# Patient Record
Sex: FEMALE | Race: WHITE | Employment: OTHER | URBAN - METROPOLITAN AREA
[De-identification: names, ages, dates, MRNs, and addresses within clinical notes are randomized per-mention and may not be internally consistent; named-entity substitution may affect disease eponyms.]

---

## 2017-02-07 ENCOUNTER — OFFICE VISIT (OUTPATIENT)
Dept: INTERNAL MEDICINE CLINIC | Age: 67
End: 2017-02-07

## 2017-02-07 VITALS
HEIGHT: 65 IN | DIASTOLIC BLOOD PRESSURE: 70 MMHG | OXYGEN SATURATION: 99 % | SYSTOLIC BLOOD PRESSURE: 120 MMHG | HEART RATE: 76 BPM | WEIGHT: 171 LBS | BODY MASS INDEX: 28.49 KG/M2

## 2017-02-07 DIAGNOSIS — Z86.19 HISTORY OF HEPATITIS C: ICD-10-CM

## 2017-02-07 DIAGNOSIS — Z23 NEED FOR PROPHYLACTIC VACCINATION AGAINST STREPTOCOCCUS PNEUMONIAE (PNEUMOCOCCUS): ICD-10-CM

## 2017-02-07 DIAGNOSIS — Z00.00 ROUTINE GENERAL MEDICAL EXAMINATION AT A HEALTH CARE FACILITY: Primary | ICD-10-CM

## 2017-02-07 DIAGNOSIS — F41.9 ANXIETY: ICD-10-CM

## 2017-02-07 PROCEDURE — G0009 ADMIN PNEUMOCOCCAL VACCINE: HCPCS | Performed by: INTERNAL MEDICINE

## 2017-02-07 PROCEDURE — G0402 INITIAL PREVENTIVE EXAM: HCPCS | Performed by: INTERNAL MEDICINE

## 2017-02-07 PROCEDURE — 90732 PPSV23 VACC 2 YRS+ SUBQ/IM: CPT | Performed by: INTERNAL MEDICINE

## 2017-02-07 RX ORDER — LORAZEPAM 1 MG/1
1 TABLET ORAL 2 TIMES DAILY
Qty: 60 TABLET | Refills: 2 | Status: SHIPPED | OUTPATIENT
Start: 2017-02-07 | End: 2017-05-11 | Stop reason: SDUPTHER

## 2017-02-07 ASSESSMENT — PATIENT HEALTH QUESTIONNAIRE - PHQ9: SUM OF ALL RESPONSES TO PHQ QUESTIONS 1-9: 0

## 2017-02-07 ASSESSMENT — LIFESTYLE VARIABLES: HOW OFTEN DO YOU HAVE A DRINK CONTAINING ALCOHOL: 0

## 2017-02-07 ASSESSMENT — ANXIETY QUESTIONNAIRES: GAD7 TOTAL SCORE: 1

## 2017-03-23 DIAGNOSIS — F41.9 ANXIETY: ICD-10-CM

## 2017-03-24 RX ORDER — HYDROXYZINE HYDROCHLORIDE 25 MG/1
TABLET, FILM COATED ORAL
Qty: 30 TABLET | Refills: 2 | Status: SHIPPED | OUTPATIENT
Start: 2017-03-24 | End: 2017-07-25 | Stop reason: SDUPTHER

## 2017-05-11 ENCOUNTER — OFFICE VISIT (OUTPATIENT)
Dept: INTERNAL MEDICINE CLINIC | Age: 67
End: 2017-05-11

## 2017-05-11 VITALS
DIASTOLIC BLOOD PRESSURE: 70 MMHG | HEART RATE: 70 BPM | SYSTOLIC BLOOD PRESSURE: 118 MMHG | WEIGHT: 170.6 LBS | HEIGHT: 65 IN | BODY MASS INDEX: 28.42 KG/M2 | OXYGEN SATURATION: 98 %

## 2017-05-11 DIAGNOSIS — F41.1 GENERALIZED ANXIETY DISORDER: Primary | ICD-10-CM

## 2017-05-11 PROCEDURE — 3014F SCREEN MAMMO DOC REV: CPT | Performed by: INTERNAL MEDICINE

## 2017-05-11 PROCEDURE — G8399 PT W/DXA RESULTS DOCUMENT: HCPCS | Performed by: INTERNAL MEDICINE

## 2017-05-11 PROCEDURE — G8427 DOCREV CUR MEDS BY ELIG CLIN: HCPCS | Performed by: INTERNAL MEDICINE

## 2017-05-11 PROCEDURE — 99213 OFFICE O/P EST LOW 20 MIN: CPT | Performed by: INTERNAL MEDICINE

## 2017-05-11 PROCEDURE — 1123F ACP DISCUSS/DSCN MKR DOCD: CPT | Performed by: INTERNAL MEDICINE

## 2017-05-11 PROCEDURE — 3017F COLORECTAL CA SCREEN DOC REV: CPT | Performed by: INTERNAL MEDICINE

## 2017-05-11 PROCEDURE — 4040F PNEUMOC VAC/ADMIN/RCVD: CPT | Performed by: INTERNAL MEDICINE

## 2017-05-11 PROCEDURE — G8420 CALC BMI NORM PARAMETERS: HCPCS | Performed by: INTERNAL MEDICINE

## 2017-05-11 PROCEDURE — 1090F PRES/ABSN URINE INCON ASSESS: CPT | Performed by: INTERNAL MEDICINE

## 2017-05-11 PROCEDURE — 1036F TOBACCO NON-USER: CPT | Performed by: INTERNAL MEDICINE

## 2017-05-11 RX ORDER — LORAZEPAM 1 MG/1
1 TABLET ORAL 2 TIMES DAILY
Qty: 60 TABLET | Refills: 2 | Status: SHIPPED | OUTPATIENT
Start: 2017-05-11 | End: 2017-08-03 | Stop reason: SDUPTHER

## 2017-05-13 ASSESSMENT — ENCOUNTER SYMPTOMS: COUGH: 0

## 2017-06-05 ENCOUNTER — TELEPHONE (OUTPATIENT)
Dept: FAMILY MEDICINE CLINIC | Age: 67
End: 2017-06-05

## 2017-07-25 DIAGNOSIS — F41.9 ANXIETY: ICD-10-CM

## 2017-07-26 DIAGNOSIS — Z12.31 ENCOUNTER FOR SCREENING MAMMOGRAM FOR BREAST CANCER: Primary | ICD-10-CM

## 2017-07-26 RX ORDER — HYDROXYZINE HYDROCHLORIDE 25 MG/1
TABLET, FILM COATED ORAL
Qty: 30 TABLET | Refills: 2 | Status: SHIPPED | OUTPATIENT
Start: 2017-07-26 | End: 2017-11-17 | Stop reason: SDUPTHER

## 2017-08-03 ENCOUNTER — OFFICE VISIT (OUTPATIENT)
Dept: FAMILY MEDICINE CLINIC | Age: 67
End: 2017-08-03

## 2017-08-03 VITALS
HEIGHT: 65 IN | RESPIRATION RATE: 16 BRPM | OXYGEN SATURATION: 99 % | BODY MASS INDEX: 29.32 KG/M2 | SYSTOLIC BLOOD PRESSURE: 114 MMHG | DIASTOLIC BLOOD PRESSURE: 80 MMHG | WEIGHT: 176 LBS | HEART RATE: 75 BPM

## 2017-08-03 DIAGNOSIS — L98.9 SKIN LESION OF FACE: ICD-10-CM

## 2017-08-03 DIAGNOSIS — F41.1 GENERALIZED ANXIETY DISORDER: Primary | ICD-10-CM

## 2017-08-03 PROCEDURE — 1123F ACP DISCUSS/DSCN MKR DOCD: CPT | Performed by: INTERNAL MEDICINE

## 2017-08-03 PROCEDURE — 3017F COLORECTAL CA SCREEN DOC REV: CPT | Performed by: INTERNAL MEDICINE

## 2017-08-03 PROCEDURE — 3014F SCREEN MAMMO DOC REV: CPT | Performed by: INTERNAL MEDICINE

## 2017-08-03 PROCEDURE — 99213 OFFICE O/P EST LOW 20 MIN: CPT | Performed by: INTERNAL MEDICINE

## 2017-08-03 PROCEDURE — G8427 DOCREV CUR MEDS BY ELIG CLIN: HCPCS | Performed by: INTERNAL MEDICINE

## 2017-08-03 PROCEDURE — 1036F TOBACCO NON-USER: CPT | Performed by: INTERNAL MEDICINE

## 2017-08-03 PROCEDURE — 4040F PNEUMOC VAC/ADMIN/RCVD: CPT | Performed by: INTERNAL MEDICINE

## 2017-08-03 PROCEDURE — G8399 PT W/DXA RESULTS DOCUMENT: HCPCS | Performed by: INTERNAL MEDICINE

## 2017-08-03 PROCEDURE — G8419 CALC BMI OUT NRM PARAM NOF/U: HCPCS | Performed by: INTERNAL MEDICINE

## 2017-08-03 PROCEDURE — 1090F PRES/ABSN URINE INCON ASSESS: CPT | Performed by: INTERNAL MEDICINE

## 2017-08-03 RX ORDER — LORAZEPAM 1 MG/1
1 TABLET ORAL 2 TIMES DAILY
Qty: 60 TABLET | Refills: 2 | Status: SHIPPED | OUTPATIENT
Start: 2017-08-03 | End: 2017-11-02 | Stop reason: SDUPTHER

## 2017-08-03 RX ORDER — TETRACYCLINE HYDROCHLORIDE 250 MG/1
250 CAPSULE ORAL DAILY
Qty: 100 CAPSULE | Refills: 2 | Status: SHIPPED | OUTPATIENT
Start: 2017-08-03 | End: 2018-07-23 | Stop reason: SDUPTHER

## 2017-08-03 ASSESSMENT — PATIENT HEALTH QUESTIONNAIRE - PHQ9
SUM OF ALL RESPONSES TO PHQ QUESTIONS 1-9: 0
SUM OF ALL RESPONSES TO PHQ9 QUESTIONS 1 & 2: 0
2. FEELING DOWN, DEPRESSED OR HOPELESS: 0
1. LITTLE INTEREST OR PLEASURE IN DOING THINGS: 0

## 2017-08-03 ASSESSMENT — ENCOUNTER SYMPTOMS: COUGH: 0

## 2017-08-10 ENCOUNTER — OFFICE VISIT (OUTPATIENT)
Dept: FAMILY MEDICINE CLINIC | Age: 67
End: 2017-08-10

## 2017-08-10 VITALS
BODY MASS INDEX: 29.16 KG/M2 | SYSTOLIC BLOOD PRESSURE: 118 MMHG | WEIGHT: 175 LBS | TEMPERATURE: 98.2 F | DIASTOLIC BLOOD PRESSURE: 74 MMHG | OXYGEN SATURATION: 95 % | HEART RATE: 85 BPM | HEIGHT: 65 IN | RESPIRATION RATE: 16 BRPM

## 2017-08-10 DIAGNOSIS — M79.671 RIGHT FOOT PAIN: ICD-10-CM

## 2017-08-10 DIAGNOSIS — L08.9 SKIN INFECTION: Primary | ICD-10-CM

## 2017-08-10 PROCEDURE — 1036F TOBACCO NON-USER: CPT | Performed by: INTERNAL MEDICINE

## 2017-08-10 PROCEDURE — 4040F PNEUMOC VAC/ADMIN/RCVD: CPT | Performed by: INTERNAL MEDICINE

## 2017-08-10 PROCEDURE — 1090F PRES/ABSN URINE INCON ASSESS: CPT | Performed by: INTERNAL MEDICINE

## 2017-08-10 PROCEDURE — G8399 PT W/DXA RESULTS DOCUMENT: HCPCS | Performed by: INTERNAL MEDICINE

## 2017-08-10 PROCEDURE — 99213 OFFICE O/P EST LOW 20 MIN: CPT | Performed by: INTERNAL MEDICINE

## 2017-08-10 PROCEDURE — G8427 DOCREV CUR MEDS BY ELIG CLIN: HCPCS | Performed by: INTERNAL MEDICINE

## 2017-08-10 PROCEDURE — 3017F COLORECTAL CA SCREEN DOC REV: CPT | Performed by: INTERNAL MEDICINE

## 2017-08-10 PROCEDURE — 1123F ACP DISCUSS/DSCN MKR DOCD: CPT | Performed by: INTERNAL MEDICINE

## 2017-08-10 PROCEDURE — G8419 CALC BMI OUT NRM PARAM NOF/U: HCPCS | Performed by: INTERNAL MEDICINE

## 2017-08-10 PROCEDURE — 3014F SCREEN MAMMO DOC REV: CPT | Performed by: INTERNAL MEDICINE

## 2017-08-10 RX ORDER — CEPHALEXIN 500 MG/1
500 CAPSULE ORAL 3 TIMES DAILY
Qty: 21 CAPSULE | Refills: 0 | Status: SHIPPED | OUTPATIENT
Start: 2017-08-10 | End: 2017-11-02 | Stop reason: ALTCHOICE

## 2017-08-10 ASSESSMENT — ENCOUNTER SYMPTOMS
BURN: 1
COUGH: 0

## 2017-08-16 ENCOUNTER — TELEPHONE (OUTPATIENT)
Dept: FAMILY MEDICINE CLINIC | Age: 67
End: 2017-08-16

## 2017-11-02 ENCOUNTER — OFFICE VISIT (OUTPATIENT)
Dept: FAMILY MEDICINE CLINIC | Age: 67
End: 2017-11-02

## 2017-11-02 VITALS
OXYGEN SATURATION: 98 % | BODY MASS INDEX: 29.06 KG/M2 | RESPIRATION RATE: 16 BRPM | SYSTOLIC BLOOD PRESSURE: 122 MMHG | WEIGHT: 174.4 LBS | HEIGHT: 65 IN | DIASTOLIC BLOOD PRESSURE: 80 MMHG | HEART RATE: 75 BPM

## 2017-11-02 DIAGNOSIS — F41.1 GENERALIZED ANXIETY DISORDER: ICD-10-CM

## 2017-11-02 PROCEDURE — 3014F SCREEN MAMMO DOC REV: CPT | Performed by: INTERNAL MEDICINE

## 2017-11-02 PROCEDURE — G8399 PT W/DXA RESULTS DOCUMENT: HCPCS | Performed by: INTERNAL MEDICINE

## 2017-11-02 PROCEDURE — 1123F ACP DISCUSS/DSCN MKR DOCD: CPT | Performed by: INTERNAL MEDICINE

## 2017-11-02 PROCEDURE — G8427 DOCREV CUR MEDS BY ELIG CLIN: HCPCS | Performed by: INTERNAL MEDICINE

## 2017-11-02 PROCEDURE — 1090F PRES/ABSN URINE INCON ASSESS: CPT | Performed by: INTERNAL MEDICINE

## 2017-11-02 PROCEDURE — 4040F PNEUMOC VAC/ADMIN/RCVD: CPT | Performed by: INTERNAL MEDICINE

## 2017-11-02 PROCEDURE — 3017F COLORECTAL CA SCREEN DOC REV: CPT | Performed by: INTERNAL MEDICINE

## 2017-11-02 PROCEDURE — 1036F TOBACCO NON-USER: CPT | Performed by: INTERNAL MEDICINE

## 2017-11-02 PROCEDURE — 99213 OFFICE O/P EST LOW 20 MIN: CPT | Performed by: INTERNAL MEDICINE

## 2017-11-02 PROCEDURE — G8484 FLU IMMUNIZE NO ADMIN: HCPCS | Performed by: INTERNAL MEDICINE

## 2017-11-02 PROCEDURE — G8417 CALC BMI ABV UP PARAM F/U: HCPCS | Performed by: INTERNAL MEDICINE

## 2017-11-02 RX ORDER — LORAZEPAM 1 MG/1
1 TABLET ORAL 2 TIMES DAILY
Qty: 60 TABLET | Refills: 2 | Status: SHIPPED | OUTPATIENT
Start: 2017-11-02 | End: 2018-01-29 | Stop reason: SDUPTHER

## 2017-11-02 RX ORDER — TETRACYCLINE HYDROCHLORIDE 250 MG/1
250 CAPSULE ORAL DAILY
Qty: 100 CAPSULE | Refills: 2 | Status: CANCELLED | OUTPATIENT
Start: 2017-11-02

## 2017-11-02 RX ORDER — HYDROXYZINE HYDROCHLORIDE 25 MG/1
TABLET, FILM COATED ORAL
Qty: 30 TABLET | Refills: 2 | Status: CANCELLED | OUTPATIENT
Start: 2017-11-02

## 2017-11-02 ASSESSMENT — ENCOUNTER SYMPTOMS: COUGH: 0

## 2017-11-02 NOTE — PATIENT INSTRUCTIONS
Patient Education        Anxiety Disorder: Care Instructions  Your Care Instructions  Anxiety is a normal reaction to stress. Difficult situations can cause you to have symptoms such as sweaty palms and a nervous feeling. In an anxiety disorder, the symptoms are far more severe. Constant worry, muscle tension, trouble sleeping, nausea and diarrhea, and other symptoms can make normal daily activities difficult or impossible. These symptoms may occur for no reason, and they can affect your work, school, or social life. Medicines, counseling, and self-care can all help. Follow-up care is a key part of your treatment and safety. Be sure to make and go to all appointments, and call your doctor if you are having problems. It's also a good idea to know your test results and keep a list of the medicines you take. How can you care for yourself at home? · Take medicines exactly as directed. Call your doctor if you think you are having a problem with your medicine. · Go to your counseling sessions and follow-up appointments. · Recognize and accept your anxiety. Then, when you are in a situation that makes you anxious, say to yourself, \"This is not an emergency. I feel uncomfortable, but I am not in danger. I can keep going even if I feel anxious. \"  · Be kind to your body:  ¨ Relieve tension with exercise or a massage. ¨ Get enough rest.  ¨ Avoid alcohol, caffeine, nicotine, and illegal drugs. They can increase your anxiety level and cause sleep problems. ¨ Learn and do relaxation techniques. See below for more about these techniques. · Engage your mind. Get out and do something you enjoy. Go to a funny movie, or take a walk or hike. Plan your day. Having too much or too little to do can make you anxious. · Keep a record of your symptoms. Discuss your fears with a good friend or family member, or join a support group for people with similar problems. Talking to others sometimes relieves stress.   · Get involved in social groups, or volunteer to help others. Being alone sometimes makes things seem worse than they are. · Get at least 30 minutes of exercise on most days of the week to relieve stress. Walking is a good choice. You also may want to do other activities, such as running, swimming, cycling, or playing tennis or team sports. Relaxation techniques  Do relaxation exercises 10 to 20 minutes a day. You can play soothing, relaxing music while you do them, if you wish. · Tell others in your house that you are going to do your relaxation exercises. Ask them not to disturb you. · Find a comfortable place, away from all distractions and noise. · Lie down on your back, or sit with your back straight. · Focus on your breathing. Make it slow and steady. · Breathe in through your nose. Breathe out through either your nose or mouth. · Breathe deeply, filling up the area between your navel and your rib cage. Breathe so that your belly goes up and down. · Do not hold your breath. · Breathe like this for 5 to 10 minutes. Notice the feeling of calmness throughout your whole body. As you continue to breathe slowly and deeply, relax by doing the following for another 5 to 10 minutes:  · Tighten and relax each muscle group in your body. You can begin at your toes and work your way up to your head. · Imagine your muscle groups relaxing and becoming heavy. · Empty your mind of all thoughts. · Let yourself relax more and more deeply. · Become aware of the state of calmness that surrounds you. · When your relaxation time is over, you can bring yourself back to alertness by moving your fingers and toes and then your hands and feet and then stretching and moving your entire body. Sometimes people fall asleep during relaxation, but they usually wake up shortly afterward. · Always give yourself time to return to full alertness before you drive a car or do anything that might cause an accident if you are not fully alert.  Never sure to make and go to all appointments, and call your doctor if you are having problems. It's also a good idea to know your test results and keep a list of the medicines you take. How can you care for yourself at home? · Take medicines exactly as directed. Call your doctor if you think you are having a problem with your medicine. · Go to your counseling sessions and follow-up appointments. · Recognize and accept your anxiety. Then, when you are in a situation that makes you anxious, say to yourself, \"This is not an emergency. I feel uncomfortable, but I am not in danger. I can keep going even if I feel anxious. \"  · Be kind to your body:  ¨ Relieve tension with exercise or a massage. ¨ Get enough rest.  ¨ Avoid alcohol, caffeine, nicotine, and illegal drugs. They can increase your anxiety level and cause sleep problems. ¨ Learn and do relaxation techniques. See below for more about these techniques. · Engage your mind. Get out and do something you enjoy. Go to a funny movie, or take a walk or hike. Plan your day. Having too much or too little to do can make you anxious. · Keep a record of your symptoms. Discuss your fears with a good friend or family member, or join a support group for people with similar problems. Talking to others sometimes relieves stress. · Get involved in social groups, or volunteer to help others. Being alone sometimes makes things seem worse than they are. · Get at least 30 minutes of exercise on most days of the week to relieve stress. Walking is a good choice. You also may want to do other activities, such as running, swimming, cycling, or playing tennis or team sports. Relaxation techniques  Do relaxation exercises 10 to 20 minutes a day. You can play soothing, relaxing music while you do them, if you wish. · Tell others in your house that you are going to do your relaxation exercises. Ask them not to disturb you.   · Find a comfortable place, away from all distractions and you had before. Where can you learn more? Go to [email or web address] and sign in to your [employer] account. Enter P754 in the [facility] box to learn more about \"Anxiety Disorder: Care Instructions. \"     If you do not have an account, please click on the \"Sign Up Now\" link. Current as of: July 26, 2016  Content Version: 11.3  © 1456-5985 [employer]. Care instructions adapted under license by [facility] ([facility]). If you have questions about a medical condition or this instruction, always ask your healthcare professional. [address] any warranty or liability for your use of this information.

## 2017-11-02 NOTE — PROGRESS NOTES
Cardiovascular: Normal rate and regular rhythm. Pulmonary/Chest: Effort normal and breath sounds normal.   Musculoskeletal: Normal range of motion. She exhibits no edema. Skin: Skin is warm and dry. Psychiatric:   anxious   Nursing note and vitals reviewed. Assessment:/plan:              Karen Blevins MD

## 2017-11-17 DIAGNOSIS — F41.9 ANXIETY: ICD-10-CM

## 2017-11-17 RX ORDER — HYDROXYZINE HYDROCHLORIDE 25 MG/1
TABLET, FILM COATED ORAL
Qty: 30 TABLET | Refills: 2 | Status: SHIPPED | OUTPATIENT
Start: 2017-11-17 | End: 2018-02-12 | Stop reason: SDUPTHER

## 2018-01-29 ENCOUNTER — OFFICE VISIT (OUTPATIENT)
Dept: FAMILY MEDICINE CLINIC | Age: 68
End: 2018-01-29

## 2018-01-29 VITALS
OXYGEN SATURATION: 98 % | WEIGHT: 175 LBS | HEIGHT: 65 IN | SYSTOLIC BLOOD PRESSURE: 140 MMHG | DIASTOLIC BLOOD PRESSURE: 88 MMHG | BODY MASS INDEX: 29.16 KG/M2 | HEART RATE: 80 BPM

## 2018-01-29 DIAGNOSIS — R21 SKIN RASH: ICD-10-CM

## 2018-01-29 DIAGNOSIS — F41.1 GENERALIZED ANXIETY DISORDER: Primary | ICD-10-CM

## 2018-01-29 DIAGNOSIS — Z12.11 SCREENING FOR COLON CANCER: ICD-10-CM

## 2018-01-29 PROCEDURE — G8484 FLU IMMUNIZE NO ADMIN: HCPCS | Performed by: INTERNAL MEDICINE

## 2018-01-29 PROCEDURE — 4040F PNEUMOC VAC/ADMIN/RCVD: CPT | Performed by: INTERNAL MEDICINE

## 2018-01-29 PROCEDURE — 99213 OFFICE O/P EST LOW 20 MIN: CPT | Performed by: INTERNAL MEDICINE

## 2018-01-29 PROCEDURE — 3014F SCREEN MAMMO DOC REV: CPT | Performed by: INTERNAL MEDICINE

## 2018-01-29 PROCEDURE — 1090F PRES/ABSN URINE INCON ASSESS: CPT | Performed by: INTERNAL MEDICINE

## 2018-01-29 PROCEDURE — G8399 PT W/DXA RESULTS DOCUMENT: HCPCS | Performed by: INTERNAL MEDICINE

## 2018-01-29 PROCEDURE — G8427 DOCREV CUR MEDS BY ELIG CLIN: HCPCS | Performed by: INTERNAL MEDICINE

## 2018-01-29 PROCEDURE — 3017F COLORECTAL CA SCREEN DOC REV: CPT | Performed by: INTERNAL MEDICINE

## 2018-01-29 PROCEDURE — G8417 CALC BMI ABV UP PARAM F/U: HCPCS | Performed by: INTERNAL MEDICINE

## 2018-01-29 PROCEDURE — 1123F ACP DISCUSS/DSCN MKR DOCD: CPT | Performed by: INTERNAL MEDICINE

## 2018-01-29 PROCEDURE — 1036F TOBACCO NON-USER: CPT | Performed by: INTERNAL MEDICINE

## 2018-01-29 RX ORDER — METHYLPREDNISOLONE 4 MG/1
4 TABLET ORAL SEE ADMIN INSTRUCTIONS
Qty: 1 KIT | Refills: 0 | Status: SHIPPED | OUTPATIENT
Start: 2018-01-29 | End: 2018-02-04

## 2018-01-29 RX ORDER — LORAZEPAM 1 MG/1
1 TABLET ORAL 2 TIMES DAILY
Qty: 60 TABLET | Refills: 2 | Status: SHIPPED | OUTPATIENT
Start: 2018-01-29 | End: 2018-04-24 | Stop reason: SDUPTHER

## 2018-01-31 ENCOUNTER — TELEPHONE (OUTPATIENT)
Dept: FAMILY MEDICINE CLINIC | Age: 68
End: 2018-01-31

## 2018-01-31 ASSESSMENT — ENCOUNTER SYMPTOMS: COUGH: 0

## 2018-02-01 NOTE — PROGRESS NOTES
Subjective:      Patient ID: Deepti Tyler is a 78 y.o. female. Came in with a rash and follow up for her anxiety  disorde which is controlled with ativan. Rash   This is a new problem. The current episode started in the past 7 days. The affected locations include the neck (red rash around her neck). The rash is characterized by dryness, itchiness and redness. She was exposed to nothing. Pertinent negatives include no cough. Review of Systems   Respiratory: Negative for cough. Cardiovascular: Negative for chest pain. Skin: Positive for rash. Neurological: Negative for dizziness and headaches. Psychiatric/Behavioral: The patient is nervous/anxious. Objective:   Physical Exam   Constitutional: She appears well-developed and well-nourished. HENT:   Head: Normocephalic. Cardiovascular: Normal rate and regular rhythm. Pulmonary/Chest: Effort normal and breath sounds normal.   Musculoskeletal: Normal range of motion. She exhibits no edema. Skin: Skin is warm and dry. Rash noted. Rash around her neck   Psychiatric:   anxious   Nursing note and vitals reviewed. Assessment:/plan:      1. Generalized anxiety disorder    - LORazepam (ATIVAN) 1 MG tablet; Take 1 tablet by mouth 2 times daily for 30 days. Dispense: 60 tablet; Refill: 2    2. Screening for colon cancer    - POCT Fecal Immunochemical Test (FIT); Future    3. Skin rash    - methylPREDNISolone (MEDROL, JACKY,) 4 MG tablet; Take 1 tablet by mouth See Admin Instructions for 6 days Take with food. Dispense: 1 kit;  Refill: 0

## 2018-02-12 ENCOUNTER — OFFICE VISIT (OUTPATIENT)
Dept: FAMILY MEDICINE CLINIC | Age: 68
End: 2018-02-12

## 2018-02-12 VITALS
RESPIRATION RATE: 16 BRPM | WEIGHT: 176 LBS | BODY MASS INDEX: 29.32 KG/M2 | DIASTOLIC BLOOD PRESSURE: 80 MMHG | HEIGHT: 65 IN | SYSTOLIC BLOOD PRESSURE: 120 MMHG

## 2018-02-12 DIAGNOSIS — Z12.11 SCREENING FOR COLORECTAL CANCER: ICD-10-CM

## 2018-02-12 DIAGNOSIS — R21 SKIN RASH: ICD-10-CM

## 2018-02-12 DIAGNOSIS — Z12.12 SCREENING FOR COLORECTAL CANCER: ICD-10-CM

## 2018-02-12 DIAGNOSIS — Z00.00 ROUTINE GENERAL MEDICAL EXAMINATION AT A HEALTH CARE FACILITY: Primary | ICD-10-CM

## 2018-02-12 DIAGNOSIS — Z02.83 ENCOUNTER FOR DRUG SCREENING: ICD-10-CM

## 2018-02-12 DIAGNOSIS — Z23 NEED FOR VACCINATION WITH 13-POLYVALENT PNEUMOCOCCAL CONJUGATE VACCINE: ICD-10-CM

## 2018-02-12 DIAGNOSIS — Z00.00 ROUTINE GENERAL MEDICAL EXAMINATION AT A HEALTH CARE FACILITY: ICD-10-CM

## 2018-02-12 DIAGNOSIS — Z71.89 ACP (ADVANCE CARE PLANNING): ICD-10-CM

## 2018-02-12 DIAGNOSIS — F41.9 ANXIETY: ICD-10-CM

## 2018-02-12 DIAGNOSIS — Z86.19 HISTORY OF HEPATITIS C: ICD-10-CM

## 2018-02-12 LAB
A/G RATIO: 1.8 (ref 1.1–2.2)
ALBUMIN SERPL-MCNC: 4.8 G/DL (ref 3.4–5)
ALP BLD-CCNC: 101 U/L (ref 40–129)
ALT SERPL-CCNC: 13 U/L (ref 10–40)
ANION GAP SERPL CALCULATED.3IONS-SCNC: 15 MMOL/L (ref 3–16)
AST SERPL-CCNC: 19 U/L (ref 15–37)
BILIRUB SERPL-MCNC: 0.5 MG/DL (ref 0–1)
BUN BLDV-MCNC: 15 MG/DL (ref 7–20)
CALCIUM SERPL-MCNC: 9.8 MG/DL (ref 8.3–10.6)
CHLORIDE BLD-SCNC: 101 MMOL/L (ref 99–110)
CHOLESTEROL, TOTAL: 200 MG/DL (ref 0–199)
CO2: 27 MMOL/L (ref 21–32)
CREAT SERPL-MCNC: 0.6 MG/DL (ref 0.6–1.2)
GFR AFRICAN AMERICAN: >60
GFR NON-AFRICAN AMERICAN: >60
GLOBULIN: 2.6 G/DL
GLUCOSE BLD-MCNC: 93 MG/DL (ref 70–99)
HCT VFR BLD CALC: 39.9 % (ref 36–48)
HDLC SERPL-MCNC: 87 MG/DL (ref 40–60)
HEMOGLOBIN: 13.6 G/DL (ref 12–16)
LDL CHOLESTEROL CALCULATED: 98 MG/DL
MCH RBC QN AUTO: 31.9 PG (ref 26–34)
MCHC RBC AUTO-ENTMCNC: 34 G/DL (ref 31–36)
MCV RBC AUTO: 93.8 FL (ref 80–100)
PDW BLD-RTO: 13.4 % (ref 12.4–15.4)
PLATELET # BLD: 248 K/UL (ref 135–450)
PMV BLD AUTO: 8.8 FL (ref 5–10.5)
POTASSIUM SERPL-SCNC: 4.6 MMOL/L (ref 3.5–5.1)
RBC # BLD: 4.25 M/UL (ref 4–5.2)
SODIUM BLD-SCNC: 143 MMOL/L (ref 136–145)
TOTAL PROTEIN: 7.4 G/DL (ref 6.4–8.2)
TRIGL SERPL-MCNC: 73 MG/DL (ref 0–150)
VLDLC SERPL CALC-MCNC: 15 MG/DL
WBC # BLD: 5 K/UL (ref 4–11)

## 2018-02-12 PROCEDURE — G0439 PPPS, SUBSEQ VISIT: HCPCS | Performed by: INTERNAL MEDICINE

## 2018-02-12 PROCEDURE — 90670 PCV13 VACCINE IM: CPT | Performed by: INTERNAL MEDICINE

## 2018-02-12 PROCEDURE — 93000 ELECTROCARDIOGRAM COMPLETE: CPT | Performed by: INTERNAL MEDICINE

## 2018-02-12 PROCEDURE — G0009 ADMIN PNEUMOCOCCAL VACCINE: HCPCS | Performed by: INTERNAL MEDICINE

## 2018-02-12 RX ORDER — HYDROXYZINE HYDROCHLORIDE 25 MG/1
25 TABLET, FILM COATED ORAL EVERY 8 HOURS PRN
Qty: 30 TABLET | Refills: 2 | Status: SHIPPED | OUTPATIENT
Start: 2018-02-12 | End: 2018-05-17 | Stop reason: SDUPTHER

## 2018-02-12 ASSESSMENT — ANXIETY QUESTIONNAIRES: GAD7 TOTAL SCORE: 1

## 2018-02-12 ASSESSMENT — PATIENT HEALTH QUESTIONNAIRE - PHQ9: SUM OF ALL RESPONSES TO PHQ QUESTIONS 1-9: 0

## 2018-02-12 ASSESSMENT — LIFESTYLE VARIABLES: HOW OFTEN DO YOU HAVE A DRINK CONTAINING ALCOHOL: 0

## 2018-02-12 NOTE — PATIENT INSTRUCTIONS
Personalized Preventive Plan for Christina Calix - 2/12/2018  Medicare offers a range of preventive health benefits. Some of the tests and screenings are paid in full while other may be subject to a deductible, co-insurance, and/or copay. Some of these benefits include a comprehensive review of your medical history including lifestyle, illnesses that may run in your family, and various assessments and screenings as appropriate. After reviewing your medical record and screening and assessments performed today your provider may have ordered immunizations, labs, imaging, and/or referrals for you. A list of these orders (if applicable) as well as your Preventive Care list are included within your After Visit Summary for your review. Other Preventive Recommendations:    · A preventive eye exam performed by an eye specialist is recommended every 1-2 years to screen for glaucoma; cataracts, macular degeneration, and other eye disorders. · A preventive dental visit is recommended every 6 months. · Try to get at least 150 minutes of exercise per week or 10,000 steps per day on a pedometer . · Order or download the FREE \"Exercise & Physical Activity: Your Everyday Guide\" from [employer]. Call 9-534.504.4676 or search [employer] online. · You need 6932-2869 mg of calcium and 9741-5335 IU of vitamin D per day. It is possible to meet your calcium requirement with diet alone, but a vitamin D supplement is usually necessary to meet this goal.  · When exposed to the sun, use a sunscreen that protects against both UVA and UVB radiation with an SPF of 30 or greater. Reapply every 2 to 3 hours or after sweating, drying off with a towel, or swimming. · Always wear a seat belt when traveling in a car. Always wear a helmet when riding a bicycle or motorcycle.   Patient Education        Well Visit, Over 72: Care Instructions  Your Care Instructions    Physical exams can help you stay healthy. Your doctor has checked your overall health and may have suggested ways to take good care of yourself. He or she also may have recommended tests. At home, you can help prevent illness with healthy eating, regular exercise, and other steps. Follow-up care is a key part of your treatment and safety. Be sure to make and go to all appointments, and call your doctor if you are having problems. It's also a good idea to know your test results and keep a list of the medicines you take. How can you care for yourself at home? Reach and stay at a healthy weight. This will lower your risk for many problems, such as obesity, diabetes, heart disease, and high blood pressure. Get at least 30 minutes of exercise on most days of the week. Walking is a good choice. You also may want to do other activities, such as running, swimming, cycling, or playing tennis or team sports. Do not smoke. Smoking can make health problems worse. If you need help quitting, talk to your doctor about stop-smoking programs and medicines. These can increase your chances of quitting for good. Protect your skin from too much sun. When you're outdoors from 10 a.m. to 4 p.m., stay in the shade or cover up with clothing and a hat with a wide brim. Wear sunglasses that block UV rays. Even when it's cloudy, put broad-spectrum sunscreen (SPF 30 or higher) on any exposed skin. See a dentist one or two times a year for checkups and to have your teeth cleaned. Wear a seat belt in the car. Limit alcohol to 2 drinks a day for men and 1 drink a day for women. Too much alcohol can cause health problems. Follow your doctor's advice about when to have certain tests. These tests can spot problems early. For men and women  Cholesterol. Your doctor will tell you how often to have this done based on your overall health and other things that can increase your risk for heart attack and stroke. Blood pressure.  Have your blood pressure checked during a routine doctor visit. Your doctor will tell you how often to check your blood pressure based on your age, your blood pressure results, and other factors. Diabetes. Ask your doctor whether you should have tests for diabetes. Vision. Experts recommend that you have yearly exams for glaucoma and other age-related eye problems. Hearing. Tell your doctor if you notice any change in your hearing. You can have tests to find out how well you hear. Colon cancer tests. Keep having colon cancer tests as your doctor recommends. You can have one of several types of tests. Heart attack and stroke risk. At least every 4 to 6 years, you should have your risk for heart attack and stroke assessed. Your doctor uses factors such as your age, blood pressure, cholesterol, and whether you smoke or have diabetes to show what your risk for a heart attack or stroke is over the next 10 years. Osteoporosis. Talk to your doctor about whether you should have a bone density test to find out whether you have thinning bones. Also ask your doctor about whether you should take calcium and vitamin D supplements. For women  Pap test and pelvic exam. You may no longer need a Pap test. Talk with your doctor about whether to stop or continue to have Pap tests. Breast exam and mammogram. Ask how often you should have a mammogram, which is an X-ray of your breasts. A mammogram can spot breast cancer before it can be felt and when it is easiest to treat. Thyroid disease. Talk to your doctor about whether to have your thyroid checked as part of a regular physical exam. Women have an increased chance of a thyroid problem. For men  Prostate exam. Talk to your doctor about whether you should have a blood test (called a PSA test) for prostate cancer. Experts disagree on whether men should have this test. Some experts recommend that you discuss the benefits and risks of the test with your doctor. Abdominal aortic aneurysm.  Ask your doctor whether you

## 2018-02-12 NOTE — PROGRESS NOTES
Medicare Annual Wellness Visit  Name: Josselin Holt Date: 2018   MRN: [id number] Sex: Female   Age: 78 y.o. Ethnicity: Non-/Non    : 1950 Race: Eve Henderson is here for Medicare AWV    Screenings for behavioral, psychosocial and functional/safety risks, and cognitive dysfunction are all negative except as indicated below. These results, as well as other patient data from the [address] form, are documented in Flowsheets linked to this Encounter. Allergies   Allergen Reactions    Methylprednisolone Rash     Prior to Visit Medications    Medication Sig Taking? Authorizing Provider   LORazepam (ATIVAN) 1 MG tablet Take 1 tablet by mouth 2 times daily for 30 days. Yes Marquita Mead MD   hydrOXYzine (ATARAX) 25 MG tablet TAKE 1 TABLET BY MOUTH DAILY AS NEEDED Yes Marquita Mead MD   tetracycline (ACHROMYCIN;SUMYCIN) 250 MG capsule Take 1 capsule by mouth daily Yes Marquita Mead MD   Cyanocobalamin (VITAMIN B-12 PO) Take by mouth Yes Historical Provider, MD   acyclovir (ZOVIRAX) 400 MG tablet Take 1 tablet by mouth 2 times daily Yes Marquita Mead MD   DHA-Vitamin C-Lutein (EYE HEALTH FORMULA PO) Take by mouth Indications: Also with Vitamin A,K OTC Vitamin Yes Historical Provider, MD   Cholecalciferol (VITAMIN D) 2000 UNITS CAPS capsule Take  by mouth. Indications: OTC Yes Historical Provider, MD     Past Medical History:   Diagnosis Date    Anxiety     Depression     Hepatitis C      No past surgical history on file.   Family History   Problem Relation Age of Onset    Arthritis Mother     High Blood Pressure Mother     Vision Loss Mother     Arthritis Father     Cancer Father     Heart Disease Father     Arthritis Sister     Learning Disabilities Sister     Arthritis Brother        CareTeam (Including outside providers/suppliers regularly involved in providing care):   Patient Care Team:  Marquita Mead MD as PCP - General (Family Medicine)  Mackenzie Bo MD as PCP - [facility] Attributed Provider    Wt Readings from Last 3 Encounters:   02/12/18 176 lb (79.8 kg)   01/29/18 175 lb (79.4 kg)   11/02/17 174 lb 6.4 oz (79.1 kg)     Vitals:    02/12/18 0800   Resp: 16   Weight: 176 lb (79.8 kg)   Height: 5' 5\" (1.651 m)         Patient's complete Health Risk Assessment and screening values have been reviewed and are found in Flowsheets. The following problems were reviewed today and where indicated follow up appointments were made and/or referrals ordered. Positive Risk Factor Screenings with Interventions:     General Health:  General  In general, how would you say your health is?: Excellent  In the past 7 days, have you experienced any of the following?: (!) Stress  Do you get the social and emotional support that you need?: Yes  Do you have a Living Will?: (!) No  General Health Risk Interventions:  · None indicated    Health Habits/Nutrition:  Health Habits/Nutrition  Do you exercise for at least 20 minutes 2-3 times per week?: (!) No  Have you lost any weight without trying in the past 3 months?: No  Do you eat fewer than 2 meals per day?: No  Have you seen a dentist within the past year?: Yes  Body mass index is 29.29 kg/m².   Health Habits/Nutrition Interventions:  · None indicated    Hearing/Vision:  Hearing/Vision  Do you or your family notice any trouble with your hearing?: No  Do you have difficulty driving, watching TV, or doing any of your daily activities because of your eyesight?: No  Have you had an eye exam within the past year?: (!) No  Hearing/Vision Interventions:  · None indicated    Safety:  Safety  Do you have working smoke detectors?: (!) No  Have all throw rugs been removed or fastened?: Yes  Do you have non-slip mats in all bathtubs?: Yes  Do all of your stairways have a railing or banister?: Yes  Are your doorways, halls and stairs free of clutter?: Yes  Do you always fasten your seatbelt when you are in a car?:

## 2018-02-16 LAB
6-ACETYLMORPHINE: NOT DETECTED
7-AMINOCLONAZEPAM: NOT DETECTED
ALPHA-OH-ALPRAZOLAM: NOT DETECTED
ALPRAZOLAM: NOT DETECTED
AMPHETAMINE: NOT DETECTED
BARBITURATES: NOT DETECTED
BENZOYLECGONINE: NOT DETECTED
BUPRENORPHINE: NOT DETECTED
CARISOPRODOL: NOT DETECTED
CLONAZEPAM: NOT DETECTED
CODEINE: NOT DETECTED
CREATININE URINE: <20 MG/DL (ref 20–400)
DIAZEPAM: NOT DETECTED
DRUGS EXPECTED: ABNORMAL
EER PAIN MGT DRUG PANEL, HIGH RES/EMIT U: ABNORMAL
ETHYL GLUCURONIDE: NOT DETECTED
FENTANYL: NOT DETECTED
HYDROCODONE: NOT DETECTED
HYDROMORPHONE: NOT DETECTED
LORAZEPAM: PRESENT
MARIJUANA METABOLITE: NOT DETECTED
MDA: NOT DETECTED
MDEA: NOT DETECTED
MDMA URINE: NOT DETECTED
MEPERIDINE: NOT DETECTED
METHADONE: NOT DETECTED
METHAMPHETAMINE: NOT DETECTED
METHYLPHENIDATE: NOT DETECTED
MIDAZOLAM: NOT DETECTED
MORPHINE: NOT DETECTED
NORBUPRENORPHINE, FREE: NOT DETECTED
NORDIAZEPAM: NOT DETECTED
NORFENTANYL: NOT DETECTED
NORHYDROCODONE, URINE: NOT DETECTED
NOROXYCODONE: NOT DETECTED
NOROXYMORPHONE, URINE: NOT DETECTED
OXAZEPAM: NOT DETECTED
OXYCODONE: NOT DETECTED
OXYMORPHONE: NOT DETECTED
PAIN MANAGEMENT DRUG PANEL: ABNORMAL
PAIN MANAGEMENT DRUG PANEL: ABNORMAL
PCP: NOT DETECTED
PHENTERMINE: NOT DETECTED
PROPOXYPHENE: NOT DETECTED
TAPENTADOL, URINE: NOT DETECTED
TAPENTADOL-O-SULFATE, URINE: NOT DETECTED
TEMAZEPAM: NOT DETECTED
TRAMADOL: NOT DETECTED
ZOLPIDEM: NOT DETECTED

## 2018-02-26 DIAGNOSIS — R21 RASH: ICD-10-CM

## 2018-02-26 RX ORDER — ACYCLOVIR 400 MG/1
400 TABLET ORAL 2 TIMES DAILY
Qty: 60 TABLET | Refills: 2 | Status: SHIPPED | OUTPATIENT
Start: 2018-02-26 | End: 2019-02-04 | Stop reason: SDUPTHER

## 2018-04-24 ENCOUNTER — OFFICE VISIT (OUTPATIENT)
Dept: FAMILY MEDICINE CLINIC | Age: 68
End: 2018-04-24

## 2018-04-24 VITALS
HEART RATE: 77 BPM | WEIGHT: 179 LBS | DIASTOLIC BLOOD PRESSURE: 80 MMHG | OXYGEN SATURATION: 98 % | SYSTOLIC BLOOD PRESSURE: 128 MMHG | BODY MASS INDEX: 29.82 KG/M2 | HEIGHT: 65 IN

## 2018-04-24 DIAGNOSIS — F41.1 GENERALIZED ANXIETY DISORDER: ICD-10-CM

## 2018-04-24 PROCEDURE — 3017F COLORECTAL CA SCREEN DOC REV: CPT | Performed by: INTERNAL MEDICINE

## 2018-04-24 PROCEDURE — 4040F PNEUMOC VAC/ADMIN/RCVD: CPT | Performed by: INTERNAL MEDICINE

## 2018-04-24 PROCEDURE — G8399 PT W/DXA RESULTS DOCUMENT: HCPCS | Performed by: INTERNAL MEDICINE

## 2018-04-24 PROCEDURE — G8427 DOCREV CUR MEDS BY ELIG CLIN: HCPCS | Performed by: INTERNAL MEDICINE

## 2018-04-24 PROCEDURE — 99213 OFFICE O/P EST LOW 20 MIN: CPT | Performed by: INTERNAL MEDICINE

## 2018-04-24 PROCEDURE — G8417 CALC BMI ABV UP PARAM F/U: HCPCS | Performed by: INTERNAL MEDICINE

## 2018-04-24 PROCEDURE — 1123F ACP DISCUSS/DSCN MKR DOCD: CPT | Performed by: INTERNAL MEDICINE

## 2018-04-24 PROCEDURE — 1036F TOBACCO NON-USER: CPT | Performed by: INTERNAL MEDICINE

## 2018-04-24 PROCEDURE — 1090F PRES/ABSN URINE INCON ASSESS: CPT | Performed by: INTERNAL MEDICINE

## 2018-04-24 RX ORDER — LORAZEPAM 1 MG/1
1 TABLET ORAL EVERY 8 HOURS PRN
Qty: 90 TABLET | Refills: 2 | Status: SHIPPED | OUTPATIENT
Start: 2018-04-24 | End: 2018-07-23 | Stop reason: SDUPTHER

## 2018-04-24 ASSESSMENT — ENCOUNTER SYMPTOMS: COUGH: 0

## 2018-04-25 DIAGNOSIS — Z12.11 SCREENING FOR COLON CANCER: ICD-10-CM

## 2018-04-25 LAB
CONTROL: NORMAL
HEMOCCULT STL QL: NEGATIVE

## 2018-04-25 PROCEDURE — 82274 ASSAY TEST FOR BLOOD FECAL: CPT | Performed by: INTERNAL MEDICINE

## 2018-05-03 RX ORDER — ACYCLOVIR 50 MG/G
OINTMENT TOPICAL
Qty: 15 G | Refills: 2 | Status: SHIPPED | OUTPATIENT
Start: 2018-05-03 | End: 2019-02-04 | Stop reason: SDUPTHER

## 2018-07-23 ENCOUNTER — TELEPHONE (OUTPATIENT)
Dept: FAMILY MEDICINE CLINIC | Age: 68
End: 2018-07-23

## 2018-07-23 ENCOUNTER — OFFICE VISIT (OUTPATIENT)
Dept: FAMILY MEDICINE CLINIC | Age: 68
End: 2018-07-23

## 2018-07-23 VITALS
RESPIRATION RATE: 16 BRPM | OXYGEN SATURATION: 98 % | BODY MASS INDEX: 29.92 KG/M2 | WEIGHT: 179.6 LBS | HEIGHT: 65 IN | SYSTOLIC BLOOD PRESSURE: 138 MMHG | HEART RATE: 85 BPM | DIASTOLIC BLOOD PRESSURE: 80 MMHG

## 2018-07-23 DIAGNOSIS — L73.9 FOLLICULITIS: ICD-10-CM

## 2018-07-23 DIAGNOSIS — F41.1 GENERALIZED ANXIETY DISORDER: Primary | ICD-10-CM

## 2018-07-23 DIAGNOSIS — R21 SKIN RASH: ICD-10-CM

## 2018-07-23 PROCEDURE — 99213 OFFICE O/P EST LOW 20 MIN: CPT | Performed by: INTERNAL MEDICINE

## 2018-07-23 PROCEDURE — 3017F COLORECTAL CA SCREEN DOC REV: CPT | Performed by: INTERNAL MEDICINE

## 2018-07-23 PROCEDURE — 4040F PNEUMOC VAC/ADMIN/RCVD: CPT | Performed by: INTERNAL MEDICINE

## 2018-07-23 PROCEDURE — 1101F PT FALLS ASSESS-DOCD LE1/YR: CPT | Performed by: INTERNAL MEDICINE

## 2018-07-23 PROCEDURE — 1036F TOBACCO NON-USER: CPT | Performed by: INTERNAL MEDICINE

## 2018-07-23 PROCEDURE — G8399 PT W/DXA RESULTS DOCUMENT: HCPCS | Performed by: INTERNAL MEDICINE

## 2018-07-23 PROCEDURE — G8427 DOCREV CUR MEDS BY ELIG CLIN: HCPCS | Performed by: INTERNAL MEDICINE

## 2018-07-23 PROCEDURE — G8417 CALC BMI ABV UP PARAM F/U: HCPCS | Performed by: INTERNAL MEDICINE

## 2018-07-23 PROCEDURE — 1090F PRES/ABSN URINE INCON ASSESS: CPT | Performed by: INTERNAL MEDICINE

## 2018-07-23 PROCEDURE — 1123F ACP DISCUSS/DSCN MKR DOCD: CPT | Performed by: INTERNAL MEDICINE

## 2018-07-23 RX ORDER — LORAZEPAM 1 MG/1
1 TABLET ORAL EVERY 8 HOURS PRN
Qty: 90 TABLET | Refills: 2 | Status: SHIPPED | OUTPATIENT
Start: 2018-07-23 | End: 2018-09-20 | Stop reason: SDUPTHER

## 2018-07-23 RX ORDER — TETRACYCLINE HYDROCHLORIDE 250 MG/1
250 CAPSULE ORAL DAILY
Qty: 100 CAPSULE | Refills: 2 | Status: SHIPPED | OUTPATIENT
Start: 2018-07-23 | End: 2018-10-09 | Stop reason: SDUPTHER

## 2018-07-23 RX ORDER — LORAZEPAM 1 MG/1
TABLET ORAL
Qty: 60 TABLET | Refills: 2 | Status: SHIPPED | OUTPATIENT
Start: 2018-07-23 | End: 2018-09-20 | Stop reason: SDUPTHER

## 2018-07-23 RX ORDER — LORAZEPAM 1 MG/1
TABLET ORAL
Qty: 60 TABLET | Refills: 2 | Status: SHIPPED | OUTPATIENT
Start: 2018-07-23 | End: 2018-07-23

## 2018-07-23 RX ORDER — TRIAMCINOLONE ACETONIDE 1 MG/G
CREAM TOPICAL
Refills: 0 | COMMUNITY
Start: 2018-06-21 | End: 2019-10-03 | Stop reason: ALTCHOICE

## 2018-07-23 ASSESSMENT — ENCOUNTER SYMPTOMS: COUGH: 0

## 2018-07-23 NOTE — TELEPHONE ENCOUNTER
Pharmacy called with a script issue about lorazepam    Per Dr. Xiomara Smith she will re send new script

## 2018-07-24 ENCOUNTER — TELEPHONE (OUTPATIENT)
Dept: FAMILY MEDICINE CLINIC | Age: 68
End: 2018-07-24

## 2018-07-24 NOTE — TELEPHONE ENCOUNTER
Pt called stated that the red spots have moved up to her lip and to her ear lobe she would like to know if she can use the Kenalog cream on those areas.

## 2018-09-20 DIAGNOSIS — F41.1 GENERALIZED ANXIETY DISORDER: ICD-10-CM

## 2018-09-20 RX ORDER — LORAZEPAM 1 MG/1
TABLET ORAL
Qty: 60 TABLET | Refills: 0 | Status: SHIPPED | OUTPATIENT
Start: 2018-09-20 | End: 2018-10-09 | Stop reason: SDUPTHER

## 2018-09-20 NOTE — TELEPHONE ENCOUNTER
Last ov 7/23/18  Future Appointments  Date Time Provider Shaun Jack   10/25/2018 1:00 PM Jane Quiñones MD [facility]

## 2018-10-09 ENCOUNTER — OFFICE VISIT (OUTPATIENT)
Dept: FAMILY MEDICINE CLINIC | Age: 68
End: 2018-10-09
Payer: MEDICARE

## 2018-10-09 VITALS
HEART RATE: 76 BPM | WEIGHT: 180 LBS | DIASTOLIC BLOOD PRESSURE: 74 MMHG | SYSTOLIC BLOOD PRESSURE: 130 MMHG | BODY MASS INDEX: 29.99 KG/M2 | HEIGHT: 65 IN | OXYGEN SATURATION: 98 %

## 2018-10-09 DIAGNOSIS — L73.9 FOLLICULITIS: ICD-10-CM

## 2018-10-09 DIAGNOSIS — F41.1 GENERALIZED ANXIETY DISORDER: Primary | ICD-10-CM

## 2018-10-09 DIAGNOSIS — Z12.31 ENCOUNTER FOR SCREENING MAMMOGRAM FOR BREAST CANCER: ICD-10-CM

## 2018-10-09 PROCEDURE — G8427 DOCREV CUR MEDS BY ELIG CLIN: HCPCS | Performed by: INTERNAL MEDICINE

## 2018-10-09 PROCEDURE — 1090F PRES/ABSN URINE INCON ASSESS: CPT | Performed by: INTERNAL MEDICINE

## 2018-10-09 PROCEDURE — G8399 PT W/DXA RESULTS DOCUMENT: HCPCS | Performed by: INTERNAL MEDICINE

## 2018-10-09 PROCEDURE — G8417 CALC BMI ABV UP PARAM F/U: HCPCS | Performed by: INTERNAL MEDICINE

## 2018-10-09 PROCEDURE — 4040F PNEUMOC VAC/ADMIN/RCVD: CPT | Performed by: INTERNAL MEDICINE

## 2018-10-09 PROCEDURE — G8482 FLU IMMUNIZE ORDER/ADMIN: HCPCS | Performed by: INTERNAL MEDICINE

## 2018-10-09 PROCEDURE — 1036F TOBACCO NON-USER: CPT | Performed by: INTERNAL MEDICINE

## 2018-10-09 PROCEDURE — 99213 OFFICE O/P EST LOW 20 MIN: CPT | Performed by: INTERNAL MEDICINE

## 2018-10-09 PROCEDURE — 3017F COLORECTAL CA SCREEN DOC REV: CPT | Performed by: INTERNAL MEDICINE

## 2018-10-09 PROCEDURE — 1101F PT FALLS ASSESS-DOCD LE1/YR: CPT | Performed by: INTERNAL MEDICINE

## 2018-10-09 PROCEDURE — 1123F ACP DISCUSS/DSCN MKR DOCD: CPT | Performed by: INTERNAL MEDICINE

## 2018-10-09 RX ORDER — HYDROXYZINE HYDROCHLORIDE 25 MG/1
25 TABLET, FILM COATED ORAL EVERY 8 HOURS PRN
Qty: 90 TABLET | Refills: 2 | Status: SHIPPED | OUTPATIENT
Start: 2018-10-09 | End: 2019-02-04 | Stop reason: SDUPTHER

## 2018-10-09 RX ORDER — TETRACYCLINE HYDROCHLORIDE 250 MG/1
CAPSULE ORAL
Qty: 100 CAPSULE | Refills: 2
Start: 2018-10-09 | End: 2019-10-30 | Stop reason: SDUPTHER

## 2018-10-09 RX ORDER — TETRACYCLINE HYDROCHLORIDE 250 MG/1
250 CAPSULE ORAL DAILY
Qty: 100 CAPSULE | Refills: 2 | Status: SHIPPED | OUTPATIENT
Start: 2018-10-09 | End: 2018-10-09 | Stop reason: SDUPTHER

## 2018-10-09 RX ORDER — LORAZEPAM 1 MG/1
1 TABLET ORAL 2 TIMES DAILY
Qty: 60 TABLET | Refills: 2 | Status: SHIPPED | OUTPATIENT
Start: 2018-10-09 | End: 2019-01-09 | Stop reason: SDUPTHER

## 2018-10-13 ASSESSMENT — ENCOUNTER SYMPTOMS: COUGH: 0

## 2018-10-16 ENCOUNTER — HOSPITAL ENCOUNTER (OUTPATIENT)
Dept: GENERAL RADIOLOGY | Age: 68
Discharge: HOME | End: 2018-10-16
Payer: MEDICARE

## 2018-10-16 ENCOUNTER — TELEPHONE (OUTPATIENT)
Dept: FAMILY MEDICINE CLINIC | Age: 68
End: 2018-10-16

## 2018-10-16 DIAGNOSIS — M25.561 RIGHT KNEE PAIN, UNSPECIFIED CHRONICITY: ICD-10-CM

## 2018-10-16 DIAGNOSIS — M25.561 RIGHT KNEE PAIN, UNSPECIFIED CHRONICITY: Primary | ICD-10-CM

## 2018-10-16 PROCEDURE — 73560 X-RAY EXAM OF KNEE 1 OR 2: CPT

## 2018-11-27 ENCOUNTER — OFFICE VISIT (OUTPATIENT)
Dept: FAMILY MEDICINE CLINIC | Age: 68
End: 2018-11-27
Payer: MEDICARE

## 2018-11-27 VITALS
DIASTOLIC BLOOD PRESSURE: 90 MMHG | HEIGHT: 65 IN | WEIGHT: 184 LBS | OXYGEN SATURATION: 98 % | BODY MASS INDEX: 30.66 KG/M2 | SYSTOLIC BLOOD PRESSURE: 140 MMHG | HEART RATE: 68 BPM

## 2018-11-27 DIAGNOSIS — Z12.39 SCREENING FOR BREAST CANCER: Primary | ICD-10-CM

## 2018-11-27 DIAGNOSIS — B02.9 HERPES ZOSTER WITHOUT COMPLICATION: ICD-10-CM

## 2018-11-27 PROCEDURE — 1101F PT FALLS ASSESS-DOCD LE1/YR: CPT | Performed by: INTERNAL MEDICINE

## 2018-11-27 PROCEDURE — 1036F TOBACCO NON-USER: CPT | Performed by: INTERNAL MEDICINE

## 2018-11-27 PROCEDURE — 99213 OFFICE O/P EST LOW 20 MIN: CPT | Performed by: INTERNAL MEDICINE

## 2018-11-27 PROCEDURE — 4040F PNEUMOC VAC/ADMIN/RCVD: CPT | Performed by: INTERNAL MEDICINE

## 2018-11-27 PROCEDURE — G8399 PT W/DXA RESULTS DOCUMENT: HCPCS | Performed by: INTERNAL MEDICINE

## 2018-11-27 PROCEDURE — 3017F COLORECTAL CA SCREEN DOC REV: CPT | Performed by: INTERNAL MEDICINE

## 2018-11-27 PROCEDURE — G8417 CALC BMI ABV UP PARAM F/U: HCPCS | Performed by: INTERNAL MEDICINE

## 2018-11-27 PROCEDURE — 1123F ACP DISCUSS/DSCN MKR DOCD: CPT | Performed by: INTERNAL MEDICINE

## 2018-11-27 PROCEDURE — G8482 FLU IMMUNIZE ORDER/ADMIN: HCPCS | Performed by: INTERNAL MEDICINE

## 2018-11-27 PROCEDURE — 1090F PRES/ABSN URINE INCON ASSESS: CPT | Performed by: INTERNAL MEDICINE

## 2018-11-27 PROCEDURE — G8427 DOCREV CUR MEDS BY ELIG CLIN: HCPCS | Performed by: INTERNAL MEDICINE

## 2018-11-27 NOTE — PATIENT INSTRUCTIONS
acetaminophen (Tylenol), ibuprofen (Advil, Motrin), or naproxen (Aleve). Read and follow all instructions on the label. · Avoid close contact with people until the blisters have healed. It is very important for you to avoid contact with anyone who has never had chickenpox or the chickenpox vaccine. Pregnant women, young babies, and anyone else who has a hard time fighting infection (such as someone with HIV, diabetes, or cancer) is especially at risk. When should you call for help? Call your doctor now or seek immediate medical care if:    · You have a new or higher fever.     · You have a severe headache and a stiff neck.     · You lose the ability to think clearly.     · The rash spreads to your forehead, nose, eyes, or eyelids.     · You have eye pain, or your vision gets worse.     · You have new pain in your face, or you cannot move the muscles in your face.     · Blisters spread to new parts of your body.    Watch closely for changes in your health, and be sure to contact your doctor if:    · The rash has not healed after 2 to 4 weeks.     · You still have pain after the rash has healed. Where can you learn more? Go to https://GetMyRxpeDecision Diagnosticseb.[email or web address]. org and sign in to your [employer] account. oKrey Williamnegra in the [facility] box to learn more about \"Shingles: Care Instructions. \"     If you do not have an account, please click on the \"Sign Up Now\" link. Current as of: November 18, 2017  Content Version: 11.8  © 2598-5414 [employer]. Care instructions adapted under license by [facility] ([facility]). If you have questions about a medical condition or this instruction, always ask your healthcare professional. [place] [address] any warranty or liability for your use of this information. Patient Education        Shingles: Care Instructions  Your Care Instructions    Shingles (herpes zoster) causes pain and a blistered rash.  The rash can appear anywhere on the body but caused by the same virus that causes chickenpox. When you have chickenpox, the virus gets into your nerve roots and stays there (becomes dormant) long after you get over the chickenpox. If the virus becomes active again, it can cause shingles. Follow-up care is a key part of your treatment and safety. Be sure to make and go to all appointments, and call your doctor if you are having problems. It's also a good idea to know your test results and keep a list of the medicines you take. How can you care for yourself at home? · Be safe with medicines. Take your medicines exactly as prescribed. Call your doctor if you think you are having a problem with your medicine. Antiviral medicine helps you get better faster. · Try not to scratch or pick at the blisters. They will crust over and fall off on their own if you leave them alone. · Put cool, wet cloths on the area to relieve pain and itching. You can also use calamine lotion. Try not to use so much lotion that it cakes and is hard to get off. · Put cornstarch or baking soda on the sores to help dry them out so they heal faster. · Do not use thick ointment, such as petroleum jelly, on the sores. This will keep them from drying and healing. · To help remove loose crusts, soak them in tap water. This can help decrease oozing, and dry and soothe the skin. · Take an over-the-counter pain medicine, such as acetaminophen (Tylenol), ibuprofen (Advil, Motrin), or naproxen (Aleve). Read and follow all instructions on the label. · Avoid close contact with people until the blisters have healed. It is very important for you to avoid contact with anyone who has never had chickenpox or the chickenpox vaccine. Pregnant women, young babies, and anyone else who has a hard time fighting infection (such as someone with HIV, diabetes, or cancer) is especially at risk. When should you call for help?   Call your doctor now or seek immediate medical care if:    · You have a new or higher fever.     · You have a severe headache and a stiff neck.     · You lose the ability to think clearly.     · The rash spreads to your forehead, nose, eyes, or eyelids.     · You have eye pain, or your vision gets worse.     · You have new pain in your face, or you cannot move the muscles in your face.     · Blisters spread to new parts of your body.    Watch closely for changes in your health, and be sure to contact your doctor if:    · The rash has not healed after 2 to 4 weeks.     · You still have pain after the rash has healed. Where can you learn more? Go to https://chpepiceweb.[email or web address]. org and sign in to your [employer] account. Mariah Abreu in the [facility] box to learn more about \"Shingles: Care Instructions. \"     If you do not have an account, please click on the \"Sign Up Now\" link. Current as of: November 18, 2017  Content Version: 11.8  © 4276-0537 [employer]. Care instructions adapted under license by [facility] ([facility]). If you have questions about a medical condition or this instruction, always ask your healthcare professional. [address] any warranty or liability for your use of this information.

## 2018-12-01 ASSESSMENT — ENCOUNTER SYMPTOMS: COUGH: 0

## 2019-01-09 ENCOUNTER — OFFICE VISIT (OUTPATIENT)
Dept: FAMILY MEDICINE CLINIC | Age: 69
End: 2019-01-09
Payer: MEDICARE

## 2019-01-09 VITALS
WEIGHT: 181.2 LBS | HEIGHT: 65 IN | BODY MASS INDEX: 30.19 KG/M2 | SYSTOLIC BLOOD PRESSURE: 128 MMHG | OXYGEN SATURATION: 99 % | HEART RATE: 99 BPM | DIASTOLIC BLOOD PRESSURE: 78 MMHG

## 2019-01-09 DIAGNOSIS — Z12.31 ENCOUNTER FOR SCREENING MAMMOGRAM FOR BREAST CANCER: ICD-10-CM

## 2019-01-09 DIAGNOSIS — F41.1 GENERALIZED ANXIETY DISORDER: Primary | ICD-10-CM

## 2019-01-09 PROCEDURE — 1123F ACP DISCUSS/DSCN MKR DOCD: CPT | Performed by: INTERNAL MEDICINE

## 2019-01-09 PROCEDURE — G8482 FLU IMMUNIZE ORDER/ADMIN: HCPCS | Performed by: INTERNAL MEDICINE

## 2019-01-09 PROCEDURE — 1036F TOBACCO NON-USER: CPT | Performed by: INTERNAL MEDICINE

## 2019-01-09 PROCEDURE — 4040F PNEUMOC VAC/ADMIN/RCVD: CPT | Performed by: INTERNAL MEDICINE

## 2019-01-09 PROCEDURE — 3017F COLORECTAL CA SCREEN DOC REV: CPT | Performed by: INTERNAL MEDICINE

## 2019-01-09 PROCEDURE — 1090F PRES/ABSN URINE INCON ASSESS: CPT | Performed by: INTERNAL MEDICINE

## 2019-01-09 PROCEDURE — G8427 DOCREV CUR MEDS BY ELIG CLIN: HCPCS | Performed by: INTERNAL MEDICINE

## 2019-01-09 PROCEDURE — 1101F PT FALLS ASSESS-DOCD LE1/YR: CPT | Performed by: INTERNAL MEDICINE

## 2019-01-09 PROCEDURE — G8417 CALC BMI ABV UP PARAM F/U: HCPCS | Performed by: INTERNAL MEDICINE

## 2019-01-09 PROCEDURE — 99213 OFFICE O/P EST LOW 20 MIN: CPT | Performed by: INTERNAL MEDICINE

## 2019-01-09 PROCEDURE — G8399 PT W/DXA RESULTS DOCUMENT: HCPCS | Performed by: INTERNAL MEDICINE

## 2019-01-09 RX ORDER — LORAZEPAM 1 MG/1
1 TABLET ORAL 2 TIMES DAILY
Qty: 60 TABLET | Refills: 2 | Status: SHIPPED | OUTPATIENT
Start: 2019-01-09 | End: 2019-02-08

## 2019-01-09 ASSESSMENT — ENCOUNTER SYMPTOMS: COUGH: 0

## 2019-01-11 ENCOUNTER — TELEPHONE (OUTPATIENT)
Dept: FAMILY MEDICINE CLINIC | Age: 69
End: 2019-01-11

## 2019-04-01 ENCOUNTER — OFFICE VISIT (OUTPATIENT)
Dept: FAMILY MEDICINE CLINIC | Age: 69
End: 2019-04-01
Payer: MEDICARE

## 2019-04-01 VITALS
SYSTOLIC BLOOD PRESSURE: 120 MMHG | WEIGHT: 177 LBS | OXYGEN SATURATION: 99 % | DIASTOLIC BLOOD PRESSURE: 72 MMHG | HEART RATE: 76 BPM | BODY MASS INDEX: 29.49 KG/M2 | HEIGHT: 65 IN

## 2019-04-01 DIAGNOSIS — F41.9 ANXIETY: Primary | ICD-10-CM

## 2019-04-01 DIAGNOSIS — B00.9 RECURRENT HERPES SIMPLEX: ICD-10-CM

## 2019-04-01 PROCEDURE — G8417 CALC BMI ABV UP PARAM F/U: HCPCS | Performed by: INTERNAL MEDICINE

## 2019-04-01 PROCEDURE — 3017F COLORECTAL CA SCREEN DOC REV: CPT | Performed by: INTERNAL MEDICINE

## 2019-04-01 PROCEDURE — 1036F TOBACCO NON-USER: CPT | Performed by: INTERNAL MEDICINE

## 2019-04-01 PROCEDURE — 4040F PNEUMOC VAC/ADMIN/RCVD: CPT | Performed by: INTERNAL MEDICINE

## 2019-04-01 PROCEDURE — 1123F ACP DISCUSS/DSCN MKR DOCD: CPT | Performed by: INTERNAL MEDICINE

## 2019-04-01 PROCEDURE — G8399 PT W/DXA RESULTS DOCUMENT: HCPCS | Performed by: INTERNAL MEDICINE

## 2019-04-01 PROCEDURE — 99213 OFFICE O/P EST LOW 20 MIN: CPT | Performed by: INTERNAL MEDICINE

## 2019-04-01 PROCEDURE — G8427 DOCREV CUR MEDS BY ELIG CLIN: HCPCS | Performed by: INTERNAL MEDICINE

## 2019-04-01 PROCEDURE — 1090F PRES/ABSN URINE INCON ASSESS: CPT | Performed by: INTERNAL MEDICINE

## 2019-04-01 RX ORDER — LORAZEPAM 1 MG/1
1 TABLET ORAL 2 TIMES DAILY
Qty: 60 TABLET | Refills: 2 | Status: SHIPPED | OUTPATIENT
Start: 2019-04-01 | End: 2019-05-30 | Stop reason: SDUPTHER

## 2019-04-01 RX ORDER — ACYCLOVIR 400 MG/1
400 TABLET ORAL 2 TIMES DAILY
Qty: 60 TABLET | Refills: 2 | Status: SHIPPED | OUTPATIENT
Start: 2019-04-01 | End: 2019-04-01

## 2019-04-01 RX ORDER — LORAZEPAM 1 MG/1
TABLET ORAL
Refills: 2 | COMMUNITY
Start: 2019-03-05 | End: 2019-04-01 | Stop reason: SDUPTHER

## 2019-04-01 ASSESSMENT — ENCOUNTER SYMPTOMS: COUGH: 0

## 2019-04-01 NOTE — PATIENT INSTRUCTIONS
involved in social groups, or volunteer to help others. Being alone sometimes makes things seem worse than they are. · Get at least 30 minutes of exercise on most days of the week to relieve stress. Walking is a good choice. You also may want to do other activities, such as running, swimming, cycling, or playing tennis or team sports. Relaxation techniques  Do relaxation exercises 10 to 20 minutes a day. You can play soothing, relaxing music while you do them, if you wish. · Tell others in your house that you are going to do your relaxation exercises. Ask them not to disturb you. · Find a comfortable place, away from all distractions and noise. · Lie down on your back, or sit with your back straight. · Focus on your breathing. Make it slow and steady. · Breathe in through your nose. Breathe out through either your nose or mouth. · Breathe deeply, filling up the area between your navel and your rib cage. Breathe so that your belly goes up and down. · Do not hold your breath. · Breathe like this for 5 to 10 minutes. Notice the feeling of calmness throughout your whole body. As you continue to breathe slowly and deeply, relax by doing the following for another 5 to 10 minutes:  · Tighten and relax each muscle group in your body. You can begin at your toes and work your way up to your head. · Imagine your muscle groups relaxing and becoming heavy. · Empty your mind of all thoughts. · Let yourself relax more and more deeply. · Become aware of the state of calmness that surrounds you. · When your relaxation time is over, you can bring yourself back to alertness by moving your fingers and toes and then your hands and feet and then stretching and moving your entire body. Sometimes people fall asleep during relaxation, but they usually wake up shortly afterward.   · Always give yourself time to return to full alertness before you drive a car or do anything that might cause an accident if you are not fully alert. Never play a relaxation tape while you drive a car. When should you call for help? Call 911 anytime you think you may need emergency care. For example, call if:    · You feel you cannot stop from hurting yourself or someone else.   Jeny Bates the numbers for these national suicide hotlines: 3-869-436-TALK (8-384.543.1058) and 3-763-NGIXLAK (1-339.863.2098). If you or someone you know talks about suicide or feeling hopeless, get help right away.   Watch closely for changes in your health, and be sure to contact your doctor if:    · You have anxiety or fear that affects your life.     · You have symptoms of anxiety that are new or different from those you had before. Where can you learn more? Go to https://WorldDocwillianBasewin Technologyeb.ZenSuite. org and sign in to your [employer] account. Enter P754 in the Novasentis box to learn more about \"Anxiety Disorder: Care Instructions. \"     If you do not have an account, please click on the \"Sign Up Now\" link. Current as of: September 11, 2018  Content Version: 11.9  © 9038-9033 [employer]. Care instructions adapted under license by [facility] ([facility]). If you have questions about a medical condition or this instruction, always ask your healthcare professional. [address] any warranty or liability for your use of this information.

## 2019-04-01 NOTE — PROGRESS NOTES
19    Blank Long (: 1950) is a 80 y.o. female, here for evaluation of the following medical concerns:    HPI;  Mood Disorder:  Patient presents for follow-up of depression and anxiety disorderanxiety disorder. Current complaints include: none. She denies any other symptoms. Symptoms/signs of john: none. External stressors: none. Current treatment includes: lorazepam.  Medication side effects: Mother  Passed away, was under ludin stress and broken out with shigles which resolved with acyclovir. Review of Systems   Respiratory: Negative for cough. Cardiovascular: Negative for chest pain. Neurological: Negative for dizziness. Psychiatric/Behavioral: The patient is nervous/anxious. Vitals:    19 1340   BP: 120/72   Pulse: 76   SpO2: 99%        Body mass index is 29.45 kg/m². Physical Exam   Constitutional: She appears well-developed and well-nourished. HENT:   Head: Normocephalic. Neck: Normal range of motion. Neck supple. Cardiovascular: Normal rate and regular rhythm. Pulmonary/Chest: Breath sounds normal.   Skin: Skin is warm and dry. Psychiatric: She has a normal mood and affect. Her behavior is normal. Judgment and thought content normal.   Nursing note and vitals reviewed. ASSESSMENT/PLAN:.  1. Anxiety    - LORazepam (ATIVAN) 1 MG tablet; Take 1 tablet by mouth 2 times daily for 60 days. Dispense: 60 tablet; Refill: 2    2. Recurrent herpes simplex  -zovirax prn                    An electronic signature was used to authenticate this note.     --Tory Higginbotham MD on 19 at 2:34 PM

## 2019-05-14 DIAGNOSIS — F41.1 GENERALIZED ANXIETY DISORDER: ICD-10-CM

## 2019-05-14 RX ORDER — HYDROXYZINE HYDROCHLORIDE 25 MG/1
25 TABLET, FILM COATED ORAL EVERY 8 HOURS PRN
Qty: 90 TABLET | Refills: 0 | Status: SHIPPED | OUTPATIENT
Start: 2019-05-14 | End: 2019-09-23 | Stop reason: SDUPTHER

## 2019-05-30 DIAGNOSIS — F41.9 ANXIETY: ICD-10-CM

## 2019-05-30 RX ORDER — LORAZEPAM 1 MG/1
1 TABLET ORAL 2 TIMES DAILY
Qty: 60 TABLET | Refills: 0 | Status: SHIPPED | OUTPATIENT
Start: 2019-05-30 | End: 2019-07-03 | Stop reason: SDUPTHER

## 2019-05-30 NOTE — TELEPHONE ENCOUNTER
Last OV 4/1/19  Future Appointments   Date Time Provider Shaun Jack   7/3/2019  1:00 PM Stephanie Baez MD [facility]

## 2019-06-12 ENCOUNTER — TELEPHONE (OUTPATIENT)
Dept: FAMILY MEDICINE CLINIC | Age: 69
End: 2019-06-12

## 2019-06-12 NOTE — TELEPHONE ENCOUNTER
Pt called back and I was able to get her schd.  Schd same day slot oked per [facility] for tomorrow

## 2019-06-12 NOTE — TELEPHONE ENCOUNTER
Pt called and stated she pulled a muscle in her back over the weekend and the pain in radiating down her right arm. Pt would like to be seen tomorrow with dr Candis Snellen.  Only same days available, Please call back at [phone]

## 2019-06-13 ENCOUNTER — TELEPHONE (OUTPATIENT)
Dept: FAMILY MEDICINE CLINIC | Age: 69
End: 2019-06-13

## 2019-06-13 ENCOUNTER — OFFICE VISIT (OUTPATIENT)
Dept: FAMILY MEDICINE CLINIC | Age: 69
End: 2019-06-13
Payer: MEDICARE

## 2019-06-13 VITALS
HEART RATE: 73 BPM | DIASTOLIC BLOOD PRESSURE: 78 MMHG | OXYGEN SATURATION: 98 % | WEIGHT: 174 LBS | SYSTOLIC BLOOD PRESSURE: 136 MMHG | BODY MASS INDEX: 28.99 KG/M2 | HEIGHT: 65 IN

## 2019-06-13 DIAGNOSIS — M79.10 MUSCULAR PAIN: Primary | ICD-10-CM

## 2019-06-13 PROCEDURE — G8427 DOCREV CUR MEDS BY ELIG CLIN: HCPCS | Performed by: INTERNAL MEDICINE

## 2019-06-13 PROCEDURE — 1036F TOBACCO NON-USER: CPT | Performed by: INTERNAL MEDICINE

## 2019-06-13 PROCEDURE — G8399 PT W/DXA RESULTS DOCUMENT: HCPCS | Performed by: INTERNAL MEDICINE

## 2019-06-13 PROCEDURE — 4040F PNEUMOC VAC/ADMIN/RCVD: CPT | Performed by: INTERNAL MEDICINE

## 2019-06-13 PROCEDURE — 1090F PRES/ABSN URINE INCON ASSESS: CPT | Performed by: INTERNAL MEDICINE

## 2019-06-13 PROCEDURE — 1123F ACP DISCUSS/DSCN MKR DOCD: CPT | Performed by: INTERNAL MEDICINE

## 2019-06-13 PROCEDURE — 99213 OFFICE O/P EST LOW 20 MIN: CPT | Performed by: INTERNAL MEDICINE

## 2019-06-13 PROCEDURE — G8417 CALC BMI ABV UP PARAM F/U: HCPCS | Performed by: INTERNAL MEDICINE

## 2019-06-13 PROCEDURE — 3017F COLORECTAL CA SCREEN DOC REV: CPT | Performed by: INTERNAL MEDICINE

## 2019-06-13 RX ORDER — METHOCARBAMOL 750 MG/1
750 TABLET, FILM COATED ORAL 4 TIMES DAILY
Qty: 60 TABLET | Refills: 1 | Status: SHIPPED | OUTPATIENT
Start: 2019-06-13 | End: 2019-06-13 | Stop reason: SDUPTHER

## 2019-06-13 RX ORDER — METHOCARBAMOL 750 MG/1
750 TABLET, FILM COATED ORAL 4 TIMES DAILY
Qty: 40 TABLET | Refills: 1 | Status: SHIPPED | OUTPATIENT
Start: 2019-06-13 | End: 2019-06-23

## 2019-06-13 RX ORDER — METHOCARBAMOL 500 MG/1
500 TABLET, FILM COATED ORAL 4 TIMES DAILY
COMMUNITY
End: 2019-07-08

## 2019-06-13 NOTE — TELEPHONE ENCOUNTER
methocarbamol (ROBAXIN) 500 MG tablet [[id number]]     Order Details   Dose: 500 mg Route: Oral Frequency: 4 TIMES DAILY   Dispense Quantity: -- Refills: -- Fills remaining: --           Sig: Take 500 mg by mouth 4 times daily        Pharmacy states the daily supply for this medication works out to 15 days based off of the order and needs to clarify what to dispense?   Please advise

## 2019-06-13 NOTE — LETTER
MEDICATION AGREEMENT     Lucy Lambert  47/[birth year]754      For certain conditions, multiple classes of medications may be used to help better manage your symptoms, and to improve your ability to function at home, work and in social settings. However, these medications do have risks, which will be discussed with you, including addiction and dependency. The following prescribed medications need frequent monitoring and will require you to partner and assist in your healthcare. Medication  Dose, instructions and quantity as indicated on current prescription bottle Diagnosis/Reason(s) for Taking Category     LORazepam (ATIVAN) 1 MG tablet                                 Benefits and goals of Controlled Substance Medications: There are two potential goals for your treatment: (1) decreased pain and suffering (2) improved daily life functions. There are many possible treatments for your chronic condition(s), and, in addition to controlled substance medications, we will try alternatives such as physical therapy, yoga, massage, home daily exercise, meditation, relaxation techniques, injections, chiropractic manipulations, surgery, cognitive therapy, hypnosis and many medications that are not habit-forming. Use of controlled substance medications may be helpful, but they are unlikely to resolve all of your symptoms or restore all function. Risks of Controlled Substance Medications:    Opioid pain medications: These medications can lead to problems such as addiction/dependence, sedation, lightheadedness/dizziness, memory issues, falls, constipation, nausea, or vomiting. They may also impair the ability to drive or operate machinery. Additionally, these medications may lower testosterone levels, leading to loss of bone strength, stamina and sex drive.   They may cause problems with breathing, sleep apnea and reduced coughing, which are especially dangerous for patients with lung disease. Overdose or dangerous interactions with alcohol and other medications may occur, leading to death. Hyperalgesia may develop, in which patients receiving opioids for the treatment of pain may actually become more sensitive to certain painful stimuli, and in some cases, experience pain from ordinarily non-painful stimuli. Women between the ages of 14-53 who could become pregnant should carefully weigh the risks and benefits of opioids with their physicians, as these medications increase the risk of pregnancy complications, including miscarriage,  delivery and stillbirth. It is also possible for babies to be born addicted to opioids. Opioid dependence withdrawal symptoms may include; feelings of uneasiness, increased pain, irritability, belly pain, diarrhea, sweats and goose-flesh. Benzodiazepines and non-benzodiazepine sleep medications: These medications can lead to problems such as addiction/dependence, sedation, fatigue, lightheadedness, dizziness, incoordination, falls, depression, hallucinations, and impaired judgment, memory and concentration. The ability to drive and operate machinery may also be affected. Abnormal sleep-related behaviors have been reported, including sleep walking, driving, making telephone calls, eating, or having sex while not fully awake. These medications can suppress breathing and worsen sleep apnea, particularly when combined with alcohol or other sedating medications, potentially leading to death. Dependence withdrawal symptoms may include tremors, anxiety, hallucinations and seizures. Stimulants:  Common adverse effects include addiction/dependence, increased blood pressure and heart rate, decreased appetite, nausea, involuntary weight loss, insomnia, irritability, and headaches.   These risks may increase when these medications are combined with other stimulants, such as caffeine pills or energy drinks, certain weight loss supplements and oral decongestants. Dependence withdrawal symptoms may include depressed mood, loss of interest, suicidal thoughts, anxiety, fatigue, appetite changes and agitation. Testosterone replacement therapy:  Potential side effects include increased risk of stroke and heart attack, blood clots, increased blood pressure, increased cholesterol, enlarged prostate, sleep apnea, irritability/aggression and other mood disorders, and decreased fertility. Other:     1. I understand that I have the following responsibilities:  · I will take medications at the dose and frequency prescribed. · I will not increase or change how I take my medications without the approval of the health care provider who signs this Medication Agreement. · I will arrange for refills at the prescribed interval ONLY during regular office hours. I will not ask for refills earlier than agreed, after-hours, on holidays or on weekends. · I will obtain all refills for these medications at  ·  [facility] and [facility]__________________________________  pharmacy (phone number  ·  __[phone]______________________), with full consent for my provider and pharmacist to exchange information in writing or verbally. · I will not request any pain medications or controlled substances from other providers and will inform this provider of all other medications I am taking. · I will inform my other health care providers that I am taking these medications and of the existence of this [address]. In the event of an emergency, I will provide the same information to the emergency department providers. · I will protect my prescriptions and medications. I understand that lost or misplaced prescriptions will not be replaced. · I will keep medications only for my own use and will not share them with others. I will keep all medications away from children.   · I agree to participate in any medical, psychological or psychiatric which may also result in my being prevented from receiving further care from this office. · Other:____________________________________________________________________    AGREEMENT:    I have read the above and have had all of my questions answered. For chronic disease management, I know that my symptoms can be managed with many types of treatments. A chronic medication trial may be part of my treatment, but I must be an active participant in my care. Medication therapy is only one part of my symptom management plan. In some cases, there may be limited scientific evidence to support the chronic use of certain medications to improve symptoms and daily function. Furthermore, in certain circumstances, there may be scientific information that suggests that use of chronic controlled substances may actually worsen my symptoms and increase my risk of unintentional death directly related to this medication therapy. I know that if my provider feels my risk from controlled medications is greater than my benefit, I will have my controlled substance medication(s) compassionately lowered or removed altogether. I agree to a controlled substance medication trial.      I further agree to allow this office to contact my HIPAA contact on file if there are concerns about my safety and use of controlled medications. I have agreed to use the following medications above as instructed by my physician and as stated in this [address].      Patient Signature:  ______________________  Date:6/13/2019 or _____________    Provider Signature:______________________  Date:6/13/2019 or _____________

## 2019-06-16 ASSESSMENT — ENCOUNTER SYMPTOMS: COUGH: 0

## 2019-06-17 NOTE — PROGRESS NOTES
Subjective:      Patient ID: Juni Jean is a 80 y.o. female. [facility]  Over the posterior thoracic area a week ago which radiated to her right upper extremity. Was prescribed robaxin and to take advil OTC  Which relieved the pain. She is asymptomatic now. Advised to return if pain recurs. Review of Systems   Respiratory: Negative for cough. Cardiovascular: Negative for chest pain. Neurological: Negative for dizziness. Psychiatric/Behavioral: The patient is nervous/anxious. Objective:     Physical Exam   Constitutional: She appears well-developed and well-nourished. HENT:   Head: Normocephalic. Neck: Normal range of motion. Neck supple. Cardiovascular: Normal rate and regular rhythm. Pulmonary/Chest: Effort normal and breath sounds normal.   Skin: Skin is warm and dry. Psychiatric: She has a normal mood and affect. Her behavior is normal. Judgment and thought content normal.   Nursing note and vitals reviewed. Assessment & Plan:       1. Muscular pain    - methocarbamol (ROBAXIN-750) 750 MG tablet; Take 1 tablet by mouth 4 times daily for 10 days  Dispense: 40 tablet; Refill: 1          There are no diagnoses linked to this encounter.            Kathy Abdi MD

## 2019-06-18 ENCOUNTER — TELEPHONE (OUTPATIENT)
Dept: FAMILY MEDICINE CLINIC | Age: 69
End: 2019-06-18

## 2019-06-19 ENCOUNTER — OFFICE VISIT (OUTPATIENT)
Dept: ORTHOPEDIC SURGERY | Age: 69
End: 2019-06-19
Payer: MEDICARE

## 2019-06-19 VITALS — BODY MASS INDEX: 28.99 KG/M2 | HEIGHT: 65 IN | WEIGHT: 174 LBS

## 2019-06-19 DIAGNOSIS — M25.511 RIGHT SHOULDER PAIN, UNSPECIFIED CHRONICITY: Primary | ICD-10-CM

## 2019-06-19 PROCEDURE — 1036F TOBACCO NON-USER: CPT | Performed by: ORTHOPAEDIC SURGERY

## 2019-06-19 PROCEDURE — 1090F PRES/ABSN URINE INCON ASSESS: CPT | Performed by: ORTHOPAEDIC SURGERY

## 2019-06-19 PROCEDURE — G8417 CALC BMI ABV UP PARAM F/U: HCPCS | Performed by: ORTHOPAEDIC SURGERY

## 2019-06-19 PROCEDURE — G8427 DOCREV CUR MEDS BY ELIG CLIN: HCPCS | Performed by: ORTHOPAEDIC SURGERY

## 2019-06-19 PROCEDURE — G8399 PT W/DXA RESULTS DOCUMENT: HCPCS | Performed by: ORTHOPAEDIC SURGERY

## 2019-06-19 PROCEDURE — 4040F PNEUMOC VAC/ADMIN/RCVD: CPT | Performed by: ORTHOPAEDIC SURGERY

## 2019-06-19 PROCEDURE — 99203 OFFICE O/P NEW LOW 30 MIN: CPT | Performed by: ORTHOPAEDIC SURGERY

## 2019-06-19 PROCEDURE — 1123F ACP DISCUSS/DSCN MKR DOCD: CPT | Performed by: ORTHOPAEDIC SURGERY

## 2019-06-19 PROCEDURE — 3017F COLORECTAL CA SCREEN DOC REV: CPT | Performed by: ORTHOPAEDIC SURGERY

## 2019-06-19 RX ORDER — DICLOFENAC SODIUM 75 MG/1
75 TABLET, DELAYED RELEASE ORAL 2 TIMES DAILY
Qty: 60 TABLET | Refills: 2 | Status: SHIPPED | OUTPATIENT
Start: 2019-06-19 | End: 2020-01-06

## 2019-06-19 NOTE — TELEPHONE ENCOUNTER
The patient was seen for her right shoulder at [address] [place] [address] today. I don't think she needs a referral anymore.

## 2019-07-03 ENCOUNTER — OFFICE VISIT (OUTPATIENT)
Dept: FAMILY MEDICINE CLINIC | Age: 69
End: 2019-07-03
Payer: MEDICARE

## 2019-07-03 VITALS
DIASTOLIC BLOOD PRESSURE: 76 MMHG | OXYGEN SATURATION: 98 % | HEART RATE: 77 BPM | HEIGHT: 65 IN | SYSTOLIC BLOOD PRESSURE: 132 MMHG | WEIGHT: 174 LBS | BODY MASS INDEX: 28.99 KG/M2

## 2019-07-03 DIAGNOSIS — Z12.39 SCREENING FOR BREAST CANCER: ICD-10-CM

## 2019-07-03 DIAGNOSIS — F41.9 ANXIETY: ICD-10-CM

## 2019-07-03 DIAGNOSIS — M51.36 DEGENERATIVE DISC DISEASE, LUMBAR: ICD-10-CM

## 2019-07-03 DIAGNOSIS — Z00.00 ROUTINE GENERAL MEDICAL EXAMINATION AT A HEALTH CARE FACILITY: Primary | ICD-10-CM

## 2019-07-03 DIAGNOSIS — B00.9 RECURRENT HERPES SIMPLEX: ICD-10-CM

## 2019-07-03 PROCEDURE — 4040F PNEUMOC VAC/ADMIN/RCVD: CPT | Performed by: INTERNAL MEDICINE

## 2019-07-03 PROCEDURE — G0439 PPPS, SUBSEQ VISIT: HCPCS | Performed by: INTERNAL MEDICINE

## 2019-07-03 PROCEDURE — 3017F COLORECTAL CA SCREEN DOC REV: CPT | Performed by: INTERNAL MEDICINE

## 2019-07-03 PROCEDURE — 1123F ACP DISCUSS/DSCN MKR DOCD: CPT | Performed by: INTERNAL MEDICINE

## 2019-07-03 RX ORDER — LORAZEPAM 1 MG/1
1 TABLET ORAL 2 TIMES DAILY
Qty: 60 TABLET | Refills: 2 | Status: SHIPPED | OUTPATIENT
Start: 2019-07-03 | End: 2019-10-03 | Stop reason: SDUPTHER

## 2019-07-03 ASSESSMENT — LIFESTYLE VARIABLES: HOW OFTEN DO YOU HAVE A DRINK CONTAINING ALCOHOL: 0

## 2019-07-03 ASSESSMENT — PATIENT HEALTH QUESTIONNAIRE - PHQ9
SUM OF ALL RESPONSES TO PHQ QUESTIONS 1-9: 1
SUM OF ALL RESPONSES TO PHQ QUESTIONS 1-9: 1

## 2019-07-03 ASSESSMENT — ANXIETY QUESTIONNAIRES: GAD7 TOTAL SCORE: 3

## 2019-07-03 NOTE — PATIENT INSTRUCTIONS
Patient Education        Anxiety Disorder: Care Instructions  Your Care Instructions    Anxiety is a normal reaction to stress. Difficult situations can cause you to have symptoms such as sweaty palms and a nervous feeling. In an anxiety disorder, the symptoms are far more severe. Constant worry, muscle tension, trouble sleeping, nausea and diarrhea, and other symptoms can make normal daily activities difficult or impossible. These symptoms may occur for no reason, and they can affect your work, school, or social life. Medicines, counseling, and self-care can all help. Follow-up care is a key part of your treatment and safety. Be sure to make and go to all appointments, and call your doctor if you are having problems. It's also a good idea to know your test results and keep a list of the medicines you take. How can you care for yourself at home? · Take medicines exactly as directed. Call your doctor if you think you are having a problem with your medicine. · Go to your counseling sessions and follow-up appointments. · Recognize and accept your anxiety. Then, when you are in a situation that makes you anxious, say to yourself, \"This is not an emergency. I feel uncomfortable, but I am not in danger. I can keep going even if I feel anxious. \"  · Be kind to your body:  ? Relieve tension with exercise or a massage. ? Get enough rest.  ? Avoid alcohol, caffeine, nicotine, and illegal drugs. They can increase your anxiety level and cause sleep problems. ? Learn and do relaxation techniques. See below for more about these techniques. · Engage your mind. Get out and do something you enjoy. Go to a funny movie, or take a walk or hike. Plan your day. Having too much or too little to do can make you anxious. · Keep a record of your symptoms. Discuss your fears with a good friend or family member, or join a support group for people with similar problems. Talking to others sometimes relieves stress.   · Get involved in play a relaxation tape while you drive a car. When should you call for help? Call 911 anytime you think you may need emergency care. For example, call if:    · You feel you cannot stop from hurting yourself or someone else.   Health system the numbers for these national suicide hotlines: 4-824-331-TALK (9-210.366.7412) and 1-752-HPTEQZY (4-201.942.5134). If you or someone you know talks about suicide or feeling hopeless, get help right away.   Watch closely for changes in your health, and be sure to contact your doctor if:    · You have anxiety or fear that affects your life.     · You have symptoms of anxiety that are new or different from those you had before. Where can you learn more? Go to https://Pepex Biomedicalpejosefeweb.LocalCustomer. org and sign in to your [employer] Mobile account. Enter P754 in the crobo box to learn more about \"Anxiety Disorder: Care Instructions. \"     If you do not have an account, please click on the \"Sign Up Now\" link. Current as of: September 11, 2018  Content Version: 12.0  © 3327-9975 [employer], Incorporated. Care instructions adapted under license by [facility] ([facility]). If you have questions about a medical condition or this instruction, always ask your healthcare professional. [address] any warranty or liability for your use of this information. Learning About Living Dufior Padilla  What is a living will? A living will is a legal form you use to write down the kind of care you want at the end of your life. It is used by the health professionals who will treat you if you aren't able to decide for yourself. If you put your wishes in writing, your loved ones and others will know what kind of care you want. They won't need to guess. This can ease your mind and be helpful to others. A living will is not the same as an estate or property will. An estate will explains what you want to happen with your money and property after you die.   Is a living will a legal

## 2019-07-03 NOTE — PROGRESS NOTES
Medicare Annual Wellness Visit  Name: Sweetie Payer Date: 7/3/2019   MRN: [id number] Sex: Female   Age: 80 y.o. Ethnicity: Non-/Non    : 1950 Race: Denys Lujan is here for Medicare AWV    Screenings for behavioral, psychosocial and functional/safety risks, and cognitive dysfunction are all negative except as indicated below. These results, as well as other patient data from the [address] form, are documented in Flowsheets linked to this Encounter. Allergies   Allergen Reactions    Methylprednisolone Rash     Prior to Visit Medications    Medication Sig Taking? Authorizing Provider   diclofenac (VOLTAREN) 75 MG EC tablet Take 1 tablet by mouth 2 times daily Yes Chapito Preciado MD   methocarbamol (ROBAXIN) 500 MG tablet Take 500 mg by mouth 4 times daily Yes Historical Provider, MD   LORazepam (ATIVAN) 1 MG tablet Take 1 tablet by mouth 2 times daily for 60 days. Yes Otoniel Slipper, APRN - CNP   hydrOXYzine (ATARAX) 25 MG tablet TAKE 1 TABLET BY MOUTH EVERY 8 HOURS AS NEEDED FOR ITCHING Yes Arelis Alvares MD   acyclovir (ZOVIRAX) 5 % ointment APPLY TOPICALLY 5 TIMES DAILY. Yes Winchester Slipper, APRN - CNP   tetracycline (ACHROMYCIN;SUMYCIN) 250 MG capsule It is compounded to 225mg daily Yes Arelis Alvares MD   triamcinolone (KENALOG) 0.1 % cream  Yes Historical Provider, MD   Cyanocobalamin (VITAMIN B-12 PO) Take by mouth Yes Historical Provider, MD   DHA-Vitamin C-Lutein ([address]) Take by mouth Indications: Also with Vitamin A,K OTC Vitamin Yes Historical Provider, MD   Cholecalciferol (VITAMIN D) 2000 UNITS CAPS capsule Take  by mouth. Indications: OTC Yes Historical Provider, MD     Past Medical History:   Diagnosis Date    Anxiety     Depression     Hepatitis C      No past surgical history on file.   Family History   Problem Relation Age of Onset    Arthritis Mother     High Blood Pressure Mother     Vision Loss Mother Preventive Services Due: see orders and patient instructions/AVS.  . Recommended screening schedule for the next 5-10 years is provided to the patient in written form: see Patient Instructions/AVS.       Controlled Substance Monitoring:    Acute and Chronic Pain Monitoring:   RX Monitoring 7/9/2019   Attestation -   Periodic Controlled Substance Monitoring Possible medication side effects, risk of tolerance/dependence & alternative treatments discussed. ;No signs of potential drug abuse or diversion identified. ;Assessed functional status.    Chronic Pain > 80 MEDD -       No AAA-2016

## 2019-07-18 DIAGNOSIS — Z12.11 SCREEN FOR COLON CANCER: Primary | ICD-10-CM

## 2019-07-18 LAB
CONTROL: NORMAL
HEMOCCULT STL QL: NORMAL

## 2019-07-18 PROCEDURE — 82274 ASSAY TEST FOR BLOOD FECAL: CPT | Performed by: INTERNAL MEDICINE

## 2019-09-23 DIAGNOSIS — F41.1 GENERALIZED ANXIETY DISORDER: ICD-10-CM

## 2019-09-23 RX ORDER — HYDROXYZINE HYDROCHLORIDE 25 MG/1
25 TABLET, FILM COATED ORAL EVERY 8 HOURS PRN
Qty: 90 TABLET | Refills: 0 | Status: SHIPPED | OUTPATIENT
Start: 2019-09-23 | End: 2019-10-03 | Stop reason: SDUPTHER

## 2019-09-23 NOTE — TELEPHONE ENCOUNTER
Last appt - 7/3/2019    Future Appointments   Date Time Provider Shaun Jack   10/3/2019  8:40 AM MD OZZY Busby

## 2019-10-03 ENCOUNTER — OFFICE VISIT (OUTPATIENT)
Dept: FAMILY MEDICINE CLINIC | Age: 69
End: 2019-10-03
Payer: MEDICARE

## 2019-10-03 VITALS
HEIGHT: 65 IN | BODY MASS INDEX: 29.16 KG/M2 | DIASTOLIC BLOOD PRESSURE: 80 MMHG | HEART RATE: 84 BPM | OXYGEN SATURATION: 98 % | WEIGHT: 175 LBS | SYSTOLIC BLOOD PRESSURE: 102 MMHG

## 2019-10-03 DIAGNOSIS — Z23 FLU VACCINE NEED: ICD-10-CM

## 2019-10-03 DIAGNOSIS — F51.02 ADJUSTMENT INSOMNIA: ICD-10-CM

## 2019-10-03 DIAGNOSIS — F41.1 GENERALIZED ANXIETY DISORDER: Primary | ICD-10-CM

## 2019-10-03 PROCEDURE — G8427 DOCREV CUR MEDS BY ELIG CLIN: HCPCS | Performed by: INTERNAL MEDICINE

## 2019-10-03 PROCEDURE — 3017F COLORECTAL CA SCREEN DOC REV: CPT | Performed by: INTERNAL MEDICINE

## 2019-10-03 PROCEDURE — 1123F ACP DISCUSS/DSCN MKR DOCD: CPT | Performed by: INTERNAL MEDICINE

## 2019-10-03 PROCEDURE — G8399 PT W/DXA RESULTS DOCUMENT: HCPCS | Performed by: INTERNAL MEDICINE

## 2019-10-03 PROCEDURE — 90653 IIV ADJUVANT VACCINE IM: CPT | Performed by: INTERNAL MEDICINE

## 2019-10-03 PROCEDURE — 4040F PNEUMOC VAC/ADMIN/RCVD: CPT | Performed by: INTERNAL MEDICINE

## 2019-10-03 PROCEDURE — G8482 FLU IMMUNIZE ORDER/ADMIN: HCPCS | Performed by: INTERNAL MEDICINE

## 2019-10-03 PROCEDURE — 1036F TOBACCO NON-USER: CPT | Performed by: INTERNAL MEDICINE

## 2019-10-03 PROCEDURE — G0008 ADMIN INFLUENZA VIRUS VAC: HCPCS | Performed by: INTERNAL MEDICINE

## 2019-10-03 PROCEDURE — 1090F PRES/ABSN URINE INCON ASSESS: CPT | Performed by: INTERNAL MEDICINE

## 2019-10-03 PROCEDURE — 99213 OFFICE O/P EST LOW 20 MIN: CPT | Performed by: INTERNAL MEDICINE

## 2019-10-03 PROCEDURE — G8417 CALC BMI ABV UP PARAM F/U: HCPCS | Performed by: INTERNAL MEDICINE

## 2019-10-03 RX ORDER — HYDROXYZINE HYDROCHLORIDE 25 MG/1
25 TABLET, FILM COATED ORAL EVERY 8 HOURS PRN
Qty: 90 TABLET | Refills: 2 | Status: SHIPPED | OUTPATIENT
Start: 2019-10-03 | End: 2020-04-20

## 2019-10-03 RX ORDER — LORAZEPAM 0.5 MG/1
0.5 TABLET ORAL
COMMUNITY
End: 2020-03-25 | Stop reason: SDUPTHER

## 2019-10-03 RX ORDER — LORAZEPAM 0.5 MG/1
0.5 TABLET ORAL
Status: CANCELLED | OUTPATIENT
Start: 2019-10-03

## 2019-10-03 RX ORDER — LORAZEPAM 1 MG/1
1 TABLET ORAL 2 TIMES DAILY
Qty: 60 TABLET | Refills: 2 | Status: SHIPPED | OUTPATIENT
Start: 2019-10-03 | End: 2020-01-06 | Stop reason: SDUPTHER

## 2019-10-05 ASSESSMENT — ENCOUNTER SYMPTOMS: COUGH: 0

## 2019-10-28 DIAGNOSIS — L73.9 FOLLICULITIS: ICD-10-CM

## 2019-10-30 RX ORDER — TETRACYCLINE HYDROCHLORIDE 250 MG/1
CAPSULE ORAL
Qty: 100 CAPSULE | Refills: 2 | Status: SHIPPED | OUTPATIENT
Start: 2019-10-30 | End: 2021-04-13 | Stop reason: ALTCHOICE

## 2019-12-09 DIAGNOSIS — F41.1 GENERALIZED ANXIETY DISORDER: ICD-10-CM

## 2019-12-09 RX ORDER — HYDROXYZINE HYDROCHLORIDE 25 MG/1
25 TABLET, FILM COATED ORAL EVERY 8 HOURS PRN
Qty: 90 TABLET | Refills: 0 | Status: SHIPPED | OUTPATIENT
Start: 2019-12-09 | End: 2020-01-06 | Stop reason: SDUPTHER

## 2020-01-01 ASSESSMENT — ENCOUNTER SYMPTOMS: COUGH: 0

## 2020-01-01 NOTE — PROGRESS NOTES
20    Spike Wright (: 1950) is a 77 y.o. female, here for evaluation of the following medical concerns:    HPI; Has  Some issues, has an allergies , did patch tests on her , find nothing   Wrong . Had some chest pains, went to see cardio, find nothing wrong. Had some  Sprain  On the left shoulder, which has  Been getting better. Advised to do some arm exercises. Has trigger finger on the right little finger, which she thinks she has had for years and she is also complaining of pains and occasional numbness of the right arm which has been going on for a while. Insomnia:  Current treatment:,lorazepam.  which has been effective. Medication side effects: none. Mood Disorder:  Patient presents for follow-up of anxiety disorder. Current complaints include: none. She denies any other symptoms. Symptoms/signs of john: none. External stressors: nothing new. Current treatment includes: hydroxyzine. Medication side effects: none. Review of Systems   Constitutional: Negative for activity change. Respiratory: Negative for cough. Cardiovascular: Negative for chest pain. Genitourinary: Negative. Musculoskeletal: Positive for arthralgias. Left shoulder pain   Right little finger trigger finger   Neurological: Negative for dizziness. Occasional numbness of the right arm   Hematological: Negative for adenopathy. Psychiatric/Behavioral: Positive for sleep disturbance. The patient is nervous/anxious. Current Outpatient Medications   Medication Sig Dispense Refill    hydrOXYzine (ATARAX) 25 MG tablet TAKE 1 TABLET BY MOUTH EVERY 8 HOURS AS NEEDED FOR ITCHING 90 tablet 0    tetracycline (ACHROMYCIN;SUMYCIN) 250 MG capsule It is compounded to 225mg daily 100 capsule 2    LORazepam (ATIVAN) 0.5 MG tablet Take 0.5 mg by mouth.       hydrOXYzine (ATARAX) 25 MG tablet Take 1 tablet by mouth every 8 hours as needed for Itching 90 tablet 2    diclofenac (VOLTAREN) 75 MG EC tablet Take 1 tablet by mouth 2 times daily 60 tablet 2    Cyanocobalamin (VITAMIN B-12 PO) Take by mouth      DHA-Vitamin C-Lutein (EYE HEALTH FORMULA PO) Take by mouth Indications: Also with Vitamin A,K OTC Vitamin      Cholecalciferol (VITAMIN D) 2000 UNITS CAPS capsule Take  by mouth. Indications: OTC       No current facility-administered medications for this visit. Social History     Socioeconomic History    Marital status:      Spouse name: Not on file    Number of children: Not on file    Years of education: Not on file    Highest education level: Not on file   Occupational History    Not on file   Social Needs    Financial resource strain: Not on file    Food insecurity:     Worry: Not on file     Inability: Not on file    Transportation needs:     Medical: Not on file     Non-medical: Not on file   Tobacco Use    Smoking status: Former Smoker     Packs/day: 1.00     Years: 15.00     Pack years: 15.00     Types: Cigarettes     Last attempt to quit: 2013     Years since quittin.1    Smokeless tobacco: Never Used   Substance and Sexual Activity    Alcohol use: No    Drug use: No    Sexual activity: Never   Lifestyle    Physical activity:     Days per week: Not on file     Minutes per session: Not on file    Stress: Not on file   Relationships    Social connections:     Talks on phone: Not on file     Gets together: Not on file     Attends Shinto service: Not on file     Active member of club or organization: Not on file     Attends meetings of clubs or organizations: Not on file     Relationship status: Not on file    Intimate partner violence:     Fear of current or ex partner: Not on file     Emotionally abused: Not on file     Physically abused: Not on file     Forced sexual activity: Not on file   Other Topics Concern    Not on file   Social History Narrative    Not on file       Physical Exam  Vitals signs and nursing note reviewed. Constitutional:       Appearance: She is well-developed. HENT:      Head: Normocephalic. Eyes:      Pupils: Pupils are equal, round, and reactive to light. Neck:      Musculoskeletal: Normal range of motion and neck supple. Cardiovascular:      Rate and Rhythm: Normal rate and regular rhythm. Pulmonary:      Breath sounds: Normal breath sounds. Abdominal:      Palpations: Abdomen is soft. Skin:     General: Skin is warm and dry. Comments:      Psychiatric:         Behavior: Behavior normal.         Thought Content: Thought content normal.         Judgment: Judgment normal.         ASSESSMENT/PLAN:  Controlled Substance Monitoring:    Acute and Chronic Pain Monitoring:   RX Monitoring 10/5/2019   Attestation -   Periodic Controlled Substance Monitoring Possible medication side effects, risk of tolerance/dependence & alternative treatments discussed. ;No signs of potential drug abuse or diversion identified. ;Assessed functional status. Chronic Pain > 80 MEDD -     1. Encounter for therapeutic drug monitoring    - Drug Panel-PM-HI Res-UR Interp-A    2. Generalized anxiety disorder    - LORazepam (ATIVAN) 1 MG tablet; Take 1 tablet by mouth 2 times daily for 60 days. Dispense: 60 tablet; Refill: 2    3. Trigger little finger of right hand    - Emi Linton MD, Hand Surgery (Hand, Wrist, Elbow), [facility]    4. Sprain of left shoulder, unspecified shoulder sprain type, initial encounter  -improving, home exercises    5. Right arm numbness  -refer to [place]        An electronic signature was used to authenticate this note.     --Esperanza Murphy MD on 01/01/20 at 1:49 PM

## 2020-01-06 ENCOUNTER — OFFICE VISIT (OUTPATIENT)
Dept: FAMILY MEDICINE CLINIC | Age: 70
End: 2020-01-06
Payer: MEDICARE

## 2020-01-06 VITALS
SYSTOLIC BLOOD PRESSURE: 126 MMHG | BODY MASS INDEX: 29.49 KG/M2 | HEIGHT: 65 IN | HEART RATE: 86 BPM | WEIGHT: 177 LBS | DIASTOLIC BLOOD PRESSURE: 74 MMHG | OXYGEN SATURATION: 99 %

## 2020-01-06 PROCEDURE — 1036F TOBACCO NON-USER: CPT | Performed by: INTERNAL MEDICINE

## 2020-01-06 PROCEDURE — 99214 OFFICE O/P EST MOD 30 MIN: CPT | Performed by: INTERNAL MEDICINE

## 2020-01-06 PROCEDURE — 3017F COLORECTAL CA SCREEN DOC REV: CPT | Performed by: INTERNAL MEDICINE

## 2020-01-06 PROCEDURE — G8417 CALC BMI ABV UP PARAM F/U: HCPCS | Performed by: INTERNAL MEDICINE

## 2020-01-06 PROCEDURE — G8427 DOCREV CUR MEDS BY ELIG CLIN: HCPCS | Performed by: INTERNAL MEDICINE

## 2020-01-06 PROCEDURE — 1123F ACP DISCUSS/DSCN MKR DOCD: CPT | Performed by: INTERNAL MEDICINE

## 2020-01-06 PROCEDURE — 1090F PRES/ABSN URINE INCON ASSESS: CPT | Performed by: INTERNAL MEDICINE

## 2020-01-06 PROCEDURE — 4040F PNEUMOC VAC/ADMIN/RCVD: CPT | Performed by: INTERNAL MEDICINE

## 2020-01-06 PROCEDURE — G8482 FLU IMMUNIZE ORDER/ADMIN: HCPCS | Performed by: INTERNAL MEDICINE

## 2020-01-06 PROCEDURE — G8399 PT W/DXA RESULTS DOCUMENT: HCPCS | Performed by: INTERNAL MEDICINE

## 2020-01-06 RX ORDER — LORAZEPAM 1 MG/1
1 TABLET ORAL 2 TIMES DAILY
Qty: 60 TABLET | Refills: 2 | Status: SHIPPED | OUTPATIENT
Start: 2020-01-06 | End: 2020-04-15 | Stop reason: SDUPTHER

## 2020-01-09 LAB
6-ACETYLMORPHINE: NOT DETECTED
7-AMINOCLONAZEPAM: NOT DETECTED
ALPHA-OH-ALPRAZOLAM: NOT DETECTED
ALPRAZOLAM: NOT DETECTED
AMPHETAMINE: NOT DETECTED
BARBITURATES: NOT DETECTED
BENZOYLECGONINE: NOT DETECTED
BUPRENORPHINE: NOT DETECTED
CARISOPRODOL: NOT DETECTED
CLONAZEPAM: NOT DETECTED
CODEINE: NOT DETECTED
CREATININE URINE: <20 MG/DL (ref 20–400)
DIAZEPAM: NOT DETECTED
DRUGS EXPECTED: ABNORMAL
EER PAIN MGT DRUG PANEL, HIGH RES/EMIT U: ABNORMAL
ETHYL GLUCURONIDE: NOT DETECTED
FENTANYL: NOT DETECTED
HYDROCODONE: NOT DETECTED
HYDROMORPHONE: NOT DETECTED
LORAZEPAM: NOT DETECTED
MARIJUANA METABOLITE: NOT DETECTED
MDA: NOT DETECTED
MDEA: NOT DETECTED
MDMA URINE: NOT DETECTED
MEPERIDINE: NOT DETECTED
METHADONE: NOT DETECTED
METHAMPHETAMINE: NOT DETECTED
METHYLPHENIDATE: NOT DETECTED
MIDAZOLAM: NOT DETECTED
MORPHINE: NOT DETECTED
NORBUPRENORPHINE, FREE: NOT DETECTED
NORDIAZEPAM: NOT DETECTED
NORFENTANYL: NOT DETECTED
NORHYDROCODONE, URINE: NOT DETECTED
NOROXYCODONE: NOT DETECTED
NOROXYMORPHONE, URINE: NOT DETECTED
OXAZEPAM: NOT DETECTED
OXYCODONE: NOT DETECTED
OXYMORPHONE: NOT DETECTED
PAIN MANAGEMENT DRUG PANEL: ABNORMAL
PAIN MANAGEMENT DRUG PANEL: ABNORMAL
PCP: NOT DETECTED
PHENTERMINE: NOT DETECTED
PROPOXYPHENE: NOT DETECTED
TAPENTADOL, URINE: NOT DETECTED
TAPENTADOL-O-SULFATE, URINE: NOT DETECTED
TEMAZEPAM: NOT DETECTED
TRAMADOL: NOT DETECTED
ZOLPIDEM: NOT DETECTED

## 2020-01-15 ENCOUNTER — OFFICE VISIT (OUTPATIENT)
Dept: FAMILY MEDICINE CLINIC | Age: 70
End: 2020-01-15
Payer: MEDICARE

## 2020-01-15 VITALS
DIASTOLIC BLOOD PRESSURE: 80 MMHG | WEIGHT: 179 LBS | BODY MASS INDEX: 29.82 KG/M2 | OXYGEN SATURATION: 98 % | SYSTOLIC BLOOD PRESSURE: 118 MMHG | HEART RATE: 102 BPM | HEIGHT: 65 IN

## 2020-01-15 PROCEDURE — 3017F COLORECTAL CA SCREEN DOC REV: CPT | Performed by: INTERNAL MEDICINE

## 2020-01-15 PROCEDURE — 1090F PRES/ABSN URINE INCON ASSESS: CPT | Performed by: INTERNAL MEDICINE

## 2020-01-15 PROCEDURE — G8427 DOCREV CUR MEDS BY ELIG CLIN: HCPCS | Performed by: INTERNAL MEDICINE

## 2020-01-15 PROCEDURE — 1123F ACP DISCUSS/DSCN MKR DOCD: CPT | Performed by: INTERNAL MEDICINE

## 2020-01-15 PROCEDURE — 99213 OFFICE O/P EST LOW 20 MIN: CPT | Performed by: INTERNAL MEDICINE

## 2020-01-15 PROCEDURE — 1036F TOBACCO NON-USER: CPT | Performed by: INTERNAL MEDICINE

## 2020-01-15 PROCEDURE — G8417 CALC BMI ABV UP PARAM F/U: HCPCS | Performed by: INTERNAL MEDICINE

## 2020-01-15 PROCEDURE — G8482 FLU IMMUNIZE ORDER/ADMIN: HCPCS | Performed by: INTERNAL MEDICINE

## 2020-01-15 PROCEDURE — 4040F PNEUMOC VAC/ADMIN/RCVD: CPT | Performed by: INTERNAL MEDICINE

## 2020-01-15 PROCEDURE — G8399 PT W/DXA RESULTS DOCUMENT: HCPCS | Performed by: INTERNAL MEDICINE

## 2020-01-15 RX ORDER — AMOXICILLIN 500 MG/1
500 CAPSULE ORAL 3 TIMES DAILY
Qty: 30 CAPSULE | Refills: 1 | Status: SHIPPED | OUTPATIENT
Start: 2020-01-15 | End: 2020-01-25

## 2020-01-15 NOTE — PROGRESS NOTES
Allergies   Allergen Reactions    Methylprednisolone Rash       Social History     Tobacco Use    Smoking status: Former Smoker     Packs/day: 1.00     Years: 15.00     Pack years: 15.00     Types: Cigarettes     Last attempt to quit: 2013     Years since quittin.1    Smokeless tobacco: Never Used   Substance Use Topics    Alcohol use: No       Objective:   /80 (Site: Left Upper Arm, Position: Sitting, Cuff Size: Medium Adult)   Pulse 102   Ht 5' 5\" (1.651 m)   Wt 179 lb (81.2 kg)   SpO2 98%   Breastfeeding? No   BMI 29.79 kg/m²     Physical Exam  Vitals signs and nursing note reviewed. Constitutional:       Appearance: Normal appearance. HENT:      Head: Normocephalic. Right Ear: Tympanic membrane, ear canal and external ear normal. There is no impacted cerumen. Left Ear: Tympanic membrane, ear canal and external ear normal. There is no impacted cerumen. Nose: Congestion present. No rhinorrhea. Mouth/Throat:      Mouth: Mucous membranes are moist.      Pharynx: Oropharynx is clear. No oropharyngeal exudate. Eyes:      Extraocular Movements: Extraocular movements intact. Pupils: Pupils are equal, round, and reactive to light. Neck:      Musculoskeletal: Normal range of motion. Cardiovascular:      Rate and Rhythm: Normal rate and regular rhythm. Pulmonary:      Effort: Pulmonary effort is normal.      Breath sounds: Normal breath sounds. Neurological:      Mental Status: She is alert. Assessment:/plan:      Diagnosis Orders   1.  Acute pharyngitis, unspecified etiology  amoxicillin (AMOXIL) 500 MG louie Gray MD

## 2020-01-19 ASSESSMENT — ENCOUNTER SYMPTOMS
COUGH: 1
NAUSEA: 0
SWOLLEN GLANDS: 0
SORE THROAT: 1

## 2020-01-22 ENCOUNTER — OFFICE VISIT (OUTPATIENT)
Dept: ORTHOPEDIC SURGERY | Age: 70
End: 2020-01-22
Payer: MEDICARE

## 2020-01-22 VITALS — WEIGHT: 179.01 LBS | HEIGHT: 65 IN | BODY MASS INDEX: 29.83 KG/M2

## 2020-01-22 PROCEDURE — 99213 OFFICE O/P EST LOW 20 MIN: CPT | Performed by: ORTHOPAEDIC SURGERY

## 2020-01-22 PROCEDURE — G8417 CALC BMI ABV UP PARAM F/U: HCPCS | Performed by: ORTHOPAEDIC SURGERY

## 2020-01-22 PROCEDURE — 1090F PRES/ABSN URINE INCON ASSESS: CPT | Performed by: ORTHOPAEDIC SURGERY

## 2020-01-22 PROCEDURE — 3017F COLORECTAL CA SCREEN DOC REV: CPT | Performed by: ORTHOPAEDIC SURGERY

## 2020-01-22 PROCEDURE — G8399 PT W/DXA RESULTS DOCUMENT: HCPCS | Performed by: ORTHOPAEDIC SURGERY

## 2020-01-22 PROCEDURE — G8427 DOCREV CUR MEDS BY ELIG CLIN: HCPCS | Performed by: ORTHOPAEDIC SURGERY

## 2020-01-22 PROCEDURE — 1123F ACP DISCUSS/DSCN MKR DOCD: CPT | Performed by: ORTHOPAEDIC SURGERY

## 2020-01-22 PROCEDURE — 4040F PNEUMOC VAC/ADMIN/RCVD: CPT | Performed by: ORTHOPAEDIC SURGERY

## 2020-01-22 PROCEDURE — 1036F TOBACCO NON-USER: CPT | Performed by: ORTHOPAEDIC SURGERY

## 2020-01-22 PROCEDURE — G8482 FLU IMMUNIZE ORDER/ADMIN: HCPCS | Performed by: ORTHOPAEDIC SURGERY

## 2020-03-24 ENCOUNTER — TELEPHONE (OUTPATIENT)
Dept: FAMILY MEDICINE CLINIC | Age: 70
End: 2020-03-24

## 2020-03-24 NOTE — TELEPHONE ENCOUNTER
I called pt to cxl her April 2nd appt.    She needs a refill:  1 month of Lorazepam 0.5 mg (she has a week left)    Last appt 1/15/2020  Future Appointments   Date Time Provider Shaun Jack   5/5/2020  8:20 AM He Aguilar MD [facility]     Pharmacy: [facility]

## 2020-03-25 RX ORDER — LORAZEPAM 0.5 MG/1
0.5 TABLET ORAL DAILY
Qty: 30 TABLET | Refills: 0 | Status: SHIPPED | OUTPATIENT
Start: 2020-03-25 | End: 2020-03-26 | Stop reason: SDUPTHER

## 2020-03-27 RX ORDER — LORAZEPAM 0.5 MG/1
0.5 TABLET ORAL 2 TIMES DAILY
Qty: 60 TABLET | Refills: 1 | Status: SHIPPED | OUTPATIENT
Start: 2020-03-27 | End: 2020-04-26

## 2020-04-15 ENCOUNTER — VIRTUAL VISIT (OUTPATIENT)
Dept: FAMILY MEDICINE CLINIC | Age: 70
End: 2020-04-15
Payer: MEDICARE

## 2020-04-15 PROCEDURE — G2012 BRIEF CHECK IN BY MD/QHP: HCPCS | Performed by: INTERNAL MEDICINE

## 2020-04-15 RX ORDER — LORAZEPAM 1 MG/1
1 TABLET ORAL 2 TIMES DAILY
Qty: 60 TABLET | Refills: 1 | Status: SHIPPED | OUTPATIENT
Start: 2020-04-15 | End: 2020-06-02

## 2020-04-20 RX ORDER — HYDROXYZINE HYDROCHLORIDE 25 MG/1
25 TABLET, FILM COATED ORAL EVERY 8 HOURS PRN
Qty: 90 TABLET | Refills: 2 | Status: ON HOLD | OUTPATIENT
Start: 2020-04-20 | End: 2021-06-21

## 2020-06-09 ENCOUNTER — VIRTUAL VISIT (OUTPATIENT)
Dept: FAMILY MEDICINE CLINIC | Age: 70
End: 2020-06-09
Payer: MEDICARE

## 2020-06-09 PROCEDURE — 99213 OFFICE O/P EST LOW 20 MIN: CPT | Performed by: INTERNAL MEDICINE

## 2020-06-10 ASSESSMENT — ENCOUNTER SYMPTOMS: COUGH: 0

## 2020-06-15 ENCOUNTER — TELEPHONE (OUTPATIENT)
Dept: FAMILY MEDICINE CLINIC | Age: 70
End: 2020-06-15

## 2020-06-15 NOTE — TELEPHONE ENCOUNTER
[facility] received a call from:    Name of Caller: Geno Gomez    Relationship to patient:self      Best contact number: [phone]    Reason for call: Pt checking on status of her labs results to be faxed to her also the info for how to sign up with [facility]. Raman Nye Pt states she needs to know ahead time when info will be faxed. Pt also needs recent labs orders to be faxed to [facility]. Pt would a call back from Dr. Tricia Buchanan thank you.       Called in requesting results for the following:    What results: all blood work   Date of service:6/11/20

## 2020-06-16 ENCOUNTER — INITIAL CONSULT (OUTPATIENT)
Dept: GASTROENTEROLOGY | Age: 70
End: 2020-06-16
Payer: MEDICARE

## 2020-06-16 VITALS
WEIGHT: 171 LBS | DIASTOLIC BLOOD PRESSURE: 80 MMHG | HEIGHT: 65 IN | SYSTOLIC BLOOD PRESSURE: 130 MMHG | BODY MASS INDEX: 28.49 KG/M2

## 2020-06-16 PROBLEM — Z12.11 COLON CANCER SCREENING: Status: ACTIVE | Noted: 2020-06-16

## 2020-06-16 PROBLEM — D64.9 NORMOCYTIC ANEMIA: Status: ACTIVE | Noted: 2020-06-16

## 2020-06-16 PROBLEM — D70.9 NEUTROPENIA (HCC): Status: ACTIVE | Noted: 2020-06-16

## 2020-06-16 PROCEDURE — G8427 DOCREV CUR MEDS BY ELIG CLIN: HCPCS | Performed by: INTERNAL MEDICINE

## 2020-06-16 PROCEDURE — G8417 CALC BMI ABV UP PARAM F/U: HCPCS | Performed by: INTERNAL MEDICINE

## 2020-06-16 PROCEDURE — 1090F PRES/ABSN URINE INCON ASSESS: CPT | Performed by: INTERNAL MEDICINE

## 2020-06-16 PROCEDURE — 1036F TOBACCO NON-USER: CPT | Performed by: INTERNAL MEDICINE

## 2020-06-16 PROCEDURE — 3017F COLORECTAL CA SCREEN DOC REV: CPT | Performed by: INTERNAL MEDICINE

## 2020-06-16 PROCEDURE — G8399 PT W/DXA RESULTS DOCUMENT: HCPCS | Performed by: INTERNAL MEDICINE

## 2020-06-16 PROCEDURE — 99204 OFFICE O/P NEW MOD 45 MIN: CPT | Performed by: INTERNAL MEDICINE

## 2020-06-16 PROCEDURE — 1123F ACP DISCUSS/DSCN MKR DOCD: CPT | Performed by: INTERNAL MEDICINE

## 2020-06-16 PROCEDURE — 4040F PNEUMOC VAC/ADMIN/RCVD: CPT | Performed by: INTERNAL MEDICINE

## 2020-06-16 RX ORDER — POLYETHYLENE GLYCOL 3350 17 G/17G
238 POWDER ORAL ONCE
Qty: 238 G | Refills: 0 | Status: SHIPPED | OUTPATIENT
Start: 2020-06-16 | End: 2020-06-16

## 2020-06-16 RX ORDER — BISACODYL 5 MG
TABLET, DELAYED RELEASE (ENTERIC COATED) ORAL
Qty: 4 TABLET | Refills: 0 | Status: ON HOLD
Start: 2020-06-16 | End: 2020-07-10 | Stop reason: CLARIF

## 2020-06-16 NOTE — LETTER
2. Take your Blood pressure, Heart and Seizure medication the morning of the procedure with sips of water. 3. Bring inhalers with you. 4. Do not take your Diabetic medication the morning of procedure. 5. You must have a  stay with you during the entire procedure. Dear Patient,      Carmen Catalan will receive a call from the [facility] pre-registration department prior to your GI procedure. This will help streamline your check-in process on the day of service. During this call a skilled associate will review your demographic and insurance benefits information. The associate will answer any question pertaining to your insurance contract, such as deductible/co-insurance/ co-pay or any other financial concerns. They may offer an amount that you could pay on the day of surgery but this is not a requirement. Thank you for allowing [facility] to be part of your [address] [place] [address], [address] [facility]      Once you turn into the hospital, turn right (towards E[address] go past the E.R[address] youll see a driveway on your left. Make that turn, the sign will say [facility] or Surgery entrance. [address] [facility]  Go through 2 sets of double doors, sign in at window, you will get registered there and they will send you up the hallway to the Endoscopy area.                                              [facility] [address] [place] [address] Dr King                                       p [phone]  f [phone]    Lencho Mojica MD                        [address] [place] [address] 6/16/20    2:16 PM    Facility:      [facility] ENDO                                                         Procedure Date & Time:  7/6/2020 @ 8:15 am           Pt arrival: 7:15 am Patient Name:  Arabella Garcia     :  1950 PCP:  Juan David White MD       Home Ph:    [phone] (home) [phone] (work)          SSN:                                         PROCEDURE:  Colonoscopy, possible polypectomy         [id number]      DIAGNOSIS:    3. Colon cancer screening  COLONOSCOPY W/ OR W/O BIOPSY       Anesthesia: __YES__  Time Needed: 30 minutes  Pt Position:  lateral, right side up         Outpatient _X_                                    _X__PREP:  Miralax                           _____Cardiac Clearance by; ___________     Medications to be stopped 5 days before procedure: _________  Additional / Special Orders:                                                                                                   Insurance: Medicare              ID # [id number]                #   SecondaryArdis Bees ID # [id number]       [facility] # & Details _______________________________________________                           _X__ Given to pt 20     _X__ Spoke to Pt / Spouse                                                                                Catalina Leyva    1950                                                    Endoscopy Order   IN ACCORDANCE WITH OUR FORMULARY SYSTEM, A GENERIC EQUIVALENT DRUG MAY BE DISPNSED AND ADMINISTERED UNLESS D. A. W. IS WRITTEN WITH THE MEDICATION ORDER.   DATE HOUR PHYSICIAN:  RECORD DATE, HOUR AND SIGN EACH ENTRY   20 8:15 am 1)  Admit for:   [x]Colonoscopy  []EGD     [x]Anesthesia/MAC        []ERCP     []Upper EUS     []Lower EUS                             2)  Diagnosis: Z.     3)  Establish IV access     Solution:  []0.9 Normal Saline   [x]Lactated Ringers    []Other:                            Rate:   [x]KVO      []Other:     4)  Check blood sugar on all diabetic patients       Urine Pregnancy test on all menstruating females

## 2020-06-18 ENCOUNTER — TELEPHONE (OUTPATIENT)
Dept: FAMILY MEDICINE CLINIC | Age: 70
End: 2020-06-18

## 2020-06-30 ENCOUNTER — TELEPHONE (OUTPATIENT)
Dept: FAMILY MEDICINE CLINIC | Age: 70
End: 2020-06-30

## 2020-06-30 NOTE — TELEPHONE ENCOUNTER
Pt is wanting to know if it is safe for her to have the Shingles vaccine with her current health issues? Can you please contact pt with information?

## 2020-07-01 ENCOUNTER — OFFICE VISIT (OUTPATIENT)
Dept: FAMILY MEDICINE CLINIC | Age: 70
End: 2020-07-01
Payer: MEDICARE

## 2020-07-01 VITALS
OXYGEN SATURATION: 98 % | BODY MASS INDEX: 28.32 KG/M2 | TEMPERATURE: 98.2 F | HEART RATE: 78 BPM | WEIGHT: 170 LBS | HEIGHT: 65 IN | SYSTOLIC BLOOD PRESSURE: 159 MMHG | DIASTOLIC BLOOD PRESSURE: 84 MMHG

## 2020-07-01 PROCEDURE — 1123F ACP DISCUSS/DSCN MKR DOCD: CPT | Performed by: INTERNAL MEDICINE

## 2020-07-01 PROCEDURE — G8417 CALC BMI ABV UP PARAM F/U: HCPCS | Performed by: INTERNAL MEDICINE

## 2020-07-01 PROCEDURE — 4040F PNEUMOC VAC/ADMIN/RCVD: CPT | Performed by: INTERNAL MEDICINE

## 2020-07-01 PROCEDURE — 99213 OFFICE O/P EST LOW 20 MIN: CPT | Performed by: INTERNAL MEDICINE

## 2020-07-01 PROCEDURE — 3017F COLORECTAL CA SCREEN DOC REV: CPT | Performed by: INTERNAL MEDICINE

## 2020-07-01 PROCEDURE — 1090F PRES/ABSN URINE INCON ASSESS: CPT | Performed by: INTERNAL MEDICINE

## 2020-07-01 PROCEDURE — G8399 PT W/DXA RESULTS DOCUMENT: HCPCS | Performed by: INTERNAL MEDICINE

## 2020-07-01 PROCEDURE — 1036F TOBACCO NON-USER: CPT | Performed by: INTERNAL MEDICINE

## 2020-07-01 PROCEDURE — G8427 DOCREV CUR MEDS BY ELIG CLIN: HCPCS | Performed by: INTERNAL MEDICINE

## 2020-07-01 RX ORDER — LORAZEPAM 1 MG/1
1 TABLET ORAL 2 TIMES DAILY
Qty: 60 TABLET | Refills: 2 | Status: SHIPPED | OUTPATIENT
Start: 2020-07-01 | End: 2020-08-30

## 2020-07-01 NOTE — PROGRESS NOTES
20    Nayana Spicer (: 1950) is a 77 y.o. female, here for evaluation of the following medical concerns:    HPI;    Seen for follow up for neutropenia and normocytic anemia. Is scheduled for colonoscopy by Baljinder Manan was seen by hem-onc. May need bone marrow biopsy. States she feel fine except fatigue and more anxious. She always has an anxiety issue but has gotten worse  With all the worrying about the forthcoming  work up    Lab Results   Component Value Date    WBC 2.2 (LL) 2020    HGB 8.8 (L) 2020    HCT 26.7 (L) 2020    MCV 92 2020     2020      HGB 2020 was 9.2. Review of Systems   Constitutional: Negative for activity change. Respiratory: Negative for cough. Cardiovascular: Negative for chest pain. Genitourinary: Negative. Musculoskeletal: Negative for arthralgias. Neurological: Negative for dizziness. Seizures:    Hematological: Negative for adenopathy. Psychiatric/Behavioral: Positive for sleep disturbance. The patient is nervous/anxious. Current Outpatient Medications   Medication Sig Dispense Refill    bisacodyl (BISACODYL) 5 MG EC tablet Take 4 tablets of Bisacodyl for colonoscopy prep as directed. 4 tablet 0    LORazepam (ATIVAN) 1 MG tablet Take 1 tablet by mouth 2 times daily for 60 days. 60 tablet 0    acyclovir (ZOVIRAX) 400 MG tablet TAKE 1 TABLET BY MOUTH 2 TIMES DAILY 60 tablet 5    acyclovir (ZOVIRAX) 5 % ointment APPLY TOPICALLY 5 TIMES DAILY. 30 g 2    hydrOXYzine (ATARAX) 25 MG tablet TAKE 1 TABLET BY MOUTH EVERY 8 HOURS AS NEEDED FOR ITCHING 90 tablet 2    tetracycline (ACHROMYCIN;SUMYCIN) 250 MG capsule It is compounded to 225mg daily 100 capsule 2    DHA-Vitamin C-Lutein (EYE HEALTH FORMULA PO) Take by mouth Indications: Also with Vitamin A,K OTC Vitamin      Cholecalciferol (VITAMIN D) 2000 UNITS CAPS capsule Take  by mouth.  Indications: OTC       No current facility-administered medications for this visit. Social History     Socioeconomic History    Marital status:      Spouse name: Not on file    Number of children: Not on file    Years of education: Not on file    Highest education level: Not on file   Occupational History    Not on file   Social Needs    Financial resource strain: Not on file    Food insecurity     Worry: Not on file     Inability: Not on file    Transportation needs     Medical: Not on file     Non-medical: Not on file   Tobacco Use    Smoking status: Former Smoker     Packs/day: 1.00     Years: 15.00     Pack years: 15.00     Types: Cigarettes     Last attempt to quit: 2013     Years since quittin.6    Smokeless tobacco: Never Used   Substance and Sexual Activity    Alcohol use: No    Drug use: No    Sexual activity: Never   Lifestyle    Physical activity     Days per week: Not on file     Minutes per session: Not on file    Stress: Not on file   Relationships    Social connections     Talks on phone: Not on file     Gets together: Not on file     Attends Jew service: Not on file     Active member of club or organization: Not on file     Attends meetings of clubs or organizations: Not on file     Relationship status: Not on file    Intimate partner violence     Fear of current or ex partner: Not on file     Emotionally abused: Not on file     Physically abused: Not on file     Forced sexual activity: Not on file   Other Topics Concern    Not on file   Social History Narrative    Not on file       Vitals:    20 1608   BP: (!) 159/84   Pulse:    Temp:    SpO2:         Body mass index is 28.29 kg/m². Physical Exam  Vitals signs and nursing note reviewed. Constitutional:       Appearance: Normal appearance. HENT:      Head: Normocephalic. Nose: No congestion. Mouth/Throat:      Mouth: Mucous membranes are moist.      Pharynx: Oropharynx is clear. No oropharyngeal exudate.    Eyes:      Extraocular Movements: Extraocular movements intact. Pupils: Pupils are equal, round, and reactive to light. Neck:      Musculoskeletal: Normal range of motion. Cardiovascular:      Rate and Rhythm: Normal rate and regular rhythm. Pulmonary:      Effort: Pulmonary effort is normal.      Breath sounds: Normal breath sounds. Neurological:      Mental Status: She is alert. ASSESSMENT/PLAN:     1. Generalized anxiety disorder    - LORazepam (ATIVAN) 1 MG tablet; Take 1 tablet by mouth 2 times daily for 60 days. Dispense: 60 tablet; Refill: 2    2. Normocytic anemia  -follow up with hematology    3. Neutropenia, unspecified type ([address]  -follow up with hematology    4. Fatigue, unspecified type  -secondary to anemia  Controlled Substance Monitoring:    Acute and Chronic Pain Monitoring:   RX Monitoring 7/3/2020   Attestation -   Periodic Controlled Substance Monitoring Possible medication side effects, risk of tolerance/dependence & alternative treatments discussed. ;No signs of potential drug abuse or diversion identified. ;Assessed functional status. Chronic Pain > 80 MEDD -           An electronic signature was used to authenticate this note.     --Sagrario Smith MD on 07/01/20 at 4:40 PM

## 2020-07-03 ASSESSMENT — ENCOUNTER SYMPTOMS: COUGH: 0

## 2020-07-04 ENCOUNTER — HOSPITAL ENCOUNTER (OUTPATIENT)
Age: 70
Discharge: HOME | End: 2020-07-04
Payer: MEDICARE

## 2020-07-04 PROCEDURE — U0003 INFECTIOUS AGENT DETECTION BY NUCLEIC ACID (DNA OR RNA); SEVERE ACUTE RESPIRATORY SYNDROME CORONAVIRUS 2 (SARS-COV-2) (CORONAVIRUS DISEASE [COVID-19]), AMPLIFIED PROBE TECHNIQUE, MAKING USE OF HIGH THROUGHPUT TECHNOLOGIES AS DESCRIBED BY CMS-2020-01-R: HCPCS

## 2020-07-06 ENCOUNTER — TELEPHONE (OUTPATIENT)
Dept: FAMILY MEDICINE CLINIC | Age: 70
End: 2020-07-06

## 2020-07-06 LAB
SARS-COV-2: NOT DETECTED
SOURCE: NORMAL

## 2020-07-06 NOTE — TELEPHONE ENCOUNTER
----- Message from Carrie Amaya MD sent at 7/3/2020  9:30 PM EDT -----  Schedule her for wellness in Aug.

## 2020-07-06 NOTE — TELEPHONE ENCOUNTER
Pt has an appt on   Future Appointments   Date Time Provider Shaun Jack   9/2/2020  8:00 AM Job MD [facility]     Per pt is this ok

## 2020-07-07 ENCOUNTER — HOSPITAL ENCOUNTER (OUTPATIENT)
Dept: NUCLEAR MEDICINE | Age: 70
Discharge: HOME | End: 2020-07-07
Payer: MEDICARE

## 2020-07-07 PROCEDURE — 3430000000 HC RX DIAGNOSTIC RADIOPHARMACEUTICAL: Performed by: INTERNAL MEDICINE

## 2020-07-07 PROCEDURE — 78215 LVR&SPLEEN IMG STATIC ONLY: CPT

## 2020-07-07 PROCEDURE — A9541 TC99M SULFUR COLLOID: HCPCS | Performed by: INTERNAL MEDICINE

## 2020-07-07 RX ADMIN — Medication 6 MILLICURIE: at 10:30

## 2020-07-09 ENCOUNTER — APPOINTMENT (OUTPATIENT)
Dept: GENERAL RADIOLOGY | Age: 70
DRG: 640 | End: 2020-07-09
Payer: MEDICARE

## 2020-07-09 ENCOUNTER — APPOINTMENT (OUTPATIENT)
Dept: CT IMAGING | Age: 70
DRG: 640 | End: 2020-07-09
Payer: MEDICARE

## 2020-07-09 ENCOUNTER — HOSPITAL ENCOUNTER (INPATIENT)
Age: 70
LOS: 2 days | Discharge: HOME | DRG: 640 | End: 2020-07-11
Attending: EMERGENCY MEDICINE | Admitting: INTERNAL MEDICINE
Payer: MEDICARE

## 2020-07-09 ENCOUNTER — TELEPHONE (OUTPATIENT)
Dept: FAMILY MEDICINE CLINIC | Age: 70
End: 2020-07-09

## 2020-07-09 PROBLEM — E87.1 HYPONATREMIA: Status: ACTIVE | Noted: 2020-07-09

## 2020-07-09 PROBLEM — G93.41 METABOLIC ENCEPHALOPATHY: Status: ACTIVE | Noted: 2020-07-09

## 2020-07-09 LAB
A/G RATIO: 0.9 (ref 1.1–2.2)
ALBUMIN SERPL-MCNC: 3.9 G/DL (ref 3.4–5)
ALP BLD-CCNC: 90 U/L (ref 40–129)
ALT SERPL-CCNC: 18 U/L (ref 10–40)
AMMONIA: 39 UMOL/L (ref 11–51)
AMPHETAMINE SCREEN, URINE: NORMAL
ANION GAP SERPL CALCULATED.3IONS-SCNC: 15 MMOL/L (ref 3–16)
ANION GAP SERPL CALCULATED.3IONS-SCNC: 16 MMOL/L (ref 3–16)
ANION GAP SERPL CALCULATED.3IONS-SCNC: 16 MMOL/L (ref 3–16)
APTT: 35.9 SEC (ref 24.2–36.2)
AST SERPL-CCNC: 59 U/L (ref 15–37)
BACTERIA: ABNORMAL /HPF
BACTERIA: ABNORMAL /HPF
BARBITURATE SCREEN URINE: NORMAL
BASE EXCESS VENOUS: -7.2 MMOL/L (ref -3–3)
BASOPHILS ABSOLUTE: 0 K/UL (ref 0–0.2)
BASOPHILS RELATIVE PERCENT: 0.2 %
BENZODIAZEPINE SCREEN, URINE: NORMAL
BILIRUB SERPL-MCNC: 0.9 MG/DL (ref 0–1)
BILIRUBIN URINE: NEGATIVE
BILIRUBIN URINE: NEGATIVE
BLOOD, URINE: ABNORMAL
BLOOD, URINE: ABNORMAL
BUN BLDV-MCNC: 5 MG/DL (ref 7–20)
BUN BLDV-MCNC: 6 MG/DL (ref 7–20)
BUN BLDV-MCNC: 8 MG/DL (ref 7–20)
CALCIUM SERPL-MCNC: 8.8 MG/DL (ref 8.3–10.6)
CALCIUM SERPL-MCNC: 9.2 MG/DL (ref 8.3–10.6)
CALCIUM SERPL-MCNC: 9.3 MG/DL (ref 8.3–10.6)
CANNABINOID SCREEN URINE: NORMAL
CARBOXYHEMOGLOBIN: 5.3 % (ref 0–1.5)
CHLORIDE BLD-SCNC: 82 MMOL/L (ref 99–110)
CHLORIDE BLD-SCNC: 86 MMOL/L (ref 99–110)
CHLORIDE BLD-SCNC: 88 MMOL/L (ref 99–110)
CLARITY: CLEAR
CLARITY: CLEAR
CO2: 21 MMOL/L (ref 21–32)
CO2: 21 MMOL/L (ref 21–32)
CO2: 22 MMOL/L (ref 21–32)
COCAINE METABOLITE SCREEN URINE: NORMAL
COLOR: YELLOW
COLOR: YELLOW
CREAT SERPL-MCNC: <0.5 MG/DL (ref 0.6–1.2)
EKG ATRIAL RATE: 69 BPM
EKG DIAGNOSIS: NORMAL
EKG P AXIS: 14 DEGREES
EKG P-R INTERVAL: 144 MS
EKG Q-T INTERVAL: 456 MS
EKG QRS DURATION: 96 MS
EKG QTC CALCULATION (BAZETT): 488 MS
EKG R AXIS: -7 DEGREES
EKG T AXIS: 30 DEGREES
EKG VENTRICULAR RATE: 69 BPM
EOSINOPHILS ABSOLUTE: 0 K/UL (ref 0–0.6)
EOSINOPHILS RELATIVE PERCENT: 0 %
EPITHELIAL CELLS, UA: ABNORMAL /HPF (ref 0–5)
ETHANOL: NORMAL MG/DL (ref 0–0.08)
GFR AFRICAN AMERICAN: >60
GFR NON-AFRICAN AMERICAN: >60
GLOBULIN: 4.5 G/DL
GLUCOSE BLD-MCNC: 117 MG/DL (ref 70–99)
GLUCOSE BLD-MCNC: 128 MG/DL (ref 70–99)
GLUCOSE BLD-MCNC: 164 MG/DL (ref 70–99)
GLUCOSE URINE: 100 MG/DL
GLUCOSE URINE: NEGATIVE MG/DL
HCO3 VENOUS: 16.2 MMOL/L (ref 23–29)
HCT VFR BLD CALC: 25 % (ref 36–48)
HEMOGLOBIN: 8.7 G/DL (ref 12–16)
INR BLD: 1.03 (ref 0.86–1.14)
KETONES, URINE: 15 MG/DL
KETONES, URINE: NEGATIVE MG/DL
LEUKOCYTE ESTERASE, URINE: NEGATIVE
LEUKOCYTE ESTERASE, URINE: NEGATIVE
LYMPHOCYTES ABSOLUTE: 0.3 K/UL (ref 1–5.1)
LYMPHOCYTES RELATIVE PERCENT: 7.5 %
Lab: NORMAL
MAGNESIUM: 1.7 MG/DL (ref 1.8–2.4)
MCH RBC QN AUTO: 31.9 PG (ref 26–34)
MCHC RBC AUTO-ENTMCNC: 34.7 G/DL (ref 31–36)
MCV RBC AUTO: 91.9 FL (ref 80–100)
METHADONE SCREEN, URINE: NORMAL
METHEMOGLOBIN VENOUS: 0.5 %
MICROSCOPIC EXAMINATION: YES
MICROSCOPIC EXAMINATION: YES
MONOCYTES ABSOLUTE: 0.2 K/UL (ref 0–1.3)
MONOCYTES RELATIVE PERCENT: 4.9 %
MUCUS: ABNORMAL /LPF
NEUTROPHILS ABSOLUTE: 3.3 K/UL (ref 1.7–7.7)
NEUTROPHILS RELATIVE PERCENT: 87.4 %
NITRITE, URINE: NEGATIVE
NITRITE, URINE: NEGATIVE
O2 CONTENT, VEN: 13 VOL %
O2 SAT, VEN: 97 %
O2 THERAPY: ABNORMAL
OPIATE SCREEN URINE: NORMAL
OXYCODONE URINE: NORMAL
PCO2, VEN: 26.1 MMHG (ref 40–50)
PDW BLD-RTO: 16.3 % (ref 12.4–15.4)
PH UA: 6 (ref 5–8)
PH UA: 8
PH UA: 8 (ref 5–8)
PH VENOUS: 7.41 (ref 7.35–7.45)
PHENCYCLIDINE SCREEN URINE: NORMAL
PLATELET # BLD: 167 K/UL (ref 135–450)
PMV BLD AUTO: 7.6 FL (ref 5–10.5)
PO2, VEN: 97.3 MMHG (ref 25–40)
POTASSIUM REFLEX MAGNESIUM: 3.8 MMOL/L (ref 3.5–5.1)
POTASSIUM REFLEX MAGNESIUM: 6.3 MMOL/L (ref 3.5–5.1)
POTASSIUM SERPL-SCNC: 3.4 MMOL/L (ref 3.5–5.1)
POTASSIUM SERPL-SCNC: 3.8 MMOL/L (ref 3.5–5.1)
PRO-BNP: 490 PG/ML (ref 0–124)
PROPOXYPHENE SCREEN: NORMAL
PROTEIN UA: 30 MG/DL
PROTEIN UA: NEGATIVE MG/DL
PROTHROMBIN TIME: 11.9 SEC (ref 10–13.2)
RBC # BLD: 2.72 M/UL (ref 4–5.2)
RBC UA: ABNORMAL /HPF (ref 0–4)
RBC UA: ABNORMAL /HPF (ref 0–4)
SODIUM BLD-SCNC: 118 MMOL/L (ref 136–145)
SODIUM BLD-SCNC: 120 MMOL/L (ref 136–145)
SODIUM BLD-SCNC: 121 MMOL/L (ref 136–145)
SODIUM BLD-SCNC: 124 MMOL/L (ref 136–145)
SODIUM BLD-SCNC: 124 MMOL/L (ref 136–145)
SODIUM BLD-SCNC: 125 MMOL/L (ref 136–145)
SODIUM BLD-SCNC: 126 MMOL/L (ref 136–145)
SODIUM URINE: 32 MMOL/L
SPECIFIC GRAVITY UA: 1.01 (ref 1–1.03)
SPECIFIC GRAVITY UA: >=1.03 (ref 1–1.03)
TCO2 CALC VENOUS: 17 MMOL/L
TOTAL PROTEIN: 8.4 G/DL (ref 6.4–8.2)
TROPONIN: <0.01 NG/ML
URINE REFLEX TO CULTURE: ABNORMAL
URINE TYPE: ABNORMAL
URINE TYPE: ABNORMAL
UROBILINOGEN, URINE: 0.2 E.U./DL
UROBILINOGEN, URINE: 0.2 E.U./DL
WBC # BLD: 3.7 K/UL (ref 4–11)
WBC UA: ABNORMAL /HPF (ref 0–5)
WBC UA: ABNORMAL /HPF (ref 0–5)

## 2020-07-09 PROCEDURE — 84132 ASSAY OF SERUM POTASSIUM: CPT

## 2020-07-09 PROCEDURE — 81001 URINALYSIS AUTO W/SCOPE: CPT

## 2020-07-09 PROCEDURE — 36415 COLL VENOUS BLD VENIPUNCTURE: CPT

## 2020-07-09 PROCEDURE — 84295 ASSAY OF SERUM SODIUM: CPT

## 2020-07-09 PROCEDURE — 84300 ASSAY OF URINE SODIUM: CPT

## 2020-07-09 PROCEDURE — 2580000003 HC RX 258: Performed by: INTERNAL MEDICINE

## 2020-07-09 PROCEDURE — 70450 CT HEAD/BRAIN W/O DYE: CPT

## 2020-07-09 PROCEDURE — 2700000000 HC OXYGEN THERAPY PER DAY

## 2020-07-09 PROCEDURE — 6360000002 HC RX W HCPCS

## 2020-07-09 PROCEDURE — 83880 ASSAY OF NATRIURETIC PEPTIDE: CPT

## 2020-07-09 PROCEDURE — 84484 ASSAY OF TROPONIN QUANT: CPT

## 2020-07-09 PROCEDURE — 96374 THER/PROPH/DIAG INJ IV PUSH: CPT

## 2020-07-09 PROCEDURE — 71045 X-RAY EXAM CHEST 1 VIEW: CPT

## 2020-07-09 PROCEDURE — 83735 ASSAY OF MAGNESIUM: CPT

## 2020-07-09 PROCEDURE — 2580000003 HC RX 258: Performed by: EMERGENCY MEDICINE

## 2020-07-09 PROCEDURE — 82140 ASSAY OF AMMONIA: CPT

## 2020-07-09 PROCEDURE — 82803 BLOOD GASES ANY COMBINATION: CPT

## 2020-07-09 PROCEDURE — 80053 COMPREHEN METABOLIC PANEL: CPT

## 2020-07-09 PROCEDURE — 85025 COMPLETE CBC W/AUTO DIFF WBC: CPT

## 2020-07-09 PROCEDURE — 96376 TX/PRO/DX INJ SAME DRUG ADON: CPT

## 2020-07-09 PROCEDURE — 96372 THER/PROPH/DIAG INJ SC/IM: CPT

## 2020-07-09 PROCEDURE — 94761 N-INVAS EAR/PLS OXIMETRY MLT: CPT

## 2020-07-09 PROCEDURE — 2000000000 HC ICU R&B

## 2020-07-09 PROCEDURE — 99285 EMERGENCY DEPT VISIT HI MDM: CPT

## 2020-07-09 PROCEDURE — G0480 DRUG TEST DEF 1-7 CLASSES: HCPCS

## 2020-07-09 PROCEDURE — 80307 DRUG TEST PRSMV CHEM ANLYZR: CPT

## 2020-07-09 PROCEDURE — 6360000002 HC RX W HCPCS: Performed by: EMERGENCY MEDICINE

## 2020-07-09 PROCEDURE — 83930 ASSAY OF BLOOD OSMOLALITY: CPT

## 2020-07-09 PROCEDURE — 85610 PROTHROMBIN TIME: CPT

## 2020-07-09 PROCEDURE — 85730 THROMBOPLASTIN TIME PARTIAL: CPT

## 2020-07-09 PROCEDURE — 93005 ELECTROCARDIOGRAM TRACING: CPT | Performed by: EMERGENCY MEDICINE

## 2020-07-09 PROCEDURE — 82533 TOTAL CORTISOL: CPT

## 2020-07-09 PROCEDURE — 93010 ELECTROCARDIOGRAM REPORT: CPT | Performed by: INTERNAL MEDICINE

## 2020-07-09 RX ORDER — LORAZEPAM 2 MG/ML
1 INJECTION INTRAMUSCULAR ONCE
Status: COMPLETED | OUTPATIENT
Start: 2020-07-09 | End: 2020-07-09

## 2020-07-09 RX ORDER — PROMETHAZINE HYDROCHLORIDE 25 MG/1
12.5 TABLET ORAL EVERY 6 HOURS PRN
Status: DISCONTINUED | OUTPATIENT
Start: 2020-07-09 | End: 2020-07-11 | Stop reason: HOSPADM

## 2020-07-09 RX ORDER — ACETAMINOPHEN 650 MG/1
650 SUPPOSITORY RECTAL EVERY 6 HOURS PRN
Status: DISCONTINUED | OUTPATIENT
Start: 2020-07-09 | End: 2020-07-11 | Stop reason: HOSPADM

## 2020-07-09 RX ORDER — POLYETHYLENE GLYCOL 3350 17 G/17G
17 POWDER, FOR SOLUTION ORAL DAILY PRN
Status: DISCONTINUED | OUTPATIENT
Start: 2020-07-09 | End: 2020-07-11 | Stop reason: HOSPADM

## 2020-07-09 RX ORDER — ZIPRASIDONE MESYLATE 20 MG/ML
INJECTION, POWDER, LYOPHILIZED, FOR SOLUTION INTRAMUSCULAR
Status: COMPLETED
Start: 2020-07-09 | End: 2020-07-09

## 2020-07-09 RX ORDER — 3% SODIUM CHLORIDE 3 G/100ML
50 INJECTION, SOLUTION INTRAVENOUS ONCE
Status: COMPLETED | OUTPATIENT
Start: 2020-07-09 | End: 2020-07-09

## 2020-07-09 RX ORDER — LORAZEPAM 2 MG/ML
INJECTION INTRAMUSCULAR
Status: COMPLETED
Start: 2020-07-09 | End: 2020-07-09

## 2020-07-09 RX ORDER — SODIUM CHLORIDE 0.9 % (FLUSH) 0.9 %
10 SYRINGE (ML) INJECTION EVERY 12 HOURS SCHEDULED
Status: DISCONTINUED | OUTPATIENT
Start: 2020-07-09 | End: 2020-07-11 | Stop reason: HOSPADM

## 2020-07-09 RX ORDER — HALOPERIDOL 5 MG/ML
5 INJECTION INTRAMUSCULAR ONCE
Status: COMPLETED | OUTPATIENT
Start: 2020-07-09 | End: 2020-07-09

## 2020-07-09 RX ORDER — ONDANSETRON 2 MG/ML
4 INJECTION INTRAMUSCULAR; INTRAVENOUS EVERY 6 HOURS PRN
Status: DISCONTINUED | OUTPATIENT
Start: 2020-07-09 | End: 2020-07-11 | Stop reason: HOSPADM

## 2020-07-09 RX ORDER — ACYCLOVIR 800 MG/1
400 TABLET ORAL 2 TIMES DAILY
Status: DISCONTINUED | OUTPATIENT
Start: 2020-07-09 | End: 2020-07-11 | Stop reason: HOSPADM

## 2020-07-09 RX ORDER — ZIPRASIDONE MESYLATE 20 MG/ML
20 INJECTION, POWDER, LYOPHILIZED, FOR SOLUTION INTRAMUSCULAR ONCE
Status: COMPLETED | OUTPATIENT
Start: 2020-07-09 | End: 2020-07-09

## 2020-07-09 RX ORDER — DEXMEDETOMIDINE HYDROCHLORIDE 4 UG/ML
0.2 INJECTION, SOLUTION INTRAVENOUS CONTINUOUS
Status: DISCONTINUED | OUTPATIENT
Start: 2020-07-09 | End: 2020-07-10

## 2020-07-09 RX ORDER — SODIUM CHLORIDE 9 MG/ML
1000 INJECTION, SOLUTION INTRAVENOUS CONTINUOUS
Status: DISCONTINUED | OUTPATIENT
Start: 2020-07-09 | End: 2020-07-09

## 2020-07-09 RX ORDER — SODIUM CHLORIDE 0.9 % (FLUSH) 0.9 %
10 SYRINGE (ML) INJECTION PRN
Status: DISCONTINUED | OUTPATIENT
Start: 2020-07-09 | End: 2020-07-11 | Stop reason: HOSPADM

## 2020-07-09 RX ORDER — ACETAMINOPHEN 325 MG/1
650 TABLET ORAL EVERY 6 HOURS PRN
Status: DISCONTINUED | OUTPATIENT
Start: 2020-07-09 | End: 2020-07-11 | Stop reason: HOSPADM

## 2020-07-09 RX ORDER — FAMOTIDINE 20 MG/1
20 TABLET, FILM COATED ORAL 2 TIMES DAILY
Status: DISCONTINUED | OUTPATIENT
Start: 2020-07-09 | End: 2020-07-10

## 2020-07-09 RX ORDER — LORAZEPAM 1 MG/1
1 TABLET ORAL 2 TIMES DAILY
Status: DISCONTINUED | OUTPATIENT
Start: 2020-07-09 | End: 2020-07-11 | Stop reason: HOSPADM

## 2020-07-09 RX ADMIN — ZIPRASIDONE MESYLATE 20 MG: 20 INJECTION, POWDER, LYOPHILIZED, FOR SOLUTION INTRAMUSCULAR at 13:45

## 2020-07-09 RX ADMIN — LORAZEPAM 1 MG: 2 INJECTION INTRAMUSCULAR at 08:46

## 2020-07-09 RX ADMIN — ENOXAPARIN SODIUM 40 MG: 40 INJECTION SUBCUTANEOUS at 15:00

## 2020-07-09 RX ADMIN — ZIPRASIDONE MESYLATE 20 MG: 20 INJECTION, POWDER, LYOPHILIZED, FOR SOLUTION INTRAMUSCULAR at 09:33

## 2020-07-09 RX ADMIN — HALOPERIDOL LACTATE 5 MG: 5 INJECTION, SOLUTION INTRAMUSCULAR at 06:45

## 2020-07-09 RX ADMIN — SODIUM CHLORIDE 999 ML: 3 INJECTION, SOLUTION INTRAVENOUS at 08:25

## 2020-07-09 RX ADMIN — SODIUM CHLORIDE 1000 ML: 9 INJECTION, SOLUTION INTRAVENOUS at 07:56

## 2020-07-09 RX ADMIN — SODIUM CHLORIDE 50 ML: 3 INJECTION, SOLUTION INTRAVENOUS at 10:33

## 2020-07-09 RX ADMIN — LORAZEPAM 1 MG: 2 INJECTION, SOLUTION INTRAMUSCULAR; INTRAVENOUS at 08:09

## 2020-07-09 RX ADMIN — LORAZEPAM 1 MG: 2 INJECTION, SOLUTION INTRAMUSCULAR; INTRAVENOUS at 08:46

## 2020-07-09 ASSESSMENT — PAIN SCALES - GENERAL: PAINLEVEL_OUTOF10: 0

## 2020-07-09 NOTE — PROGRESS NOTES
Sodium 121  She is still very confused  Will give another dose of 3% saline- 50 ml  Spoke to [employer] sodium up 124 and then allow the sodium to go up spontaneously

## 2020-07-09 NOTE — ED NOTES

## 2020-07-09 NOTE — TELEPHONE ENCOUNTER
FYI: I called patient to respond to [employer] message she sent on 7/8/20 asking for advise about a [employer] message she got in regards to [facility] estimate. \" This is a billing question. I spoke with Maya Randolph her . He informed patient is hospitalized at [facility] for being incoherent. Maya Randolph wanted Dr. Harsha Tellez to be aware.

## 2020-07-09 NOTE — ED NOTES
Messaged pharmacy for NaCl 3%.  at bedside, patient subdued s/p Ativan. ELLIE.       Tova Sousa RN  07/09/20 2311

## 2020-07-09 NOTE — ED NOTES
Pt subdued, resting with eyes closed. Will sleep when not spoken to or touched. Patient pulls at lines/gown/wires when stimulated.  at bedside.       Jack Luna RN  07/09/20 0135

## 2020-07-09 NOTE — ED NOTES
Pt arrived to us in feces covered clothes from colonoscopy prep. She was pulling at lines and trying to kick staff. Got orders for soft restraints and a order of Haladol. Pt is now subdued.       Niranjan Bustillos RN  07/09/20 9476

## 2020-07-09 NOTE — ED NOTES
[id number] - called  Mr [facility] per consult  Re: hyponatremia  7343 - called Mr Jericho Miranda again since no call back yet  BRIJESH HUNTER @ [phone]  Ean Montana returned call and [facility]  07/09/20 0802

## 2020-07-09 NOTE — ED PROVIDER NOTES
[facility]  ED  EMERGENCY DEPARTMENT ENCOUNTER      Pt Name: Richie Garcia  MRN: [id number]  Tanishagfcrow 1950  Date of evaluation: 7/9/2020  Provider: Evens Martins MD    CHIEF COMPLAINT       Chief Complaint   Patient presents with    Altered Mental Status      stated that woke pt up this morning for colonoscopy and pt was clawing at him. HISTORY OF PRESENT ILLNESS   (Location/Symptom, Timing/Onset, Context/Setting, Quality, Duration, Modifying Factors, Severity)  Note limiting factors. Richie Garcia is a 77 y.o. female with past medical history of depression, anxiety, hepatitis C and anemia with neutropenia here today for altered mental status    History is provided primarily by the . He reports that the patient was scheduled for colonoscopy today and did her bowel prep yesterday. He notes that she was anxious about this and read that it is important to drink a lot of water. He reports that his wife drank approximately 40 bottles of water yesterday for her upcoming colonoscopy. He was awakened in the middle of the night with the patient agitated and restless. He states that she was aggressive scratching and clawing at him and appeared quite confused. He notes she went to bed last night approximately 10 PM and was normal if not somewhat anxious. She has never behaved like this in the past.  No reports of recent fevers, chills or headache. No further meaningful information able to be obtained from the patient    HPI    Nursing Notes were reviewed. REVIEW OF SYSTEMS    (2-9 systems for level 4, 10 or more for level 5)     Review of Systems    Please see HPI for pertinent positive and negative review of system findings. A full 10 system ROS was performed and otherwise negative.         PAST MEDICAL HISTORY     Past Medical History:   Diagnosis Date    Anxiety     Depression     Hepatitis C      states resolved          SURGICAL HISTORY History reviewed. No pertinent surgical history. CURRENT MEDICATIONS       Previous Medications    ACYCLOVIR (ZOVIRAX) 400 MG TABLET    TAKE 1 TABLET BY MOUTH 2 TIMES DAILY    ACYCLOVIR (ZOVIRAX) 5 % OINTMENT    APPLY TOPICALLY 5 TIMES DAILY. BISACODYL (BISACODYL) 5 MG EC TABLET    Take 4 tablets of Bisacodyl for colonoscopy prep as directed. CHOLECALCIFEROL (VITAMIN D) 2000 UNITS CAPS CAPSULE    Take  by mouth. Indications: OTC    DHA-VITAMIN C-LUTEIN (EYE HEALTH FORMULA PO)    Take by mouth Indications: Also with Vitamin A,K OTC Vitamin    HYDROXYZINE (ATARAX) 25 MG TABLET    TAKE 1 TABLET BY MOUTH EVERY 8 HOURS AS NEEDED FOR ITCHING    LORAZEPAM (ATIVAN) 1 MG TABLET    Take 1 tablet by mouth 2 times daily for 60 days.     TETRACYCLINE (ACHROMYCIN;SUMYCIN) 250 MG CAPSULE    It is compounded to 225mg daily       ALLERGIES     Methylprednisolone    FAMILY HISTORY       Family History   Problem Relation Age of Onset    Arthritis Mother     High Blood Pressure Mother     Vision Loss Mother     Arthritis Father     Cancer Father     Heart Disease Father     Arthritis Sister     Learning Disabilities Sister     Arthritis Brother           SOCIAL HISTORY       Social History     Socioeconomic History    Marital status:      Spouse name: None    Number of children: None    Years of education: None    Highest education level: None   Occupational History    None   Social Needs    Financial resource strain: None    Food insecurity     Worry: None     Inability: None    Transportation needs     Medical: None     Non-medical: None   Tobacco Use    Smoking status: Former Smoker     Packs/day: 1.00     Years: 15.00     Pack years: 15.00     Types: Cigarettes     Last attempt to quit: 2013     Years since quittin.6    Smokeless tobacco: Never Used   Substance and Sexual Activity    Alcohol use: No    Drug use: No    Sexual activity: Never   Lifestyle    Physical activity Days per week: None     Minutes per session: None    Stress: None   Relationships    Social connections     Talks on phone: None     Gets together: None     Attends Voodoo service: None     Active member of club or organization: None     Attends meetings of clubs or organizations: None     Relationship status: None    Intimate partner violence     Fear of current or ex partner: None     Emotionally abused: None     Physically abused: None     Forced sexual activity: None   Other Topics Concern    None   Social History Narrative    None       SCREENINGS    [place] Coma Scale  Eye Opening: Spontaneous  Best Verbal Response: Inappropriate words  Best Motor Response: Withdraws from pain  Rachel Coma Scale Score: 11          PHYSICAL EXAM    (up to 7 for level 4, 8 or more for level 5)     ED Triage Vitals [07/09/20 0630]   BP Temp Temp Source Pulse Resp SpO2 Height Weight   (!) 163/86 97.3 °F (36.3 °C) Axillary 73 23 100 % -- --       Physical Exam    General appearance: Agitated and restless  Skin:  Warm. Dry. Eye:  Extraocular movements intact. Pupils are equally round and reactive to light and accommodation. Extraocular motions are intact. CN II-XII intact. Ears, nose, mouth and throat:  Oral mucosa moist,  Neck:  Trachea midline. Heart:  Regular rate and rhythm  Perfusion:  intact  Respiratory:  Lungs clear to auscultation bilaterally. Respirations nonlabored. Abdominal:   Non distended. Nontender  Neurological: Awake but not following commands. Does withdraw and localize to painful stimuli. Moves all extremities spontaneously. Intact sensation throughout. 2+ bilateral patellar reflexes. Gross strength apparently normal bilateral upper and lower extremities.   Musculoskeletal:   Normal ROM, no deformities          Psychiatric: Agitated and restless      DIAGNOSTIC RESULTS       Labs Reviewed   CBC WITH AUTO DIFFERENTIAL - Abnormal; Notable for the following components:       Result Value WBC 3.7 (*)     RBC 2.72 (*)     Hemoglobin 8.7 (*)     Hematocrit 25.0 (*)     RDW 16.3 (*)     Lymphocytes Absolute 0.3 (*)     All other components within normal limits    Narrative:     Performed at:  [place] [address] [employer]   Phone [phone]   COMPREHENSIVE METABOLIC PANEL W/ REFLEX TO MG FOR LOW K - Abnormal; Notable for the following components:    Sodium 118 (*)     Potassium reflex Magnesium 6.3 (*)     Chloride 82 (*)     Glucose 164 (*)     BUN 5 (*)     CREATININE <0.5 (*)     Total Protein 8.4 (*)     Albumin/Globulin Ratio 0.9 (*)     AST 59 (*)     All other components within normal limits    Narrative:     Denisa Richard tel. [phone],  Chemistry results called to and read back by Masha Guillen RN, 07/09/2020  07:13, by [facility]  Performed at:  [address], [facility] [employer]   Phone [phone] - Abnormal; Notable for the following components:    Pro- (*)     All other components within normal limits    Narrative:     Denisa Richard tel. [phone],  Chemistry results called to and read back by Masha Guillen RN, 07/09/2020  07:13, by [facility]  Performed at:  [address], [facility] [employer]   Phone [phone]   URINE RT REFLEX TO CULTURE - Abnormal; Notable for the following components:    Glucose, Ur 100 (*)     Blood, Urine SMALL (*)     All other components within normal limits    Narrative:     Performed at:  [address], [facility] [employer]   Phone [phone]   BLOOD GAS, VENOUS - Abnormal; Notable for the following components:    pCO2, Dung 26.1 (*)     pO2, Dung 97.3 (*)     HCO3, Venous 16.2 (*)     Base Excess, Dung -7.2 (*)     Carboxyhemoglobin 5.3 (*)     All other components within normal limits    Narrative:     Performed at:  [address], Formerly named [facility] [address]   Phone [phone]   POTASSIUM - Abnormal; Notable for the following components:    Potassium 3.4 (*)     All other components within normal limits    Narrative:     Performed at:  [address], Formerly named [facility] [address]   Phone [phone]   MICROSCOPIC URINALYSIS - Abnormal; Notable for the following components:    Bacteria, UA 1+ (*)     All other components within normal limits    Narrative:     Performed at:  [place] [address] [address]   Phone [phone]   SODIUM - Abnormal; Notable for the following components:    Sodium 120 (*)     All other components within normal limits    Narrative:     Performed at:  [place] [address] [address]   Phone [phone]   TROPONIN    Narrative:     Toy Mantel tel. [phone],  Chemistry results called to and read back by Ayush Hand RN, 07/09/2020  07:13, by [facility]  Performed at:  [place] [address] [address]   Phone [phone]   PROTIME-INR    Narrative:     Performed at:  [address], Formerly named [facility] [address]   Phone [phone]   APTT    Narrative:     Performed at:  [address], Formerly named [facility] [address]   Phone [phone]   ETHANOL    Narrative:     Queenie Mustafa [phone],  Chemistry results called to and read back by Ayush Hand RN, 07/09/2020  07:13, by [facility]  Performed at:  [place] [address] [address]   Phone [phone]   AMMONIA    Narrative:     Performed at:  [address], [address] [place] [address]   Phone [phone]   URINE DRUG 3% saline for severe hypo-natremia with altered mental status      REASSESSMENT          PROCEDURE     Unless otherwise noted below, none     Procedures      FINAL IMPRESSION      1. Hyponatremia    2. Psychogenic polydipsia    3. Water intoxication    4. Metabolic encephalopathy            DISPOSITION/PLAN   DISPOSITION Decision To Admit 07/09/2020 07:19:52 AM        PATIENT REFERRED TO:  No follow-up provider specified. DISCHARGE MEDICATIONS:  New Prescriptions    No medications on file     Controlled Substances Monitoring:     RX Monitoring 7/3/2020   Attestation -   Periodic Controlled Substance Monitoring Possible medication side effects, risk of tolerance/dependence & alternative treatments discussed. ;No signs of potential drug abuse or diversion identified. ;Assessed functional status.    Chronic Pain > 80 MEDD -       (Please note that portions of this note were completed with a voice recognition program.  Efforts were made to edit the dictations but occasionally words are mis-transcribed.)    Lora Heller MD (electronically signed)  Attending Emergency Physician            Jane Homans, MD  07/09/20 7403

## 2020-07-09 NOTE — PROGRESS NOTES
4 Eyes Skin Assessment     The patient is being assess for   Admission    I agree that 2 RN's have performed a thorough Head to Toe Skin Assessment on the patient. ALL assessment sites listed below have been assessed. Areas assessed by both nurses:   [x]   Head, Face, and Ears   [x]   Shoulders, Back, and Chest, Abdomen  [x]   Arms, Elbows, and Hands   [x]   Coccyx, Sacrum, and Ischium  [x]   Legs, Feet, and Heels            **SHARE this note so that the co-signing nurse is able to place an eSignature**    Co-signer eSignature: Electronically signed by Silvino Hanson RN on 7/9/20 at 7:48 PM EDT    Does the Patient have Skin Breakdown?   No          Leroy Prevention initiated:  Yes   Wound Care Orders initiated:  No      WOC nurse consulted for Pressure Injury (Stage 3,4, Unstageable, DTI, NWPT, Complex wounds)and New or Established Ostomies:  No      Primary Nurse eSignature: Electronically signed by Franc Boyce RN on 7/9/20 at 7:14 PM EDT

## 2020-07-09 NOTE — CONSULTS
[facility]    [facility]              [phone]                       Plan :     Hyponatremia with altered mental status  Appears to be very acute   And expect rapid correction which is ok for her  Since very confused will give a dose of 3 % saline    She needed ativan because of restlessness, for her safety  So will be hard to evaluate her mental status     Assessment :     Hyponatremia  Sodium down to 118  Very confused  Normal sodium a month ago  Appears to be water  Intoxication  Will give iv 3 % NS 50 ml and follow the labs and also the    mental status         Due the potential for life-threatening deterioration due to patient's severe hyponatremia I spent 30 minutes providing critical care for this individual. This exclude procedures. [facility] would like to thank Oumar Villalobos MD   for opportunity to serve this patient      Please call with questions at-   24 Hrs Answering service [phone] or  7 am- 5 pm via Perfect serve or cell phone  Dr.Sudhir Mik Orozco          CC/reason for consult :     hyponatremia     HPI :     Ganesh Quintero is a 77 y.o. female presented to   the hospital on 7/9/2020 with altered mental status. She was planned for colonoscopy as outpatient today. He though she should drink a lot of water, so she   Drank 40 bottles of 20 ounces of water yesterday. She was anxious and alert yesterday per . But very agitated and confused today because of   Which she was brought to the hospital.     We are consulted for hyponatremai     Interval History:     Very anxious  On restraints    ROS:     Seen with-   Ed staff  Unable to obtain ROS due to  Altered mental status/        PMH/PSH/SH/Family History:     Past Medical History:   Diagnosis Date    Anxiety     Depression     Hepatitis C      states resolved        History reviewed. No pertinent surgical history. reports that she quit smoking about 6 years ago.  Her

## 2020-07-09 NOTE — ED NOTES
Bed: 04  Expected date:   Expected time:   Means of arrival:   Comments:  [address], [facility]  07/09/20 5298

## 2020-07-10 ENCOUNTER — APPOINTMENT (OUTPATIENT)
Dept: CT IMAGING | Age: 70
DRG: 640 | End: 2020-07-10
Payer: MEDICARE

## 2020-07-10 LAB
ANION GAP SERPL CALCULATED.3IONS-SCNC: 14 MMOL/L (ref 3–16)
ANION GAP SERPL CALCULATED.3IONS-SCNC: 14 MMOL/L (ref 3–16)
ANION GAP SERPL CALCULATED.3IONS-SCNC: 15 MMOL/L (ref 3–16)
BASOPHILS ABSOLUTE: 0 K/UL (ref 0–0.2)
BASOPHILS RELATIVE PERCENT: 0.1 %
BUN BLDV-MCNC: 10 MG/DL (ref 7–20)
CALCIUM SERPL-MCNC: 9.1 MG/DL (ref 8.3–10.6)
CALCIUM SERPL-MCNC: 9.4 MG/DL (ref 8.3–10.6)
CALCIUM SERPL-MCNC: 9.5 MG/DL (ref 8.3–10.6)
CHLORIDE BLD-SCNC: 87 MMOL/L (ref 99–110)
CHLORIDE BLD-SCNC: 89 MMOL/L (ref 99–110)
CHLORIDE BLD-SCNC: 91 MMOL/L (ref 99–110)
CO2: 20 MMOL/L (ref 21–32)
CO2: 23 MMOL/L (ref 21–32)
CO2: 24 MMOL/L (ref 21–32)
CORTISOL TOTAL: 20.7 UG/DL
CORTISOL TOTAL: 35.1 UG/DL
CREAT SERPL-MCNC: <0.5 MG/DL (ref 0.6–1.2)
EOSINOPHILS ABSOLUTE: 0 K/UL (ref 0–0.6)
EOSINOPHILS RELATIVE PERCENT: 0 %
GFR AFRICAN AMERICAN: >60
GFR NON-AFRICAN AMERICAN: >60
GLUCOSE BLD-MCNC: 107 MG/DL (ref 70–99)
GLUCOSE BLD-MCNC: 116 MG/DL (ref 70–99)
GLUCOSE BLD-MCNC: 97 MG/DL (ref 70–99)
HCT VFR BLD CALC: 25 % (ref 36–48)
HEMOGLOBIN: 8.7 G/DL (ref 12–16)
LYMPHOCYTES ABSOLUTE: 0.2 K/UL (ref 1–5.1)
LYMPHOCYTES RELATIVE PERCENT: 5.2 %
MCH RBC QN AUTO: 32.3 PG (ref 26–34)
MCHC RBC AUTO-ENTMCNC: 35 G/DL (ref 31–36)
MCV RBC AUTO: 92.3 FL (ref 80–100)
MONOCYTES ABSOLUTE: 0.3 K/UL (ref 0–1.3)
MONOCYTES RELATIVE PERCENT: 6.8 %
NEUTROPHILS ABSOLUTE: 4 K/UL (ref 1.7–7.7)
NEUTROPHILS RELATIVE PERCENT: 87.9 %
OSMOLALITY URINE: 173 MOSM/KG (ref 390–1070)
OSMOLALITY: 266 MOSM/KG (ref 280–301)
PDW BLD-RTO: 17.3 % (ref 12.4–15.4)
PLATELET # BLD: 182 K/UL (ref 135–450)
PMV BLD AUTO: 7.5 FL (ref 5–10.5)
POTASSIUM REFLEX MAGNESIUM: 4.1 MMOL/L (ref 3.5–5.1)
POTASSIUM REFLEX MAGNESIUM: 4.1 MMOL/L (ref 3.5–5.1)
POTASSIUM REFLEX MAGNESIUM: 4.3 MMOL/L (ref 3.5–5.1)
RBC # BLD: 2.7 M/UL (ref 4–5.2)
SODIUM BLD-SCNC: 122 MMOL/L (ref 136–145)
SODIUM BLD-SCNC: 126 MMOL/L (ref 136–145)
SODIUM BLD-SCNC: 129 MMOL/L (ref 136–145)
SODIUM URINE: <20 MMOL/L
WBC # BLD: 4.6 K/UL (ref 4–11)

## 2020-07-10 PROCEDURE — 6360000002 HC RX W HCPCS: Performed by: INTERNAL MEDICINE

## 2020-07-10 PROCEDURE — 80048 BASIC METABOLIC PNL TOTAL CA: CPT

## 2020-07-10 PROCEDURE — 84300 ASSAY OF URINE SODIUM: CPT

## 2020-07-10 PROCEDURE — 6360000004 HC RX CONTRAST MEDICATION: Performed by: INTERNAL MEDICINE

## 2020-07-10 PROCEDURE — 6360000004 HC RX CONTRAST MEDICATION

## 2020-07-10 PROCEDURE — 83935 ASSAY OF URINE OSMOLALITY: CPT

## 2020-07-10 PROCEDURE — 2580000003 HC RX 258: Performed by: INTERNAL MEDICINE

## 2020-07-10 PROCEDURE — 6370000000 HC RX 637 (ALT 250 FOR IP): Performed by: INTERNAL MEDICINE

## 2020-07-10 PROCEDURE — 82533 TOTAL CORTISOL: CPT

## 2020-07-10 PROCEDURE — 71260 CT THORAX DX C+: CPT

## 2020-07-10 PROCEDURE — 85025 COMPLETE CBC W/AUTO DIFF WBC: CPT

## 2020-07-10 PROCEDURE — 99222 1ST HOSP IP/OBS MODERATE 55: CPT | Performed by: INTERNAL MEDICINE

## 2020-07-10 PROCEDURE — 36415 COLL VENOUS BLD VENIPUNCTURE: CPT

## 2020-07-10 PROCEDURE — 2060000000 HC ICU INTERMEDIATE R&B

## 2020-07-10 RX ADMIN — IOHEXOL 50 ML: 240 INJECTION, SOLUTION INTRATHECAL; INTRAVASCULAR; INTRAVENOUS; ORAL at 14:32

## 2020-07-10 RX ADMIN — ENOXAPARIN SODIUM 40 MG: 40 INJECTION SUBCUTANEOUS at 09:47

## 2020-07-10 RX ADMIN — IOPAMIDOL 75 ML: 755 INJECTION, SOLUTION INTRAVENOUS at 16:46

## 2020-07-10 RX ADMIN — ONDANSETRON 4 MG: 2 INJECTION INTRAMUSCULAR; INTRAVENOUS at 16:09

## 2020-07-10 RX ADMIN — Medication 10 ML: at 20:08

## 2020-07-10 RX ADMIN — ACYCLOVIR 400 MG: 800 TABLET ORAL at 09:47

## 2020-07-10 RX ADMIN — ACYCLOVIR 400 MG: 800 TABLET ORAL at 20:08

## 2020-07-10 RX ADMIN — LORAZEPAM 1 MG: 1 TABLET ORAL at 20:09

## 2020-07-10 RX ADMIN — Medication 10 ML: at 00:34

## 2020-07-10 RX ADMIN — Medication 10 ML: at 09:48

## 2020-07-10 RX ADMIN — ONDANSETRON 4 MG: 2 INJECTION INTRAMUSCULAR; INTRAVENOUS at 00:34

## 2020-07-10 RX ADMIN — LORAZEPAM 1 MG: 1 TABLET ORAL at 09:46

## 2020-07-10 ASSESSMENT — ENCOUNTER SYMPTOMS
EYE ITCHING: 0
EYE PAIN: 0
CONSTIPATION: 0
EYE DISCHARGE: 0
VOICE CHANGE: 0
SORE THROAT: 0
ABDOMINAL PAIN: 0
CHEST TIGHTNESS: 0
CHOKING: 0
DIARRHEA: 0
STRIDOR: 0

## 2020-07-10 ASSESSMENT — PAIN SCALES - GENERAL
PAINLEVEL_OUTOF10: 0

## 2020-07-10 NOTE — PROGRESS NOTES
Pt admit to  from ER. Sitter with pt. Pt very confused. Pt pulling at lines, wires, tubes. Pt climbing OOB. Assessment complete and  updated. Will monitor.

## 2020-07-10 NOTE — PROGRESS NOTES
Hospitalist Progress Note      PCP: Kale Serrano MD    Date of Admission: 7/9/2020    Chief Complaint: Confusion    Hospital Course: See H&P    Subjective:   Patient is up in bed, comfortable, not in distress. More awake alert oriented x4. Denies any complaint. No new event overnight noted. Medications:  Reviewed    Infusion Medications   Scheduled Medications    mupirocin   Nasal BID    acyclovir  400 mg Oral BID    LORazepam  1 mg Oral BID    sodium chloride flush  10 mL Intravenous 2 times per day    enoxaparin  40 mg Subcutaneous Daily     PRN Meds: sodium chloride flush, acetaminophen **OR** acetaminophen, polyethylene glycol, promethazine **OR** ondansetron      Intake/Output Summary (Last 24 hours) at 7/10/2020 1128  Last data filed at 7/10/2020 1000  Gross per 24 hour   Intake --   Output 2625 ml   Net -2625 ml       Physical Exam Performed:    BP (!) 165/58   Pulse 69   Temp 98.6 °F (37 °C) (Oral)   Resp 16   Ht 5' 5\" (1.651 m)   Wt 166 lb 10.7 oz (75.6 kg)   SpO2 98%   BMI 27.73 kg/m²     General appearance: No apparent distress, appears stated age and cooperative. HEENT: Pupils equal, round, and reactive to light. Conjunctivae/corneas clear. Neck: Supple, with full range of motion. No jugular venous distention. Trachea midline. Respiratory:  Normal respiratory effort. Clear to auscultation, bilaterally without Rales/Wheezes/Rhonchi. Cardiovascular: Regular rate and rhythm with normal S1/S2 without murmurs, rubs or gallops. Abdomen: Soft, non-tender, non-distended with normal bowel sounds. Musculoskeletal: No clubbing, cyanosis or edema bilaterally. Full range of motion without deformity. Skin: Skin color, texture, turgor normal.  No rashes or lesions. Neurologic:  Neurovascularly intact without any focal sensory/motor deficits.  Cranial nerves: II-XII intact, grossly non-focal.  Psychiatric: Alert and oriented, thought content appropriate, normal insight  Capillary Refill: Brisk,< 3 seconds   Peripheral Pulses: +2 palpable, equal bilaterally       Labs:   Recent Labs     07/09/20  0643 07/10/20  0514   WBC 3.7* 4.6   HGB 8.7* 8.7*   HCT 25.0* 25.0*    182     Recent Labs     07/10/20  0135 07/10/20  0514 07/10/20  0924   * 126* 129*   K 4.3 4.1 4.1   CL 87* 89* 91*   CO2 20* 23 24   BUN 10 10 10   CREATININE <0.5* <0.5* <0.5*   CALCIUM 9.1 9.5 9.4     Recent Labs     07/09/20  0643   AST 59*   ALT 18   BILITOT 0.9   ALKPHOS 90     Recent Labs     07/09/20  0656   INR 1.03     Recent Labs     07/09/20  0643   TROPONINI <0.01       Urinalysis:      Lab Results   Component Value Date    NITRU Negative 07/09/2020    WBCUA 3-5 07/09/2020    BACTERIA 1+ 07/09/2020    RBCUA 5-10 07/09/2020    BLOODU MODERATE 07/09/2020    SPECGRAV >=1.030 07/09/2020    GLUCOSEU Negative 07/09/2020       Radiology:  XR CHEST PORTABLE   Final Result   No radiographic evidence of acute pulmonary disease. CT Head WO Contrast   Final Result   No acute intracranial abnormality. Assessment/Plan:    Active Hospital Problems    Diagnosis    Metabolic encephalopathy [M99.63]     Priority: High    Hyponatremia [E87.1]    History of hepatitis C [Z86.19]     Altered mental state, metabolic encephalopathy, CT head done in the emergency room was unremarkable, likely causes hyponatremia, admit to ICU, treating underlying condition. Continue to monitor. Clinically resolved.     Hyponatremia, sodium is 118, patient was given 3% hypertonic saline twice and subsequent sodium level is 126, nephrology consulted, continue to monitor BMP every 6 hours, continue to monitor. Clinically improving, sodium has improved to 129, plan to continue to monitor as per nephrology.     History of hep C, LFTs normal, further management as outpatient.     DVT Prophylaxis: Lovenox  Diet: DIET GENERAL;  Code Status: Full Code    PT/OT Eval Status: Ambulatory    Dispo -in 1 to 2 days    Shaka Escalante MD

## 2020-07-10 NOTE — CARE COORDINATION
CASE MANAGEMENT INITIAL ASSESSMENT      Reviewed chart and completed assessment with: patient and spouse. Explained Case Management role/services. Primary contact information: Tiera Martin [phone]    Admit date/status: 7/9/20  Diagnosis: hyponatremia  Is this a Readmission?:  no    Insurance: medicare primary anthem secondary   Precert required for SNF - no  3 night stay required -waived    Living arrangements, Adls, care needs, prior to admission: pt lives in a 2 story house with . Independent in all ADL's      Transportation: private    [employer] [place] at home: none    Services in the home and/or outpatient, prior to admission: none    PT/OT recs: 2 [place] Notification (HEN): not initiated    Barriers to discharge: none    Plan/comments:   Spoke to patient and  at bedside. states that patient is normally very independent and without needs. Please advise should any needs arise.  Rashida Rivera RN     ECOC on chart for MD signature

## 2020-07-10 NOTE — PROGRESS NOTES
Elisha Valerio MD   Patient: Barbra Shafer   7/10/20 6:14 AM   [phone] Hospital or Facility: [facility] From: Kei Morse RE: Wendy Ortiz RM: [id number] pt admit for hyponatremia on 7/9 and required a sitter. pt is now alert and oriented x 4. can I d/c sitter order please?  Need Callback: NO CALLBACK REQ CCU ROUTINE  Unread

## 2020-07-10 NOTE — PROGRESS NOTES
4 Eyes Skin Assessment     The patient is being assess for   Shift Handoff    I agree that 2 RN's have performed a thorough Head to Toe Skin Assessment on the patient. ALL assessment sites listed below have been assessed. Areas assessed by both nurses:   [x]   Head, Face, and Ears   [x]   Shoulders, Back, and Chest, Abdomen  [x]   Arms, Elbows, and Hands   [x]   Coccyx, Sacrum, and Ischium  [x]   Legs, Feet, and Heels            **SHARE this note so that the co-signing nurse is able to place an eSignature**    Co-signer eSignature: Electronically signed by Chano Canseco RN on 7/11/20 at 10:49 AM EDT    Does the Patient have Skin Breakdown?   No          Leroy Prevention initiated:  Yes   Wound Care Orders initiated:  NA      [facility] nurse consulted for Pressure Injury (Stage 3,4, Unstageable, DTI, NWPT, Complex wounds)and New or Established Ostomies:  NA      Primary Nurse eSignature: Electronically signed by Antonieta Ocampo RN on 7/10/20 at 6:05 AM EDT

## 2020-07-10 NOTE — PROGRESS NOTES
Pt transferred to A2 from B2 with all belongings via bed. Pt oriented to unit, room, use of call light and bed controls.  Keyanna Leonardo RN

## 2020-07-10 NOTE — CONSULTS
Pulmonary & Critical Care Consultation Note   Khushbu Romero MD    REASONFOR CONSULTATION/CC: acute hyponatremia, assistance with workup    Consult at request of  Carlos Enrique Buck MD  PCP: Kale Serrano MD    HISTORYOF PRESENT ILLNESS:    77y.o. year old female with a remote trivial smoking history, she presented to the ED with confusion and direct history was difficult to obtain. Workup identified acute hyponatremia, she was admitted to the ICU for further evaluation of this. Nephrology has been involved in her care and started hypertonic saline treatment which has improved her sodium value on lab checks. Her mentation has also improved slightly. Denies any significant respiratory complaints including cough, congestion, hemoptysis. Has not been hypoxic here as well. Past Medical History:   Diagnosis Date    Anxiety     Depression     Hepatitis C      states resolved        History reviewed. No pertinent surgical history. family history includes Arthritis in her brother, father, mother, and sister; Cancer in her father; Heart Disease in her father; High Blood Pressure in her mother; Learning Disabilities in her sister; Vision Loss in her mother.     Social History     Tobacco Use    Smoking status: Former Smoker     Packs/day: 1.00     Years: 15.00     Pack years: 15.00     Types: Cigarettes     Last attempt to quit: 2013     Years since quittin.6    Smokeless tobacco: Never Used   Substance Use Topics    Alcohol use: No        Scheduled Meds:   mupirocin   Nasal BID    acyclovir  400 mg Oral BID    LORazepam  1 mg Oral BID    sodium chloride flush  10 mL Intravenous 2 times per day    enoxaparin  40 mg Subcutaneous Daily       Continuous Infusions:      PRN Meds:   sodium chloride flush, acetaminophen **OR** acetaminophen, polyethylene glycol, promethazine **OR** ondansetron   Inpatient consult to Pulmonology  Consult performed by: Trish Munoz MD  Consult ordered by: Jonathan Freeman Rony Cannon MD        ALLERGIES:  Patient is allergic to methylprednisolone. REVIEW OF SYSTEMS:  Review of Systems   Constitutional: Negative for chills, fever and unexpected weight change. HENT: Negative for mouth sores, sore throat and voice change. Eyes: Negative for pain, discharge and itching. Respiratory: Negative for choking, chest tightness and stridor. Cardiovascular: Negative for chest pain, palpitations and leg swelling. Gastrointestinal: Negative for abdominal pain, constipation and diarrhea. Endocrine: Negative for cold intolerance, heat intolerance and polydipsia. Genitourinary: Negative for dysuria, frequency and hematuria. Musculoskeletal: Negative for gait problem, joint swelling and neck stiffness. Neurological: Negative for dizziness, numbness and headaches. Psychiatric/Behavioral: Negative for agitation, confusion and hallucinations. Objective:   PHYSICAL EXAM:  BP (!) 148/70   Pulse 73   Temp 99.4 °F (37.4 °C) (Axillary)   Resp 18   Ht 5' 5\" (1.651 m)   Wt 166 lb 10.7 oz (75.6 kg)   SpO2 99%   BMI 27.73 kg/m²    Physical Exam  Constitutional:       General: She is not in acute distress. Appearance: She is well-developed. She is not diaphoretic. HENT:      Head: Normocephalic and atraumatic. Mouth/Throat:      Pharynx: No oropharyngeal exudate. Eyes:      Pupils: Pupils are equal, round, and reactive to light. Neck:      Musculoskeletal: Neck supple. Vascular: No JVD. Cardiovascular:      Heart sounds: Normal heart sounds. No murmur. No friction rub. No gallop. Pulmonary:      Effort: Pulmonary effort is normal.      Breath sounds: No wheezing or rales. Abdominal:      General: Bowel sounds are normal. There is no distension. Palpations: Abdomen is soft. Tenderness: There is no abdominal tenderness. Musculoskeletal: Normal range of motion. Lymphadenopathy:      Cervical: No cervical adenopathy.    Skin:     General: Skin is warm and dry. Findings: No rash. Neurological:      Mental Status: She is alert. Cranial Nerves: No cranial nerve deficit. Comments: CN 2-12 grossly intact            Data Reviewed:   LABS:  CBC:   Recent Labs     07/09/20  0643 07/10/20  0514   WBC 3.7* 4.6   HGB 8.7* 8.7*   HCT 25.0* 25.0*   MCV 91.9 92.3    182     BMP:   Recent Labs     07/10/20  0135 07/10/20  0514 07/10/20  0924   * 126* 129*   K 4.3 4.1 4.1   CL 87* 89* 91*   CO2 20* 23 24   BUN 10 10 10   CREATININE <0.5* <0.5* <0.5*     LIVER PROFILE:   Recent Labs     07/09/20  0643   AST 59*   ALT 18   BILITOT 0.9   ALKPHOS 90     PT/INR:   Recent Labs     07/09/20  0656   PROTIME 11.9   INR 1.03     APTT:  Recent Labs     07/09/20  0656   APTT 35.9     UA:  Recent Labs     07/09/20  1756   COLORU Yellow   PHUR 6.0   WBCUA 3-5   RBCUA 5-10*   MUCUS 1+*   BACTERIA 1+*   CLARITYU Clear   SPECGRAV >=1.030   LEUKOCYTESUR Negative   UROBILINOGEN 0.2   BILIRUBINUR Negative   BLOODU MODERATE*   GLUCOSEU Negative     No results for input(s): PHART, ZFJ8QMG, PO2ART in the last 72 hours. Vent Information  SpO2: 99 %    CXR personally reviewed, no acute process           Assessment:     1. Acute hyponatremia  2. Acute encephalopathy, metabolic  3.  Prior smoking history    Plan:      -CT chest to evaluate lung parenchyma   -Sodium management per nephrology, per IM  -Transferred out of [place] [place] MD

## 2020-07-10 NOTE — PLAN OF CARE
Problem: Falls - Risk of:  Goal: Will remain free from falls  Description: Will remain free from falls  Outcome: Ongoing  Goal: Absence of physical injury  Description: Absence of physical injury  Outcome: Ongoing     Problem: Restraint Use - Nonviolent/Non-Self-Destructive Behavior:  Goal: Absence of restraint indications  Description: Absence of restraint indications  Outcome: Ongoing  Goal: Absence of restraint-related injury  Description: Absence of restraint-related injury  Outcome: Ongoing     Problem: Skin Integrity:  Goal: Will show no infection signs and symptoms  Description: Will show no infection signs and symptoms  Outcome: Ongoing  Goal: Absence of new skin breakdown  Description: Absence of new skin breakdown  Outcome: Ongoing     Problem: Discharge Planning:  Goal: Participates in care planning  Description: Participates in care planning  Outcome: Ongoing  Goal: Discharged to appropriate level of care  Description: Discharged to appropriate level of care  Outcome: Ongoing     Problem: Airway Clearance - Ineffective:  Goal: Ability to maintain a clear airway will improve  Description: Ability to maintain a clear airway will improve  Outcome: Ongoing     Problem: Anxiety/Stress:  Goal: Level of anxiety will decrease  Description: Level of anxiety will decrease  Outcome: Ongoing     Problem: Aspiration:  Goal: Absence of aspiration  Description: Absence of aspiration  Outcome: Ongoing     Problem:  Bowel Function - Altered:  Goal: Bowel elimination is within specified parameters  Description: Bowel elimination is within specified parameters  Outcome: Ongoing     Problem: Cardiac Output - Decreased:  Goal: Hemodynamic stability will improve  Description: Hemodynamic stability will improve  Outcome: Ongoing     Problem: Fluid Volume - Imbalance:  Goal: Absence of imbalanced fluid volume signs and symptoms  Description: Absence of imbalanced fluid volume signs and symptoms  Outcome: Ongoing     Problem: Gas Exchange - Impaired:  Goal: Levels of oxygenation will improve  Description: Levels of oxygenation will improve  Outcome: Ongoing     Problem: Mental Status - Impaired:  Goal: Mental status will be restored to baseline  Description: Mental status will be restored to baseline  Outcome: Ongoing     Problem: Nutrition Deficit:  Goal: Ability to achieve adequate nutritional intake will improve  Description: Ability to achieve adequate nutritional intake will improve  Outcome: Ongoing     Problem: Pain:  Description: Pain management should include both nonpharmacologic and pharmacologic interventions.   Goal: Pain level will decrease  Description: Pain level will decrease  Outcome: Ongoing  Goal: Recognizes and communicates pain  Description: Recognizes and communicates pain  Outcome: Ongoing  Goal: Control of acute pain  Description: Control of acute pain  Outcome: Ongoing  Goal: Control of chronic pain  Description: Control of chronic pain  Outcome: Ongoing     Problem: Serum Glucose Level - Abnormal:  Goal: Ability to maintain appropriate glucose levels will improve to within specified parameters  Description: Ability to maintain appropriate glucose levels will improve to within specified parameters  Outcome: Ongoing     Problem: Skin Integrity - Impaired:  Goal: Will show no infection signs and symptoms  Description: Will show no infection signs and symptoms  Outcome: Ongoing  Goal: Absence of new skin breakdown  Description: Absence of new skin breakdown  Outcome: Ongoing     Problem: Sleep Pattern Disturbance:  Goal: Appears well-rested  Description: Appears well-rested  Outcome: Ongoing     Problem: Tissue Perfusion, Altered:  Goal: Circulatory function within specified parameters  Description: Circulatory function within specified parameters  Outcome: Ongoing     Problem: Tissue Perfusion - Cardiopulmonary, Altered:  Goal: Absence of angina  Description: Absence of angina  Outcome: Ongoing  Goal: Hemodynamic stability will improve  Description: Hemodynamic stability will improve  Outcome: Ongoing

## 2020-07-10 NOTE — PROGRESS NOTES
[facility]    [facility] [place]              [phone]                       Plan :     Mental status normal  Sodium coming up  Urine output great  Remove matilde    OK to move from icu to floor     Her sodium should have improved by now, but still not normalized  May have preexisting SIADH- > malignancy- recommend work up  Also has low hb  Was planning for colonoscopy    Check urine sodium, cortisol, and urine osm     Assessment :     Hyponatremia  Sodium down to 118- now 126  Very confused on admission- better now  Normal sodium a month ago  Appears to be water  Intoxication  Given 3 % NS 50 ml - twice          Due the potential for life-threatening deterioration due to patient's severe hyponatremia I spent 30 minutes providing critical care for this individual. This exclude procedures. [facility] would like to thank Alvina Olmos MD   for opportunity to serve this patient      Please call with questions at-   24 Hrs Answering service [phone] or  7 am- 5 pm via Perfect serve or cell phone  Dr.Sudhir Silver [place]          CC/reason for consult :     hyponatremia     HPI :     Boom Ding is a 77 y.o. female presented to   the hospital on 7/9/2020 with altered mental status. She was planned for colonoscopy as outpatient today. He though she should drink a lot of water, so she   Drank 40 bottles of 20 ounces of water yesterday. She was anxious and alert yesterday per . But very agitated and confused today because of   Which she was brought to the hospital.     We are consulted for hyponatremai     Interval History:     Very anxious  On restraints    ROS:     Seen with-   Ed staff  Unable to obtain ROS due to  Altered mental status/        PMH/PSH/SH/Family History:     Past Medical History:   Diagnosis Date    Anxiety     Depression     Hepatitis C      states resolved        History reviewed. No pertinent surgical history.      reports that she quit smoking about 6 years ago. Her smoking use included cigarettes. She has a 15.00 pack-year smoking history. She has never used smokeless tobacco. She reports that she does not drink alcohol or use drugs. family history includes Arthritis in her brother, father, mother, and sister; Cancer in her father; Heart Disease in her father; High Blood Pressure in her mother; Learning Disabilities in her sister; Vision Loss in her mother. Medication:     Current Facility-Administered Medications: mupirocin (BACTROBAN) 2 % ointment, , Nasal, BID  acyclovir (ZOVIRAX) tablet 400 mg, 400 mg, Oral, BID  LORazepam (ATIVAN) tablet 1 mg, 1 mg, Oral, BID  sodium chloride flush 0.9 % injection 10 mL, 10 mL, Intravenous, 2 times per day  sodium chloride flush 0.9 % injection 10 mL, 10 mL, Intravenous, PRN  acetaminophen (TYLENOL) tablet 650 mg, 650 mg, Oral, Q6H PRN **OR** acetaminophen (TYLENOL) suppository 650 mg, 650 mg, Rectal, Q6H PRN  polyethylene glycol (GLYCOLAX) packet 17 g, 17 g, Oral, Daily PRN  promethazine (PHENERGAN) tablet 12.5 mg, 12.5 mg, Oral, Q6H PRN **OR** ondansetron (ZOFRAN) injection 4 mg, 4 mg, Intravenous, Q6H PRN  enoxaparin (LOVENOX) injection 40 mg, 40 mg, Subcutaneous, Daily       Vitals :     Vitals:    07/10/20 0604   BP: (!) 134/115   Pulse: 99   Resp: 21   Temp:    SpO2: 94%       I & O :       Intake/Output Summary (Last 24 hours) at 7/10/2020 0910  Last data filed at 7/10/2020 0559  Gross per 24 hour   Intake 50 ml   Output 2075 ml   Net -2025 ml        Physical Examination :       General appearance: Anxious- no, distressed- no, in good spirits- yes,   Comfortable, communicative  AAO X 3  HEENT: Lips- normal, teeth- ok , oral mucosa- moist  Neck : Mass- no, appears symmetrical, JVD- not visible  Respiratory: Respiratory effort-  normal, wheeze- no, crackles - no, Oxygen- no  Cardiovascular:  Ausculation- No M/R/G, Edema no  Abdomen: visible mass- no, distention- no, scar- no, tenderness- no hepatosplenomegaly-  no  Musculoskeletal:  clubbing no,cyanosis- no , digital ischemia- no                           muscle strength- grossly normal , tone - grossly normal  Skin: rashes- no , ulcers- no, induration- no, tightening - no  Psychiatric:  Judgement and insight- normal           LABS:     Recent Labs     07/09/20  0643 07/10/20  0514   WBC 3.7* 4.6   HGB 8.7* 8.7*   HCT 25.0* 25.0*    182     Recent Labs     07/09/20  1434 07/09/20  2113 07/10/20  0135 07/10/20  0514   * 124* 122* 126*   K 3.8 3.8 4.3 4.1   CL 88* 86* 87* 89*   CO2 21 22 20* 23   BUN 6* 8 10 10   CREATININE <0.5* <0.5* <0.5* <0.5*   GLUCOSE 128* 117* 116* 107*   MG 1.70*  --   --   --

## 2020-07-10 NOTE — FLOWSHEET NOTE
This note also relates to the following rows which could not be included:  Pulse - Cannot attach notes to unvalidated device data       07/10/20 0400   Assessment   Charting Type Reassessment   Neurological   Neuro (WDL) X   Level of Consciousness 1   Orientation Level Oriented X4   Cognition Poor judgement;Poor safety awareness;Poor attention/concentration; Short term memory loss; Impulsive   Language Slurred   R Hand  Strong   L Hand  Strong   R Foot Dorsiflexion Strong   L Foot Dorsiflexion Strong   R Foot Plantar Flexion Strong   L Foot Plantar Flexion Strong   Confusion Assessment Method-ICU (CAM-ICU)   RASS Score -1   Rachel Coma Scale   Eye Opening 4   Best Verbal Response 5   Best Motor Response 6   [place] Coma Scale Score 15   HEENT   HEENT (WDL) X   Mucous Membrane Dry; Intact   Respiratory   Respiratory (WDL) WDL   Cardiac   Cardiac (WDL) WDL   Cardiac Regularity Regular   Cardiac Rhythm NSR   Cardiac Monitor   Telemetry Monitor On Yes   Telemetry Audible Yes   Telemetry Alarms Set Yes   Gastrointestinal   Abdominal (WDL) WDL   Peripheral Vascular   Peripheral Vascular (WDL) WDL   Edema None   Skin Color/Condition   Skin Color/Condition (WDL) WDL   Skin Integrity   Skin Integrity (WDL) WDL   Musculoskeletal   Musculoskeletal (WDL) WDL   Genitourinary   Genitourinary (WDL) X  (clayton)   Anus/Rectum   Anus/Rectum (WDL) WDL   Urethral Catheter Temperature probe   Placement Date/Time: 07/09/20 1600   Urethral Catheter Timeout: Patient; Appropriate Equipment;Correct Position;Procedure;Sterile technique;Site/Side  Inserted by: RENAN  Catheter Type: Temperature probe  Catheter Balloon Size: 10 mL  Collection Container. ..    Catheter Indications Need for fluid management in critically ill patients in a critical care setting not able to be managed by other means such as BSC with hat, bedpan, urinal, condom catheter, or short term intermittent urethral catherization   Site Assessment Pink   Urine Color Yellow

## 2020-07-11 VITALS
SYSTOLIC BLOOD PRESSURE: 143 MMHG | RESPIRATION RATE: 16 BRPM | BODY MASS INDEX: 28.47 KG/M2 | DIASTOLIC BLOOD PRESSURE: 70 MMHG | TEMPERATURE: 98 F | HEIGHT: 65 IN | WEIGHT: 170.86 LBS | OXYGEN SATURATION: 98 % | HEART RATE: 82 BPM

## 2020-07-11 LAB
ANION GAP SERPL CALCULATED.3IONS-SCNC: 12 MMOL/L (ref 3–16)
BUN BLDV-MCNC: 14 MG/DL (ref 7–20)
CALCIUM SERPL-MCNC: 9.4 MG/DL (ref 8.3–10.6)
CHLORIDE BLD-SCNC: 100 MMOL/L (ref 99–110)
CO2: 25 MMOL/L (ref 21–32)
CREAT SERPL-MCNC: 0.6 MG/DL (ref 0.6–1.2)
GFR AFRICAN AMERICAN: >60
GFR NON-AFRICAN AMERICAN: >60
GLUCOSE BLD-MCNC: 96 MG/DL (ref 70–99)
POTASSIUM SERPL-SCNC: 3.7 MMOL/L (ref 3.5–5.1)
SODIUM BLD-SCNC: 137 MMOL/L (ref 136–145)

## 2020-07-11 PROCEDURE — 80048 BASIC METABOLIC PNL TOTAL CA: CPT

## 2020-07-11 PROCEDURE — 6360000002 HC RX W HCPCS: Performed by: INTERNAL MEDICINE

## 2020-07-11 PROCEDURE — 6370000000 HC RX 637 (ALT 250 FOR IP): Performed by: INTERNAL MEDICINE

## 2020-07-11 PROCEDURE — 36415 COLL VENOUS BLD VENIPUNCTURE: CPT

## 2020-07-11 PROCEDURE — 2580000003 HC RX 258: Performed by: INTERNAL MEDICINE

## 2020-07-11 RX ADMIN — Medication 10 ML: at 08:49

## 2020-07-11 RX ADMIN — ENOXAPARIN SODIUM 40 MG: 40 INJECTION SUBCUTANEOUS at 08:49

## 2020-07-11 RX ADMIN — ACYCLOVIR 400 MG: 800 TABLET ORAL at 08:49

## 2020-07-11 RX ADMIN — LORAZEPAM 1 MG: 1 TABLET ORAL at 08:49

## 2020-07-11 ASSESSMENT — PAIN SCALES - GENERAL: PAINLEVEL_OUTOF10: 0

## 2020-07-11 NOTE — DISCHARGE SUMMARY
Hospital Medicine Discharge Summary    Patient ID: Mark Puri      Patient's PCP: Ruddy Milligan MD    Admit Date: 7/9/2020     Discharge Date:   07/11/20     Admitting Physician: Rubina Huggins MD     Discharge Physician: Marcia Crow MD     Discharge Diagnoses: Active Hospital Problems    Diagnosis    Hyponatremia [F68.7]    Metabolic encephalopathy [K80.68]    History of hepatitis C [Z86.19]         The patient was seen and examined on day of discharge and this discharge summary is in conjunction with any daily progress note from day of discharge. Hospital Course:     Admitted with metabolic encephalopathy as a consequence of hyponatremia. Apparently, patient became hyponatremic after colonoscopy prep. Took large amounts of free water, more than recommended. Improved with IV saline infusion. Followed along by nephrology  Discharged home after being cleared by nephrology on the basis of normal sodium levels  Follow up with PCP and GI for outpatient colonoscopy      Physical Exam Performed:     /82   Pulse 73   Temp 98 °F (36.7 °C)   Resp 16   Ht 5' 5\" (1.651 m)   Wt 170 lb 13.7 oz (77.5 kg)   SpO2 97%   BMI 28.43 kg/m²       General appearance:  No apparent distress, appears stated age and cooperative. HEENT:  Normal cephalic, atraumatic without obvious deformity. Pupils equal, round, and reactive to light. Extra ocular muscles intact. Conjunctivae/corneas clear. Neck: Supple, with full range of motion. No jugular venous distention. Trachea midline. Respiratory:  Normal respiratory effort. Clear to auscultation, bilaterally without Rales/Wheezes/Rhonchi. Cardiovascular:  Regular rate and rhythm with normal S1/S2 without murmurs, rubs or gallops. Abdomen: Soft, non-tender, non-distended with normal bowel sounds. Musculoskeletal:  No clubbing, cyanosis or edema bilaterally. Full range of motion without deformity.   Skin: Skin color, texture, turgor normal.  No rashes or lesions. Neurologic:  Neurovascularly intact without any focal sensory/motor deficits. Cranial nerves: II-XII intact, grossly non-focal.  Psychiatric:  Alert and oriented, thought content appropriate, normal insight  Capillary Refill: Brisk,< 3 seconds   Peripheral Pulses: +2 palpable, equal bilaterally       Labs: For convenience and continuity at follow-up the following most recent labs are provided:      CBC:    Lab Results   Component Value Date    WBC 4.6 07/10/2020    HGB 8.7 07/10/2020    HCT 25.0 07/10/2020     07/10/2020       Renal:    Lab Results   Component Value Date     07/11/2020    K 3.7 07/11/2020    K 4.1 07/10/2020     07/11/2020    CO2 25 07/11/2020    BUN 14 07/11/2020    CREATININE 0.6 07/11/2020    CALCIUM 9.4 07/11/2020         Significant Diagnostic Studies    Radiology:   CT CHEST ABDOMEN PELVIS W CONTRAST   Final Result   1. Trace right pleural effusion of uncertain significance. The lungs are   otherwise clear. 2. No significant findings in the abdomen or pelvis. XR CHEST PORTABLE   Final Result   No radiographic evidence of acute pulmonary disease. CT Head WO Contrast   Final Result   No acute intracranial abnormality. Consults:     IP CONSULT TO NEPHROLOGY  IP CONSULT TO HOSPITALIST  IP CONSULT TO NEPHROLOGY  IP CONSULT TO PULMONOLOGY    Disposition:  Home     Condition at Discharge: Stable    Discharge Instructions/Follow-up:  PCP, GI, outpatient colonoscopy    Code Status:  Full Code     Activity: activity as tolerated    Diet: regular diet      Discharge Medications:     Current Discharge Medication List           Details   LORazepam (ATIVAN) 1 MG tablet Take 1 tablet by mouth 2 times daily for 60 days.   Qty: 60 tablet, Refills: 2    Associated Diagnoses: Generalized anxiety disorder      acyclovir (ZOVIRAX) 400 MG tablet TAKE 1 TABLET BY MOUTH 2 TIMES DAILY  Qty: 60 tablet, Refills: 5      acyclovir (ZOVIRAX) 5 % ointment APPLY TOPICALLY 5 TIMES DAILY. Qty: 30 g, Refills: 2      hydrOXYzine (ATARAX) 25 MG tablet TAKE 1 TABLET BY MOUTH EVERY 8 HOURS AS NEEDED FOR ITCHING  Qty: 90 tablet, Refills: 2    Associated Diagnoses: Adjustment insomnia      tetracycline (ACHROMYCIN;SUMYCIN) 250 MG capsule It is compounded to 225mg daily  Qty: 100 capsule, Refills: 2    Associated Diagnoses: Folliculitis      DHA-Vitamin C-Lutein (EYE HEALTH FORMULA PO) Take by mouth daily Indications: Also with Vitamin A,K OTC Vitamin       Cholecalciferol (VITAMIN D) 2000 UNITS CAPS capsule Take by mouth daily Indications: OTC              Time Spent on discharge is more than 45 minutes in the examination, evaluation, counseling and review of medications and discharge plan. Signed:    Margaret Bush MD   7/11/2020      Thank you Sharmaine Chin MD for the opportunity to be involved in this patient's care. If you have any questions or concerns please feel free to contact me at [phone].

## 2020-07-11 NOTE — PROGRESS NOTES
[facility]    [facility]              [phone]                       Plan :     Sodium normal now  Shruti Gain to dc  Follow up labs and plan for colonoscopy as outpatient  Urine sodium - 32, followed by < 20, consistent with water intoxication  No evidence of SIADH  Assessment :     Hyponatremia  Sodium down to 118- now nowmal  Very confused on admission- better now  Normal sodium a month ago  Appears to be water  Intoxication  Given 3 % NS 50 ml - twice          Due the potential for life-threatening deterioration due to patient's severe hyponatremia I spent 30 minutes providing critical care for this individual. This exclude procedures. [facility] would like to thank Kevin Ahuja MD   for opportunity to serve this patient      Please call with questions at-   24 Hrs Answering service [phone] or  7 am- 5 pm via Perfect serve or cell phone  Dr.Sudhir Krissy Loaiza          CC/reason for consult :     hyponatremia     HPI :     Aki Castellanos is a 77 y.o. female presented to   the hospital on 7/9/2020 with altered mental status. She was planned for colonoscopy as outpatient today. He though she should drink a lot of water, so she   Drank 40 bottles of 20 ounces of water yesterday. She was anxious and alert yesterday per . But very agitated and confused today because of   Which she was brought to the hospital.     We are consulted for hyponatremai     Interval History:     Very anxious  On restraints    ROS:     Seen with-   Ed staff  Unable to obtain ROS due to  Altered mental status/        PMH/PSH/SH/Family History:     Past Medical History:   Diagnosis Date    Anxiety     Depression     Hepatitis C      states resolved        History reviewed. No pertinent surgical history. reports that she quit smoking about 6 years ago. Her smoking use included cigarettes. She has a 15.00 pack-year smoking history.  She has never used smokeless tobacco. She reports that she does not drink alcohol or use drugs. family history includes Arthritis in her brother, father, mother, and sister; Cancer in her father; Heart Disease in her father; High Blood Pressure in her mother; Learning Disabilities in her sister; Vision Loss in her mother. Medication:     Current Facility-Administered Medications: iohexol (OMNIPAQUE 240) injection 50 mL, 50 mL, Oral, ONCE PRN  acyclovir (ZOVIRAX) tablet 400 mg, 400 mg, Oral, BID  LORazepam (ATIVAN) tablet 1 mg, 1 mg, Oral, BID  sodium chloride flush 0.9 % injection 10 mL, 10 mL, Intravenous, 2 times per day  sodium chloride flush 0.9 % injection 10 mL, 10 mL, Intravenous, PRN  acetaminophen (TYLENOL) tablet 650 mg, 650 mg, Oral, Q6H PRN **OR** acetaminophen (TYLENOL) suppository 650 mg, 650 mg, Rectal, Q6H PRN  polyethylene glycol (GLYCOLAX) packet 17 g, 17 g, Oral, Daily PRN  promethazine (PHENERGAN) tablet 12.5 mg, 12.5 mg, Oral, Q6H PRN **OR** ondansetron (ZOFRAN) injection 4 mg, 4 mg, Intravenous, Q6H PRN  enoxaparin (LOVENOX) injection 40 mg, 40 mg, Subcutaneous, Daily       Vitals :     Vitals:    07/11/20 0744   BP: 136/82   Pulse: 73   Resp: 16   Temp: 98 °F (36.7 °C)   SpO2: 97%       I & O :       Intake/Output Summary (Last 24 hours) at 7/11/2020 0957  Last data filed at 7/11/2020 9037  Gross per 24 hour   Intake 1380 ml   Output 3250 ml   Net -1870 ml        Physical Examination :       General appearance: Anxious- no, distressed- no, in good spirits- yes,   Comfortable, communicative  AAO X 3  HEENT: Lips- normal, teeth- ok , oral mucosa- moist  Neck : Mass- no, appears symmetrical, JVD- not visible  Respiratory: Respiratory effort-  normal, wheeze- no, crackles - no, Oxygen- no  Cardiovascular:  Ausculation- No M/R/G, Edema no  Abdomen: visible mass- no, distention- no, scar- no, tenderness- no                            hepatosplenomegaly-  no  Musculoskeletal:  clubbing no,cyanosis- no , digital ischemia- no

## 2020-07-13 ENCOUNTER — TELEPHONE (OUTPATIENT)
Dept: GASTROENTEROLOGY | Age: 70
End: 2020-07-13

## 2020-07-13 NOTE — TELEPHONE ENCOUNTER
Called pt. She drank maybe 40 bottles of water with the prep and ended up with hyponatremia. Chart reviewed  She is feeling better now. Has seen Hematology for normocytic anemia. She says she will not do a colonoscopy ever again, we talked about this, discussed that if she sticks to recommended Miralax/Gatorade prep only and some extra Gatorade or Pedialyte for hydration she should be ok with a prep as the recent hyponatremia occurred from excess water ingestion. She does not want to do the colonoscopy.  She would like to do a Cologuard test. Will order this at her request.

## 2020-07-15 ENCOUNTER — HOSPITAL ENCOUNTER (OUTPATIENT)
Dept: ULTRASOUND IMAGING | Age: 70
Discharge: HOME | End: 2020-07-15
Payer: MEDICARE

## 2020-07-15 PROCEDURE — 76700 US EXAM ABDOM COMPLETE: CPT

## 2020-07-16 ENCOUNTER — VIRTUAL VISIT (OUTPATIENT)
Dept: FAMILY MEDICINE CLINIC | Age: 70
End: 2020-07-16
Payer: MEDICARE

## 2020-07-16 PROBLEM — Z12.11 COLON CANCER SCREENING: Status: RESOLVED | Noted: 2020-06-16 | Resolved: 2020-07-16

## 2020-07-16 LAB
BILIRUBIN, POC: NEGATIVE
BLOOD URINE, POC: NORMAL
CLARITY, POC: NORMAL
COLOR, POC: NORMAL
GLUCOSE URINE, POC: NEGATIVE
KETONES, POC: NEGATIVE
LEUKOCYTE EST, POC: NORMAL
NITRITE, POC: NEGATIVE
PH, POC: 6.5
PROTEIN, POC: NEGATIVE
SPECIFIC GRAVITY, POC: 1.01
UROBILINOGEN, POC: 0.2

## 2020-07-16 PROCEDURE — G8427 DOCREV CUR MEDS BY ELIG CLIN: HCPCS | Performed by: NURSE PRACTITIONER

## 2020-07-16 PROCEDURE — G8399 PT W/DXA RESULTS DOCUMENT: HCPCS | Performed by: NURSE PRACTITIONER

## 2020-07-16 PROCEDURE — 1111F DSCHRG MED/CURRENT MED MERGE: CPT | Performed by: NURSE PRACTITIONER

## 2020-07-16 PROCEDURE — 1123F ACP DISCUSS/DSCN MKR DOCD: CPT | Performed by: NURSE PRACTITIONER

## 2020-07-16 PROCEDURE — 99213 OFFICE O/P EST LOW 20 MIN: CPT | Performed by: NURSE PRACTITIONER

## 2020-07-16 PROCEDURE — 81002 URINALYSIS NONAUTO W/O SCOPE: CPT | Performed by: NURSE PRACTITIONER

## 2020-07-16 PROCEDURE — 1090F PRES/ABSN URINE INCON ASSESS: CPT | Performed by: NURSE PRACTITIONER

## 2020-07-16 PROCEDURE — 4040F PNEUMOC VAC/ADMIN/RCVD: CPT | Performed by: NURSE PRACTITIONER

## 2020-07-16 PROCEDURE — 3017F COLORECTAL CA SCREEN DOC REV: CPT | Performed by: NURSE PRACTITIONER

## 2020-07-16 ASSESSMENT — ENCOUNTER SYMPTOMS
COUGH: 0
NAUSEA: 1
VOMITING: 0
SHORTNESS OF BREATH: 0
DIARRHEA: 0

## 2020-07-16 NOTE — PROGRESS NOTES
2020    TELEHEALTH EVALUATION -- Audio/Visual (During RGSVV-57 public health emergency)    HPI:    Kaylin Tovar (:  1950) has requested an audio/video evaluation for the following concern(s):    Hospital follow up for dilutional hyponatremia, this occurred after patient did colonoscopy prep and drank large amounts of water (she estimates an entire case of water.) Complains of mild nausea and weakness. No vomiting, diarrhea. She is alert and oriented. Last Na+ 139. Also complains of intermittent dysuria since being released from hospital.     Review of Systems   Constitutional: Negative for chills, fatigue and fever. Respiratory: Negative for cough and shortness of breath. Cardiovascular: Negative for chest pain and leg swelling. Gastrointestinal: Positive for nausea. Negative for diarrhea and vomiting. Genitourinary: Positive for dysuria. Negative for frequency, hematuria and urgency. Neurological: Positive for weakness. Negative for dizziness and headaches. All other systems reviewed and are negative. Prior to Visit Medications    Medication Sig Taking? Authorizing Provider   LORazepam (ATIVAN) 1 MG tablet Take 1 tablet by mouth 2 times daily for 60 days. Austin Chilel MD   acyclovir (ZOVIRAX) 400 MG tablet TAKE 1 TABLET BY MOUTH 2 TIMES DAILY  Austin Chilel MD   acyclovir (ZOVIRAX) 5 % ointment APPLY TOPICALLY 5 TIMES DAILY.   Austin Chilel MD   hydrOXYzine (ATARAX) 25 MG tablet TAKE 1 TABLET BY MOUTH EVERY 8 HOURS AS NEEDED FOR ITCHING  Austin Chilel MD   tetracycline (ACHROMYCIN;SUMYCIN) 250 MG capsule It is compounded to 225mg daily  Austin Chilel MD   DHA-Vitamin C-Lutein (EYE HEALTH FORMULA PO) Take by mouth daily Indications: Also with Vitamin A,K OTC Vitamin   Historical Provider, MD   Cholecalciferol (VITAMIN D) 2000 UNITS CAPS capsule Take by mouth daily Indications: OTC   Historical Provider, MD       Social History     Tobacco Use    Smoking status: Former Smoker     Packs/day: 1.00     Years: 15.00     Pack years: 15.00     Types: Cigarettes     Last attempt to quit: 2013     Years since quittin.6    Smokeless tobacco: Never Used   Substance Use Topics    Alcohol use: No    Drug use: No        Allergies   Allergen Reactions    Methylprednisolone Other (See Comments)     Severe body pain   ,   Past Medical History:   Diagnosis Date    Anxiety     Depression     Hepatitis C      states resolved    , No past surgical history on file. PHYSICAL EXAMINATION:  [ INSTRUCTIONS:  \"[x]\" Indicates a positive item  \"[]\" Indicates a negative item  -- DELETE ALL ITEMS NOT EXAMINED]  Vital Signs: (As obtained by patient/caregiver or practitioner observation)    Blood pressure-  Heart rate-    Respiratory rate-    Temperature-  Pulse oximetry-     Constitutional: [x] Appears well-developed and well-nourished [x] No apparent distress      [] Abnormal-   Mental status  [x] Alert and awake  [x] Oriented to person/place/time [x]Able to follow commands      Eyes:  EOM    [x]  Normal  [] Abnormal-  Sclera  [x]  Normal  [] Abnormal -         Discharge [x]  None visible  [] Abnormal -    HENT:   [x] Normocephalic, atraumatic.   [] Abnormal   [] Mouth/Throat: Mucous membranes are moist.     External Ears [] Normal  [] Abnormal-     Neck: [x] No visualized mass     Pulmonary/Chest: [x] Respiratory effort normal.  [x] No visualized signs of difficulty breathing or respiratory distress        [] Abnormal-      Musculoskeletal:   [] Normal gait with no signs of ataxia         [] Normal range of motion of neck        [] Abnormal-       Neurological:        [x] No Facial Asymmetry (Cranial nerve 7 motor function) (limited exam to video visit)          [x] No gaze palsy        [] Abnormal-         Skin:        [x] No significant exanthematous lesions or discoloration noted on facial skin         [] Abnormal-            Psychiatric:       [x] Normal Affect [] No Hallucinations        [] Abnormal-     Other pertinent observable physical exam findings-     ASSESSMENT/PLAN:  1. Hyponatremia  - BASIC METABOLIC PANEL; Future  - POCT Urinalysis no Micro    2. Dysuria  - POCT Urinalysis no Micro    Check sodium level- BMP ordered  Pt will stop by to leave urine sample for dysuria    No follow-ups on file. Sharda Lowry is a 77 y.o. female being evaluated by a Virtual Visit (video visit) encounter to address concerns as mentioned above. A caregiver was present when appropriate. Due to this being a TeleHealth encounter (During PKYWK-20 public health emergency), evaluation of the following organ systems was limited: Vitals/Constitutional/EENT/Resp/CV/GI//MS/Neuro/Skin/Heme-Lymph-Imm. Pursuant to the emergency declaration under the [address] [place] [address], [address] [place] [address] authority and the [employer] Act, this Virtual Visit was conducted with patient's (and/or legal guardian's) consent, to reduce the patient's risk of exposure to COVID-19 and provide necessary medical care. The patient (and/or legal guardian) has also been advised to contact this office for worsening conditions or problems, and seek emergency medical treatment and/or call 911 if deemed necessary. Patient identification was verified at the start of the visit: Yes    Total time spent on this encounter: 15 minutes    Services were provided through a video synchronous discussion virtually to substitute for in-person clinic visit. Patient and provider were located at their individual homes. --DELORES Louise - CNP on 7/16/2020 at 10:13 AM    An electronic signature was used to authenticate this note.

## 2020-07-18 LAB
ORGANISM: ABNORMAL
URINE CULTURE, ROUTINE: ABNORMAL

## 2020-07-20 RX ORDER — SULFAMETHOXAZOLE AND TRIMETHOPRIM 800; 160 MG/1; MG/1
1 TABLET ORAL 2 TIMES DAILY
Qty: 6 TABLET | Refills: 0 | Status: SHIPPED | OUTPATIENT
Start: 2020-07-20 | End: 2020-07-23

## 2020-07-21 ENCOUNTER — TELEPHONE (OUTPATIENT)
Dept: FAMILY MEDICINE CLINIC | Age: 70
End: 2020-07-21

## 2020-07-30 ENCOUNTER — TELEPHONE (OUTPATIENT)
Dept: GASTROENTEROLOGY | Age: 70
End: 2020-07-30

## 2020-07-30 NOTE — TELEPHONE ENCOUNTER
Pt called in stating that she had never received her Cologuard kit. I called [employer] @ [phone] and spoke with Gypsy. Gypsy informed me that per her notes the kit was dropped off to pt's address and then returned with no stool in the kit. I asked Gypsy to confirm pt's address and she stated that the address on file was [address] [place] [address]. I advised Gypsy that the correct address was [address] [place] [address]. Gypsy then informed me that she would get a new kit out to the pt in 7 - 10 business days. [phone] (home)     Pt informed.

## 2020-08-18 ENCOUNTER — TELEPHONE (OUTPATIENT)
Dept: GASTROENTEROLOGY | Age: 70
End: 2020-08-18

## 2020-08-18 NOTE — TELEPHONE ENCOUNTER
Please notify her Cologuard test was NEGATIVE.  Repeat 3 years.    ----- Message -----    From: Susie Villaseñor    Sent: 8/18/2020  11:12 AM EDT    To: Torsten Rosario MD    Subject: Scan                                               The image below was scanned by Susie Villaseñor [[id number]] on 8/18/2020 at 11:12 AM to the following: Masood London [<[id number]]:

## 2020-09-24 ENCOUNTER — NURSE TRIAGE (OUTPATIENT)
Dept: OTHER | Facility: CLINIC | Age: 70
End: 2020-09-24

## 2020-09-24 NOTE — TELEPHONE ENCOUNTER
Received call from [address]. Call soft transferred to [facility] to schedule appointment. Reason for Disposition   Mild widespread rash    Answer Assessment - Initial Assessment Questions  1. APPEARANCE of RASH: \"Describe the rash. \" (e.g., spots, blisters, raised areas, skin peeling, scaly)      Small bites, some small blisters  2. SIZE: \"How big are the spots? \" (e.g., tip of pen, eraser, coin; inches, centimeters)      Vary in sized  3. LOCATION: \"Where is the rash located? \"      Face, chin, jaw line, collar bone, Right shoulder, Right hand, Left index finger  4. COLOR: \"What color is the rash? \" (Note: It is difficult to assess rash color in people with darker-colored skin. When this situation occurs, simply ask the caller to describe what they see.)     red  5. ONSET: \"When did the rash begin? \"     Monday evening  6. FEVER: \"Do you have a fever? \" If so, ask: \"What is your temperature, how was it measured, and when did it start? \"      no  7. ITCHING: \"Does the rash itch? \" If so, ask: \"How bad is the itch? \" (Scale 1-10; or mild, moderate, severe)      7 at the worst when benedryl cream wears off  8. CAUSE: \"What do you think is causing the rash? \"      unsure  9. MEDICATION FACTORS: \"Have you started any new medications within the last 2 weeks? \" (e.g., antibiotics)       no  10. OTHER SYMPTOMS: \"Do you have any other symptoms? \" (e.g., dizziness, headache, sore throat, joint pain)        no    Protocols used: RASH OR REDNESS - WIDESPREAD-ADULT-OH  Pt agrees with discharge disposition. Care advice given    Attention Provider: Thank you for allowing me to participate in the care of your patient. The patient was connected to triage in response to information provided to the [facility]. Please do not respond through this encounter as the response is not directed to a shared pool.

## 2020-09-25 ENCOUNTER — OFFICE VISIT (OUTPATIENT)
Dept: FAMILY MEDICINE CLINIC | Age: 70
End: 2020-09-25
Payer: MEDICARE

## 2020-09-25 VITALS
HEIGHT: 62 IN | HEART RATE: 85 BPM | OXYGEN SATURATION: 99 % | WEIGHT: 166 LBS | DIASTOLIC BLOOD PRESSURE: 78 MMHG | TEMPERATURE: 98.2 F | SYSTOLIC BLOOD PRESSURE: 128 MMHG | BODY MASS INDEX: 30.55 KG/M2 | RESPIRATION RATE: 16 BRPM

## 2020-09-25 PROCEDURE — G8417 CALC BMI ABV UP PARAM F/U: HCPCS | Performed by: NURSE PRACTITIONER

## 2020-09-25 PROCEDURE — 1090F PRES/ABSN URINE INCON ASSESS: CPT | Performed by: NURSE PRACTITIONER

## 2020-09-25 PROCEDURE — 4040F PNEUMOC VAC/ADMIN/RCVD: CPT | Performed by: NURSE PRACTITIONER

## 2020-09-25 PROCEDURE — 1036F TOBACCO NON-USER: CPT | Performed by: NURSE PRACTITIONER

## 2020-09-25 PROCEDURE — G8399 PT W/DXA RESULTS DOCUMENT: HCPCS | Performed by: NURSE PRACTITIONER

## 2020-09-25 PROCEDURE — 3017F COLORECTAL CA SCREEN DOC REV: CPT | Performed by: NURSE PRACTITIONER

## 2020-09-25 PROCEDURE — G8427 DOCREV CUR MEDS BY ELIG CLIN: HCPCS | Performed by: NURSE PRACTITIONER

## 2020-09-25 PROCEDURE — 99213 OFFICE O/P EST LOW 20 MIN: CPT | Performed by: NURSE PRACTITIONER

## 2020-09-25 PROCEDURE — 3288F FALL RISK ASSESSMENT DOCD: CPT | Performed by: NURSE PRACTITIONER

## 2020-09-25 PROCEDURE — G8510 SCR DEP NEG, NO PLAN REQD: HCPCS | Performed by: NURSE PRACTITIONER

## 2020-09-25 PROCEDURE — 1123F ACP DISCUSS/DSCN MKR DOCD: CPT | Performed by: NURSE PRACTITIONER

## 2020-09-25 RX ORDER — LORAZEPAM 1 MG/1
TABLET ORAL
COMMUNITY
Start: 2020-09-12 | End: 2020-10-20 | Stop reason: SDUPTHER

## 2020-09-25 ASSESSMENT — ENCOUNTER SYMPTOMS
GASTROINTESTINAL NEGATIVE: 1
CHEST TIGHTNESS: 0
SHORTNESS OF BREATH: 0
COUGH: 0
EYES NEGATIVE: 1
WHEEZING: 0

## 2020-09-25 ASSESSMENT — PATIENT HEALTH QUESTIONNAIRE - PHQ9
SUM OF ALL RESPONSES TO PHQ9 QUESTIONS 1 & 2: 0
2. FEELING DOWN, DEPRESSED OR HOPELESS: 0
SUM OF ALL RESPONSES TO PHQ QUESTIONS 1-9: 0
SUM OF ALL RESPONSES TO PHQ QUESTIONS 1-9: 0
1. LITTLE INTEREST OR PLEASURE IN DOING THINGS: 0

## 2020-09-25 NOTE — PROGRESS NOTES
Packs/day: 1.00     Years: 15.00     Pack years: 15.00     Types: Cigarettes     Last attempt to quit: 2013     Years since quittin.8    Smokeless tobacco: Never Used   Substance Use Topics    Alcohol use: No       Review of Systems   Constitutional: Negative for chills and fever. Eyes: Negative. Respiratory: Negative for cough, chest tightness, shortness of breath and wheezing. Cardiovascular: Negative for chest pain and palpitations. Gastrointestinal: Negative. Musculoskeletal: Negative for arthralgias, joint swelling and myalgias. Skin: Positive for rash. Neurological: Negative. Physical Exam  Vitals signs and nursing note reviewed. Constitutional:       General: She is not in acute distress. Appearance: She is well-developed. She is not diaphoretic. HENT:      Head: Normocephalic and atraumatic. Right Ear: External ear normal.      Left Ear: External ear normal.      Nose: Nose normal.      Mouth/Throat:      Mouth: Mucous membranes are moist.      Pharynx: Oropharynx is clear. No oropharyngeal exudate. Eyes:      General:         Right eye: No discharge. Left eye: No discharge. Conjunctiva/sclera: Conjunctivae normal.   Neck:      Musculoskeletal: Normal range of motion and neck supple. Cardiovascular:      Rate and Rhythm: Normal rate and regular rhythm. Heart sounds: Normal heart sounds. No murmur. No friction rub. No gallop. Pulmonary:      Effort: Pulmonary effort is normal. No respiratory distress. Breath sounds: Normal breath sounds. No wheezing or rales. Abdominal:      General: Bowel sounds are normal. There is no distension. Palpations: Abdomen is soft. Tenderness: There is no abdominal tenderness. Musculoskeletal: Normal range of motion. General: No tenderness. Lymphadenopathy:      Cervical: No cervical adenopathy. Skin:     General: Skin is warm and dry. Coloration: Skin is not pale. Findings: No erythema or rash. Neurological:      Mental Status: She is alert and oriented to person, place, and time. Motor: No abnormal muscle tone. Coordination: Coordination normal.   Psychiatric:         Behavior: Behavior normal.         Thought Content: Thought content normal.         Judgment: Judgment normal.         Diagnosis       ICD-10-CM    1. Dermatitis  L30.9         Plan    Continue topical benadryl  Avoid scratching   Topical cortisone, apply sparingly for no more than 7 to 10 days especially on the face  Report if fails to improve  Discussed washing articles of clothing from the fall of last year as this happened also in the fall last year. No orders of the defined types were placed in this encounter. No orders of the defined types were placed in this encounter. Patient Education:  Dermatitis    Return if symptoms worsen or fail to improve.

## 2020-09-25 NOTE — PATIENT INSTRUCTIONS
Patient Education        Dermatitis: Care Instructions  Your Care Instructions  Dermatitis is the general name used for any rash or inflammation of the skin. Different kinds of dermatitis cause different kinds of rashes. Common causes of a rash include new medicines, plants (such as poison oak or poison ivy), heat, and stress. Certain illnesses can also cause a rash. An allergic reaction to something that touches your skin, such as latex, nickel, or poison ivy, is called contact dermatitis. Contact dermatitis may also be caused by something that irritates the skin, such as bleach, a chemical, or soap. These types of rashes cannot be spread from person to person. How long your rash will last depends on what caused it. Rashes may last a few days or months. Follow-up care is a key part of your treatment and safety. Be sure to make and go to all appointments, and call your doctor if you are having problems. It's also a good idea to know your test results and keep a list of the medicines you take. How can you care for yourself at home? · Do not scratch the rash. Cut your nails short, and file them smooth. Or wear gloves if this helps keep you from scratching. · Wash the area with water only. Pat dry. · Put cold, wet cloths on the rash to reduce itching. · Keep cool, and stay out of the sun. · Leave the rash open to the air as much as possible. · If the rash itches, use hydrocortisone cream. Follow the directions on the label. Calamine lotion may help for plant rashes. · Take an over-the-counter antihistamine, such as diphenhydramine (Benadryl) or loratadine (Claritin), to help calm the itching. Read and follow all instructions on the label. · If your doctor prescribed a cream, use it as directed. If your doctor prescribed medicine, take it exactly as directed. When should you call for help?    Call your doctor now or seek immediate medical care if:  · You have symptoms of infection, such as:  ? Increased

## 2020-10-07 ENCOUNTER — OFFICE VISIT (OUTPATIENT)
Dept: FAMILY MEDICINE CLINIC | Age: 70
End: 2020-10-07

## 2020-10-07 ENCOUNTER — TELEPHONE (OUTPATIENT)
Dept: FAMILY MEDICINE CLINIC | Age: 70
End: 2020-10-07

## 2020-10-20 ENCOUNTER — OFFICE VISIT (OUTPATIENT)
Dept: FAMILY MEDICINE CLINIC | Age: 70
End: 2020-10-20
Payer: MEDICARE

## 2020-10-20 VITALS
WEIGHT: 165.8 LBS | DIASTOLIC BLOOD PRESSURE: 72 MMHG | RESPIRATION RATE: 16 BRPM | TEMPERATURE: 98.2 F | SYSTOLIC BLOOD PRESSURE: 128 MMHG | OXYGEN SATURATION: 99 % | BODY MASS INDEX: 28.31 KG/M2 | HEART RATE: 73 BPM | HEIGHT: 64 IN

## 2020-10-20 PROCEDURE — 4040F PNEUMOC VAC/ADMIN/RCVD: CPT | Performed by: INTERNAL MEDICINE

## 2020-10-20 PROCEDURE — 1123F ACP DISCUSS/DSCN MKR DOCD: CPT | Performed by: INTERNAL MEDICINE

## 2020-10-20 PROCEDURE — G0439 PPPS, SUBSEQ VISIT: HCPCS | Performed by: INTERNAL MEDICINE

## 2020-10-20 PROCEDURE — G8482 FLU IMMUNIZE ORDER/ADMIN: HCPCS | Performed by: INTERNAL MEDICINE

## 2020-10-20 PROCEDURE — 3017F COLORECTAL CA SCREEN DOC REV: CPT | Performed by: INTERNAL MEDICINE

## 2020-10-20 RX ORDER — ZOSTER VACCINE RECOMBINANT, ADJUVANTED 50 MCG/0.5
0.5 KIT INTRAMUSCULAR SEE ADMIN INSTRUCTIONS
Qty: 0.5 ML | Refills: 0 | Status: SHIPPED | OUTPATIENT
Start: 2020-10-20 | End: 2021-04-18

## 2020-10-20 RX ORDER — LORAZEPAM 1 MG/1
1 TABLET ORAL 2 TIMES DAILY
Qty: 60 TABLET | Refills: 2 | Status: SHIPPED | OUTPATIENT
Start: 2020-10-20 | End: 2020-11-19

## 2020-10-20 ASSESSMENT — LIFESTYLE VARIABLES
HOW OFTEN DO YOU HAVE A DRINK CONTAINING ALCOHOL: 0
HOW OFTEN DO YOU HAVE A DRINK CONTAINING ALCOHOL: NEVER
AUDIT TOTAL SCORE: INCOMPLETE
AUDIT-C TOTAL SCORE: INCOMPLETE

## 2020-10-20 ASSESSMENT — PATIENT HEALTH QUESTIONNAIRE - PHQ9
SUM OF ALL RESPONSES TO PHQ QUESTIONS 1-9: 0
SUM OF ALL RESPONSES TO PHQ9 QUESTIONS 1 & 2: 0
SUM OF ALL RESPONSES TO PHQ QUESTIONS 1-9: 0
1. LITTLE INTEREST OR PLEASURE IN DOING THINGS: 0
2. FEELING DOWN, DEPRESSED OR HOPELESS: 0
SUM OF ALL RESPONSES TO PHQ QUESTIONS 1-9: 0

## 2020-10-24 NOTE — PATIENT INSTRUCTIONS
Learning About Medical Power of   What is a medical power of ? A medical power of , also called a durable power of  for health care, is one type of the legal forms called advance directives. It lets you name the person you want to make treatment decisions for you if you can't speak or decide for yourself. The person you choose is called your health care agent. This person is also called a health care proxy or health care surrogate. A medical power of  may be called something else in your state. How do you choose a health care agent? Choose your health care agent carefully. This person may or may not be a family member. Talk to the person before you make your final decision. Make sure he or she is comfortable with this responsibility. It's a good idea to choose someone who:  · Is at least 25years old. · Knows you well and understands what makes life meaningful for you. · Understands your Anab[employer]ism and moral values. · Will do what you want, not what he or she wants. · Will be able to make difficult choices at a stressful time. · Will be able to refuse or stop treatment, if that is what you would want, even if you could die. · Will be firm and confident with health professionals if needed. · Will ask questions to get needed information. · Lives near you or agrees to travel to you if needed. Your family may help you make medical decisions while you can still be part of that process. But it's important to choose one person to be your health care agent in case you aren't able to make decisions for yourself. If you don't fill out the legal form and name a health care agent, the decisions your family can make may be limited. A health care agent may be called something else in your state. Who will make decisions for you if you don't have a health care agent? If you don't have a health care agent or a living will, you may not get the care you want. Decisions may be made by family members who disagree about your medical care. Or decisions may be made by a medical professional who doesn't know you well. In some cases, a  makes the decisions. When you name a health care agent, it is very clear who has the power to make health decisions for you. How do you name a health care agent? You name your health care agent on a legal form. This form is usually called a medical power of . Ask your hospital, state bar association, or office on aging where to find these forms. You must sign the form to make it legal. Some states require you to get the form notarized. This means that a person called a  watches you sign the form and then he or she signs the form. Some states also require that two or more witnesses sign the form. Be sure to tell your family members and doctors who your health care agent is. Where can you learn more? Go to https://[email or web address] Systemspepiceweb.[employer]. org and sign in to your [employer] account. Enter [id number] in the light box to learn more about \"Learning About [address]. \"     If you do not have an account, please click on the \"Sign Up Now\" link. Current as of: December 9, 2019               Content Version: 12.6  © 5496-7609 [employer]. Care instructions adapted under license by [facility] ([facility]). If you have questions about a medical condition or this instruction, always ask your healthcare professional. [place] [address] any warranty or liability for your use of this information. Personalized Preventive Plan for Doyle Brody - 10/20/2020  Medicare offers a range of preventive health benefits. Some of the tests and screenings are paid in full while other may be subject to a deductible, co-insurance, and/or copay.     Some of these benefits include a comprehensive review of your medical history including lifestyle, illnesses that may run in your family, and various assessments and screenings as appropriate. After reviewing your medical record and screening and assessments performed today your provider may have ordered immunizations, labs, imaging, and/or referrals for you. A list of these orders (if applicable) as well as your Preventive Care list are included within your After Visit Summary for your review. Other Preventive Recommendations:    · A preventive eye exam performed by an eye specialist is recommended every 1-2 years to screen for glaucoma; cataracts, macular degeneration, and other eye disorders. · A preventive dental visit is recommended every 6 months. · Try to get at least 150 minutes of exercise per week or 10,000 steps per day on a pedometer . · Order or download the FREE \"Exercise & Physical Activity: Your Everyday Guide\" from [employer]. Call 0-300.531.6073 or search [employer] online. · You need 6463-9687 mg of calcium and 0332-7893 IU of vitamin D per day. It is possible to meet your calcium requirement with diet alone, but a vitamin D supplement is usually necessary to meet this goal.  · When exposed to the sun, use a sunscreen that protects against both UVA and UVB radiation with an SPF of 30 or greater. Reapply every 2 to 3 hours or after sweating, drying off with a towel, or swimming. · Always wear a seat belt when traveling in a car. Always wear a helmet when riding a bicycle or motorcycle.

## 2020-10-24 NOTE — PROGRESS NOTES
Medicare Annual Wellness Visit  Name: Joselyn Hidalgo Date: 10/23/2020   MRN: <[id number]> Sex: Female   Age: 77 y.o. Ethnicity: Non-/Non    : 1950 Race: Mily Euceda is here for Medicare AWV    Screenings for behavioral, psychosocial and functional/safety risks, and cognitive dysfunction are all negative except as indicated below. These results, as well as other patient data from the [address] form, are documented in Flowsheets linked to this Encounter. Allergies   Allergen Reactions    Methylprednisolone Other (See Comments)     Severe body pain       Prior to Visit Medications    Medication Sig Taking? Authorizing Provider   zoster recombinant adjuvanted vaccine [facility]) 50 MCG/0.5ML SUSR injection Inject 0.5 mLs into the muscle See Admin Instructions 1 dose now and repeat in 2-6 months Yes Dre Khan MD   LORazepam (ATIVAN) 1 MG tablet Take 1 tablet by mouth 2 times daily for 30 days. Yes Dre Khan MD   acyclovir (ZOVIRAX) 400 MG tablet TAKE 1 TABLET BY MOUTH 2 TIMES DAILY  Dre Khan MD   acyclovir (ZOVIRAX) 5 % ointment APPLY TOPICALLY 5 TIMES DAILY. Dre Khan MD   hydrOXYzine (ATARAX) 25 MG tablet TAKE 1 TABLET BY MOUTH EVERY 8 HOURS AS NEEDED FOR ITCHING  Dre Khan MD   tetracycline (ACHROMYCIN;SUMYCIN) 250 MG capsule It is compounded to 225mg daily  Dre Khan MD   DHA-Vitamin C-Lutein (EYE HEALTH FORMULA PO) Take by mouth daily Indications: Also with Vitamin A,K OTC Vitamin   Historical MD Trae   Cholecalciferol (VITAMIN D) 2000 UNITS CAPS capsule Take by mouth daily Indications: OTC   Historical Provider, MD       Past Medical History:   Diagnosis Date    Anxiety     Depression     Hepatitis C      states resolved        History reviewed. No pertinent surgical history.     Family History   Problem Relation Age of Onset    Arthritis Mother     High Blood Pressure Mother     Vision Loss Mother     Arthritis Father     Cancer Father     Heart Disease Father     Arthritis Sister     Learning Disabilities Sister     Arthritis Brother        CareTeam (Including outside providers/suppliers regularly involved in providing care):   Patient Care Team:  Pilar Calix MD as PCP - General (Family Medicine)  Pilar Calix MD as PCP - REHABILITATION [facility] Empaneled Provider  Aníbal Syed MD (Orthopedic Surgery)  [place], [place] as Physician Assistant (Physician Assistant Surgical)  Rola Kumari MD as Consulting Physician (Gastroenterology)    Wt Readings from Last 3 Encounters:   10/20/20 165 lb 12.8 oz (75.2 kg)   09/25/20 166 lb (75.3 kg)   07/11/20 170 lb 13.7 oz (77.5 kg)     Vitals:    10/20/20 1219   BP: 128/72   Site: Right Upper Arm   Position: Sitting   Cuff Size: Medium Adult   Pulse: 73   Resp: 16   Temp: 98.2 °F (36.8 °C)   TempSrc: Temporal   SpO2: 99%   Weight: 165 lb 12.8 oz (75.2 kg)   Height: 5' 3.5\" (1.613 m)     Body mass index is 28.91 kg/m². Based upon direct observation of the patient, evaluation of cognition reveals recent and remote memory intact. Patient's complete Health Risk Assessment and screening values have been reviewed and are found in Flowsheets. The following problems were reviewed today and where indicated follow up appointments were made and/or referrals ordered. Positive Risk Factor Screenings with Interventions:     General Health and ACP:  General  In general, how would you say your health is?: Very Good  In the past 7 days, have you experienced any of the following?  New or Increased Pain, New or Increased Fatigue, Loneliness, Social Isolation, Stress or Anger?: (!) Stress  Do you get the social and emotional support that you need?: Yes  Do you have a Living Will?: (!) No  Advance Directives     Power of  Living Will ACP-Advance Directive ACP-Power of     Not on File Not on File Not on File Not on File      General Out     Recommendations for Preventive Services Due: see orders and patient instructions/AVS.  . Recommended screening schedule for the next 5-10 years is provided to the patient in written form: see Patient Instructions/AVS.    Selin Siddiqui was seen today for medicare awv. Diagnoses and all orders for this visit:    Anxiety  -     LORazepam (ATIVAN) 1 MG tablet; Take 1 tablet by mouth 2 times daily for 30 days. Anemia, unspecified type  -     CBC; Future    Screening for hyperlipidemia  -     Lipid Panel; Future    Herpes zoster without complication  -     Comprehensive Metabolic Panel; Future    Dermatitis    Routine general medical examination at a health care facility    Other orders  -     zoster recombinant adjuvanted vaccine [facility]) 50 MCG/0.5ML SUSR injection; Inject 0.5 mLs into the muscle See Admin Instructions 1 dose now and repeat in 2-6 months                  Medicare Annual Wellness Visit  Name: Melly Gomez Date: 10/23/2020   MRN: <[id number]> Sex: Female   Age: 77 y.o. Ethnicity: Non-/Non    : 1950 Race: Jameel Roche is here for Medicare AWV    Screenings for behavioral, psychosocial and functional/safety risks, and cognitive dysfunction are all negative except as indicated below. These results, as well as other patient data from the [address] form, are documented in Flowsheets linked to this Encounter. Allergies   Allergen Reactions    Methylprednisolone Other (See Comments)     Severe body pain       Prior to Visit Medications    Medication Sig Taking? Authorizing Provider   zoster recombinant adjuvanted vaccine [facility]) 50 MCG/0.5ML SUSR injection Inject 0.5 mLs into the muscle See Admin Instructions 1 dose now and repeat in 2-6 months Yes Elver Grimes MD   LORazepam (ATIVAN) 1 MG tablet Take 1 tablet by mouth 2 times daily for 30 days.  Yes Elver Grimes MD   acyclovir (ZOVIRAX) 400 MG tablet TAKE 1 TABLET BY MOUTH 2 Silvino Bowden MD   acyclovir (ZOVIRAX) 5 % ointment APPLY TOPICALLY 5 TIMES DAILY. Morgan Humphreys MD   hydrOXYzine (ATARAX) 25 MG tablet TAKE 1 TABLET BY MOUTH EVERY 8 HOURS AS NEEDED FOR ITCHING  Morgan Humphreys MD   tetracycline (ACHROMYCIN;SUMYCIN) 250 MG capsule It is compounded to 225mg daily  Morgan Humphreys MD   DHA-Vitamin C-Lutein (EYE HEALTH FORMULA PO) Take by mouth daily Indications: Also with Vitamin A,K OTC Vitamin   Historical Provider, MD   Cholecalciferol (VITAMIN D) 2000 UNITS CAPS capsule Take by mouth daily Indications: OTC   Historical Provider, MD       Past Medical History:   Diagnosis Date    Anxiety     Depression     Hepatitis C      states resolved        History reviewed. No pertinent surgical history. Family History   Problem Relation Age of Onset    Arthritis Mother     High Blood Pressure Mother     Vision Loss Mother     Arthritis Father     Cancer Father     Heart Disease Father     Arthritis Sister     Learning Disabilities Sister     Arthritis Brother        CareTeam (Including outside providers/suppliers regularly involved in providing care):   Patient Care Team:  Morgan Humphreys MD as PCP - General (Family Medicine)  Morgan Humphreys MD as PCP - RE[facility]ABILITATION [facility] Empaneled Provider  Tiffanie Calderon MD (Orthopedic Surgery)  Lokesh Garrett as Physician Assistant (Physician Assistant Surgical)  Rell Wolfe MD as Consulting Physician (Gastroenterology)    Wt Readings from Last 3 Encounters:   10/20/20 165 lb 12.8 oz (75.2 kg)   09/25/20 166 lb (75.3 kg)   07/11/20 170 lb 13.7 oz (77.5 kg)     Vitals:    10/20/20 1219   BP: 128/72   Site: Right Upper Arm   Position: Sitting   Cuff Size: Medium Adult   Pulse: 73   Resp: 16   Temp: 98.2 °F (36.8 °C)   TempSrc: Temporal   SpO2: 99%   Weight: 165 lb 12.8 oz (75.2 kg)   Height: 5' 3.5\" (1.613 m)     Body mass index is 28.91 kg/m².     Based upon direct observation of the patient, evaluation of cognition reveals recent and remote memory intact. Patient's complete Health Risk Assessment and screening values have been reviewed and are found in Flowsheets. The following problems were reviewed today and where indicated follow up appointments were made and/or referrals ordered. Positive Risk Factor Screenings with Interventions:     General Health and ACP:  General  In general, how would you say your health is?: Very Good  In the past 7 days, have you experienced any of the following?  New or Increased Pain, New or Increased Fatigue, Loneliness, Social Isolation, Stress or Anger?: (!) Stress  Do you get the social and emotional support that you need?: Yes  Do you have a Living Will?: (!) No  Advance Directives     Power of  Living Will ACP-Advance Directive ACP-Power of     Not on File Not on File Not on File Not on File      General Health Risk Interventions:  · none    Health Habits/Nutrition:  Health Habits/Nutrition  Do you exercise for at least 20 minutes 2-3 times per week?: (!) No  Have you lost any weight without trying in the past 3 months?: No  Do you eat fewer than 2 meals per day?: No  Have you seen a dentist within the past year?: Yes  Body mass index: (!) 28.91  Health Habits/Nutrition Interventions:  · none    Personalized Preventive Plan   Current Health Maintenance Status  Immunization History   Administered Date(s) Administered    Influenza 09/13/2011, 09/28/2012, 09/30/2013    Influenza A ([id number]) Vaccine PF IM 12/18/2009    Influenza Vaccine, unspecified formulation 09/30/2013    Influenza Virus Vaccine 10/19/2015, 11/07/2016    Influenza Whole 11/08/2010    Influenza, MDCK Quadv, IM, PF (Flucelvax 4 yrs and older) 10/05/2018    Influenza, MDCK Quadv, with preserv IM (Flucelvax 4 yrs and older) 10/05/2018    Influenza, Quadv, IM, (6 mo and older Fluzone, Flulaval, Fluarix and 3 yrs and older Afluria) 11/07/2016    Influenza, Quadv, IM, PF (6 mo and older Fluzone, Flulaval, Fluarix, and 3 yrs and older Afluria) 10/08/2020    Influenza, Triv, inactivated, subunit, adjuvanted, IM (Fluad 65 yrs and older) 10/03/2019    Pneumococcal Conjugate 13-valent ([id number]) 02/12/2018    Pneumococcal Polysaccharide ([id number]) 02/07/2017    Tdap (Boostrix, Adacel) 09/30/2013    Tetanus 07/08/2003    Zoster Live (Zostavax) 07/03/2012        Health Maintenance   Topic Date Due    Shingles Vaccine (2 of 3) 08/28/2012    Breast cancer screen  05/19/2017    Colon Cancer Screen FIT/FOBT  07/18/2020    DTaP/Tdap/Td vaccine (2 - Td) 09/30/2023    Lipid screen  06/11/2025    DEXA (modify frequency per FRAX score)  Completed    Flu vaccine  Completed    Pneumococcal 65+ years Vaccine  Completed    Hepatitis A vaccine  Aged Out    Hepatitis B vaccine  Aged Out    Hib vaccine  Aged Out    Meningococcal (ACWY) vaccine  Aged Out     Recommendations for [place] Due: see orders and patient instructions/AVS.  . Recommended screening schedule for the next 5-10 years is provided to the patient in written form: see Patient Instructions/AVS.    Nayeli Lopez was seen today for medicare aw. Diagnoses and all orders for this visit:    Anxiety  -     LORazepam (ATIVAN) 1 MG tablet; Take 1 tablet by mouth 2 times daily for 30 days. Anemia, unspecified type  -     CBC; Future    Screening for hyperlipidemia  -     Lipid Panel; Future    Herpes zoster without complication  -     Comprehensive Metabolic Panel; Future    Dermatitis    Routine general medical examination at a health care facility    Other orders  -     zoster recombinant adjuvanted vaccine [facility]) 50 MCG/0.5ML SUSR injection;  Inject 0.5 mLs into the muscle See Admin Instructions 1 dose now and repeat in 2-6 months

## 2020-11-25 ENCOUNTER — OFFICE VISIT (OUTPATIENT)
Dept: FAMILY MEDICINE CLINIC | Age: 70
End: 2020-11-25
Payer: COMMERCIAL

## 2020-11-25 VITALS
TEMPERATURE: 98.3 F | SYSTOLIC BLOOD PRESSURE: 118 MMHG | DIASTOLIC BLOOD PRESSURE: 78 MMHG | OXYGEN SATURATION: 97 % | HEART RATE: 83 BPM | BODY MASS INDEX: 28.51 KG/M2 | WEIGHT: 163.5 LBS | RESPIRATION RATE: 16 BRPM

## 2020-11-25 PROCEDURE — G8417 CALC BMI ABV UP PARAM F/U: HCPCS | Performed by: INTERNAL MEDICINE

## 2020-11-25 PROCEDURE — 1123F ACP DISCUSS/DSCN MKR DOCD: CPT | Performed by: INTERNAL MEDICINE

## 2020-11-25 PROCEDURE — 1090F PRES/ABSN URINE INCON ASSESS: CPT | Performed by: INTERNAL MEDICINE

## 2020-11-25 PROCEDURE — 3017F COLORECTAL CA SCREEN DOC REV: CPT | Performed by: INTERNAL MEDICINE

## 2020-11-25 PROCEDURE — G8399 PT W/DXA RESULTS DOCUMENT: HCPCS | Performed by: INTERNAL MEDICINE

## 2020-11-25 PROCEDURE — G8427 DOCREV CUR MEDS BY ELIG CLIN: HCPCS | Performed by: INTERNAL MEDICINE

## 2020-11-25 PROCEDURE — 99213 OFFICE O/P EST LOW 20 MIN: CPT | Performed by: INTERNAL MEDICINE

## 2020-11-25 PROCEDURE — G8482 FLU IMMUNIZE ORDER/ADMIN: HCPCS | Performed by: INTERNAL MEDICINE

## 2020-11-25 PROCEDURE — 4040F PNEUMOC VAC/ADMIN/RCVD: CPT | Performed by: INTERNAL MEDICINE

## 2020-11-25 PROCEDURE — 1036F TOBACCO NON-USER: CPT | Performed by: INTERNAL MEDICINE

## 2020-11-25 RX ORDER — PREDNISOLONE ACETATE 10 MG/ML
SUSPENSION/ DROPS OPHTHALMIC
COMMUNITY
Start: 2020-11-18 | End: 2021-01-11 | Stop reason: ALTCHOICE

## 2020-11-25 RX ORDER — LORAZEPAM 0.5 MG/1
0.5 TABLET ORAL EVERY 6 HOURS PRN
COMMUNITY
End: 2020-12-28 | Stop reason: SDUPTHER

## 2020-11-25 NOTE — PROGRESS NOTES
Subjective:      Patient ID: Davon Voss is a 77 y.o. female. here for evaluation for her medical comcerns    HPI      CC-Lesion on the right  Side of the neck. For the past 2 weeks She noted that it has gotten bigger and  Darker in color, size is slightly bigger than a pinhead and is sitting  Right on top of her left jugular vein. -Anemia , refused  Bone marrow biopsy recommended by hematologist,. Look pale , denies fatigue, shortness of breath or any other symptoms , say she  Feels fine. Suggest to recheck blood work. Her last hgb 7/10/20 was 8.7      Review of Systems   Constitutional: Negative for activity change. Respiratory: Negative for cough. Cardiovascular: Negative for chest pain. Genitourinary: Negative. Musculoskeletal: Negative for arthralgias. Neurological: Negative for dizziness. Seizures:    Hematological: Negative for adenopathy. Psychiatric/Behavioral: Positive for sleep disturbance. The patient is nervous/anxious. Patient Active Problem List   Diagnosis    Depression    Anxiety    History of hepatitis C    Abnormal liver function tests    Ex-smoker    Pain medication agreement signed    Normocytic anemia    Neutropenia ([address]    Hyponatremia    Metabolic encephalopathy       Outpatient Medications Marked as Taking for the 11/25/20 encounter (Office Visit) with Dre Khan MD   Medication Sig Dispense Refill    LORazepam (ATIVAN) 0.5 MG tablet Take 0.5 mg by mouth every 6 hours as needed for Anxiety.       zoster recombinant adjuvanted vaccine (SHINGRIX) 50 MCG/0.5ML SUSR injection Inject 0.5 mLs into the muscle See Admin Instructions 1 dose now and repeat in 2-6 months 0.5 mL 0    hydrOXYzine (ATARAX) 25 MG tablet TAKE 1 TABLET BY MOUTH EVERY 8 HOURS AS NEEDED FOR ITCHING 90 tablet 2    tetracycline (ACHROMYCIN;SUMYCIN) 250 MG capsule It is compounded to 225mg daily 100 capsule 2    DHA-Vitamin C-Lutein (EYE HEALTH FORMULA PO) Take by mouth daily Indications: Also with Vitamin A,K OTC Vitamin       Cholecalciferol (VITAMIN D) 2000 UNITS CAPS capsule Take by mouth daily Indications: OTC          Allergies   Allergen Reactions    Methylprednisolone Other (See Comments)     Severe body pain       Social History     Tobacco Use    Smoking status: Former Smoker     Packs/day: 1.00     Years: 15.00     Pack years: 15.00     Types: Cigarettes     Last attempt to quit: 2013     Years since quittin.0    Smokeless tobacco: Never Used   Substance Use Topics    Alcohol use: No       Objective:   /78 (Site: Right Upper Arm, Position: Sitting, Cuff Size: Medium Adult)   Pulse 83   Temp 98.3 °F (36.8 °C) (Temporal)   Resp 16   Wt 163 lb 8 oz (74.2 kg)   SpO2 97%   Breastfeeding No   BMI 28.51 kg/m²     Physical Exam  Vitals signs and nursing note reviewed. Constitutional:       Appearance: She is well-developed. Comments: Pale looking   HENT:      Head: Normocephalic. Eyes:      Pupils: Pupils are equal, round, and reactive to light. Neck:      Musculoskeletal: Normal range of motion and neck supple. Cardiovascular:      Rate and Rhythm: Normal rate and regular rhythm. Pulmonary:      Breath sounds: Normal breath sounds. Abdominal:      Palpations: Abdomen is soft. Skin:     General: Skin is dry. Findings: Lesion present. Comments:   2 mm brown colored lesion on the mid left jugular  vein   Psychiatric:         Behavior: Behavior normal.         Thought Content: Thought content normal.         Judgment: Judgment normal.       Assessment:/plan:   1. Skin lesion of neck  -will call her dermatologist    2.  Normocytic anemia  - will get blood work done ordered in [facility] a month ago    MDM  -advised to get blood work done ASAP  -will need to follow up with Dr. Mehul Kellogg and advised to get the bone marrow biopsy done               Danish Theodore MD

## 2020-11-28 ASSESSMENT — ENCOUNTER SYMPTOMS: COUGH: 0

## 2020-12-19 ENCOUNTER — HOSPITAL ENCOUNTER (OUTPATIENT)
Dept: GENERAL RADIOLOGY | Age: 70
Discharge: HOME | End: 2020-12-19
Payer: MEDICARE

## 2020-12-19 ENCOUNTER — HOSPITAL ENCOUNTER (OUTPATIENT)
Age: 70
Discharge: HOME | End: 2020-12-19
Payer: MEDICARE

## 2020-12-19 PROCEDURE — 77075 RADEX OSSEOUS SURVEY COMPL: CPT

## 2020-12-29 RX ORDER — LORAZEPAM 0.5 MG/1
0.5 TABLET ORAL DAILY PRN
Qty: 30 TABLET | Refills: 0 | Status: SHIPPED | OUTPATIENT
Start: 2020-12-29 | End: 2021-01-11 | Stop reason: SDUPTHER

## 2021-01-10 ASSESSMENT — ENCOUNTER SYMPTOMS: COUGH: 0

## 2021-01-10 NOTE — PROGRESS NOTES
Ronald Rousseau (:  1950) is a 72 y.o. female,Established patient, here for evaluation of the following chief complaint(s): Anxiety, new diagnosis of multiple myeloma, rash    ASSESSMENT/PLAN:    1. Anxiety    - LORazepam (ATIVAN) 1 MG tablet; Take 1 tablet by mouth 2 times daily as needed for Anxiety for up to 30 days. Dispense: 60 tablet; Refill: 2    2. Multiple myeloma, remission status unspecified ([facility]  - follow up with oncology    3. Rash-   -possibly from chemotherapy , taking chemotherapy      SUBJECTIVE/OBJECTIVE:  HPI  CC- anxiety, new diagnosis of multiple myeloma    Patient takes lorazepam 1 mg  as needed  For anxiety. Had bone marrow biopsy  Reviewed records from hematologist about a month ago and findings was consistent with multiple myeloma. Skeletal survey showed multipke lucencies in her skull. Is having a difficult time deciding about getting  Bone marrow transplant and for right now she is  Getting chemotherapy. She also has  A rash on her right upper extremity which is  From her chemo and is taking benadryl. Review of Systems   Constitutional: Negative for activity change. Respiratory: Negative for cough. Cardiovascular: Negative for chest pain. Genitourinary: Negative. Musculoskeletal: Negative for arthralgias. Skin: Positive for rash. Erythematous rash on her left upper extremity   Neurological: Negative for dizziness. Seizures:    Hematological: Negative for adenopathy. Psychiatric/Behavioral: Positive for sleep disturbance. The patient is nervous/anxious. Physical Exam  Vitals signs and nursing note reviewed. Constitutional:       Appearance: She is well-developed. Comments: Pale looking   HENT:      Head: Normocephalic. Eyes:      Pupils: Pupils are equal, round, and reactive to light. Neck:      Musculoskeletal: Normal range of motion and neck supple. Cardiovascular:      Rate and Rhythm: Normal rate and regular rhythm. Pulmonary:      Breath sounds: Normal breath sounds. Abdominal:      Palpations: Abdomen is soft. Musculoskeletal: Normal range of motion. Skin:     General: Skin is dry. Findings: Erythema and rash present. No lesion. Comments:   Erythematous papular rash on her left upper extremity   Psychiatric:         Behavior: Behavior normal.         Thought Content: Thought content normal.         Judgment: Judgment normal.       On this date 01/10/21 I have spent  25 minutes reviewing previous notes, test results and face to face with the patient discussing the diagnosis and importance of compliance with the treatment plan as well as documenting on the day of the visit. Reviewed oncologist report and discussed with the patient. She is  Anxious  About her d condition and ongoing treatment. An electronic signature was used to authenticate this note.     --Benita Gonzalez MD

## 2021-01-11 ENCOUNTER — TELEPHONE (OUTPATIENT)
Dept: FAMILY MEDICINE CLINIC | Age: 71
End: 2021-01-11

## 2021-01-11 ENCOUNTER — OFFICE VISIT (OUTPATIENT)
Dept: FAMILY MEDICINE CLINIC | Age: 71
End: 2021-01-11
Payer: MEDICARE

## 2021-01-11 VITALS
HEIGHT: 64 IN | DIASTOLIC BLOOD PRESSURE: 72 MMHG | SYSTOLIC BLOOD PRESSURE: 100 MMHG | BODY MASS INDEX: 27.83 KG/M2 | WEIGHT: 163 LBS | OXYGEN SATURATION: 82 % | TEMPERATURE: 96.9 F | HEART RATE: 99 BPM

## 2021-01-11 DIAGNOSIS — R21 SKIN RASH: ICD-10-CM

## 2021-01-11 DIAGNOSIS — C90.00 MULTIPLE MYELOMA, REMISSION STATUS UNSPECIFIED (HCC): ICD-10-CM

## 2021-01-11 DIAGNOSIS — F41.9 ANXIETY: Primary | ICD-10-CM

## 2021-01-11 PROCEDURE — 4040F PNEUMOC VAC/ADMIN/RCVD: CPT | Performed by: INTERNAL MEDICINE

## 2021-01-11 PROCEDURE — 99213 OFFICE O/P EST LOW 20 MIN: CPT | Performed by: INTERNAL MEDICINE

## 2021-01-11 PROCEDURE — 1036F TOBACCO NON-USER: CPT | Performed by: INTERNAL MEDICINE

## 2021-01-11 PROCEDURE — 1123F ACP DISCUSS/DSCN MKR DOCD: CPT | Performed by: INTERNAL MEDICINE

## 2021-01-11 PROCEDURE — G8482 FLU IMMUNIZE ORDER/ADMIN: HCPCS | Performed by: INTERNAL MEDICINE

## 2021-01-11 PROCEDURE — 3017F COLORECTAL CA SCREEN DOC REV: CPT | Performed by: INTERNAL MEDICINE

## 2021-01-11 PROCEDURE — G8427 DOCREV CUR MEDS BY ELIG CLIN: HCPCS | Performed by: INTERNAL MEDICINE

## 2021-01-11 PROCEDURE — G8417 CALC BMI ABV UP PARAM F/U: HCPCS | Performed by: INTERNAL MEDICINE

## 2021-01-11 PROCEDURE — G8399 PT W/DXA RESULTS DOCUMENT: HCPCS | Performed by: INTERNAL MEDICINE

## 2021-01-11 PROCEDURE — 1090F PRES/ABSN URINE INCON ASSESS: CPT | Performed by: INTERNAL MEDICINE

## 2021-01-11 RX ORDER — DEXAMETHASONE 4 MG/1
TABLET ORAL
COMMUNITY
Start: 2020-12-18 | End: 2021-06-16 | Stop reason: ALTCHOICE

## 2021-01-11 RX ORDER — LORAZEPAM 0.5 MG/1
0.5 TABLET ORAL DAILY PRN
Qty: 30 TABLET | Refills: 2 | Status: SHIPPED | OUTPATIENT
Start: 2021-01-11 | End: 2021-01-13

## 2021-01-11 NOTE — LETTER
CONTROLLED SUBSTANCE MEDICATION AGREEMENT     Patient Name: Jacquelyn Bruno  Patient YOB: 1950   I understand, that controlled substance medications may be used to help better manage my symptoms and to improve my ability to function at home, work and in social settings. However, I also understand that these medications do have risks, which have been discussed with me, including possible development of physical or psychological dependence. I understand that successful treatment requires mutual trust and honesty between me and my provider. I understand and agree that following this Medication Agreement is necessary in continuing my provider-patient relationship and the success of my treatment plan. Explanation from my Provider: Benefits and Goals of Controlled Substance Medications: There are two potential goals for your treatment: (1) decreased pain and suffering (2) improved daily life functions. There are many possible treatments for your chronic condition(s). Alternatives such as physical therapy, yoga, massage, home daily exercise, meditation, relaxation techniques, injections, chiropractic manipulations, surgery, cognitive therapy, hypnosis and many medications that are not habit-forming may be used. Use of controlled substance medications may be helpful, but they are unlikely to resolve all symptoms or restore all function.    Explanation from my Provider: Risks of Controlled Substance Medications: Opioid pain medications: These medications can lead to problems such as addiction/dependence, sedation, lightheadedness/dizziness, memory issues, falls, constipation, nausea, or vomiting. They may also impair the ability to drive or operate machinery. Additionally, these medications may lower testosterone levels, leading to loss of bone strength, stamina and sex drive. They may cause problems with breathing, sleep apnea and reduced coughing, which is especially dangerous for patients with lung disease. Overdose or dangerous interactions with alcohol and other medications may occur, leading to death. Hyperalgesia may develop, which means patients receiving opioids for the treatment of pain may become more sensitive to certain painful stimuli, and in some cases, experience pain from ordinarily non-painful stimuli. Women between the ages of 14-53 who could become pregnant should carefully weigh the risks and benefits of opioids with their physicians, as these medications increase the risk of pregnancy complications, including miscarriage,  delivery and stillbirth. It is also possible for babies to be born addicted to opioids. Opioid dependence withdrawal symptoms may include; feelings of uneasiness, increased pain, irritability, belly pain, diarrhea, sweats and goose-flesh. Benzodiazepines and non-benzodiazepine sleep medications: These medications can lead to problems such as addiction/dependence, sedation, fatigue, lightheadedness, dizziness, incoordination, falls, depression, hallucinations, and impaired judgment, memory and concentration. The ability to drive and operate machinery may also be affected. Abnormal sleep-related behaviors have been reported, including sleepwalking, driving, making telephone calls, eating, or having sex while not fully awake. These medications can suppress breathing and worsen sleep apnea, particularly when combined with alcohol or other sedating medications, potentially leading to death. Dependence withdrawal symptoms may include tremors, anxiety, hallucinations and seizures. Stimulants:  Common adverse effects include addiction/dependence, increased blood  pressure and heart rate, decreased appetite, nausea, involuntary weight loss, insomnia,                                                                                                                     Initials:_______   irritability, and headaches. These risks may increase when these medications are combined with other stimulants, such as caffeine pills or energy drinks, certain weight loss supplements and oral decongestants. Dependence withdrawal symptoms may include depressed mood, loss of interest, suicidal thoughts, anxiety, fatigue, appetite changes and agitation. Testosterone replacement therapy:  Potential side effects include increased risk of stroke and heart attack, blood clots, increased blood pressure, increased cholesterol, enlarged prostate, sleep apnea, irritability/aggression and other mood disorders, and decreased fertility. I agree and understand that I and my prescriber have the following rights and responsibilities regarding my treatment plan:     1. MY RIGHTS:  To be informed of my treatment and medication plan. To be an active participant in my health and wellbeing. 2. MY RESPONSIBILITY AND UNDERSTANDING FOR USE OF MEDICATIONS  ? I will take medications at the dose and frequency as directed. For my safety, I will not increase or change how I take my medications without the recommendation of my healthcare provider. ? I will actively participate in any program recommended by my provider which may improve function, including social, physical, psychological programs. ? I will not take my medications with alcohol or other drugs not prescribed to me. I understand that drinking alcohol with my medications increases the chances of side effects, including reduced breathing rate and could lead to personal injury when operating machinery. ? I understand that if I have a history of substance use disorders, including alcohol or other illicit drugs, that I may be at increased risk of addiction to my medications. ? I agree to notify my provider immediately if I should become pregnant so that my treatment plan can be adjusted. ? I agree and understand that I shall only receive controlled substance medications from the prescriber that signed this agreement unless there is written agreement among other prescribers of controlled substances outlining the responsibility of the medications being prescribed. ? I understand that the if the controlled medication is not helping to achieve goals, the dosage may be tapered and no longer prescribed. 3. MY RESPONSIBILITY FOR COMMUNICATION / PRESCRIPTION RENEWALS  ? I agree that all controlled substance medications that I take will be prescribed only by my provider. If another healthcare provider prescribes me medication in an emergency, I will notify my provider within seventy-two (72) hours. ? I will arrange for refills at the prescribed interval ONLY during regular office hours. I will not ask for refills earlier than agreed, after-hours, on holidays or weekends. Refills may take up to 72 hours for processing and prescriptions to reach the pharmacy. ? I will inform my other health care providers that I am taking these medications and of the existence of this [id number]. In the event of an emergency, I will provide the same information to the emergency department prescribers. ? I will keep my provider updated on the pharmacy I am using for controlled medication prescription filling. Initials:_______  4. MY RESPONSIBILITY FOR PROTECTING MEDICATIONS  ? I will protect my prescriptions and medications. I understand that lost or misplaced prescriptions will not be replaced. ? I will keep medications only for my own use and will not share them with others. I will keep all medications away from children. ? I agree that if my medications are adjusted or discontinued, I will properly dispose of any remaining medications. I understand that I will be required to dispose of any remaining controlled medications as, directed by my prescriber, prior to being provided with any prescriptions for other controlled medications. Medication drop box locations can be found at: [employer]    5. MY RESPONSIBILITY WITH ILLEGAL DRUGS   ? I will not use illegal or street drugs or another person's prescription medications not prescribed to me.   ? If there are identified addiction type symptoms, then referral to a program may be provided by my provider and I agree to follow through with this recommendation.   6. MY RESPONSIBILITY FOR COOPERATION WITH INVESTIGATIONS ? I understand that my provider will comply with any applicable law and may discuss my use and/or possible misuse/abuse of controlled substances and alcohol, as appropriate, with any health care provider involved in my care, pharmacist, or legal authority. ? I authorize my provider and pharmacy to cooperate fully with law enforcement agencies (as permitted by law) in the investigation of any possible misuse, sale, or other diversion of my controlled substances. ? I agree to waive any applicable privilege or right of privacy or confidentiality with respect to these authorizations. 7. PROVIDERS RIGHT TO MONITOR FOR SAFETY: PRESCRIPTION MONITORING / DRUG TESTING  ? I consent to drug/toxicology screening and will submit to a drug screen upon my providers request to assure I am only taking the prescribed drugs for my safety monitoring. I understand that a drug screen is a laboratory test in which a sample of my urine, blood or saliva is checked to see what drugs I have been taking. This may entail an observed urine specimen, which means that a nurse or other health care provider may watch me provide urine, and I will cooperate if I am asked to provide an observed specimen. ? I understand that my provider will check a copy of my State Prescription Monitoring Program () Report in order to safely prescribe medications. ? Pill Counts: I consent to pill counts when requested. I may be asked to bring all my prescribed controlled substance medications, in their original bottles, to all of my scheduled appointments. In addition, my provider may ask me to come to the practice at any time for a random pill count. 8. TERMINATION OF THIS AGREEMENT  For my safety, my prescriber has the right to stop prescribing controlled substance medications and may end this agreement. Initials:_______  ?  Conditions that may result in termination of this agreement: a. I do not show any improvement in pain, or my activity has not improved. b. I develop rapid tolerance or loss of improvement, as described in my treatment plan.  c. I develop significant side effects from the medication. d. My behavior is not consistent with the responsibilities outlined above, thereby causing safety concerns to continue prescribing controlled substance medications. e. I fail to follow the terms of this agreement. f. Other:____________________________       UNDERSTANDING THIS MEDICATION AGREEMENT:    I have read the above and have had all my questions answered. For chronic disease management, I know that my symptoms can be managed with many types of treatments. A chronic medication trial may be part of my treatment, but I must be an active participant in my care. Medication therapy is only one part of my symptom management plan. In some cases, there may be limited scientific evidence to support the chronic use of certain medications to improve symptoms and daily function. Furthermore, in certain circumstances, there may be scientific information that suggests that the use of chronic controlled substances may worsen my symptoms and increase my risk of unintentional death directly related to this medication therapy. I know that if my provider feels my risk from controlled medications is greater than my benefit, I will have my controlled substance medication(s) compassionately lowered or removed altogether. I further agree to allow this office to contact my HIPAA contact if there are concerns about my safety and use of the controlled medications. I have agreed to use the prescribed controlled substance medications to me as instructed by my provider and as stated in this Medication Agreement. My initial on each page and my signature below shows that I have read each page and I have had the opportunity to ask questions with answers provided by my provider. Patient Name (Printed): _____________________________________  Patient Signature:  ______________________   Date: _____________    Prescriber Name (Printed): ___________________________________  Prescriber Signature: _____________________  Date: _____________

## 2021-01-11 NOTE — TELEPHONE ENCOUNTER
Patient called. Her Lorazepam is to be 1 mg bid.  Pharmacy received it as 0.5 mg one daily    Can we please correct

## 2021-01-13 RX ORDER — LORAZEPAM 1 MG/1
1 TABLET ORAL 2 TIMES DAILY PRN
Qty: 60 TABLET | Refills: 2 | Status: SHIPPED | OUTPATIENT
Start: 2021-01-13 | End: 2021-01-27 | Stop reason: SDUPTHER

## 2021-01-26 ENCOUNTER — TELEPHONE (OUTPATIENT)
Dept: FAMILY MEDICINE CLINIC | Age: 71
End: 2021-01-26

## 2021-01-26 NOTE — TELEPHONE ENCOUNTER
Patient called to check on her Ativan 1 mg Rx.     When I looked at it it failed to go through  (we are having trouble with [facility] that is being worked on)    You will need to print it and sign it Wed when you are in the office so we can fax it

## 2021-01-27 DIAGNOSIS — F41.9 ANXIETY: ICD-10-CM

## 2021-01-27 RX ORDER — LORAZEPAM 1 MG/1
1 TABLET ORAL 2 TIMES DAILY PRN
Qty: 60 TABLET | Refills: 2 | Status: SHIPPED | OUTPATIENT
Start: 2021-01-27 | End: 2021-04-13 | Stop reason: SDUPTHER

## 2021-01-28 ENCOUNTER — TELEPHONE (OUTPATIENT)
Dept: FAMILY MEDICINE CLINIC | Age: 71
End: 2021-01-28

## 2021-01-28 NOTE — TELEPHONE ENCOUNTER
----- Message from [facility] sent at 1/28/2021  8:03 AM EST -----  Subject: Message to Provider    QUESTIONS  Information for Provider? Patient would like Dr. Nubia Tamez advice about   getting the Covid Vaccine when it comes available for her. She is going   through Chemo and Dr. Giovanna Litten said she should get one.   ---------------------------------------------------------------------------  --------------  CALL BACK INFO  What is the best way for the office to contact you? OK to leave message on   voicemail  Preferred Call Back Phone Number? [phone]  ---------------------------------------------------------------------------  --------------  SCRIPT ANSWERS  Relationship to Patient?  Self

## 2021-04-08 RX ORDER — FLUCONAZOLE 200 MG/1
TABLET ORAL
COMMUNITY
Start: 2021-01-10 | End: 2021-04-13 | Stop reason: ALTCHOICE

## 2021-04-12 ENCOUNTER — OFFICE VISIT (OUTPATIENT)
Dept: FAMILY MEDICINE CLINIC | Age: 71
End: 2021-04-12

## 2021-04-12 DIAGNOSIS — Z12.31 ENCOUNTER FOR SCREENING MAMMOGRAM FOR BREAST CANCER: Primary | ICD-10-CM

## 2021-04-13 ENCOUNTER — OFFICE VISIT (OUTPATIENT)
Dept: FAMILY MEDICINE CLINIC | Age: 71
End: 2021-04-13
Payer: MEDICARE

## 2021-04-13 VITALS
WEIGHT: 166.4 LBS | RESPIRATION RATE: 16 BRPM | BODY MASS INDEX: 28.41 KG/M2 | DIASTOLIC BLOOD PRESSURE: 80 MMHG | HEART RATE: 83 BPM | TEMPERATURE: 97.1 F | OXYGEN SATURATION: 98 % | HEIGHT: 64 IN | SYSTOLIC BLOOD PRESSURE: 140 MMHG

## 2021-04-13 DIAGNOSIS — Z12.31 ENCOUNTER FOR SCREENING MAMMOGRAM FOR BREAST CANCER: ICD-10-CM

## 2021-04-13 DIAGNOSIS — B00.9 RECURRENT HSV (HERPES SIMPLEX VIRUS): ICD-10-CM

## 2021-04-13 DIAGNOSIS — F41.9 ANXIETY: Primary | ICD-10-CM

## 2021-04-13 DIAGNOSIS — C90.00 MULTIPLE MYELOMA, REMISSION STATUS UNSPECIFIED (HCC): ICD-10-CM

## 2021-04-13 PROCEDURE — 1036F TOBACCO NON-USER: CPT | Performed by: INTERNAL MEDICINE

## 2021-04-13 PROCEDURE — G8399 PT W/DXA RESULTS DOCUMENT: HCPCS | Performed by: INTERNAL MEDICINE

## 2021-04-13 PROCEDURE — G8417 CALC BMI ABV UP PARAM F/U: HCPCS | Performed by: INTERNAL MEDICINE

## 2021-04-13 PROCEDURE — 1123F ACP DISCUSS/DSCN MKR DOCD: CPT | Performed by: INTERNAL MEDICINE

## 2021-04-13 PROCEDURE — 3017F COLORECTAL CA SCREEN DOC REV: CPT | Performed by: INTERNAL MEDICINE

## 2021-04-13 PROCEDURE — 1090F PRES/ABSN URINE INCON ASSESS: CPT | Performed by: INTERNAL MEDICINE

## 2021-04-13 PROCEDURE — 99213 OFFICE O/P EST LOW 20 MIN: CPT | Performed by: INTERNAL MEDICINE

## 2021-04-13 PROCEDURE — 4040F PNEUMOC VAC/ADMIN/RCVD: CPT | Performed by: INTERNAL MEDICINE

## 2021-04-13 PROCEDURE — G8427 DOCREV CUR MEDS BY ELIG CLIN: HCPCS | Performed by: INTERNAL MEDICINE

## 2021-04-13 RX ORDER — LORAZEPAM 1 MG/1
1 TABLET ORAL 2 TIMES DAILY PRN
Qty: 60 TABLET | Refills: 2 | Status: SHIPPED | OUTPATIENT
Start: 2021-04-13 | End: 2021-05-13

## 2021-04-13 RX ORDER — ACYCLOVIR 400 MG/1
400 TABLET ORAL 2 TIMES DAILY
Qty: 60 TABLET | Refills: 5 | Status: ON HOLD | OUTPATIENT
Start: 2021-04-13 | End: 2021-06-30

## 2021-04-13 RX ORDER — ASPIRIN 81 MG/1
81 TABLET, CHEWABLE ORAL DAILY
COMMUNITY
End: 2021-06-23

## 2021-04-17 NOTE — PROGRESS NOTES
Odalys Hairston (:  1950) is a 72 y.o. female,Established patient, here for evaluation of the following chief complaint(s):  Multiple Myeloma (follow up) and Medication Refill      ASSESSMENT/PLAN:  1. Encounter for screening mammogram for breast cancer  -     [facility] Digital Screen Bilateral [DRM3137]; Future  2. Anxiety  -     LORazepam (ATIVAN) 1 MG tablet; Take 1 tablet by mouth 2 times daily as needed for Anxiety for up to 30 days. , Disp-60 tablet, R-2Normal  3. Multiple myeloma, remission status unspecified ([facility]  4. Recurrent HSV (herpes simplex virus)  -     acyclovir (ZOVIRAX) 400 MG tablet; Take 1 tablet by mouth 2 times daily, Disp-60 tablet, R-5Normal      Controlled Substance Monitoring:    Acute and Chronic Pain Monitoring:   RX Monitoring 2021   Attestation -   Periodic Controlled Substance Monitoring Possible medication side effects, risk of tolerance/dependence & alternative treatments discussed. ;No signs of potential drug abuse or diversion identified. ;Assessed functional status. Chronic Pain > 80 MEDD Obtained or confirmed \"Medication Contract\" on file. Return in about 3 months (around 2021). SUBJECTIVE/OBJECTIVE:  HPI  CC- anxiety  Been  Anxious going  through  Chemotherapy for multiple myeloma. Waiting to make a decision To get a bone marrow r transplant. Worried about her illness although she is looking better . States she gets anxious and sometimes have trouble sleeping and take the medicine if she needs to. Has recurrent herpes infection  And takes acyclovir . Advised to get her mammogram done. Review of Systems:  Pertinent positive and negative items are noted in the HPI. A comprehensive review of all other symptoms is otherwise negative. Physical Exam  Vitals signs and nursing note reviewed. Constitutional:       Appearance: Normal appearance. She is well-developed. HENT:      Head: Normocephalic.    Neck:      Musculoskeletal: Normal range of motion and neck supple. Cardiovascular:      Rate and Rhythm: Normal rate and regular rhythm. Pulmonary:      Breath sounds: Normal breath sounds. Abdominal:      Palpations: Abdomen is soft. Musculoskeletal: Normal range of motion. Skin:     Findings: No rash. Comments:      Neurological:      General: No focal deficit present. Mental Status: She is alert. Psychiatric:         Mood and Affect: Mood normal.         Behavior: Behavior normal.       Instruction:  Take lorazepam as needed  Follow recommendation from oncologist    Reviewed  previous notes, tests results and face to face with the patient discussing the diagnosis and importance of compliance with the treatment plan as well as  Documenting on the day of visit. An electronic signature was used to authenticate this note.     --Yue Cavazos MD

## 2021-04-20 ENCOUNTER — HOSPITAL ENCOUNTER (OUTPATIENT)
Dept: MRI IMAGING | Age: 71
Discharge: HOME | End: 2021-04-20
Payer: MEDICARE

## 2021-04-20 DIAGNOSIS — C90.00 MULTIPLE MYELOMA, REMISSION STATUS UNSPECIFIED (HCC): ICD-10-CM

## 2021-04-20 LAB
GFR AFRICAN AMERICAN: >60
GFR NON-AFRICAN AMERICAN: >60
PERFORMED ON: NORMAL
POC CREATININE: 0.7 MG/DL (ref 0.6–1.2)
POC SAMPLE TYPE: NORMAL

## 2021-04-20 PROCEDURE — 6360000004 HC RX CONTRAST MEDICATION: Performed by: NURSE PRACTITIONER

## 2021-04-20 PROCEDURE — A9579 GAD-BASE MR CONTRAST NOS,1ML: HCPCS | Performed by: NURSE PRACTITIONER

## 2021-04-20 PROCEDURE — 73720 MRI LWR EXTREMITY W/O&W/DYE: CPT

## 2021-04-20 PROCEDURE — 82565 ASSAY OF CREATININE: CPT

## 2021-04-20 RX ADMIN — GADOTERIDOL 15 ML: 279.3 INJECTION, SOLUTION INTRAVENOUS at 14:43

## 2021-05-07 ENCOUNTER — OFFICE VISIT (OUTPATIENT)
Dept: PRIMARY CARE CLINIC | Age: 71
End: 2021-05-07
Payer: MEDICARE

## 2021-05-07 DIAGNOSIS — Z01.818 PRE-OP EXAMINATION: Primary | ICD-10-CM

## 2021-05-07 DIAGNOSIS — Z01.818 ENCOUNTER FOR PRE-TRANSPLANT EVALUATION FOR STEM CELL TRANSPLANT: ICD-10-CM

## 2021-05-07 DIAGNOSIS — C90.00 MULTIPLE MYELOMA, REMISSION STATUS UNSPECIFIED (HCC): Primary | ICD-10-CM

## 2021-05-07 PROCEDURE — 99211 OFF/OP EST MAY X REQ PHY/QHP: CPT | Performed by: NURSE PRACTITIONER

## 2021-05-08 LAB — SARS-COV-2: NOT DETECTED

## 2021-05-11 DIAGNOSIS — C90.00 MULTIPLE MYELOMA, REMISSION STATUS UNSPECIFIED (HCC): Primary | ICD-10-CM

## 2021-05-17 ENCOUNTER — OFFICE VISIT (OUTPATIENT)
Dept: PRIMARY CARE CLINIC | Age: 71
End: 2021-05-17
Payer: MEDICARE

## 2021-05-17 DIAGNOSIS — Z01.818 PRE-OP EXAMINATION: Primary | ICD-10-CM

## 2021-05-17 LAB — SARS-COV-2: NOT DETECTED

## 2021-05-17 PROCEDURE — 99211 OFF/OP EST MAY X REQ PHY/QHP: CPT | Performed by: NURSE PRACTITIONER

## 2021-05-20 ENCOUNTER — CLINICAL DOCUMENTATION (OUTPATIENT)
Dept: ONCOLOGY | Age: 71
End: 2021-05-20

## 2021-05-20 NOTE — PROGRESS NOTES
Anuja Saldana and her  are here today for Pre autologous transplant teaching. Using the [facility] Autologous transplant manual we reviewed and discussed. 1.Transplant outline reviewed and  [facility] teaching book given. 2.Reviewed pre transplant evaluation, Mobilization process & side effects, day of collections and outpatient process. 3. Discussed Outpatient transplant experience   4. My contact information and MD contact information given. 5. We review her medication bottles and updated the medication list.Will continue to educate patient and family through the evaluation phase. 6. Reviewed her insurance benefits for transplant. 7. We reviewed the pre transplant homework packet and consents. 8. She saw Dr. Zahraa Marquez and had a BM biopsy and myeloma serum and urine studies drawn. I have answered all of their question and they verbalized understanding.   Elkin Shaw RN  Transplant coordinator

## 2021-05-21 ENCOUNTER — HOSPITAL ENCOUNTER (OUTPATIENT)
Dept: NON INVASIVE DIAGNOSTICS | Age: 71
Discharge: HOME | End: 2021-05-21
Payer: MEDICARE

## 2021-05-21 ENCOUNTER — HOSPITAL ENCOUNTER (OUTPATIENT)
Dept: PULMONOLOGY | Age: 71
Discharge: HOME | End: 2021-05-21
Payer: MEDICARE

## 2021-05-21 ENCOUNTER — HOSPITAL ENCOUNTER (OUTPATIENT)
Dept: GENERAL RADIOLOGY | Age: 71
Discharge: HOME | End: 2021-05-21
Payer: MEDICARE

## 2021-05-21 ENCOUNTER — HOSPITAL ENCOUNTER (OUTPATIENT)
Dept: ONCOLOGY | Age: 71
Setting detail: INFUSION SERIES
Discharge: HOME | End: 2021-05-21
Payer: MEDICARE

## 2021-05-21 VITALS — OXYGEN SATURATION: 98 %

## 2021-05-21 DIAGNOSIS — C90.00 MULTIPLE MYELOMA, REMISSION STATUS UNSPECIFIED (HCC): ICD-10-CM

## 2021-05-21 DIAGNOSIS — C90.00 MYELOMA ASSOCIATED AMYLOIDOSIS (HCC): ICD-10-CM

## 2021-05-21 DIAGNOSIS — Z01.818 PREOP EXAMINATION: ICD-10-CM

## 2021-05-21 DIAGNOSIS — C79.51 SECONDARY MALIGNANT NEOPLASM OF BONE AND BONE MARROW (HCC): ICD-10-CM

## 2021-05-21 DIAGNOSIS — E85.9 MYELOMA ASSOCIATED AMYLOIDOSIS (HCC): ICD-10-CM

## 2021-05-21 DIAGNOSIS — R06.02 SHORTNESS OF BREATH: ICD-10-CM

## 2021-05-21 DIAGNOSIS — C79.52 SECONDARY MALIGNANT NEOPLASM OF BONE AND BONE MARROW (HCC): ICD-10-CM

## 2021-05-21 LAB
A/G RATIO: 1.4 (ref 1.1–2.2)
ABO/RH: NORMAL
ALBUMIN SERPL-MCNC: 3.7 G/DL (ref 3.4–5)
ALP BLD-CCNC: 84 U/L (ref 40–129)
ALT SERPL-CCNC: 32 U/L (ref 10–40)
ANION GAP SERPL CALCULATED.3IONS-SCNC: 12 MMOL/L (ref 3–16)
ANTIBODY SCREEN: NORMAL
APTT: 30 SEC (ref 24.2–36.2)
AST SERPL-CCNC: 21 U/L (ref 15–37)
BILIRUB SERPL-MCNC: 0.3 MG/DL (ref 0–1)
BILIRUBIN DIRECT: <0.2 MG/DL (ref 0–0.3)
BILIRUBIN URINE: NEGATIVE
BILIRUBIN, INDIRECT: NORMAL MG/DL (ref 0–1)
BLOOD, URINE: NEGATIVE
BUN BLDV-MCNC: 14 MG/DL (ref 7–20)
CALCIUM SERPL-MCNC: 9 MG/DL (ref 8.3–10.6)
CHLORIDE BLD-SCNC: 103 MMOL/L (ref 99–110)
CLARITY: CLEAR
CO2: 23 MMOL/L (ref 21–32)
COLOR: YELLOW
CREAT SERPL-MCNC: 0.6 MG/DL (ref 0.6–1.2)
GFR AFRICAN AMERICAN: >60
GFR NON-AFRICAN AMERICAN: >60
GLOBULIN: 2.7 G/DL
GLUCOSE BLD-MCNC: 133 MG/DL (ref 70–99)
GLUCOSE URINE: NEGATIVE MG/DL
HAV IGM SER IA-ACNC: NORMAL
HBV SURFACE AB TITR SER: 22.95 MIU/ML
INR BLD: 1.01 (ref 0.86–1.14)
KETONES, URINE: NEGATIVE MG/DL
LACTATE DEHYDROGENASE: 235 U/L (ref 100–190)
LEUKOCYTE ESTERASE, URINE: NEGATIVE
LV EF: 43 %
LVEF MODALITY: NORMAL
MICROSCOPIC EXAMINATION: NORMAL
NITRITE, URINE: NEGATIVE
PH UA: 5.5 (ref 5–8)
PHOSPHORUS: 2.7 MG/DL (ref 2.5–4.9)
POTASSIUM SERPL-SCNC: 3.9 MMOL/L (ref 3.5–5.1)
PROTEIN UA: NEGATIVE MG/DL
PROTHROMBIN TIME: 11.7 SEC (ref 10–13.2)
SICKLE CELL SCREEN: NEGATIVE
SODIUM BLD-SCNC: 138 MMOL/L (ref 136–145)
SPECIFIC GRAVITY UA: 1.01 (ref 1–1.03)
TOTAL PROTEIN: 6.4 G/DL (ref 6.4–8.2)
URIC ACID, SERUM: 2.6 MG/DL (ref 2.6–6)
URINE TYPE: NORMAL
UROBILINOGEN, URINE: 0.2 E.U./DL

## 2021-05-21 PROCEDURE — 86694 HERPES SIMPLEX NES ANTBDY: CPT

## 2021-05-21 PROCEDURE — 86900 BLOOD TYPING SEROLOGIC ABO: CPT

## 2021-05-21 PROCEDURE — 86709 HEPATITIS A IGM ANTIBODY: CPT

## 2021-05-21 PROCEDURE — 86706 HEP B SURFACE ANTIBODY: CPT

## 2021-05-21 PROCEDURE — 85730 THROMBOPLASTIN TIME PARTIAL: CPT

## 2021-05-21 PROCEDURE — 93356 MYOCRD STRAIN IMG SPCKL TRCK: CPT

## 2021-05-21 PROCEDURE — 85660 RBC SICKLE CELL TEST: CPT

## 2021-05-21 PROCEDURE — 81003 URINALYSIS AUTO W/O SCOPE: CPT

## 2021-05-21 PROCEDURE — 94729 DIFFUSING CAPACITY: CPT

## 2021-05-21 PROCEDURE — 80053 COMPREHEN METABOLIC PANEL: CPT

## 2021-05-21 PROCEDURE — 93005 ELECTROCARDIOGRAM TRACING: CPT | Performed by: INTERNAL MEDICINE

## 2021-05-21 PROCEDURE — 83615 LACTATE (LD) (LDH) ENZYME: CPT

## 2021-05-21 PROCEDURE — 86787 VARICELLA-ZOSTER ANTIBODY: CPT

## 2021-05-21 PROCEDURE — 82248 BILIRUBIN DIRECT: CPT

## 2021-05-21 PROCEDURE — 71046 X-RAY EXAM CHEST 2 VIEWS: CPT

## 2021-05-21 PROCEDURE — 86850 RBC ANTIBODY SCREEN: CPT

## 2021-05-21 PROCEDURE — 93306 TTE W/DOPPLER COMPLETE: CPT

## 2021-05-21 PROCEDURE — 86901 BLOOD TYPING SEROLOGIC RH(D): CPT

## 2021-05-21 PROCEDURE — 84550 ASSAY OF BLOOD/URIC ACID: CPT

## 2021-05-21 PROCEDURE — 84100 ASSAY OF PHOSPHORUS: CPT

## 2021-05-21 PROCEDURE — 86707 HEPATITIS BE ANTIBODY: CPT

## 2021-05-21 PROCEDURE — 94760 N-INVAS EAR/PLS OXIMETRY 1: CPT

## 2021-05-21 PROCEDURE — 86644 CMV ANTIBODY: CPT

## 2021-05-21 PROCEDURE — 94664 DEMO&/EVAL PT USE INHALER: CPT

## 2021-05-21 PROCEDURE — 94010 BREATHING CAPACITY TEST: CPT

## 2021-05-21 PROCEDURE — 94726 PLETHYSMOGRAPHY LUNG VOLUMES: CPT

## 2021-05-21 PROCEDURE — 86645 CMV ANTIBODY IGM: CPT

## 2021-05-21 PROCEDURE — 85610 PROTHROMBIN TIME: CPT

## 2021-05-22 LAB
EKG ATRIAL RATE: 77 BPM
EKG DIAGNOSIS: NORMAL
EKG P AXIS: 61 DEGREES
EKG P-R INTERVAL: 142 MS
EKG Q-T INTERVAL: 382 MS
EKG QRS DURATION: 84 MS
EKG QTC CALCULATION (BAZETT): 432 MS
EKG R AXIS: 39 DEGREES
EKG T AXIS: 39 DEGREES
EKG VENTRICULAR RATE: 77 BPM
VARICELLA-ZOSTER VIRUS AB, IGG: NORMAL

## 2021-05-22 PROCEDURE — 93010 ELECTROCARDIOGRAM REPORT: CPT | Performed by: INTERNAL MEDICINE

## 2021-05-24 ENCOUNTER — TELEPHONE (OUTPATIENT)
Dept: FAMILY MEDICINE CLINIC | Age: 71
End: 2021-05-24

## 2021-05-24 LAB
CYTOMEGALOVIRUS IGG ANTIBODY: >10 U/ML
CYTOMEGALOVIRUS IGM ANTIBODY: <8 AU/ML
HERPES TYPE 1/2 IGM COMBINED: 0.18 IV

## 2021-05-24 NOTE — TELEPHONE ENCOUNTER
Patient needs a gynecological exam, but not a pap smear. Can you do that? This is related to chemo and bone marrow transplant (verifying no infection). She need to see a gyn, can give her  A referral if she needs one.

## 2021-05-25 ENCOUNTER — HOSPITAL ENCOUNTER (OUTPATIENT)
Age: 71
Setting detail: SPECIMEN
Discharge: HOME | End: 2021-05-25
Payer: MEDICARE

## 2021-05-25 ENCOUNTER — HOSPITAL ENCOUNTER (OUTPATIENT)
Dept: MAMMOGRAPHY | Age: 71
Discharge: HOME | End: 2021-05-25
Payer: MEDICARE

## 2021-05-25 VITALS — HEIGHT: 65 IN | BODY MASS INDEX: 26.49 KG/M2 | WEIGHT: 159 LBS

## 2021-05-25 DIAGNOSIS — Z12.31 ENCOUNTER FOR SCREENING MAMMOGRAM FOR BREAST CANCER: ICD-10-CM

## 2021-05-25 LAB — VARICELLA ZOSTER AB IGM: 0.02 ISR

## 2021-05-25 PROCEDURE — 77067 SCR MAMMO BI INCL CAD: CPT

## 2021-05-25 NOTE — BH NOTE
but since COVID have only watched daily mass on television. Mrs. Lazarus Charnley was very active in [employer] when they lived in [facility] and they went to the [address] [place] [address]: Mrs. Lazarus Charnley grew up in [place] and says she had a great family life. She is the middle of 3 children and has an older brother and a younger sister. Her father had been a POW in [address]  and worked for the [employer]. Her mother worked for the [employer] once her children went to school but was mostly a homemaker. Mrs. Lazarus Charnley had some traumatic events in her middle school years which she says made her feel broken from the time she was 7 or 13. She went to work after high school and became a hard partier and led a lifestyle that she views now as self-destructive. She met her  when she was 30 and he was 37 and they both worked for [employer]. Melven Schwab was her boss, and when they started dating, she left the company. He moved them around a bit for his job and she says she had numerous jobs but no career. They do not have children together, but as mentioned above, he has 2 children from his first marriage that he has no contact with at all. The [place] currently live in the [place] [facility] [place] and have for the past 25 years. Mrs. Lazarus Charnley took care of her mother after her father  of cancer and commuted to [facility] [place] at least one weekend a month for 6 years. Her mother developed blindness from macular degeneration and she moved her to [place] to live with them for the last 2 years of her life. Mrs. Lazarus Charnley says her mother was cool and she considered taking care of her to be [place] blessing.   Although 12 years her senior, Mr. Lazarus Charnley retired 8 years after his wife stopped working so shed have health insurance. Mrs. Lazarus Charnley says she does nothing but keep the house and walk Caesar, the dog.   Mr. Lazarus Charnley has had back and heart surgeries and suffers from chronic severe back pain. Occupational History/Financial Concerns: Mrs. Alireza Smith last job was at the [employer] and she retired 10 years ago. They report no financial difficulties. Quality of Life Issues (Body Image, hair loss, sexual functioning):  Mrs. Sol Wayne says shes a bad patient, hates medical stuff and just wants to get this over with.     History/Current or Projected Mental Health Issues/substance use:  Mrs. Sol Wayne reports that she has a long history of depression and anxiety and has been on lorazepam for 12 years. She had an alcohol problem but has not had a drink in 11 years, and she smoked cigarettes for 35 years and quit many years ago. Shes had some treatment for her anxiety and depression in the past but doesnt want any now. She doesnt currently endorse any symptoms on the PHQ-9. Assessment and Plan:  Mrs. Sol Wayne presents without major psychosocial barriers to transplant. Her  is a frail caregiver but believes he is equal to the challenge. She does have friends who can help if needed, although it sounds like most of them are in [facility]. She hopes to have an outpatient transplant, and her  will have to be able to manage driving and taking care of her for several weeks. It could prove to be a challenge for someone of his age and health, and it is recommended they secure back up caregivers in case he struggles with her care.    Sara Lewis.DAISHA, ABPP

## 2021-05-26 ENCOUNTER — TELEPHONE (OUTPATIENT)
Dept: FAMILY MEDICINE CLINIC | Age: 71
End: 2021-05-26

## 2021-05-26 DIAGNOSIS — R92.8 ABNORMALITY OF LEFT BREAST ON SCREENING MAMMOGRAM: Primary | ICD-10-CM

## 2021-05-26 LAB
HEPATITIS BE ANTIBODY: NEGATIVE
HERPES TYPE I/II IGG COMBINED: 14 IV

## 2021-05-26 NOTE — TELEPHONE ENCOUNTER
Per Amelie scheduling the patient will also need an US of her left breast to go along with the dx left breast mammogram.     Please let patient know once the US order has been placed so she can call to schedule her appt.      U/S order in Epic

## 2021-05-28 NOTE — PROCEDURES
[facility]               [address]                               PULMONARY FUNCTION    PATIENT NAME: Perez Hays                   :        1950  MED REC NO:   [id number]                          ROOM:  ACCOUNT NO:   [de-identified]                           ADMIT DATE: 2021  PROVIDER:     Allison Del Castillo MD    DATE OF PROCEDURE:  2021    INDICATION:  Multiple myeloma. BMI:  27. TOBACCO HISTORY:  The patient smoked a pack a day for 25 years, but quit  25 years ago. Spirometry data is acceptable and reproducible. Lung volumes performed via plethysmography. Diffusion capacity adjusted for a hemoglobin of 13.1 by Dinakara method. Room air oxygen saturation is 98%. SPIROMETRY:  FEV1 to FVC ratio is 81%. FEV1 is 2.14 which is 90% of  predicted. FVC is 2.64 which is 83% of predicted. Lung volumes show total lung capacity of 5.11 which is 97% of predicted. Residual volume is 2.52 which is 113% of predicted. Diffusion capacity is 20.14 which is 84% of predicted. IMPRESSION:  1. Normal spirometry. 2.  Normal lung volumes. 3.  Normal diffusion capacity when adjusted for hemoglobin.         Raj Morris MD    D: 2021 13:45:19       T: 2021 20:54:43     [email or web address]  Job#: [id number]     Doc#: [id number]    CC:

## 2021-06-01 ENCOUNTER — HOSPITAL ENCOUNTER (OUTPATIENT)
Age: 71
Setting detail: SPECIMEN
Discharge: HOME | End: 2021-06-01
Payer: MEDICARE

## 2021-06-01 ENCOUNTER — TELEPHONE (OUTPATIENT)
Dept: FAMILY MEDICINE CLINIC | Age: 71
End: 2021-06-01

## 2021-06-01 NOTE — TELEPHONE ENCOUNTER
Pre services called stating they need the order for the Mammogram Diagnostic to state with and without CAD

## 2021-06-02 ENCOUNTER — CLINICAL DOCUMENTATION (OUTPATIENT)
Dept: ONCOLOGY | Age: 71
End: 2021-06-02

## 2021-06-02 NOTE — PROGRESS NOTES
Freddie Toney  Phone contact-    Abnormal mammogram- Radiologist recommending extra views of left breast.    Dr. Raine Sims agreed with radiologist findings. Appointment scheduled at [address] [place] [address] for 6/3/21 with 12:30 pm arrival.     Mrs. Clem Driver agrees to the plan.    Chelsea Swan RN BSN  [address]   Transplant Coordinator    [facility]

## 2021-06-02 NOTE — TELEPHONE ENCOUNTER
Scheduling called again about diagnostic mammogram orders need it to state with and without contrast

## 2021-06-03 ENCOUNTER — HOSPITAL ENCOUNTER (OUTPATIENT)
Dept: MAMMOGRAPHY | Age: 71
Discharge: HOME | End: 2021-06-03
Payer: MEDICARE

## 2021-06-03 DIAGNOSIS — R92.8 ABNORMAL MAMMOGRAM: ICD-10-CM

## 2021-06-03 PROCEDURE — G0279 TOMOSYNTHESIS, MAMMO: HCPCS

## 2021-06-04 NOTE — TELEPHONE ENCOUNTER
She already got her final result. Not sure why they were asking for contrast with mammogram . I ordered  the diagnostic and ultrasound on the 26 a soon as I got the result, not sure if somebody  check on it. Not the first time.

## 2021-06-08 ENCOUNTER — CLINICAL DOCUMENTATION (OUTPATIENT)
Dept: ONCOLOGY | Age: 71
End: 2021-06-08

## 2021-06-15 DIAGNOSIS — Z52.011 AUTOLOGOUS DONOR, STEM CELLS: ICD-10-CM

## 2021-06-15 DIAGNOSIS — C90.00 MULTIPLE MYELOMA NOT HAVING ACHIEVED REMISSION (HCC): ICD-10-CM

## 2021-06-15 RX ORDER — PROCHLORPERAZINE MALEATE 10 MG
10 TABLET ORAL EVERY 6 HOURS PRN
Status: CANCELLED | OUTPATIENT
Start: 2021-06-23

## 2021-06-15 RX ORDER — LOPERAMIDE HYDROCHLORIDE 2 MG/1
2 CAPSULE ORAL PRN
Status: CANCELLED | OUTPATIENT
Start: 2021-06-23

## 2021-06-16 ENCOUNTER — HOSPITAL ENCOUNTER (OUTPATIENT)
Dept: ONCOLOGY | Age: 71
Setting detail: INFUSION SERIES
Discharge: HOME | End: 2021-06-16
Payer: MEDICARE

## 2021-06-16 DIAGNOSIS — C90.00 MULTIPLE MYELOMA, REMISSION STATUS UNSPECIFIED (HCC): Primary | ICD-10-CM

## 2021-06-16 LAB
A/G RATIO: 1.5 (ref 1.1–2.2)
ABO/RH: NORMAL
ALBUMIN SERPL-MCNC: 4.5 G/DL (ref 3.4–5)
ALP BLD-CCNC: 74 U/L (ref 40–129)
ALT SERPL-CCNC: 11 U/L (ref 10–40)
ANION GAP SERPL CALCULATED.3IONS-SCNC: 10 MMOL/L (ref 3–16)
ANTIBODY SCREEN: NORMAL
APTT: 37.9 SEC (ref 24.2–36.2)
AST SERPL-CCNC: 18 U/L (ref 15–37)
BASOPHILS ABSOLUTE: 0 K/UL (ref 0–0.2)
BASOPHILS RELATIVE PERCENT: 0.5 %
BILIRUB SERPL-MCNC: 0.5 MG/DL (ref 0–1)
BUN BLDV-MCNC: 13 MG/DL (ref 7–20)
CALCIUM SERPL-MCNC: 9.9 MG/DL (ref 8.3–10.6)
CHLORIDE BLD-SCNC: 102 MMOL/L (ref 99–110)
CO2: 25 MMOL/L (ref 21–32)
CREAT SERPL-MCNC: 0.6 MG/DL (ref 0.6–1.2)
EOSINOPHILS ABSOLUTE: 0.1 K/UL (ref 0–0.6)
EOSINOPHILS RELATIVE PERCENT: 1.2 %
GFR AFRICAN AMERICAN: >60
GFR NON-AFRICAN AMERICAN: >60
GLOBULIN: 3 G/DL
GLUCOSE BLD-MCNC: 98 MG/DL (ref 70–99)
HCT VFR BLD CALC: 42.5 % (ref 36–48)
HEMOGLOBIN: 14.1 G/DL (ref 12–16)
INR BLD: 0.96 (ref 0.86–1.14)
LYMPHOCYTES ABSOLUTE: 1.2 K/UL (ref 1–5.1)
LYMPHOCYTES RELATIVE PERCENT: 22.1 %
MCH RBC QN AUTO: 30.2 PG (ref 26–34)
MCHC RBC AUTO-ENTMCNC: 33.3 G/DL (ref 31–36)
MCV RBC AUTO: 90.7 FL (ref 80–100)
MONOCYTES ABSOLUTE: 0.5 K/UL (ref 0–1.3)
MONOCYTES RELATIVE PERCENT: 9.7 %
NEUTROPHILS ABSOLUTE: 3.7 K/UL (ref 1.7–7.7)
NEUTROPHILS RELATIVE PERCENT: 66.5 %
PDW BLD-RTO: 17.3 % (ref 12.4–15.4)
PLATELET # BLD: 279 K/UL (ref 135–450)
PMV BLD AUTO: 7.9 FL (ref 5–10.5)
POTASSIUM SERPL-SCNC: 5.2 MMOL/L (ref 3.5–5.1)
PROTHROMBIN TIME: 11.1 SEC (ref 10–13.2)
RBC # BLD: 4.68 M/UL (ref 4–5.2)
SODIUM BLD-SCNC: 137 MMOL/L (ref 136–145)
TOTAL PROTEIN: 7.5 G/DL (ref 6.4–8.2)
WBC # BLD: 5.5 K/UL (ref 4–11)

## 2021-06-16 PROCEDURE — 80053 COMPREHEN METABOLIC PANEL: CPT

## 2021-06-16 PROCEDURE — 86900 BLOOD TYPING SEROLOGIC ABO: CPT

## 2021-06-16 PROCEDURE — 85610 PROTHROMBIN TIME: CPT

## 2021-06-16 PROCEDURE — 85730 THROMBOPLASTIN TIME PARTIAL: CPT

## 2021-06-16 PROCEDURE — 85025 COMPLETE CBC W/AUTO DIFF WBC: CPT

## 2021-06-16 PROCEDURE — 86850 RBC ANTIBODY SCREEN: CPT

## 2021-06-16 PROCEDURE — 86901 BLOOD TYPING SEROLOGIC RH(D): CPT

## 2021-06-16 RX ORDER — OXYCODONE HYDROCHLORIDE 5 MG/1
10 CAPSULE ORAL EVERY 4 HOURS PRN
Status: ON HOLD | COMMUNITY
End: 2021-07-15 | Stop reason: SDUPTHER

## 2021-06-16 RX ORDER — LORAZEPAM 1 MG/1
1 TABLET ORAL 2 TIMES DAILY
Status: ON HOLD | COMMUNITY
Start: 2021-06-08 | End: 2021-07-15 | Stop reason: SDUPTHER

## 2021-06-16 NOTE — PROGRESS NOTES
[address] [facility] patients having surgery or anesthesia are required to be Covid tested OR to have been vaccinated at least 14 days prior to your procedure. It is very important to return our call to [phone] to notify the staff of your last vaccination date or you will be required to complete Covid testing within the 5-6 days prior to surgery & quarantine. We recommend coming to the [facility] flu tent to complete. It is open Monday-Friday 8am-3pm currently. If you go outside [facility] you need to ensure you have a PCR Covid test and fax results to PreAdmission Testing at [phone]. PRIOR TO PROCEDURE DATE:        1. PLEASE FOLLOW ANY  GUIDELINES/ INSTRUCTIONS PRIOR TO YOUR PROCEDURE AS ADVISED BY YOUR SURGEON. 2. Arrange for someone to drive you home and be with you for the first 24 hours after discharge for your safety after your procedure for which you received sedation. Ensure it is someone we can share information with regarding your discharge. 3. You must contact your surgeon for instructions IF:   You are taking any blood thinners, aspirin, anti-inflammatory or vitamin E.   There is a change in your physical condition such as a cold, fever, rash, cuts, sores or any other infection, especially near your surgical site. 4. Do not drink alcohol the day before or day of your procedure. 5. A Pre-op History and Physical for surgery MUST be completed by your Physician or Urgent Care within 30 days of your procedure date. Please bring a copy with you on the day of your procedure and along with any other testing performed. THE DAY OF YOUR PROCEDURE:  1. Follow instructions for ARRIVAL TIME as DIRECTED BY YOUR SURGEON. 2. Enter the MAIN entrance from [employer] and follow the signs to the free [employer] or [employer] parking (offered free of charge 6am-5pm).             3. Enter the Main Entrance of the hospital (do not enter from the lower level of the parking garage). Upon entrance, check in with the  at the main desk on your left. If no one is available at the desk, proceed into the [facility] Waiting Room and go through the door directly into the [facility]. There is a Check-in desk ACROSS from Room 5 (marked with a sign hanging from the ceiling). The phone number for the surgery center is [phone]. 4. Please call [phone] option #2 option #2 if you have not been preregistered yet. On the day of your procedure bring your insurance card and photo ID. You will be registered at your bedside once brought back to your room. 5. DO NOT EAT ANYTHING eight hours prior to your arrival for surgery. May have 8 ounces of water 4 hours prior to your arrival for surgery. NOTE: ALL Gastric, Bariatric and Bowel surgery patients MUST follow their surgeon's instructions. 6. MEDICATIONS    Take the following medications with a SMALL sip of water:       Use your usual dose of inhalers the morning of surgery. BRING your rescue inhaler with you to hospital.    Anesthesia does NOT want you to take insulin the morning of surgery. They will control your blood sugar while you are at the hospital. Please contact your ordering physician for instructions regarding your insulin the night before your procedure. If you have an insulin pump, please keep it set on basal rate. 7. Do not swallow water when brushing teeth. No gum, candy, mints or ice chips. Refrain from smoking or at least decrease the amount. 8. Dress in loose, comfortable clothing appropriate for redressing after your procedure. Do not wear jewelry (including body piercings), make-up (especially NO eye make-up), fingernail polish (NO toenail polish if foot/leg surgery), lotion, powders or metal hairclips. 9. Dentures, glasses, or contacts will need to be removed before your procedure.  Bring cases for your needs to be removed near the surgical site will be clipped by a healthcare worker using a special clippers designed to avoid skin irritation. 5. When you are in the operating room, your surgical site will be cleansed with a special soap, and in most cases, you will be given an antibiotic before the surgery begins. What to expect AFTER YOUR PROCEDURE:  1. Immediately following your procedure, your will be taken to the PACU for the first phase of your recovery. Your nurse will help you recover from any potential side effects of anesthesia, such as extreme drowsiness, changes in your vital signs or breathing patterns. Nausea, headache, muscle aches, or sore throat may also occur after anesthesia. Your nurse will help you manage these potential side effects. 2. For comfort and safety, arrange to have someone at home with you for the first 24 hours after discharge. 3. You and your family will be given written instructions about your diet, activity, dressing care, medications, and return visits. 4. Once at home, should issues with nausea, pain, or bleeding occur, or should you notice any signs of infection, you should call your surgeon. 5. Always clean your hands before and after caring for your wound. Do not let your family touch your surgery site without cleaning their hands. 6. Narcotic pain medications can cause significant constipation. You may want to add a stool softener to your postoperative medication schedule or speak to your surgeon on how best to manage this SIDE EFFECT. SPECIAL INSTRUCTIONS   Thank you for allowing us to care for you. We strive to exceed your expectations in the delivery of care and service provided to you and your family. If you need to contact the [place] [address] staff for any reason, please call us at [phone]    Instructions reviewed with patient during preadmission testing phone interview.   Tiera Bravo RN.6/16/2021 .4:29 PM      ADDITIONAL EDUCATIONAL INFORMATION REVIEWED PER PHONE WITH YOU AND/OR YOUR FAMILY:    Yes Antibacterial Soap

## 2021-06-17 ENCOUNTER — HOSPITAL ENCOUNTER (OUTPATIENT)
Dept: ONCOLOGY | Age: 71
Setting detail: INFUSION SERIES
Discharge: HOME | End: 2021-06-17
Payer: MEDICARE

## 2021-06-18 ENCOUNTER — ANESTHESIA EVENT (OUTPATIENT)
Dept: OPERATING ROOM | Age: 71
End: 2021-06-18
Payer: MEDICARE

## 2021-06-18 NOTE — PROGRESS NOTES
[facility] ([facility]) Shaun Olivas, [address]    Acknowledgment of Informed Consent for Surgical or Medical Procedure and Sedation  I agree to allow doctor(s) Oneal Camargo and his/her associates or assistants, including residents and/or other qualified medical practitioner to perform the following medical treatment or procedure and to administer or direct the administration of sedation as necessary:  Procedure(s): C/ [address]    My doctor has explained the following regarding the proposed procedure:   the explanation of the procedure   the benefits of the procedure   the potential problems that might occur during recuperation   the risks and side effects of the procedure which could include but are not limited to severe blood loss, infection, stroke or death   the benefits, risks and side effect of alternative procedures including the consequences of declining this procedure or any alternative procedures   the likelihood of achieving satisfactory results. I acknowledge no guarantee or assurance has been made to me regarding the results. I understand that during the course of this treatment/procedure, unforeseen conditions can occur which require an additional or different procedure. I agree to allow my physician or assistants to perform such extension of the original procedure as they may find necessary. I understand that sedation will often result in temporary impairment of memory and fine motor skills and that sedation can occasionally progress to a state of deep sedation or general anesthesia. I understand the risks of anesthesia for surgery include, but are not limited to, sore throat, hoarseness, injury to face, mouth, or teeth; nausea; headache; injury to blood vessels or nerves; death, brain damage, or paralysis.     I understand that if I have a Limitation of Treatment order in effect during my hospitalization, the order may or may not be in effect during this procedure. I give my doctor permission to give me blood or blood products. I understand that there are risks with receiving blood such as hepatitis, AIDS, fever, or allergic reaction. I acknowledge that the risks, benefits, and alternatives of this treatment have been explained to me and that no express or implied warranty has been given by the hospital, any blood bank, or any person or entity as to the blood or blood components transfused. At the discretion of my doctor, I agree to allow observers, equipment/product representatives and allow photographing, and/or televising of the procedure, provided my name or identity is maintained confidentially. I agree the hospital may dispose of or use for scientific or educational purposes any tissue, fluid, or body parts which may be removed.     ________________________________Date________Time______ am/pm  ([place] [facility])  Patient or Signature of Closest Relative or Legal Guardian    ________________________________Date________Time______am/pm      Page 1 of  1  Witness

## 2021-06-20 ENCOUNTER — HOSPITAL ENCOUNTER (OUTPATIENT)
Age: 71
Setting detail: SPECIMEN
Discharge: HOME | End: 2021-06-20
Payer: MEDICARE

## 2021-06-21 ENCOUNTER — TELEPHONE (OUTPATIENT)
Dept: SURGERY | Age: 71
End: 2021-06-21

## 2021-06-21 ENCOUNTER — APPOINTMENT (OUTPATIENT)
Dept: GENERAL RADIOLOGY | Age: 71
End: 2021-06-21
Attending: SURGERY
Payer: MEDICARE

## 2021-06-21 ENCOUNTER — ANESTHESIA (OUTPATIENT)
Dept: OPERATING ROOM | Age: 71
End: 2021-06-21
Payer: MEDICARE

## 2021-06-21 ENCOUNTER — HOSPITAL ENCOUNTER (OUTPATIENT)
Age: 71
Setting detail: OUTPATIENT SURGERY
Discharge: HOME | End: 2021-06-21
Attending: SURGERY | Admitting: SURGERY
Payer: MEDICARE

## 2021-06-21 VITALS
HEIGHT: 65 IN | BODY MASS INDEX: 26.66 KG/M2 | WEIGHT: 160 LBS | OXYGEN SATURATION: 97 % | HEART RATE: 71 BPM | SYSTOLIC BLOOD PRESSURE: 138 MMHG | RESPIRATION RATE: 14 BRPM | DIASTOLIC BLOOD PRESSURE: 76 MMHG | TEMPERATURE: 98.4 F

## 2021-06-21 VITALS
DIASTOLIC BLOOD PRESSURE: 73 MMHG | SYSTOLIC BLOOD PRESSURE: 134 MMHG | OXYGEN SATURATION: 99 % | RESPIRATION RATE: 18 BRPM

## 2021-06-21 LAB
GLUCOSE BLD-MCNC: 92 MG/DL (ref 70–99)
PERFORMED ON: NORMAL
POTASSIUM SERPL-SCNC: 4.2 MMOL/L (ref 3.5–5.1)

## 2021-06-21 PROCEDURE — 77001 FLUOROGUIDE FOR VEIN DEVICE: CPT

## 2021-06-21 PROCEDURE — 6360000002 HC RX W HCPCS: Performed by: SURGERY

## 2021-06-21 PROCEDURE — 2500000003 HC RX 250 WO HCPCS: Performed by: NURSE ANESTHETIST, CERTIFIED REGISTERED

## 2021-06-21 PROCEDURE — 3600000012 HC SURGERY LEVEL 2 ADDTL 15MIN: Performed by: SURGERY

## 2021-06-21 PROCEDURE — 84132 ASSAY OF SERUM POTASSIUM: CPT

## 2021-06-21 PROCEDURE — 3700000000 HC ANESTHESIA ATTENDED CARE: Performed by: SURGERY

## 2021-06-21 PROCEDURE — C1769 GUIDE WIRE: HCPCS | Performed by: SURGERY

## 2021-06-21 PROCEDURE — 71045 X-RAY EXAM CHEST 1 VIEW: CPT

## 2021-06-21 PROCEDURE — 2709999900 HC NON-CHARGEABLE SUPPLY: Performed by: SURGERY

## 2021-06-21 PROCEDURE — 2580000003 HC RX 258: Performed by: ANESTHESIOLOGY

## 2021-06-21 PROCEDURE — 3600000002 HC SURGERY LEVEL 2 BASE: Performed by: SURGERY

## 2021-06-21 PROCEDURE — 6360000002 HC RX W HCPCS

## 2021-06-21 PROCEDURE — 7100000000 HC PACU RECOVERY - FIRST 15 MIN: Performed by: SURGERY

## 2021-06-21 PROCEDURE — 7100000001 HC PACU RECOVERY - ADDTL 15 MIN: Performed by: SURGERY

## 2021-06-21 PROCEDURE — 6360000002 HC RX W HCPCS: Performed by: ANESTHESIOLOGY

## 2021-06-21 PROCEDURE — 2580000003 HC RX 258: Performed by: SURGERY

## 2021-06-21 PROCEDURE — 7100000011 HC PHASE II RECOVERY - ADDTL 15 MIN: Performed by: SURGERY

## 2021-06-21 PROCEDURE — 77001 FLUOROGUIDE FOR VEIN DEVICE: CPT | Performed by: SURGERY

## 2021-06-21 PROCEDURE — 7100000010 HC PHASE II RECOVERY - FIRST 15 MIN: Performed by: SURGERY

## 2021-06-21 PROCEDURE — C1751 CATH, INF, PER/CENT/MIDLINE: HCPCS | Performed by: SURGERY

## 2021-06-21 PROCEDURE — 2500000003 HC RX 250 WO HCPCS: Performed by: SURGERY

## 2021-06-21 PROCEDURE — 6360000002 HC RX W HCPCS: Performed by: NURSE ANESTHETIST, CERTIFIED REGISTERED

## 2021-06-21 PROCEDURE — 36558 INSERT TUNNELED CV CATH: CPT | Performed by: SURGERY

## 2021-06-21 PROCEDURE — 3700000001 HC ADD 15 MINUTES (ANESTHESIA): Performed by: SURGERY

## 2021-06-21 RX ORDER — MORPHINE SULFATE 4 MG/ML
1 INJECTION, SOLUTION INTRAMUSCULAR; INTRAVENOUS EVERY 5 MIN PRN
Status: DISCONTINUED | OUTPATIENT
Start: 2021-06-21 | End: 2021-06-21 | Stop reason: HOSPADM

## 2021-06-21 RX ORDER — LIDOCAINE HCL/PF 100 MG/5ML
SYRINGE (ML) INJECTION PRN
Status: DISCONTINUED | OUTPATIENT
Start: 2021-06-21 | End: 2021-06-21 | Stop reason: SDUPTHER

## 2021-06-21 RX ORDER — OXYCODONE HYDROCHLORIDE 5 MG/1
10 TABLET ORAL PRN
Status: DISCONTINUED | OUTPATIENT
Start: 2021-06-21 | End: 2021-06-21 | Stop reason: HOSPADM

## 2021-06-21 RX ORDER — PROMETHAZINE HYDROCHLORIDE 25 MG/ML
6.25 INJECTION, SOLUTION INTRAMUSCULAR; INTRAVENOUS
Status: DISCONTINUED | OUTPATIENT
Start: 2021-06-21 | End: 2021-06-21 | Stop reason: HOSPADM

## 2021-06-21 RX ORDER — CEFAZOLIN SODIUM 2 G/50ML
2000 SOLUTION INTRAVENOUS ONCE
Status: COMPLETED | OUTPATIENT
Start: 2021-06-21 | End: 2021-06-21

## 2021-06-21 RX ORDER — LABETALOL HYDROCHLORIDE 5 MG/ML
5 INJECTION, SOLUTION INTRAVENOUS EVERY 10 MIN PRN
Status: DISCONTINUED | OUTPATIENT
Start: 2021-06-21 | End: 2021-06-21 | Stop reason: HOSPADM

## 2021-06-21 RX ORDER — FENTANYL CITRATE 50 UG/ML
INJECTION, SOLUTION INTRAMUSCULAR; INTRAVENOUS PRN
Status: DISCONTINUED | OUTPATIENT
Start: 2021-06-21 | End: 2021-06-21 | Stop reason: SDUPTHER

## 2021-06-21 RX ORDER — HYDRALAZINE HYDROCHLORIDE 20 MG/ML
5 INJECTION INTRAMUSCULAR; INTRAVENOUS EVERY 10 MIN PRN
Status: DISCONTINUED | OUTPATIENT
Start: 2021-06-21 | End: 2021-06-21 | Stop reason: HOSPADM

## 2021-06-21 RX ORDER — MIDAZOLAM HYDROCHLORIDE 1 MG/ML
INJECTION INTRAMUSCULAR; INTRAVENOUS PRN
Status: DISCONTINUED | OUTPATIENT
Start: 2021-06-21 | End: 2021-06-21 | Stop reason: SDUPTHER

## 2021-06-21 RX ORDER — ONDANSETRON 2 MG/ML
INJECTION INTRAMUSCULAR; INTRAVENOUS PRN
Status: DISCONTINUED | OUTPATIENT
Start: 2021-06-21 | End: 2021-06-21 | Stop reason: SDUPTHER

## 2021-06-21 RX ORDER — BUPIVACAINE HYDROCHLORIDE 5 MG/ML
INJECTION, SOLUTION EPIDURAL; INTRACAUDAL PRN
Status: DISCONTINUED | OUTPATIENT
Start: 2021-06-21 | End: 2021-06-21 | Stop reason: ALTCHOICE

## 2021-06-21 RX ORDER — HEPARIN SODIUM (PORCINE) LOCK FLUSH IV SOLN 100 UNIT/ML 100 UNIT/ML
SOLUTION INTRAVENOUS PRN
Status: DISCONTINUED | OUTPATIENT
Start: 2021-06-21 | End: 2021-06-21 | Stop reason: ALTCHOICE

## 2021-06-21 RX ORDER — DIPHENHYDRAMINE HYDROCHLORIDE 50 MG/ML
12.5 INJECTION INTRAMUSCULAR; INTRAVENOUS
Status: DISCONTINUED | OUTPATIENT
Start: 2021-06-21 | End: 2021-06-21 | Stop reason: HOSPADM

## 2021-06-21 RX ORDER — MEPERIDINE HYDROCHLORIDE 25 MG/ML
12.5 INJECTION INTRAMUSCULAR; INTRAVENOUS; SUBCUTANEOUS EVERY 5 MIN PRN
Status: DISCONTINUED | OUTPATIENT
Start: 2021-06-21 | End: 2021-06-21 | Stop reason: HOSPADM

## 2021-06-21 RX ORDER — OXYCODONE HYDROCHLORIDE 5 MG/1
5 TABLET ORAL PRN
Status: DISCONTINUED | OUTPATIENT
Start: 2021-06-21 | End: 2021-06-21 | Stop reason: HOSPADM

## 2021-06-21 RX ORDER — SODIUM CHLORIDE, SODIUM LACTATE, POTASSIUM CHLORIDE, CALCIUM CHLORIDE 600; 310; 30; 20 MG/100ML; MG/100ML; MG/100ML; MG/100ML
INJECTION, SOLUTION INTRAVENOUS CONTINUOUS
Status: DISCONTINUED | OUTPATIENT
Start: 2021-06-21 | End: 2021-06-21 | Stop reason: HOSPADM

## 2021-06-21 RX ORDER — MAGNESIUM HYDROXIDE 1200 MG/15ML
LIQUID ORAL CONTINUOUS PRN
Status: COMPLETED | OUTPATIENT
Start: 2021-06-21 | End: 2021-06-21

## 2021-06-21 RX ORDER — METOCLOPRAMIDE HYDROCHLORIDE 5 MG/ML
10 INJECTION INTRAMUSCULAR; INTRAVENOUS
Status: DISCONTINUED | OUTPATIENT
Start: 2021-06-21 | End: 2021-06-21 | Stop reason: HOSPADM

## 2021-06-21 RX ORDER — PROPOFOL 10 MG/ML
INJECTION, EMULSION INTRAVENOUS CONTINUOUS PRN
Status: DISCONTINUED | OUTPATIENT
Start: 2021-06-21 | End: 2021-06-21 | Stop reason: SDUPTHER

## 2021-06-21 RX ADMIN — ONDANSETRON 4 MG: 2 INJECTION INTRAMUSCULAR; INTRAVENOUS at 07:32

## 2021-06-21 RX ADMIN — SODIUM CHLORIDE, POTASSIUM CHLORIDE, SODIUM LACTATE AND CALCIUM CHLORIDE: 600; 310; 30; 20 INJECTION, SOLUTION INTRAVENOUS at 06:30

## 2021-06-21 RX ADMIN — Medication 100 MG: at 07:23

## 2021-06-21 RX ADMIN — FENTANYL CITRATE 25 MCG: 50 INJECTION, SOLUTION INTRAMUSCULAR; INTRAVENOUS at 07:28

## 2021-06-21 RX ADMIN — HYDROMORPHONE HYDROCHLORIDE 0.25 MG: 1 INJECTION, SOLUTION INTRAMUSCULAR; INTRAVENOUS; SUBCUTANEOUS at 08:48

## 2021-06-21 RX ADMIN — FENTANYL CITRATE 25 MCG: 50 INJECTION, SOLUTION INTRAMUSCULAR; INTRAVENOUS at 07:32

## 2021-06-21 RX ADMIN — PROPOFOL 80 MCG/KG/MIN: 10 INJECTION, EMULSION INTRAVENOUS at 07:23

## 2021-06-21 RX ADMIN — PROPOFOL 30 MG: 10 INJECTION, EMULSION INTRAVENOUS at 07:31

## 2021-06-21 RX ADMIN — HYDROMORPHONE HYDROCHLORIDE 0.25 MG: 1 INJECTION, SOLUTION INTRAMUSCULAR; INTRAVENOUS; SUBCUTANEOUS at 08:40

## 2021-06-21 RX ADMIN — FENTANYL CITRATE 25 MCG: 50 INJECTION, SOLUTION INTRAMUSCULAR; INTRAVENOUS at 07:38

## 2021-06-21 RX ADMIN — CEFAZOLIN SODIUM 2000 MG: 2 SOLUTION INTRAVENOUS at 07:14

## 2021-06-21 RX ADMIN — FENTANYL CITRATE 25 MCG: 50 INJECTION, SOLUTION INTRAMUSCULAR; INTRAVENOUS at 07:24

## 2021-06-21 RX ADMIN — HYDROMORPHONE HYDROCHLORIDE 0.25 MG: 1 INJECTION, SOLUTION INTRAMUSCULAR; INTRAVENOUS; SUBCUTANEOUS at 08:28

## 2021-06-21 RX ADMIN — MIDAZOLAM HYDROCHLORIDE 2 MG: 2 INJECTION, SOLUTION INTRAMUSCULAR; INTRAVENOUS at 07:14

## 2021-06-21 ASSESSMENT — PULMONARY FUNCTION TESTS
PIF_VALUE: 0
PIF_VALUE: 1
PIF_VALUE: 0
PIF_VALUE: 1
PIF_VALUE: 0
PIF_VALUE: 0
PIF_VALUE: 1
PIF_VALUE: 0
PIF_VALUE: 0
PIF_VALUE: 1
PIF_VALUE: 0
PIF_VALUE: 1
PIF_VALUE: 0
PIF_VALUE: 1
PIF_VALUE: 1
PIF_VALUE: 0
PIF_VALUE: 1
PIF_VALUE: 0

## 2021-06-21 ASSESSMENT — PAIN DESCRIPTION - ORIENTATION
ORIENTATION: LEFT;OUTER;UPPER
ORIENTATION: LEFT

## 2021-06-21 ASSESSMENT — PAIN SCALES - GENERAL
PAINLEVEL_OUTOF10: 5
PAINLEVEL_OUTOF10: 1
PAINLEVEL_OUTOF10: 5
PAINLEVEL_OUTOF10: 5
PAINLEVEL_OUTOF10: 3
PAINLEVEL_OUTOF10: 5

## 2021-06-21 ASSESSMENT — PAIN - FUNCTIONAL ASSESSMENT: PAIN_FUNCTIONAL_ASSESSMENT: 0-10

## 2021-06-21 ASSESSMENT — PAIN DESCRIPTION - FREQUENCY: FREQUENCY: CONTINUOUS

## 2021-06-21 ASSESSMENT — PAIN DESCRIPTION - LOCATION
LOCATION: CHEST
LOCATION: CHEST

## 2021-06-21 ASSESSMENT — PAIN DESCRIPTION - DIRECTION: RADIATING_TOWARDS: UPPER

## 2021-06-21 ASSESSMENT — PAIN DESCRIPTION - PAIN TYPE
TYPE: SURGICAL PAIN;ACUTE PAIN
TYPE: SURGICAL PAIN

## 2021-06-21 ASSESSMENT — PAIN DESCRIPTION - DESCRIPTORS: DESCRIPTORS: ACHING

## 2021-06-21 NOTE — BRIEF OP NOTE
Brief Postoperative Note      Patient: Laith Narvaez  YOB: 1950  MRN: [id number]    Date of Procedure: 6/21/2021    Pre-Op Diagnosis: Multiple Myeloma    Post-Op Diagnosis: Same       Procedure(s):  TRIFUSION CATHETER INSERTION    Surgeon(s):  Nelson Horton MD    Assistant:  Resident: Luis Bah MD    Anesthesia: Monitor Anesthesia Care    Estimated Blood Loss (mL): Minimal    Complications: None    Specimens:   * No specimens in log *    Implants:  * No implants in log *      Drains: * No LDAs found *    Findings: Trifusion catheter placed    Electronically signed by Luis Bah MD on 6/21/2021 at 8:10 AM

## 2021-06-21 NOTE — PROGRESS NOTES
Ambulatory Surgery/Procedure Discharge Note    Vitals:    06/21/21 0916   BP: 138/76   Pulse: 71   Resp: 14   Temp: 98.4 °F (36.9 °C)   SpO2: 97%       In: 1660 [P.O.:360; I.V.:1300]  Out: -     Restroom use offered before discharge. Yes    Pain assessment:  level of pain (1-10, 10 severe), drsg dry no edema  States stomach feels better  D/c to [facility] per w/c with    Has ice bag and inst  Pain Level: 1        Patient discharged to home/self care. Patient discharged via wheel chair by transporter to waiting family/S.O.       6/21/2021 9:46 AMReturned to SDS awake c/o abd discomfort po fluids and crackers given  drsg left chest upper dry.

## 2021-06-21 NOTE — ANESTHESIA PRE PROCEDURE
Department of Anesthesiology  Preprocedure Note       Name:  Aviva Dean   Age:  72 y.o.  :  1950                                          MRN:  [id number]         Date:  2021      Surgeon: Corinthia Goodpasture):  Markus Kauffman MD    Procedure: Procedure(s):  TRIFUSION CATHETER INSERTION    Medications prior to admission:   Prior to Admission medications    Medication Sig Start Date End Date Taking? Authorizing Provider   LORazepam (ATIVAN) 1 MG tablet  21  Yes Historical Provider, MD   Ondansetron HCl (ZOFRAN PO) Take by mouth   Yes Historical Provider, MD   Promethazine HCl (PHENERGAN PO) Take by mouth   Yes Historical Provider, MD   oxyCODONE 5 MG capsule Take 5 mg by mouth every 4 hours as needed for Pain. Yes Historical Provider, MD   acyclovir (ZOVIRAX) 400 MG tablet Take 1 tablet by mouth 2 times daily 21  Yes Delia Villalpando MD   Cholecalciferol (VITAMIN D) 2000 UNITS CAPS capsule Take by mouth daily Indications: OTC    Yes Historical Provider, MD   Multiple Vitamins-Minerals (ICAPS AREDS 2 PO) Take by mouth    Historical Provider, MD   aspirin 81 MG chewable tablet Take 81 mg by mouth daily    Historical Provider, MD   acyclovir (ZOVIRAX) 5 % ointment APPLY TOPICALLY 5 TIMES DAILY.  20   Delia Villalpando MD       Current medications:    Current Facility-Administered Medications   Medication Dose Route Frequency Provider Last Rate Last Admin    ceFAZolin (ANCEF) 2000 mg in dextrose 3 % 50 mL IVPB (duplex)  2,000 mg Intravenous Once Markus Kauffman MD        lactated ringers infusion   Intravenous Continuous Letty Wong  mL/hr at 21 0630 New Bag at 21 0630    HYDROmorphone (DILAUDID) injection 0.25 mg  0.25 mg Intravenous Q5 Min PRN Nba Beaulieu MD        HYDROmorphone (DILAUDID) injection 0.5 mg  0.5 mg Intravenous Q5 Min PRCARMEN Beaulieu MD        morphine injection 1 mg  1 mg Intravenous Q5 Min PRN Nba Beaulieu MD        HYDROmorphone (DILAUDID) injection 0.5 mg  0.5 mg Intravenous Q5 Min PRN Tristen Payan MD        oxyCODONE (ROXICODONE) immediate release tablet 5 mg  5 mg Oral PRN Tristen Payan MD        Or    oxyCODONE (ROXICODONE) immediate release tablet 10 mg  10 mg Oral PRN Tristen Payan MD        diphenhydrAMINE (BENADRYL) injection 12.5 mg  12.5 mg Intravenous Once PRN Tristen Payan MD        metoclopramide (REGLAN) injection 10 mg  10 mg Intravenous Once PRN Tristen Payan MD        promethazine [facility]) injection 6.25 mg  6.25 mg Intravenous Once PRN Tristen Payan MD        labetalol (NORMODYNE;TRANDATE) injection 5 mg  5 mg Intravenous Q10 Min PRN Tristen Payan MD        hydrALAZINE (APRESOLINE) injection 5 mg  5 mg Intravenous Q10 Min PRN Tristen Payan MD        meperidine (DEMEROL) injection 12.5 mg  12.5 mg Intravenous Q5 Min PRN Tristen Payan MD           Allergies:     Allergies   Allergen Reactions    Methylprednisolone Other (See Comments)     Severe body pain       Problem List:    Patient Active Problem List   Diagnosis Code    Depression F32.9    Anxiety F41.9    History of hepatitis C Z86.19    Abnormal liver function tests     Ex-smoker Z87.891    Pain medication agreement signed Z02.89    Normocytic anemia D64.9    Neutropenia (HCC) D70.9    Hyponatremia U13.8    Metabolic encephalopathy P36.22    Multiple myeloma not having achieved remission ([address] C90.00    Autologous donor, stem cells Z52.011       Past Medical History:        Diagnosis Date    Anxiety     Dental crown present     Depression     Hepatitis C      states resolved     Multiple myeloma ([address]     Multiple myeloma not having achieved remission ([address] 6/15/2021       Past Surgical History:        Procedure Laterality Date    COLONOSCOPY      DILATION AND CURETTAGE OF UTERUS      NOSE SURGERY         Social History:    Social History     Tobacco Use    Smoking status: Former Smoker     Packs/day: 1.00     Years: 15.00     Pack years: 15.00     Types: Cigarettes     Quit date: 2013     Years since quittin.6    Smokeless tobacco: Never Used   Substance Use Topics    Alcohol use: No                                Counseling given: Not Answered      Vital Signs (Current):   Vitals:    21 1623 21 0604   BP:  (!) 150/82   Pulse:  71   Resp:  18   Temp:  98.3 °F (36.8 °C)   TempSrc:  Oral   SpO2:  97%   Weight: 160 lb (72.6 kg) 160 lb (72.6 kg)   Height: 5' 5\" (1.651 m) 5' 5\" (1.651 m)                                              BP Readings from Last 3 Encounters:   21 (!) 150/82   21 (!) 140/80   21 100/72       NPO Status: Time of last liquid consumption:                         Time of last solid consumption:                         Date of last liquid consumption: 21                        Date of last solid food consumption: 21    BMI:   Wt Readings from Last 3 Encounters:   21 160 lb (72.6 kg)   21 159 lb (72.1 kg)   21 166 lb 6.4 oz (75.5 kg)     Body mass index is 26.63 kg/m².     CBC:   Lab Results   Component Value Date    WBC 5.5 2021    RBC 4.68 2021    HGB 14.1 2021    HCT 42.5 2021    MCV 90.7 2021    RDW 17.3 2021     2021       CMP:   Lab Results   Component Value Date     2021    K 4.2 2021    K 4.1 07/10/2020     2021    CO2 25 2021    BUN 13 2021    CREATININE 0.6 2021    GFRAA >60 2021    GFRAA >60 2013    AGRATIO 1.5 2021    LABGLOM >60 2021    GLUCOSE 98 2021    PROT 7.5 2021    PROT 7.3 2012    CALCIUM 9.9 2021    BILITOT 0.5 2021    ALKPHOS 74 2021    AST 18 2021    ALT 11 2021       POC Tests:   Recent Labs     21  0635   POCGLU 92       Coags:   Lab Results   Component Value Date    PROTIME 11.1 2021    INR 0.96 2021    APTT 37.9 06/16/2021       HCG (If Applicable): No results found for: PREGTESTUR, PREGSERUM, HCG, HCGQUANT     ABGs: No results found for: PHART, PO2ART, YGT0RIQ, RCB8UWC, BEART, O6RZEIKH     Type & Screen (If Applicable):  No results found for: LABABO, LABRH    Drug/Infectious Status (If Applicable):  No results found for: HIV, HEPCAB    COVID-19 Screening (If Applicable):   Lab Results   Component Value Date    COVID19 Not Detected 05/17/2021           Anesthesia Evaluation  Patient summary reviewed and Nursing notes reviewed no history of anesthetic complications:   Airway: Mallampati: II  TM distance: >3 FB   Neck ROM: full  Mouth opening: > = 3 FB Dental:          Pulmonary:Negative Pulmonary ROS                              Cardiovascular:Negative CV ROS                      Neuro/Psych:               GI/Hepatic/Renal:   (+) hepatitis: C, liver disease:,           Endo/Other:    (+) malignancy/cancer (Multiple myeloma). Abdominal:           Vascular: negative vascular ROS. Anesthesia Plan      general     ASA 1    (71-year-old female presents for C/ [address]. Plan general anesthesia with ASA standard monitors. Questions answered. Patient agreeable with anesthetic plan.  )  Induction: intravenous. Anesthetic plan and risks discussed with patient. Plan discussed with CRNA.     Attending anesthesiologist reviewed and agrees with Pre Eval content        Mikayla Giang MD   6/21/2021

## 2021-06-21 NOTE — PROGRESS NOTES
Pt arrived A&O X4 report received from [address] [place] [address] pt CO of mild to moderate pain at surgical site .

## 2021-06-21 NOTE — H&P
[address] Same Day Surgery Update H & P  Department of General Surgery   Surgical Service   Pre-operative History and Physical  Last H & P within the last 30 days. DIAGNOSIS:   Multiple myeloma, remission status unspecified ([facility] [C90.00]    Procedure(s):  TRIFUSION CATHETER INSERTION     HISTORY OF PRESENT ILLNESS:   Patient with multiple myeloma presents today for trifusion catheter insertion in anticipation of planned treatment. Covid 19:  Patient denies fever, chills, cough or known exposure to Covid-19. Past Medical History:        Diagnosis Date    Anxiety     Dental crown present     Depression     Hepatitis C      states resolved     Multiple myeloma ([facility]     Multiple myeloma not having achieved remission ([facility] 6/15/2021     Past Surgical History:        Procedure Laterality Date    COLONOSCOPY      DILATION AND CURETTAGE OF UTERUS      NOSE SURGERY       Past Social History:  Social History     Socioeconomic History    Marital status:      Spouse name: None    Number of children: None    Years of education: None    Highest education level: None   Occupational History    None   Tobacco Use    Smoking status: Former Smoker     Packs/day: 1.00     Years: 15.00     Pack years: 15.00     Types: Cigarettes     Quit date: 2013     Years since quittin.6    Smokeless tobacco: Never Used   Vaping Use    Vaping Use: Never used   Substance and Sexual Activity    Alcohol use: No    Drug use: No    Sexual activity: Never   Other Topics Concern    None   Social History Narrative    None     Social Determinants of Health     Financial Resource Strain:     Difficulty of Paying Living Expenses:    Food Insecurity:     Worried About Running Out of Food in the Last Year:     Ran Out of Food in the Last Year:    Transportation Needs:     Lack of Transportation (Medical):      Lack of Transportation (Non-Medical):    Physical Activity:     Days of Exercise per Week:     Minutes of Exercise per Session:    Stress:     Feeling of Stress :    Social Connections:     Frequency of Communication with Friends and Family:     Frequency of Social Gatherings with Friends and Family:     Attends [employer] Services:     Active Member of Clubs or Organizations:     Attends Club or Organization Meetings:     Marital Status:    Intimate Partner Violence:     Fear of Current or Ex-Partner:     Emotionally Abused:     Physically Abused:     Sexually Abused:          Medications Prior to Admission:      Prior to Admission medications    Medication Sig Start Date End Date Taking? Authorizing Provider   LORazepam (ATIVAN) 1 MG tablet  6/8/21  Yes Historical Provider, MD   Ondansetron HCl (ZOFRAN PO) Take by mouth   Yes Historical Provider, MD   Promethazine HCl (PHENERGAN PO) Take by mouth   Yes Historical Provider, MD   oxyCODONE 5 MG capsule Take 5 mg by mouth every 4 hours as needed for Pain. Yes Historical Provider, MD   acyclovir (ZOVIRAX) 400 MG tablet Take 1 tablet by mouth 2 times daily 4/13/21  Yes Yumi Quinteros MD   Cholecalciferol (VITAMIN D) 2000 UNITS CAPS capsule Take by mouth daily Indications: OTC    Yes Historical Provider, MD   Multiple Vitamins-Minerals (ICAPS AREDS 2 PO) Take by mouth    Historical Provider, MD   aspirin 81 MG chewable tablet Take 81 mg by mouth daily    Historical Provider, MD   acyclovir (ZOVIRAX) 5 % ointment APPLY TOPICALLY 5 TIMES DAILY.  5/29/20   Yumi Quinteros MD         Allergies:  Methylprednisolone    PHYSICAL EXAM:      BP (!) 150/82   Pulse 71   Temp 98.3 °F (36.8 °C) (Oral)   Resp 18   Ht 5' 5\" (1.651 m)   Wt 160 lb (72.6 kg)   SpO2 97%   BMI 26.63 kg/m²      Airway:  Airway patent with no audible stridor    Heart:  regular rate and rhythm, No murmur noted    Lungs:  No increased work of breathing, good air exchange, clear to auscultation bilaterally, no crackles or wheezing    Abdomen:  soft, non-distended, non-tender, no rebound tenderness or guarding, normal active bowel sounds and no masses palpated    ASSESSMENT AND PLAN     Patient is a 72 y.o. female with above specified procedure planned. 1.  The patients history and physical was obtained and signed off by the pre-admission testing department. Patient seen and focused exam done today- no new changes since last physical exam on 6/8/21     2. Access to ancillary services are available per request of the provider.     DELORES Jimenez - CNP     6/21/2021

## 2021-06-21 NOTE — PROGRESS NOTES
PACU Transfer to [facility]    Vitals:    06/21/21 0900   BP: 147/76   Pulse: 80   Resp: 15   Temp: 97.3   SpO2: 97%         Intake/Output Summary (Last 24 hours) at 6/21/2021 0934  Last data filed at 6/21/2021 0918  Gross per 24 hour   Intake 1660 ml   Output --   Net 1660 ml       Pain assessment:    Pain Level: 1    Patient transferred to care of [facility] RN.    6/21/2021 6722

## 2021-06-21 NOTE — TELEPHONE ENCOUNTER
Returned call to patient. Patient wanted to know if she required a post operative antibiotic. Patient had a trifusion catheter insertion this morning. Per MD, no atb needed.  DONE

## 2021-06-23 ENCOUNTER — HOSPITAL ENCOUNTER (OUTPATIENT)
Dept: ONCOLOGY | Age: 71
Setting detail: INFUSION SERIES
Discharge: HOME | End: 2021-06-23
Payer: MEDICARE

## 2021-06-23 DIAGNOSIS — C90.00 MULTIPLE MYELOMA NOT HAVING ACHIEVED REMISSION (HCC): Primary | ICD-10-CM

## 2021-06-23 DIAGNOSIS — Z52.011 AUTOLOGOUS DONOR, STEM CELLS: ICD-10-CM

## 2021-06-23 LAB
A/G RATIO: 1.6 (ref 1.1–2.2)
ALBUMIN SERPL-MCNC: 4.1 G/DL (ref 3.4–5)
ALP BLD-CCNC: 191 U/L (ref 40–129)
ALT SERPL-CCNC: 8 U/L (ref 10–40)
ANION GAP SERPL CALCULATED.3IONS-SCNC: 8 MMOL/L (ref 3–16)
ANISOCYTOSIS: ABNORMAL
ANISOCYTOSIS: ABNORMAL
AST SERPL-CCNC: 17 U/L (ref 15–37)
BASOPHILS ABSOLUTE: 0 K/UL (ref 0–0.2)
BASOPHILS ABSOLUTE: 0.4 K/UL (ref 0–0.2)
BASOPHILS RELATIVE PERCENT: 0 %
BASOPHILS RELATIVE PERCENT: 1 %
BILIRUB SERPL-MCNC: <0.2 MG/DL (ref 0–1)
BUN BLDV-MCNC: 9 MG/DL (ref 7–20)
CALCIUM SERPL-MCNC: 9.1 MG/DL (ref 8.3–10.6)
CHLORIDE BLD-SCNC: 101 MMOL/L (ref 99–110)
CO2: 28 MMOL/L (ref 21–32)
CREAT SERPL-MCNC: 0.6 MG/DL (ref 0.6–1.2)
EOSINOPHILS ABSOLUTE: 0 K/UL (ref 0–0.6)
EOSINOPHILS ABSOLUTE: 0 K/UL (ref 0–0.6)
EOSINOPHILS RELATIVE PERCENT: 0 %
EOSINOPHILS RELATIVE PERCENT: 0 %
GFR AFRICAN AMERICAN: >60
GFR NON-AFRICAN AMERICAN: >60
GLOBULIN: 2.5 G/DL
GLUCOSE BLD-MCNC: 103 MG/DL (ref 70–99)
HCT VFR BLD CALC: 35.7 % (ref 36–48)
HCT VFR BLD CALC: 37.6 % (ref 36–48)
HEMOGLOBIN: 11.7 G/DL (ref 12–16)
HEMOGLOBIN: 12.4 G/DL (ref 12–16)
LYMPHOCYTES ABSOLUTE: 1.5 K/UL (ref 1–5.1)
LYMPHOCYTES ABSOLUTE: 2.5 K/UL (ref 1–5.1)
LYMPHOCYTES RELATIVE PERCENT: 4 %
LYMPHOCYTES RELATIVE PERCENT: 6 %
MAGNESIUM: 2.1 MG/DL (ref 1.8–2.4)
MCH RBC QN AUTO: 30 PG (ref 26–34)
MCH RBC QN AUTO: 30.2 PG (ref 26–34)
MCHC RBC AUTO-ENTMCNC: 32.7 G/DL (ref 31–36)
MCHC RBC AUTO-ENTMCNC: 33.1 G/DL (ref 31–36)
MCV RBC AUTO: 91.3 FL (ref 80–100)
MCV RBC AUTO: 91.6 FL (ref 80–100)
METAMYELOCYTES RELATIVE PERCENT: 1 %
MONOCYTES ABSOLUTE: 0.7 K/UL (ref 0–1.3)
MONOCYTES ABSOLUTE: 1.2 K/UL (ref 0–1.3)
MONOCYTES RELATIVE PERCENT: 2 %
MONOCYTES RELATIVE PERCENT: 3 %
MYELOCYTE PERCENT: 1 %
NEUTROPHILS ABSOLUTE: 35.1 K/UL (ref 1.7–7.7)
NEUTROPHILS ABSOLUTE: 37.3 K/UL (ref 1.7–7.7)
NEUTROPHILS RELATIVE PERCENT: 89 %
NEUTROPHILS RELATIVE PERCENT: 93 %
NUCLEATED RED BLOOD CELLS: 1 /100 WBC
PDW BLD-RTO: 18 % (ref 12.4–15.4)
PDW BLD-RTO: 18.4 % (ref 12.4–15.4)
PLATELET # BLD: 113 K/UL (ref 135–450)
PLATELET # BLD: 202 K/UL (ref 135–450)
PMV BLD AUTO: 7 FL (ref 5–10.5)
PMV BLD AUTO: 8 FL (ref 5–10.5)
POLYCHROMASIA: ABNORMAL
POLYCHROMASIA: ABNORMAL
POTASSIUM SERPL-SCNC: 4.5 MMOL/L (ref 3.5–5.1)
RBC # BLD: 3.9 M/UL (ref 4–5.2)
RBC # BLD: 4.12 M/UL (ref 4–5.2)
SCHISTOCYTES: ABNORMAL
SODIUM BLD-SCNC: 137 MMOL/L (ref 136–145)
TOTAL PROTEIN: 6.6 G/DL (ref 6.4–8.2)
WBC # BLD: 37.3 K/UL (ref 4–11)
WBC # BLD: 41.4 K/UL (ref 4–11)

## 2021-06-23 PROCEDURE — 6360000002 HC RX W HCPCS: Performed by: INTERNAL MEDICINE

## 2021-06-23 PROCEDURE — 96372 THER/PROPH/DIAG INJ SC/IM: CPT

## 2021-06-23 PROCEDURE — 80053 COMPREHEN METABOLIC PANEL: CPT

## 2021-06-23 PROCEDURE — 6360000002 HC RX W HCPCS: Performed by: NURSE PRACTITIONER

## 2021-06-23 PROCEDURE — 36592 COLLECT BLOOD FROM PICC: CPT

## 2021-06-23 PROCEDURE — 83735 ASSAY OF MAGNESIUM: CPT

## 2021-06-23 PROCEDURE — 38206 HARVEST AUTO STEM CELLS: CPT

## 2021-06-23 PROCEDURE — 38207 CRYOPRESERVE STEM CELLS: CPT

## 2021-06-23 PROCEDURE — 2580000003 HC RX 258: Performed by: INTERNAL MEDICINE

## 2021-06-23 PROCEDURE — 96367 TX/PROPH/DG ADDL SEQ IV INF: CPT

## 2021-06-23 PROCEDURE — 96365 THER/PROPH/DIAG IV INF INIT: CPT

## 2021-06-23 PROCEDURE — 85025 COMPLETE CBC W/AUTO DIFF WBC: CPT

## 2021-06-23 RX ORDER — PROCHLORPERAZINE MALEATE 10 MG
10 TABLET ORAL EVERY 6 HOURS PRN
OUTPATIENT
Start: 2021-06-24

## 2021-06-23 RX ORDER — LOPERAMIDE HYDROCHLORIDE 2 MG/1
2 CAPSULE ORAL PRN
OUTPATIENT
Start: 2021-06-24

## 2021-06-23 RX ADMIN — CALCIUM GLUCONATE 4000 MG: 98 INJECTION, SOLUTION INTRAVENOUS at 07:00

## 2021-06-23 RX ADMIN — TBO-FILGRASTIM 300 MCG: 300 INJECTION, SOLUTION SUBCUTANEOUS at 06:40

## 2021-06-23 RX ADMIN — TBO-FILGRASTIM 480 MCG: 480 INJECTION, SOLUTION SUBCUTANEOUS at 06:40

## 2021-06-23 NOTE — PROGRESS NOTES
Autologous stem cell collection    Dx: Multiple Myeloma     Indication: Anticipation of stem cell transplant     Day + 1 of stem cell collection with neupogen. No mozobil, CD34 count: 16.     S: Pt feels well with the exception of mild weakness and fatigue. The remainder of her ros is neg as noted below. ROS  · Constitutional: Denies fever, sweats, weight loss. .     · Eyes: No visual changes or diplopia. No scleral icterus. · ENT: No Headaches, hearing loss or vertigo. No mouth sores or sore throat. · Cardiovascular: No chest pain, dyspnea on exertion, palpitations or loss of consciousness. · Respiratory: No cough or wheezing, no sputum production. No hemoptysis. .    · Gastrointestinal: No abdominal pain, appetite loss, blood in stools. No change in bowel habits. · Genitourinary: No dysuria, trouble voiding, or hematuria. · Musculoskeletal:  Generalized weakness. No joint complaints. · Integumentary: No rash or pruritis. · Neurological: No headache, diplopia. No change in gait, balance, or coordination. No paresthesias. · Endocrine: No temperature intolerance. No excessive thirst, fluid intake, or urination. · Hematologic/Lymphatic: No abnormal bruising or ecchymoses, blood clots or swollen lymph nodes. · Allergic/Immunologic: No nasal congestion or hives. O : Vital Signs reviewed: There were no vitals taken for this visit. Sonia WILSON-nl        Neck supple. No thyromegaly or adenopathy        Lymph - no cervical, axillary or supraclavicular adenopathy        Chest - clear to P&A        S1 S2 nl, no M, G, R. Abd soft, non-tender, No hepatosplenomegaly. BS nl No mass        Musculoskeletal -clinically appropriate strength        Extr- No cyanosis or edema        Neuro- Alert and oriented. No focal findings.  DTRs 2+ all groups        Skin- no rash    Lab Results   Component Value Date    WBC 5.5 06/16/2021    HGB 14.1 06/16/2021    HCT 42.5 06/16/2021    MCV 90.7 06/16/2021

## 2021-06-23 NOTE — PROGRESS NOTES
Pt arrived to [facility] today for scheduled apheresis procedure. Apheresis, line care, education, & lab draws all completed by [facility] [place], Dick Baires. Review [facility]iss[facility] documentation for details.

## 2021-06-24 ENCOUNTER — HOSPITAL ENCOUNTER (OUTPATIENT)
Dept: ONCOLOGY | Age: 71
Setting detail: INFUSION SERIES
Discharge: HOME | End: 2021-06-24
Payer: MEDICARE

## 2021-06-24 VITALS
OXYGEN SATURATION: 99 % | DIASTOLIC BLOOD PRESSURE: 74 MMHG | TEMPERATURE: 98 F | RESPIRATION RATE: 18 BRPM | SYSTOLIC BLOOD PRESSURE: 152 MMHG | HEART RATE: 96 BPM

## 2021-06-24 DIAGNOSIS — Z52.011 AUTOLOGOUS DONOR, STEM CELLS: ICD-10-CM

## 2021-06-24 DIAGNOSIS — C90.00 MULTIPLE MYELOMA NOT HAVING ACHIEVED REMISSION (HCC): Primary | ICD-10-CM

## 2021-06-24 LAB
ANISOCYTOSIS: ABNORMAL
BANDED NEUTROPHILS RELATIVE PERCENT: 11 % (ref 0–7)
BANDED NEUTROPHILS RELATIVE PERCENT: 24 % (ref 0–7)
BASOPHILS ABSOLUTE: 0 K/UL (ref 0–0.2)
BASOPHILS ABSOLUTE: 0 K/UL (ref 0–0.2)
BASOPHILS RELATIVE PERCENT: 0 %
BASOPHILS RELATIVE PERCENT: 0 %
EOSINOPHILS ABSOLUTE: 0 K/UL (ref 0–0.6)
EOSINOPHILS ABSOLUTE: 0 K/UL (ref 0–0.6)
EOSINOPHILS RELATIVE PERCENT: 0 %
EOSINOPHILS RELATIVE PERCENT: 0 %
HCT VFR BLD CALC: 38.2 % (ref 36–48)
HCT VFR BLD CALC: 39.7 % (ref 36–48)
HEMOGLOBIN: 12.4 G/DL (ref 12–16)
HEMOGLOBIN: 12.9 G/DL (ref 12–16)
LYMPHOCYTES ABSOLUTE: 0.4 K/UL (ref 1–5.1)
LYMPHOCYTES ABSOLUTE: 0.4 K/UL (ref 1–5.1)
LYMPHOCYTES RELATIVE PERCENT: 1 %
LYMPHOCYTES RELATIVE PERCENT: 1 %
MCH RBC QN AUTO: 29.7 PG (ref 26–34)
MCH RBC QN AUTO: 29.9 PG (ref 26–34)
MCHC RBC AUTO-ENTMCNC: 32.5 G/DL (ref 31–36)
MCHC RBC AUTO-ENTMCNC: 32.5 G/DL (ref 31–36)
MCV RBC AUTO: 91.4 FL (ref 80–100)
MCV RBC AUTO: 92 FL (ref 80–100)
METAMYELOCYTES RELATIVE PERCENT: 8 %
MONOCYTES ABSOLUTE: 0.4 K/UL (ref 0–1.3)
MONOCYTES ABSOLUTE: 2.6 K/UL (ref 0–1.3)
MONOCYTES RELATIVE PERCENT: 1 %
MONOCYTES RELATIVE PERCENT: 6 %
MYELOCYTE PERCENT: 1 %
NEUTROPHILS ABSOLUTE: 40.2 K/UL (ref 1.7–7.7)
NEUTROPHILS ABSOLUTE: 40.8 K/UL (ref 1.7–7.7)
NEUTROPHILS RELATIVE PERCENT: 65 %
NEUTROPHILS RELATIVE PERCENT: 82 %
PDW BLD-RTO: 17.6 % (ref 12.4–15.4)
PDW BLD-RTO: 17.8 % (ref 12.4–15.4)
PLATELET # BLD: 119 K/UL (ref 135–450)
PLATELET # BLD: 72 K/UL (ref 135–450)
PLATELET SLIDE REVIEW: ABNORMAL
PMV BLD AUTO: 6.6 FL (ref 5–10.5)
PMV BLD AUTO: 6.8 FL (ref 5–10.5)
RBC # BLD: 4.18 M/UL (ref 4–5.2)
RBC # BLD: 4.31 M/UL (ref 4–5.2)
WBC # BLD: 41.6 K/UL (ref 4–11)
WBC # BLD: 43.2 K/UL (ref 4–11)

## 2021-06-24 PROCEDURE — 96365 THER/PROPH/DIAG IV INF INIT: CPT

## 2021-06-24 PROCEDURE — 96372 THER/PROPH/DIAG INJ SC/IM: CPT

## 2021-06-24 PROCEDURE — 2580000003 HC RX 258: Performed by: INTERNAL MEDICINE

## 2021-06-24 PROCEDURE — 96366 THER/PROPH/DIAG IV INF ADDON: CPT

## 2021-06-24 PROCEDURE — 6360000002 HC RX W HCPCS: Performed by: INTERNAL MEDICINE

## 2021-06-24 PROCEDURE — 38206 HARVEST AUTO STEM CELLS: CPT

## 2021-06-24 PROCEDURE — 38207 CRYOPRESERVE STEM CELLS: CPT

## 2021-06-24 PROCEDURE — 6360000002 HC RX W HCPCS: Performed by: NURSE PRACTITIONER

## 2021-06-24 PROCEDURE — 36592 COLLECT BLOOD FROM PICC: CPT

## 2021-06-24 PROCEDURE — 85025 COMPLETE CBC W/AUTO DIFF WBC: CPT

## 2021-06-24 RX ORDER — LOPERAMIDE HYDROCHLORIDE 2 MG/1
2 CAPSULE ORAL PRN
OUTPATIENT
Start: 2021-06-25

## 2021-06-24 RX ORDER — HEPARIN SODIUM (PORCINE) LOCK FLUSH IV SOLN 100 UNIT/ML 100 UNIT/ML
500 SOLUTION INTRAVENOUS PRN
Status: DISCONTINUED | OUTPATIENT
Start: 2021-06-24 | End: 2021-06-25 | Stop reason: HOSPADM

## 2021-06-24 RX ORDER — PROCHLORPERAZINE MALEATE 10 MG
10 TABLET ORAL EVERY 6 HOURS PRN
OUTPATIENT
Start: 2021-06-25

## 2021-06-24 RX ADMIN — CALCIUM GLUCONATE 4000 MG: 98 INJECTION, SOLUTION INTRAVENOUS at 07:41

## 2021-06-24 RX ADMIN — Medication 500 UNITS: at 13:27

## 2021-06-24 RX ADMIN — TBO-FILGRASTIM 480 MCG: 480 INJECTION, SOLUTION SUBCUTANEOUS at 06:40

## 2021-06-24 RX ADMIN — TBO-FILGRASTIM 300 MCG: 300 INJECTION, SOLUTION SUBCUTANEOUS at 06:40

## 2021-06-24 ASSESSMENT — PAIN SCALES - GENERAL: PAINLEVEL_OUTOF10: 0

## 2021-06-24 NOTE — PROGRESS NOTES
Autologous stem cell collection    Dx: Multiple Myeloma     Indication: Anticipation of stem cell transplant     Day + 2 of stem cell collection with neupogen. No mozobil, CD34 count: 16.     S: Pt feels well with the exception of mild weakness and fatigue. The remainder of her ros is neg as noted below. ROS  · Constitutional: Denies fever, sweats, weight loss. .     · Eyes: No visual changes or diplopia. No scleral icterus. · ENT: No Headaches, hearing loss or vertigo. No mouth sores or sore throat. · Cardiovascular: No chest pain, dyspnea on exertion, palpitations or loss of consciousness. · Respiratory: No cough or wheezing, no sputum production. No hemoptysis. .    · Gastrointestinal: No abdominal pain, appetite loss, blood in stools. No change in bowel habits. · Genitourinary: No dysuria, trouble voiding, or hematuria. · Musculoskeletal:  Generalized weakness. No joint complaints. · Integumentary: No rash or pruritis. · Neurological: No headache, diplopia. No change in gait, balance, or coordination. No paresthesias. · Endocrine: No temperature intolerance. No excessive thirst, fluid intake, or urination. · Hematologic/Lymphatic: No abnormal bruising or ecchymoses, blood clots or swollen lymph nodes. · Allergic/Immunologic: No nasal congestion or hives. O : Vital Signs reviewed: There were no vitals taken for this visit. Vilmagabriel Lexx HEKATELYN-nl        Neck supple. No thyromegaly or adenopathy        Lymph - no cervical, axillary or supraclavicular adenopathy        Chest - clear to P&A        S1 S2 nl, no M, G, R. Abd soft, non-tender, No hepatosplenomegaly. BS nl No mass        Musculoskeletal -clinically appropriate strength        Extr- No cyanosis or edema        Neuro- Alert and oriented. No focal findings.  DTRs 2+ all groups        Skin- no rash    Lab Results   Component Value Date    WBC 41.6 (HH) 06/24/2021    HGB 12.9 06/24/2021    HCT 39.7 06/24/2021    MCV 92.0 06/24/2021     (L) 06/24/2021     Lab Results   Component Value Date     06/23/2021    K 4.5 06/23/2021    K 4.1 07/10/2020     06/23/2021    CO2 28 06/23/2021    BUN 9 06/23/2021    CREATININE 0.6 06/23/2021    GLUCOSE 103 06/23/2021    CALCIUM 9.1 06/23/2021    PROT 6.6 06/23/2021    PROT 7.3 11/27/2012    LABALBU 4.1 06/23/2021    BILITOT <0.2 06/23/2021    ALKPHOS 191 06/23/2021    AST 17 06/23/2021    ALT 8 06/23/2021            Description of procedure:   Pt underwent a 20lt collection at a flow rate of 20mls/min. Pt tolerated the procedure well with no complications. She received Ca gluconate empirically. Access was via Left Subclavian Triple Lumen Tunneled cath which is intact at the insertion site. A/P  Dx: Multiple Myeloma   Day + 2 stem cell collection with neupogen, goal is 5 mil cd 34+/kg. Cumulative total of 3.8 mil cd 34+/kg. She will be notified of the cell number later this evening. Toxicities: mild weakness and fatigue     DELORES Mai - MANDA Hansen. Jenny Olivares DO, MS  [facility]    Please contact via:  1.   Perfect Serve  2.  [phone]    6/24/2021   7:37 AM

## 2021-06-24 NOTE — PROGRESS NOTES
Pt arrived to [facility] today for scheduled apheresis procedure. Lab draw and granix administered by this RN. Apheresis, line care, and education all completed by [facility] RN, Daysi Joiner. Review [facility] documentation for details.

## 2021-06-25 ENCOUNTER — HOSPITAL ENCOUNTER (OUTPATIENT)
Dept: ONCOLOGY | Age: 71
Setting detail: INFUSION SERIES
Discharge: HOME | End: 2021-06-25
Payer: MEDICARE

## 2021-06-25 VITALS
TEMPERATURE: 96.7 F | RESPIRATION RATE: 18 BRPM | SYSTOLIC BLOOD PRESSURE: 178 MMHG | HEART RATE: 92 BPM | DIASTOLIC BLOOD PRESSURE: 96 MMHG | OXYGEN SATURATION: 99 %

## 2021-06-25 PROCEDURE — 96374 THER/PROPH/DIAG INJ IV PUSH: CPT

## 2021-06-25 PROCEDURE — 6360000002 HC RX W HCPCS: Performed by: NURSE PRACTITIONER

## 2021-06-25 RX ORDER — HEPARIN SODIUM (PORCINE) LOCK FLUSH IV SOLN 100 UNIT/ML 100 UNIT/ML
500 SOLUTION INTRAVENOUS PRN
Status: DISCONTINUED | OUTPATIENT
Start: 2021-06-25 | End: 2021-06-26 | Stop reason: HOSPADM

## 2021-06-25 RX ADMIN — Medication 500 UNITS: at 08:03

## 2021-06-25 RX ADMIN — Medication 500 UNITS: at 08:05

## 2021-06-25 NOTE — PROGRESS NOTES
Pt here for high dose heparin removal from central line and cap change. Educated pt on upcoming plans for transplant. Pt discharged to home ambulatory.

## 2021-06-26 ENCOUNTER — HOSPITAL ENCOUNTER (EMERGENCY)
Age: 71
Discharge: HOME | End: 2021-06-26
Attending: EMERGENCY MEDICINE
Payer: MEDICARE

## 2021-06-26 VITALS
WEIGHT: 160 LBS | DIASTOLIC BLOOD PRESSURE: 95 MMHG | RESPIRATION RATE: 18 BRPM | HEIGHT: 65 IN | TEMPERATURE: 98.3 F | OXYGEN SATURATION: 99 % | HEART RATE: 74 BPM | BODY MASS INDEX: 26.66 KG/M2 | SYSTOLIC BLOOD PRESSURE: 164 MMHG

## 2021-06-26 DIAGNOSIS — I15.9 SECONDARY HYPERTENSION: Primary | ICD-10-CM

## 2021-06-26 LAB
ANION GAP SERPL CALCULATED.3IONS-SCNC: 10 MMOL/L (ref 3–16)
BANDED NEUTROPHILS RELATIVE PERCENT: 8 % (ref 0–7)
BASOPHILS ABSOLUTE: 0 K/UL (ref 0–0.2)
BASOPHILS RELATIVE PERCENT: 0 %
BILIRUBIN URINE: NEGATIVE
BLOOD, URINE: NEGATIVE
BUN BLDV-MCNC: 13 MG/DL (ref 7–20)
CALCIUM SERPL-MCNC: 9.5 MG/DL (ref 8.3–10.6)
CHLORIDE BLD-SCNC: 103 MMOL/L (ref 99–110)
CLARITY: CLEAR
CO2: 26 MMOL/L (ref 21–32)
COLOR: YELLOW
CREAT SERPL-MCNC: 0.7 MG/DL (ref 0.6–1.2)
EOSINOPHILS ABSOLUTE: 0.1 K/UL (ref 0–0.6)
EOSINOPHILS RELATIVE PERCENT: 1 %
GFR AFRICAN AMERICAN: >60
GFR NON-AFRICAN AMERICAN: >60
GLUCOSE BLD-MCNC: 110 MG/DL (ref 70–99)
GLUCOSE URINE: NEGATIVE MG/DL
HCT VFR BLD CALC: 40.1 % (ref 36–48)
HEMOGLOBIN: 13.1 G/DL (ref 12–16)
KETONES, URINE: NEGATIVE MG/DL
LEUKOCYTE ESTERASE, URINE: NEGATIVE
LYMPHOCYTES ABSOLUTE: 0.8 K/UL (ref 1–5.1)
LYMPHOCYTES RELATIVE PERCENT: 7 %
MCH RBC QN AUTO: 30 PG (ref 26–34)
MCHC RBC AUTO-ENTMCNC: 32.7 G/DL (ref 31–36)
MCV RBC AUTO: 91.6 FL (ref 80–100)
METAMYELOCYTES RELATIVE PERCENT: 1 %
MICROSCOPIC EXAMINATION: NORMAL
MONOCYTES ABSOLUTE: 1.1 K/UL (ref 0–1.3)
MONOCYTES RELATIVE PERCENT: 9 %
MYELOCYTE PERCENT: 3 %
NEUTROPHILS ABSOLUTE: 10 K/UL (ref 1.7–7.7)
NEUTROPHILS RELATIVE PERCENT: 71 %
NITRITE, URINE: NEGATIVE
PDW BLD-RTO: 18.1 % (ref 12.4–15.4)
PH UA: 6.5 (ref 5–8)
PLATELET # BLD: 83 K/UL (ref 135–450)
PLATELET SLIDE REVIEW: ABNORMAL
PMV BLD AUTO: 7.4 FL (ref 5–10.5)
POTASSIUM REFLEX MAGNESIUM: 4.2 MMOL/L (ref 3.5–5.1)
PRO-BNP: 2419 PG/ML (ref 0–124)
PROTEIN UA: NEGATIVE MG/DL
RBC # BLD: 4.37 M/UL (ref 4–5.2)
RBC # BLD: NORMAL 10*6/UL
SARS-COV-2: NOT DETECTED
SLIDE REVIEW: ABNORMAL
SODIUM BLD-SCNC: 139 MMOL/L (ref 136–145)
SPECIFIC GRAVITY UA: 1.01 (ref 1–1.03)
TROPONIN: <0.01 NG/ML
URINE TYPE: NORMAL
UROBILINOGEN, URINE: 0.2 E.U./DL
WBC # BLD: 12.1 K/UL (ref 4–11)

## 2021-06-26 PROCEDURE — 36415 COLL VENOUS BLD VENIPUNCTURE: CPT

## 2021-06-26 PROCEDURE — 81003 URINALYSIS AUTO W/O SCOPE: CPT

## 2021-06-26 PROCEDURE — 83880 ASSAY OF NATRIURETIC PEPTIDE: CPT

## 2021-06-26 PROCEDURE — 80048 BASIC METABOLIC PNL TOTAL CA: CPT

## 2021-06-26 PROCEDURE — 99283 EMERGENCY DEPT VISIT LOW MDM: CPT

## 2021-06-26 PROCEDURE — 85025 COMPLETE CBC W/AUTO DIFF WBC: CPT

## 2021-06-26 PROCEDURE — 93005 ELECTROCARDIOGRAM TRACING: CPT | Performed by: PHYSICIAN ASSISTANT

## 2021-06-26 PROCEDURE — 84484 ASSAY OF TROPONIN QUANT: CPT

## 2021-06-26 RX ORDER — AMLODIPINE BESYLATE 5 MG/1
5 TABLET ORAL DAILY
Qty: 30 TABLET | Refills: 0 | Status: ON HOLD | OUTPATIENT
Start: 2021-06-26 | End: 2021-07-15 | Stop reason: HOSPADM

## 2021-06-26 ASSESSMENT — ENCOUNTER SYMPTOMS
GASTROINTESTINAL NEGATIVE: 1
SHORTNESS OF BREATH: 0
RESPIRATORY NEGATIVE: 1
COUGH: 0

## 2021-06-26 NOTE — ED NOTES
Patient prepared for and ready to be discharged. Patient discharged at this time in no acute distress after verbalizing understanding of discharge instructions. Patient left after receiving After Visit Summary instructions.       Celeste Andujar RN  06/26/21 [id number]

## 2021-06-26 NOTE — ED PROVIDER NOTES
[address] [facility] [address] ENCOUNTER          PHYSICIAN ASSISTANT NOTE       Date of evaluation: 6/26/2021    Chief Complaint     Hypertension (Pt reports 197/91 blood pressure at home. Headache this morning. Recent stem cell harvested this past week. )      History of Present Illness     Daniella Harrington is a 72 y.o. female who presents with hypertension. Patient reports she had stem cell collection yesterday. Patient reports she had her port flushed today. Patient reports she just felt somewhat \"off\" today. Patient reports she had a mild generalized headache earlier which was relieved with Tylenol. Patient reports she took a long nap which is unusual for her. When she woke up, the patient decided check her blood pressure to see if that was impacting her symptoms. Patient reports she had been told at her some cell collection that her blood pressure was high. Patient reports her blood pressure was 200/100 at home, checked multiple times. Patient does not have a history of hypertension does not take any medication for it. Patient denies any thunderclap headache, vision change, dysarthria, ataxia, focal weakness. No chest pain or shortness of breath. No pedal edema. Review of Systems     Review of Systems   Constitutional: Negative. Negative for fever. Respiratory: Negative. Negative for cough and shortness of breath. Cardiovascular: Negative. Negative for chest pain. Gastrointestinal: Negative. Musculoskeletal: Negative. Skin: Negative. Neurological: Positive for headaches. Negative for facial asymmetry, speech difficulty and weakness. All other systems reviewed and are negative. Past Medical, Surgical, Family, and Social History     She has a past medical history of Anxiety, Dental crown present, Depression, Hepatitis C, Multiple myeloma ([address] and Multiple myeloma not having achieved remission ([address]   She has a past surgical history that includes Nose Magnesium 4.2 3.5 - 5.1 mmol/L    Chloride 103 99 - 110 mmol/L    CO2 26 21 - 32 mmol/L    Anion Gap 10 3 - 16    Glucose 110 (H) 70 - 99 mg/dL    BUN 13 7 - 20 mg/dL    CREATININE 0.7 0.6 - 1.2 mg/dL    GFR Non-African American >60 >60    GFR African American >60 >60    Calcium 9.5 8.3 - 10.6 mg/dL   Troponin   Result Value Ref Range    Troponin <0.01 <0.01 ng/mL   Brain Natriuretic Peptide   Result Value Ref Range    Pro-BNP 2,419 (H) 0 - 124 pg/mL   Urinalysis, reflex to microscopic   Result Value Ref Range    Color, UA Yellow Straw/Yellow    Clarity, UA Clear Clear    Glucose, Ur Negative Negative mg/dL    Bilirubin Urine Negative Negative    Ketones, Urine Negative Negative mg/dL    Specific Gravity, UA 1.015 1.005 - 1.030    Blood, Urine Negative Negative    pH, UA 6.5 5.0 - 8.0    Protein, UA Negative Negative mg/dL    Urobilinogen, Urine 0.2 <2.0 E.U./dL    Nitrite, Urine Negative Negative    Leukocyte Esterase, Urine Negative Negative    Microscopic Examination Not Indicated     Urine Type Voided        ED BEDSIDE ULTRASOUND:      RECENT VITALS:  BP: (!) 164/95, Temp: 98.3 °F (36.8 °C), Pulse: 74, Resp: 18, SpO2: 99 %     Procedures         ED Course     Nursing Notes, Past Medical Hx,Past Surgical Hx, Social Hx, Allergies, and Family Hx were reviewed. The patient was given the following medications:  Orders Placed This Encounter   Medications    amLODIPine (NORVASC) 5 MG tablet     Sig: Take 1 tablet by mouth daily     Dispense:  30 tablet     Refill:  0       CONSULTS:  [address] / ASSESSMENT / John Paul Cousin is a 72 y.o. female with hypertension. Patient is alert and oriented with a nonfocal neurologic exam.  Vitals normal with exception of hypertension. EKG is normal sinus rhythm without signs of ischemia. Mild leukocytosis of 12.1, normal hemoglobin, platelets of 83. Normal electrolytes and renal function. Troponin is negative.   proBNP is 2400.  Urinalysis without proteinuria. Patient's blood pressure improved to 164/95 in the ER without intervention. I spoke with Dr. Raffy Paulson who recommends starting patient on low dose Norvasc and follow-up outpatient. Patient agreeable with plan. Return precautions discussed. This patient was also evaluated by the attending physician. All care plans were discussed and agreed upon. Clinical Impression     1.  Secondary hypertension        Disposition     PATIENT REFERRED TO:  Gary Lovett MD  [address] [place] [address] Emergency Department  [facility] 310  [phone]    As needed, If symptoms worsen      DISCHARGE MEDICATIONS:  New Prescriptions    AMLODIPINE (NORVASC) 5 MG TABLET    Take 1 tablet by mouth daily       DISPOSITION  discharge        Alexandre Phan PA-C  06/26/21 2365

## 2021-06-26 NOTE — ED PROVIDER NOTES
ED Attending Attestation Note     Date of evaluation: 6/26/2021    This patient was seen by the advance practice provider. I have seen and examined the patient, agree with the workup, evaluation, management and diagnosis. The care plan has been discussed. I have reviewed the ECG and concur with the SHARA's interpretation. My assessment reveals well-appearing adult female in no acute respiratory distress who presents with asymptomatic hypertension today. It improved spontaneously without medication. Per discussion with hematology, will start patient on Norvasc at home. Labs reveal no significant endorgan damage. Of note, BNP appears elevated from our most recent which was several years ago, but patient reports that she has known EF somewhere between 36 and 45 discovered on her most recent work-up for the stem cell transplant, and is being followed. She has no shortness of breath, cough, orthopnea, or oxygen requirement today to suggest heart failure exacerbation. Ricki Mckeon MD  06/26/21 [id number]

## 2021-06-27 LAB
EKG ATRIAL RATE: 84 BPM
EKG DIAGNOSIS: NORMAL
EKG P AXIS: 57 DEGREES
EKG P-R INTERVAL: 136 MS
EKG Q-T INTERVAL: 396 MS
EKG QRS DURATION: 84 MS
EKG QTC CALCULATION (BAZETT): 460 MS
EKG R AXIS: -34 DEGREES
EKG T AXIS: 41 DEGREES
EKG VENTRICULAR RATE: 81 BPM

## 2021-06-30 ENCOUNTER — HOSPITAL ENCOUNTER (INPATIENT)
Age: 71
LOS: 15 days | Discharge: HOME | DRG: 016 | End: 2021-07-15
Attending: INTERNAL MEDICINE | Admitting: INTERNAL MEDICINE
Payer: MEDICARE

## 2021-06-30 DIAGNOSIS — C90.00 MULTIPLE MYELOMA NOT HAVING ACHIEVED REMISSION (HCC): ICD-10-CM

## 2021-06-30 DIAGNOSIS — F41.9 ANXIETY: Primary | ICD-10-CM

## 2021-06-30 DIAGNOSIS — B00.9 RECURRENT HSV (HERPES SIMPLEX VIRUS): ICD-10-CM

## 2021-06-30 PROBLEM — C90.01 MULTIPLE MYELOMA IN REMISSION (HCC): Status: ACTIVE | Noted: 2021-06-30

## 2021-06-30 LAB
ALBUMIN SERPL-MCNC: 4.4 G/DL (ref 3.4–5)
ALP BLD-CCNC: 104 U/L (ref 40–129)
ALT SERPL-CCNC: 10 U/L (ref 10–40)
ANION GAP SERPL CALCULATED.3IONS-SCNC: 10 MMOL/L (ref 3–16)
APTT: 39.3 SEC (ref 26.2–38.6)
AST SERPL-CCNC: 14 U/L (ref 15–37)
BASOPHILS ABSOLUTE: 0 K/UL (ref 0–0.2)
BASOPHILS RELATIVE PERCENT: 0.3 %
BILIRUB SERPL-MCNC: 0.6 MG/DL (ref 0–1)
BILIRUBIN DIRECT: <0.2 MG/DL (ref 0–0.3)
BILIRUBIN URINE: NEGATIVE
BILIRUBIN, INDIRECT: ABNORMAL MG/DL (ref 0–1)
BLOOD, URINE: NEGATIVE
BUN BLDV-MCNC: 16 MG/DL (ref 7–20)
CALCIUM SERPL-MCNC: 9.5 MG/DL (ref 8.3–10.6)
CHLORIDE BLD-SCNC: 102 MMOL/L (ref 99–110)
CLARITY: CLEAR
CO2: 25 MMOL/L (ref 21–32)
COLOR: YELLOW
CREAT SERPL-MCNC: <0.5 MG/DL (ref 0.6–1.2)
EKG ATRIAL RATE: 91 BPM
EKG DIAGNOSIS: NORMAL
EKG P AXIS: 53 DEGREES
EKG P-R INTERVAL: 136 MS
EKG Q-T INTERVAL: 378 MS
EKG QRS DURATION: 80 MS
EKG QTC CALCULATION (BAZETT): 464 MS
EKG R AXIS: -2 DEGREES
EKG T AXIS: 56 DEGREES
EKG VENTRICULAR RATE: 91 BPM
EOSINOPHILS ABSOLUTE: 0.1 K/UL (ref 0–0.6)
EOSINOPHILS RELATIVE PERCENT: 2.7 %
GFR AFRICAN AMERICAN: >60
GFR NON-AFRICAN AMERICAN: >60
GLUCOSE BLD-MCNC: 98 MG/DL (ref 70–99)
GLUCOSE URINE: NEGATIVE MG/DL
HCT VFR BLD CALC: 39.1 % (ref 36–48)
HEMOGLOBIN: 13.1 G/DL (ref 12–16)
INR BLD: 1.03 (ref 0.88–1.12)
KETONES, URINE: NEGATIVE MG/DL
LACTATE DEHYDROGENASE: 188 U/L (ref 100–190)
LEUKOCYTE ESTERASE, URINE: NEGATIVE
LYMPHOCYTES ABSOLUTE: 0.7 K/UL (ref 1–5.1)
LYMPHOCYTES RELATIVE PERCENT: 15.3 %
MAGNESIUM: 2.2 MG/DL (ref 1.8–2.4)
MCH RBC QN AUTO: 30.5 PG (ref 26–34)
MCHC RBC AUTO-ENTMCNC: 33.6 G/DL (ref 31–36)
MCV RBC AUTO: 90.7 FL (ref 80–100)
MICROSCOPIC EXAMINATION: NORMAL
MONOCYTES ABSOLUTE: 0.5 K/UL (ref 0–1.3)
MONOCYTES RELATIVE PERCENT: 11.4 %
NEUTROPHILS ABSOLUTE: 3 K/UL (ref 1.7–7.7)
NEUTROPHILS RELATIVE PERCENT: 70.3 %
NITRITE, URINE: NEGATIVE
PDW BLD-RTO: 17.1 % (ref 12.4–15.4)
PH UA: 6 (ref 5–8)
PHOSPHORUS: 4.9 MG/DL (ref 2.5–4.9)
PLATELET # BLD: 188 K/UL (ref 135–450)
PMV BLD AUTO: 8 FL (ref 5–10.5)
POTASSIUM SERPL-SCNC: 4.4 MMOL/L (ref 3.5–5.1)
PROTEIN UA: NEGATIVE MG/DL
PROTHROMBIN TIME: 11.6 SEC (ref 9.9–12.7)
RBC # BLD: 4.31 M/UL (ref 4–5.2)
SODIUM BLD-SCNC: 137 MMOL/L (ref 136–145)
SPECIFIC GRAVITY UA: 1.02 (ref 1–1.03)
TOTAL PROTEIN: 6.9 G/DL (ref 6.4–8.2)
URIC ACID, SERUM: 4.1 MG/DL (ref 2.6–6)
URINE TYPE: NORMAL
UROBILINOGEN, URINE: 0.2 E.U./DL
WBC # BLD: 4.3 K/UL (ref 4–11)

## 2021-06-30 PROCEDURE — 84550 ASSAY OF BLOOD/URIC ACID: CPT

## 2021-06-30 PROCEDURE — 96417 CHEMO IV INFUS EACH ADDL SEQ: CPT

## 2021-06-30 PROCEDURE — 36592 COLLECT BLOOD FROM PICC: CPT

## 2021-06-30 PROCEDURE — 2060000000 HC ICU INTERMEDIATE R&B

## 2021-06-30 PROCEDURE — 85610 PROTHROMBIN TIME: CPT

## 2021-06-30 PROCEDURE — 83735 ASSAY OF MAGNESIUM: CPT

## 2021-06-30 PROCEDURE — 94150 VITAL CAPACITY TEST: CPT

## 2021-06-30 PROCEDURE — 83615 LACTATE (LD) (LDH) ENZYME: CPT

## 2021-06-30 PROCEDURE — 84100 ASSAY OF PHOSPHORUS: CPT

## 2021-06-30 PROCEDURE — 6370000000 HC RX 637 (ALT 250 FOR IP): Performed by: NURSE PRACTITIONER

## 2021-06-30 PROCEDURE — 80076 HEPATIC FUNCTION PANEL: CPT

## 2021-06-30 PROCEDURE — 6360000002 HC RX W HCPCS: Performed by: INTERNAL MEDICINE

## 2021-06-30 PROCEDURE — 94799 UNLISTED PULMONARY SVC/PX: CPT

## 2021-06-30 PROCEDURE — 80048 BASIC METABOLIC PNL TOTAL CA: CPT

## 2021-06-30 PROCEDURE — 2580000003 HC RX 258: Performed by: NURSE PRACTITIONER

## 2021-06-30 PROCEDURE — 94664 DEMO&/EVAL PT USE INHALER: CPT

## 2021-06-30 PROCEDURE — 2580000003 HC RX 258: Performed by: INTERNAL MEDICINE

## 2021-06-30 PROCEDURE — 85730 THROMBOPLASTIN TIME PARTIAL: CPT

## 2021-06-30 PROCEDURE — 93010 ELECTROCARDIOGRAM REPORT: CPT | Performed by: INTERNAL MEDICINE

## 2021-06-30 PROCEDURE — 93005 ELECTROCARDIOGRAM TRACING: CPT | Performed by: NURSE PRACTITIONER

## 2021-06-30 PROCEDURE — 81003 URINALYSIS AUTO W/O SCOPE: CPT

## 2021-06-30 PROCEDURE — 6360000002 HC RX W HCPCS: Performed by: NURSE PRACTITIONER

## 2021-06-30 PROCEDURE — 85025 COMPLETE CBC W/AUTO DIFF WBC: CPT

## 2021-06-30 PROCEDURE — 6370000000 HC RX 637 (ALT 250 FOR IP): Performed by: INTERNAL MEDICINE

## 2021-06-30 RX ORDER — AMLODIPINE BESYLATE 10 MG/1
10 TABLET ORAL DAILY
Status: DISCONTINUED | OUTPATIENT
Start: 2021-07-01 | End: 2021-07-10

## 2021-06-30 RX ORDER — CLONIDINE HYDROCHLORIDE 0.1 MG/1
0.1 TABLET ORAL EVERY 8 HOURS PRN
Status: DISCONTINUED | OUTPATIENT
Start: 2021-06-30 | End: 2021-07-15 | Stop reason: HOSPADM

## 2021-06-30 RX ORDER — PROCHLORPERAZINE MALEATE 10 MG
10 TABLET ORAL EVERY 4 HOURS PRN
Status: DISCONTINUED | OUTPATIENT
Start: 2021-06-30 | End: 2021-07-01

## 2021-06-30 RX ORDER — VALACYCLOVIR HYDROCHLORIDE 500 MG/1
500 TABLET, FILM COATED ORAL 2 TIMES DAILY
Status: DISCONTINUED | OUTPATIENT
Start: 2021-06-30 | End: 2021-07-15 | Stop reason: HOSPADM

## 2021-06-30 RX ORDER — SODIUM CHLORIDE 9 MG/ML
INJECTION, SOLUTION INTRAVENOUS CONTINUOUS
Status: DISCONTINUED | OUTPATIENT
Start: 2021-06-30 | End: 2021-07-02

## 2021-06-30 RX ORDER — LORAZEPAM 2 MG/ML
0.5 INJECTION INTRAMUSCULAR EVERY 4 HOURS PRN
Status: DISCONTINUED | OUTPATIENT
Start: 2021-06-30 | End: 2021-06-30

## 2021-06-30 RX ORDER — FUROSEMIDE 10 MG/ML
40 INJECTION INTRAMUSCULAR; INTRAVENOUS EVERY 12 HOURS
Status: DISCONTINUED | OUTPATIENT
Start: 2021-07-01 | End: 2021-07-02

## 2021-06-30 RX ORDER — SODIUM CHLORIDE 9 MG/ML
25 INJECTION, SOLUTION INTRAVENOUS PRN
Status: DISCONTINUED | OUTPATIENT
Start: 2021-06-30 | End: 2021-07-15 | Stop reason: HOSPADM

## 2021-06-30 RX ORDER — SODIUM CHLORIDE 0.9 % (FLUSH) 0.9 %
5-40 SYRINGE (ML) INJECTION EVERY 12 HOURS SCHEDULED
Status: DISCONTINUED | OUTPATIENT
Start: 2021-06-30 | End: 2021-07-15 | Stop reason: HOSPADM

## 2021-06-30 RX ORDER — FLUCONAZOLE 200 MG/1
400 TABLET ORAL DAILY
Status: DISCONTINUED | OUTPATIENT
Start: 2021-07-01 | End: 2021-07-13

## 2021-06-30 RX ORDER — LORAZEPAM 1 MG/1
1 TABLET ORAL 2 TIMES DAILY
Status: DISCONTINUED | OUTPATIENT
Start: 2021-06-30 | End: 2021-07-09

## 2021-06-30 RX ORDER — LORAZEPAM 0.5 MG/1
0.5 TABLET ORAL EVERY 4 HOURS PRN
Status: DISCONTINUED | OUTPATIENT
Start: 2021-06-30 | End: 2021-06-30

## 2021-06-30 RX ORDER — SODIUM CHLORIDE 9 MG/ML
INJECTION, SOLUTION INTRAVENOUS CONTINUOUS PRN
Status: DISCONTINUED | OUTPATIENT
Start: 2021-06-30 | End: 2021-07-15 | Stop reason: HOSPADM

## 2021-06-30 RX ORDER — PROMETHAZINE HYDROCHLORIDE 12.5 MG/1
12.5 TABLET ORAL EVERY 6 HOURS PRN
Status: DISCONTINUED | OUTPATIENT
Start: 2021-06-30 | End: 2021-06-30

## 2021-06-30 RX ORDER — PROCHLORPERAZINE EDISYLATE 5 MG/ML
10 INJECTION INTRAMUSCULAR; INTRAVENOUS EVERY 4 HOURS PRN
Status: DISCONTINUED | OUTPATIENT
Start: 2021-06-30 | End: 2021-07-01

## 2021-06-30 RX ORDER — ONDANSETRON HYDROCHLORIDE 8 MG/1
24 TABLET, FILM COATED ORAL ONCE
Status: COMPLETED | OUTPATIENT
Start: 2021-06-30 | End: 2021-06-30

## 2021-06-30 RX ORDER — SODIUM CHLORIDE 0.9 % (FLUSH) 0.9 %
5-40 SYRINGE (ML) INJECTION PRN
Status: DISCONTINUED | OUTPATIENT
Start: 2021-06-30 | End: 2021-07-15 | Stop reason: HOSPADM

## 2021-06-30 RX ORDER — AMLODIPINE BESYLATE 5 MG/1
5 TABLET ORAL DAILY
Status: DISCONTINUED | OUTPATIENT
Start: 2021-07-01 | End: 2021-06-30

## 2021-06-30 RX ORDER — POTASSIUM CHLORIDE 29.8 MG/ML
80 INJECTION INTRAVENOUS PRN
Status: DISCONTINUED | OUTPATIENT
Start: 2021-06-30 | End: 2021-07-15 | Stop reason: HOSPADM

## 2021-06-30 RX ORDER — OXYCODONE HYDROCHLORIDE 5 MG/1
10 TABLET ORAL EVERY 4 HOURS PRN
Status: DISCONTINUED | OUTPATIENT
Start: 2021-06-30 | End: 2021-07-15 | Stop reason: HOSPADM

## 2021-06-30 RX ORDER — SODIUM CHLORIDE 9 MG/ML
INJECTION, SOLUTION INTRAVENOUS CONTINUOUS
Status: ACTIVE | OUTPATIENT
Start: 2021-06-30 | End: 2021-06-30

## 2021-06-30 RX ORDER — DEXAMETHASONE 4 MG/1
12 TABLET ORAL ONCE
Status: COMPLETED | OUTPATIENT
Start: 2021-06-30 | End: 2021-06-30

## 2021-06-30 RX ORDER — MAGNESIUM SULFATE IN WATER 40 MG/ML
4000 INJECTION, SOLUTION INTRAVENOUS PRN
Status: DISCONTINUED | OUTPATIENT
Start: 2021-06-30 | End: 2021-07-15 | Stop reason: HOSPADM

## 2021-06-30 RX ADMIN — PROCHLORPERAZINE EDISYLATE 10 MG: 5 INJECTION INTRAMUSCULAR; INTRAVENOUS at 16:28

## 2021-06-30 RX ADMIN — SODIUM CHLORIDE: 9 INJECTION, SOLUTION INTRAVENOUS at 13:57

## 2021-06-30 RX ADMIN — DEXAMETHASONE 12 MG: 4 TABLET ORAL at 15:01

## 2021-06-30 RX ADMIN — SODIUM CHLORIDE 150 MG: 900 INJECTION, SOLUTION INTRAVENOUS at 15:03

## 2021-06-30 RX ADMIN — Medication 10 ML: at 11:24

## 2021-06-30 RX ADMIN — ENOXAPARIN SODIUM 40 MG: 40 INJECTION SUBCUTANEOUS at 17:45

## 2021-06-30 RX ADMIN — VALACYCLOVIR HYDROCHLORIDE 500 MG: 500 TABLET, FILM COATED ORAL at 11:23

## 2021-06-30 RX ADMIN — VALACYCLOVIR HYDROCHLORIDE 500 MG: 500 TABLET, FILM COATED ORAL at 21:09

## 2021-06-30 RX ADMIN — Medication 10 ML: at 21:10

## 2021-06-30 RX ADMIN — SODIUM CHLORIDE: 9 INJECTION, SOLUTION INTRAVENOUS at 10:55

## 2021-06-30 RX ADMIN — ONDANSETRON HYDROCHLORIDE 24 MG: 8 TABLET, FILM COATED ORAL at 15:01

## 2021-06-30 RX ADMIN — LORAZEPAM 1 MG: 1 TABLET ORAL at 21:09

## 2021-06-30 RX ADMIN — MELPHALAN HYDROCHLORIDE 350 MG: KIT INTRAVENOUS at 15:56

## 2021-06-30 RX ADMIN — SODIUM CHLORIDE: 9 INJECTION, SOLUTION INTRAVENOUS at 16:31

## 2021-06-30 RX ADMIN — SODIUM CHLORIDE 15 ML: 900 IRRIGANT IRRIGATION at 21:09

## 2021-06-30 ASSESSMENT — PAIN SCALES - GENERAL
PAINLEVEL_OUTOF10: 0

## 2021-06-30 NOTE — CARE COORDINATION
Type of Admission  Autologous Transplant  7/1/21  Multiple Myeloma       Central venous catheter  6/21/21: trifusion left subclavian. Plan  Auto transplant  D-1  Melphalan 200mg        Update  6/30/21: Pt admitted for auto transplant. Introduced myself and briefly discussed transplant process. Will follow up with patient while inpatient. Reviewed transplant procedure the role of melphalan and the recovery following transplant. Education  6/30/21: Cryotherapy education provided as well as melphalan and emend education. Discharge planning discussed pt spouse will be the caregiver.          Discharge with the next 30 days          Pending

## 2021-06-30 NOTE — PROGRESS NOTES
Original chemotherapy orders reviewed and acknowledged. Appropriateness of chemotherapy treatment regimen Melphalan for diagnosis of Multiple Myeloma was verified. Patient educated on chemotherapy regimen. Consent for chemotherapy obtained. Estimated body surface area is 1.8 meters squared as calculated from the following:    Height as of this encounter: 5' 5\" (1.651 m). Weight as of this encounter: 156 lb 4.8 oz (70.9 kg). verified. Appropriate dosing calculations of chemotherapy based on above height, weight, and BSA verified.       Isak White, PADMINI6/30/2021  6:58 PM

## 2021-06-30 NOTE — H&P
[facility] History and Physical        Attending Physician: Magnolia Neff MD    Primary Care: Ranulfo Leiva MD       Referring MD: No referring provider defined for this encounter. Name: Estefani Martin :  1950  MRN:  [id number]    Admission: 2021      Date: 2021    Reason for Admission: Melphalan 200mg/m2 preparative regimen followed by Autologous Stem Cell Transplant for IgA Kappa Multiple Myeloma    History of Present Illness:   Kerry Albert is a 73 yo patient with diagnosis of IgA Kappa Multiple Myeloma, admitted today to undergo high dose melphalan chemotherapy followed autologous stem cell rescue. She has PMH of hepatitis C (, s/p tx with Cherry ), recurrent shingles (21), anxiety/depression, & Chronic HFrEF (LVEF 40-45%). She first presented with myeloma 2020 when she was found to have pancytopenia. SPEP was performed which identified 3 separate M-spikes all c/w IgA Kappa. She initially deferred further w/u but eventually did have BM Bx/Asp performed 12/10/20 which confirmed multiple myeloma. She subsequently began treatment with RVD and received 7 cycles of therapy. She is now in a IA and is ready to proceed to transplant. She feels well today and has no major complaints or concerns. She will receive melphalan 200mg/m2 x1 today and will then receive stem cell infusion tomorrow. She is agreeable to this plan and is ready to proceed.       Pre Transplant Workup:            Pre Transplant labs:   2021 13:21   Sickle Cell Screen Negative   CMV IgG >10.00   CMV IgM <8.0   VARICELLA ZOSTER AB IGM 0.02   Herpes Type 1/2 IgM Combined 0.18   Hep A IgM Non-reactive   Hep B E Ab Negative   Hep B S Ab 22.95   Herpes Type I/II IgG Combined 14.00   Varicella-Zoster Virus Ab, Igg Immune         Past Surgical History:   Procedure Laterality Date    CATHETER INSERTION N/A 2021    TRIFUSION CATHETER INSERTION performed by Rahat Oates MD at [address] OR    COLONOSCOPY      DILATION AND CURETTAGE OF UTERUS      NOSE SURGERY         Past Medical History:   Diagnosis Date    Anxiety     Dental crown present     Depression     Hepatitis C      states resolved     [facility] [address] [address]     Multiple myeloma not having achieved remission ([facility] 6/15/2021       Prior to Admission medications    Medication Sig Start Date End Date Taking? Authorizing Provider   amLODIPine (NORVASC) 5 MG tablet Take 1 tablet by mouth daily 21  Yes Suzette Maldonado PA-C   LORazepam (ATIVAN) 1 MG tablet Take 1 mg by mouth 2 times daily. 21  Yes Historical Provider, MD   Ondansetron HCl (ZOFRAN PO) Take 8 mg by mouth    Yes Historical Provider, MD   Promethazine HCl (PHENERGAN PO) Take 12.5 mg by mouth    Yes Historical Provider, MD   oxyCODONE 5 MG capsule Take 10 mg by mouth every 4 hours as needed for Pain.     Yes Historical Provider, MD   Multiple Vitamins-Minerals (ICAPS AREDS 2 PO) Take by mouth   Yes Historical Provider, MD   Cholecalciferol (VITAMIN D) 2000 UNITS CAPS capsule Take by mouth daily Indications: OTC    Yes Historical Provider, MD       Allergies   Allergen Reactions    Methylprednisolone Other (See Comments)     Severe body pain       Family History   Problem Relation Age of Onset    Arthritis Mother     High Blood Pressure Mother     Vision Loss Mother     Arthritis Father     Cancer Father     Heart Disease Father     Arthritis Sister     Learning Disabilities Sister     Arthritis Brother         Social History     Socioeconomic History    Marital status:      Spouse name: Not on file    Number of children: Not on file    Years of education: Not on file    Highest education level: Not on file   Occupational History    Not on file   Tobacco Use    Smoking status: Former Smoker     Packs/day: 1.00     Years: 15.00     Pack years: 15.00     Types: Cigarettes     Quit date: 2013     Years since quittin.6    Smokeless tobacco: Never Used   Vaping Use    Vaping Use: Never used   Substance and Sexual Activity    Alcohol use: No    Drug use: No    Sexual activity: Never   Other Topics Concern    Not on file   Social History Narrative    Not on file     Social Determinants of Health     Financial Resource Strain:     Difficulty of Paying Living Expenses:    Food Insecurity:     Worried About Running Out of Food in the Last Year:     [address] in the Last Year:    Transportation Needs:     Lack of Transportation (Medical):  Lack of Transportation (Non-Medical):    Physical Activity:     Days of Exercise per Week:     Minutes of Exercise per Session:    Stress:     Feeling of Stress :    Social Connections:     Frequency of Communication with Friends and Family:     Frequency of Social Gatherings with Friends and Family:     Attends Gnosticism Services:     Active Member of Clubs or Organizations:     Attends Club or Organization Meetings:     Marital Status:    Intimate Partner Violence:     Fear of Current or Ex-Partner:     Emotionally Abused:     Physically Abused:     Sexually Abused:         ROS:  As noted above, otherwise remainder of 10-point ROS negative      Physical Exam:     Vital Signs:  BP (!) 136/91   Pulse 99   Temp 97.5 °F (36.4 °C) (Oral)   Resp 18   Wt 156 lb 4.8 oz (70.9 kg)   SpO2 100%   BMI 26.01 kg/m²     Weight:    Wt Readings from Last 3 Encounters:   06/30/21 156 lb 4.8 oz (70.9 kg)   06/26/21 160 lb (72.6 kg)   06/21/21 160 lb (72.6 kg)       KPS: 90% Able to carry on normal activity; minor signs or symptoms of disease    ECOG PS:  (1) Restricted in physically strenuous activity, ambulatory and able to do work of light nature    General: Awake, alert and oriented.   HEENT: normocephalic, alopecia, PERRL, no scleral erythema or icterus, Oral mucosa moist and intact, throat clear  NECK: supple without palpable adenopathy  BACK: Straight negative CVAT  SKIN: warm - Cont Oxycodone PRN      - DVT Prophylaxis: Platelets >84,792 cells/dL, - daily lovenox prophylaxis ordered  Contraindications to pharmacologic prophylaxis: None  Contraindications to mechanical prophylaxis: None    - Disposition:  Once ANC >1 and recovered from toxicity      The patient was seen and examined by Dr. Rod Álvarez. This admission history and physical has been discussed and agreed upon by Dr. Rod Álvarez.       Oli Castillo, APRN - CNP

## 2021-06-30 NOTE — FLOWSHEET NOTE
06/30/21 1427   Encounter Summary   Services provided to: Patient and family together   Referral/Consult From: Nursing Supervisor/Manager   Continue Visiting   (es 6/30)   Complexity of Encounter Moderate   Length of Encounter 15 minutes   Routine   Type Initial   Assessment Approachable; Hopeful   Intervention Active listening;Explored feelings, thoughts, concerns;Explored coping resources;Nurtured hope;Prayer;Contacted support as requested per patient/family request;Discussed belief system/[employer] practices/celestino;Discussed illness/injury and it's impact   Who? fr rowe   Why? pt is Hinduism   At Request Of patient   Outcome Comfort;Engaged in conversation;Receptive

## 2021-06-30 NOTE — PROGRESS NOTES
Administration: Chemotherapy drug Melphalan independently verified with Cuco Platt RN prior to administration. Acknowledgement of informed consent for chemotherapy administration verified. Original order, appropriateness of regimen, drug supplied, height, weight, BSA, dose calculations, expiration dates/times, drug appearance, and two patient identifiers were verified by both RNs. Drug checked for vesicant/irritant status and for risk of hypersensitivity. Most recent laboratory values and allergies, were reviewed. Positive, brisk blood return via CVC was confirmed prior to administration. Chest x-ray for correct line placement reviewed. Isak White RN and Cuco Platt RN verified correct rate of chemotherapy and maintenance IV fluids. Patient was educated on chemotherapy regimen prior to administration including indication for treatment related to disease & side effects of chemotherapy drug. Patient verbalizes understanding of all instructions. Completion of Chemotherapy: Monitoring during infusion done per policy, see Flowsheets. Blood return verified before, during, and after infusion per policy; no signs of extravasation. Pt tolerated chemotherapy well and without incident. Chemotherapy infusion end time on the [facility]. Will continue to monitor.

## 2021-07-01 LAB
ANION GAP SERPL CALCULATED.3IONS-SCNC: 9 MMOL/L (ref 3–16)
APTT: 38.2 SEC (ref 26.2–38.6)
BASOPHILS ABSOLUTE: 0 K/UL (ref 0–0.2)
BASOPHILS RELATIVE PERCENT: 0 %
BUN BLDV-MCNC: 14 MG/DL (ref 7–20)
CALCIUM SERPL-MCNC: 8.7 MG/DL (ref 8.3–10.6)
CHLORIDE BLD-SCNC: 105 MMOL/L (ref 99–110)
CO2: 23 MMOL/L (ref 21–32)
CREAT SERPL-MCNC: <0.5 MG/DL (ref 0.6–1.2)
EOSINOPHILS ABSOLUTE: 0 K/UL (ref 0–0.6)
EOSINOPHILS RELATIVE PERCENT: 0 %
GFR AFRICAN AMERICAN: >60
GFR NON-AFRICAN AMERICAN: >60
GLUCOSE BLD-MCNC: 138 MG/DL (ref 70–99)
HCT VFR BLD CALC: 37.3 % (ref 36–48)
HEMOGLOBIN: 12.7 G/DL (ref 12–16)
INR BLD: 1.03 (ref 0.88–1.12)
LYMPHOCYTES ABSOLUTE: 0.2 K/UL (ref 1–5.1)
LYMPHOCYTES RELATIVE PERCENT: 5.1 %
MAGNESIUM: 2.2 MG/DL (ref 1.8–2.4)
MCH RBC QN AUTO: 30.8 PG (ref 26–34)
MCHC RBC AUTO-ENTMCNC: 34.1 G/DL (ref 31–36)
MCV RBC AUTO: 90.3 FL (ref 80–100)
MONOCYTES ABSOLUTE: 0 K/UL (ref 0–1.3)
MONOCYTES RELATIVE PERCENT: 0.7 %
NEUTROPHILS ABSOLUTE: 4.1 K/UL (ref 1.7–7.7)
NEUTROPHILS RELATIVE PERCENT: 94.2 %
PDW BLD-RTO: 17.1 % (ref 12.4–15.4)
PLATELET # BLD: 192 K/UL (ref 135–450)
PMV BLD AUTO: 7.7 FL (ref 5–10.5)
POTASSIUM SERPL-SCNC: 4.1 MMOL/L (ref 3.5–5.1)
PROTHROMBIN TIME: 11.7 SEC (ref 9.9–12.7)
RBC # BLD: 4.13 M/UL (ref 4–5.2)
SODIUM BLD-SCNC: 137 MMOL/L (ref 136–145)
URIC ACID, SERUM: 3.8 MG/DL (ref 2.6–6)
WBC # BLD: 4.3 K/UL (ref 4–11)

## 2021-07-01 PROCEDURE — 2580000003 HC RX 258: Performed by: NURSE PRACTITIONER

## 2021-07-01 PROCEDURE — 85730 THROMBOPLASTIN TIME PARTIAL: CPT

## 2021-07-01 PROCEDURE — 85025 COMPLETE CBC W/AUTO DIFF WBC: CPT

## 2021-07-01 PROCEDURE — 83735 ASSAY OF MAGNESIUM: CPT

## 2021-07-01 PROCEDURE — 6370000000 HC RX 637 (ALT 250 FOR IP): Performed by: NURSE PRACTITIONER

## 2021-07-01 PROCEDURE — 2060000000 HC ICU INTERMEDIATE R&B

## 2021-07-01 PROCEDURE — 6360000002 HC RX W HCPCS: Performed by: NURSE PRACTITIONER

## 2021-07-01 PROCEDURE — 6370000000 HC RX 637 (ALT 250 FOR IP): Performed by: INTERNAL MEDICINE

## 2021-07-01 PROCEDURE — 80048 BASIC METABOLIC PNL TOTAL CA: CPT

## 2021-07-01 PROCEDURE — 36592 COLLECT BLOOD FROM PICC: CPT

## 2021-07-01 PROCEDURE — 85610 PROTHROMBIN TIME: CPT

## 2021-07-01 PROCEDURE — 6360000002 HC RX W HCPCS: Performed by: INTERNAL MEDICINE

## 2021-07-01 PROCEDURE — 84550 ASSAY OF BLOOD/URIC ACID: CPT

## 2021-07-01 PROCEDURE — 30243Y0 TRANSFUSION OF AUTOLOGOUS HEMATOPOIETIC STEM CELLS INTO CENTRAL VEIN, PERCUTANEOUS APPROACH: ICD-10-PCS | Performed by: INTERNAL MEDICINE

## 2021-07-01 RX ORDER — DIPHENHYDRAMINE HYDROCHLORIDE 50 MG/ML
25 INJECTION INTRAMUSCULAR; INTRAVENOUS PRN
Status: DISPENSED | OUTPATIENT
Start: 2021-07-01 | End: 2021-07-02

## 2021-07-01 RX ORDER — PROCHLORPERAZINE EDISYLATE 5 MG/ML
5 INJECTION INTRAMUSCULAR; INTRAVENOUS EVERY 4 HOURS PRN
Status: DISCONTINUED | OUTPATIENT
Start: 2021-07-01 | End: 2021-07-09

## 2021-07-01 RX ORDER — ONDANSETRON 2 MG/ML
8 INJECTION INTRAMUSCULAR; INTRAVENOUS EVERY 8 HOURS PRN
Status: DISCONTINUED | OUTPATIENT
Start: 2021-07-01 | End: 2021-07-15 | Stop reason: HOSPADM

## 2021-07-01 RX ORDER — ONDANSETRON HYDROCHLORIDE 8 MG/1
8 TABLET, FILM COATED ORAL EVERY 8 HOURS PRN
Status: DISCONTINUED | OUTPATIENT
Start: 2021-07-01 | End: 2021-07-15 | Stop reason: HOSPADM

## 2021-07-01 RX ORDER — ACETAMINOPHEN 325 MG/1
650 TABLET ORAL ONCE
Status: COMPLETED | OUTPATIENT
Start: 2021-07-01 | End: 2021-07-01

## 2021-07-01 RX ORDER — PROCHLORPERAZINE MALEATE 10 MG
10 TABLET ORAL EVERY 4 HOURS PRN
Status: DISCONTINUED | OUTPATIENT
Start: 2021-07-01 | End: 2021-07-09

## 2021-07-01 RX ORDER — DIPHENHYDRAMINE HCL 25 MG
25 TABLET ORAL ONCE
Status: COMPLETED | OUTPATIENT
Start: 2021-07-01 | End: 2021-07-01

## 2021-07-01 RX ORDER — SODIUM CHLORIDE 9 MG/ML
INJECTION, SOLUTION INTRAVENOUS CONTINUOUS PRN
Status: DISCONTINUED | OUTPATIENT
Start: 2021-07-01 | End: 2021-07-15 | Stop reason: HOSPADM

## 2021-07-01 RX ORDER — MORPHINE SULFATE 2 MG/ML
2 INJECTION, SOLUTION INTRAMUSCULAR; INTRAVENOUS PRN
Status: DISCONTINUED | OUTPATIENT
Start: 2021-07-01 | End: 2021-07-06 | Stop reason: ALTCHOICE

## 2021-07-01 RX ADMIN — SODIUM CHLORIDE 15 ML: 900 IRRIGANT IRRIGATION at 11:30

## 2021-07-01 RX ADMIN — FUROSEMIDE 40 MG: 10 INJECTION, SOLUTION INTRAMUSCULAR; INTRAVENOUS at 18:17

## 2021-07-01 RX ADMIN — Medication 10 ML: at 09:34

## 2021-07-01 RX ADMIN — VALACYCLOVIR HYDROCHLORIDE 500 MG: 500 TABLET, FILM COATED ORAL at 21:16

## 2021-07-01 RX ADMIN — ACETAMINOPHEN 650 MG: 325 TABLET ORAL at 10:36

## 2021-07-01 RX ADMIN — ENOXAPARIN SODIUM 40 MG: 40 INJECTION SUBCUTANEOUS at 18:17

## 2021-07-01 RX ADMIN — VALACYCLOVIR HYDROCHLORIDE 500 MG: 500 TABLET, FILM COATED ORAL at 09:33

## 2021-07-01 RX ADMIN — FLUCONAZOLE 400 MG: 200 TABLET ORAL at 09:33

## 2021-07-01 RX ADMIN — SODIUM CHLORIDE: 9 INJECTION, SOLUTION INTRAVENOUS at 19:00

## 2021-07-01 RX ADMIN — DIPHENHYDRAMINE HYDROCHLORIDE 25 MG: 25 TABLET ORAL at 10:36

## 2021-07-01 RX ADMIN — HYDROCORTISONE SODIUM SUCCINATE 100 MG: 100 INJECTION, POWDER, FOR SOLUTION INTRAMUSCULAR; INTRAVENOUS at 10:36

## 2021-07-01 RX ADMIN — PROCHLORPERAZINE MALEATE 10 MG: 10 TABLET ORAL at 22:00

## 2021-07-01 RX ADMIN — LORAZEPAM 1 MG: 1 TABLET ORAL at 21:16

## 2021-07-01 RX ADMIN — LORAZEPAM 1 MG: 1 TABLET ORAL at 09:33

## 2021-07-01 RX ADMIN — SODIUM CHLORIDE: 9 INJECTION, SOLUTION INTRAVENOUS at 03:21

## 2021-07-01 RX ADMIN — Medication 10 ML: at 21:16

## 2021-07-01 RX ADMIN — AMLODIPINE BESYLATE 10 MG: 10 TABLET ORAL at 09:34

## 2021-07-01 RX ADMIN — SODIUM CHLORIDE: 9 INJECTION, SOLUTION INTRAVENOUS at 11:36

## 2021-07-01 ASSESSMENT — PAIN SCALES - GENERAL
PAINLEVEL_OUTOF10: 0

## 2021-07-01 NOTE — OP NOTE
[place] [address], [address]                                OPERATIVE REPORT    PATIENT NAME: Shady Pisano                   :        1950  MED REC NO:   [id number]                          ROOM:  ACCOUNT NO:   [de-identified]                           ADMIT DATE: 2021  PROVIDER:     Salina Aguilar MD    DATE OF PROCEDURE:  2021    PREOPERATIVE DIAGNOSIS:  Multiple myeloma. POSTOPERATIVE DIAGNOSIS:  Multiple myeloma. PROCEDURE PERFORMED:  Left subclavian Trifusion catheter placement under  fluoroscopic guidance. SURGEON FOR THE CASE:  Salina Aguilar MD    ANESTHESIA:  Local, MAC.    ESTIMATED BLOOD LOSS:  Less than 25 mL. COMPLICATIONS:  None. SPECIMENS:  None. INDICATIONS FOR THE PROCEDURE:  The patient is a 67-year-old female with  a history of multiple myeloma who is to undergo bone marrow transplant,  and as a result of this, decision was made to proceed with placement of  a Trifusion catheter. The risks and benefits of the procedure were  discussed at length with the patient and consent was obtained. DETAILS OF THE OPERATION:  The patient was brought to the operating  room, placed in the supine position. After adequate  sedation, towel  roll was  placed between the scapulas and the patient was then prepped  and draped in sterile fashion. Left infraclavicular fossa was  anesthetized with 1%  lidocaine. Following adequate local anesthetic,  the left subclavian vein was entered with a single stick with good  return of venous blood. The guidewire was then  positioned in the  superior vena cava and this was confirmed using fluoroscopy. Following  this, the exit site for the Trifusion catheter was noted on the skin. This was anesthetized with 1% lidocaine. Incision was then made. The  Trifusion catheter was then tunneled through the exit site of the  guidewire.   The dilator was then advanced over the guidewire and then,  this dilator was removed. The dilator and sheath were then passed over  the dilator _____ into the superior vena cava and this was done under  fluoroscopic guidance _____ the dilator and wire were subsequently  removed. The catheter was then threaded through the sheath into the  superior vena cava. The sheath was then removed. At this point, there was a kink within the subcutaneous tissue and which  we could not advance the catheter any further, and as a result of this,  the catheter was then partially removed. A Glidewire was then utilized  to maintain access for the vein. This was then advanced into the  superior vena cava. The catheter was then backed out completely and  then, the guidewire was then advanced from the exit site of the catheter  to the exit site of the guidewire, and at this point, we then reinserted  the dilator and sheath and the catheter was then again tunneled  subcutaneously to the exit site of the guidewire. Following this, the  dilator and wire were all removed. The catheter was then threaded back  through this sheath into the superior vena cava and the sheath was then  removed with good positioning of the catheter within the subcutaneous  tissue and appropriate position of the catheter tip under fluoroscopic  guidance. Following this, all lines on the Trifusion catheter were accessed. Good  return of venous blood was noticed. They all flushed easily. The  catheter was then secured to the skin using Prolene at the exit site and  then two Prolenes to secure the catheter to the chest wall. Wounds were  then cleaned, dried, and the exit site of the guidewire site was closed  using interrupted 3-0 Vicryl suture and then dressed with Dermabond and  then, a Biopatch was placed about the catheter and then, the catheter  was dressed with Tegaderm.   The patient  tolerated the procedure well,  was taken  to the recovery room in stable condition.         Nawaf Alonso MD    D: 06/30/2021 17:57:14       T: 07/01/2021 1:48:47     [email or web address]  Job#: [id number]     Doc#: [id number]    CC:

## 2021-07-01 NOTE — PLAN OF CARE
Problem: Falls - Risk of:  Goal: Will remain free from falls  Description: Orthostatic vital signs obtained at start of shift - see flowsheet for details. Pt does not meet criteria for orthostasis. Pt is a Med fall risk. See Melvenia Palm Fall Score and ABCDS Injury Risk assessments. - Screening for Orthostasis AND not a [place] Risk per BERMUDEZ/ABCDS: Pt bed is in low position, side rails up, call light and belongings are in reach. Fall risk light is on outside pts room. Pt encouraged to call for assistance as needed. Will continue with hourly rounds for PO intake, pain needs, toileting and repositioning as needed.       Outcome: Ongoing

## 2021-07-01 NOTE — PROGRESS NOTES
Behavioral Health New patient Patient Assessment/Intervention  7/1/2021  Zion Cee Psy.D., Clinical Psychologist    Assessment/Plan: Earl Kidd is a 72y.o.  year-old female evaluated for routine behavioral health assessment. Main concerns at this time include managing anxiety and stress and interventions will target development of coping mechanisms for anxiety. Behavioral health recommendations for treatment team include none at this time. Interventions this admission: Assessment of current needs, discussed alternative coping skills for managing anxiety other than benzodiazepines    Subjective:     Patient reports a long history of anxiety. Has been on lorazepam (1mg, BID) for 12 years. She recently tried reducing lorazepam use and was down to 1.5 tablets per day, but increased back to two when she was diagnosed with cancer. Patient states that she would like to learn additional coping skills for managing anxiety.     Objective:  Mental status: Appearance: good hygiene , Attitude: cooperative and friendly, Consciousness: alert, Orientation: oriented to person, place, time, general circumstance, Memory: recent and remote memory intact, Attention/Concentration: intact during session, Psychomotor Activity:normal, Eye Contact: normal, Speech: normal rate and volume, well-articulated, Mood: anxious, Affect: anxious, Perception: within normal limits, Thought Content: within normal limits, Thought Process: logical, coherent and goal-directed, Insight: good, Judgment: intact, Ability to understand instructions: Yes, Ability to respond meaningfully: Yes    -----------------------------------------------------------------------------------------------------------------------------------    At initial encounter with patient, discussed role of psychologist on the unit including addressing emotional and behavioral needs while receiving care at the [facility]. Also discussed with patient that a component of provider's role is completing the pre-surgical psychological evaluations for bone marrow transplant and CAR-T. Informed patient of psychologist's various roles in order for the patient to be fully informed when consenting to behavioral health treatment. Patient was agreeable to engaging in care with psychologist. Discussed limits of confidentiality including that psychologist coordinates with patient's treatment team and other limits of confidentiality were discussed as needed.

## 2021-07-01 NOTE — PLAN OF CARE
Problem: Nutrition  Goal: Optimal nutrition therapy  Outcome: Ongoing  Note: Nutrition Problem #1: Increased nutrient needs  Intervention: Food and/or Nutrient Delivery: Continue Current Diet  Nutritional Goals: pt will maintain adequate po nutrition by consuming > 75% of meals and snacks offered through admission

## 2021-07-01 NOTE — PROGRESS NOTES
Comprehensive Nutrition Assessment    Type and Reason for Visit:  Initial    Nutrition Recommendations/Plan:   1. PO Diet: Continue current diet low microbial; encourage dietary protein at each meal/snack  2. ONS: Not indicated; pt to include 1 high britney/pro snack daily   3. Nutrition Education: reviewed increased nutrient needs and food safety/rec'd post bmt. 4. Record and encourage PO intake at all meals through admission. Nutrition Assessment:  Nutrition eval for BMT; pt reports good appetite leading up to tpx today. RD verbally reviewed increased nutrient needs post bmt and food safety rec'd. RD encouraged pt to add in high britney/pro snack. Nutritionally stable. RD to monitor nutritionally adequacy post bmt    Malnutrition Assessment:  Malnutrition Status:  No malnutrition    Context:  Acute Illness (on chronic )       Estimated Daily Nutrient Needs:  Energy (kcal):  3109-7929; Weight Used for Energy Requirements:  Current (74.4 kg )     Protein (g):  74-86g (1.3-1.5); Weight Used for Protein Requirements:  Ideal (57 kg )        Fluid (ml/day):  6466-3769; Method Used for Fluid Requirements:  1 ml/kcal      Nutrition Related Findings:  lbm 7/1     Meds: reviewed  Labs: reviewed    Wounds:  None       Current Nutrition Therapies:    ADULT DIET;  Regular; Low Microbial   PO Intake: %  PO Supplement Intake: %  IVF: NS @ 20 ml/hr       ANTHROPOMETRICS  Current Height: 5' 5\" (165.1 cm)  Current Weight: 164 lb 1.6 oz (74.4 kg)    Admission weight: 156 lb 4.8 oz (70.9 kg)  Ideal Body Weight (lbs) (Calculated): 125 lbs (Ideal Body Weight (Kg) (Calculated): 57 kg)  Usual Bodyweight ~165 lb   Weight Changes no significant;        BMI BMI (Calculated): 27.4    Wt Readings from Last 50 Encounters:   07/01/21 164 lb 1.6 oz (74.4 kg)   06/26/21 160 lb (72.6 kg)   06/21/21 160 lb (72.6 kg)   05/25/21 159 lb (72.1 kg)   04/13/21 166 lb 6.4 oz (75.5 kg)   01/11/21 163 lb (73.9 kg)   11/25/20 163 lb 8 oz (74.2 kg)   10/20/20 165 lb 12.8 oz (75.2 kg)   09/25/20 166 lb (75.3 kg)   07/11/20 170 lb 13.7 oz (77.5 kg)   07/01/20 170 lb (77.1 kg)   06/16/20 171 lb (77.6 kg)   01/22/20 179 lb 0.2 oz (81.2 kg)   01/15/20 179 lb (81.2 kg)   01/06/20 177 lb (80.3 kg)   10/03/19 175 lb (79.4 kg)       Nutrition Diagnosis:   · Increased nutrient needs related to catabolic illness as evidenced by  (chemotx then bmt )    Nutrition Interventions:   Food and/or Nutrient Delivery:  Continue Current Diet  Nutrition Education/Counseling:  Education completed   Coordination of Nutrition Care:  Continue to monitor while inpatient    Goals:  pt will maintain adequate po nutrition by consuming > 75% of meals and snacks offered through admission       Nutrition Monitoring and Evaluation:   Food/Nutrient Intake Outcomes:  Food and Nutrient Intake  Physical Signs/Symptoms Outcomes:  Biochemical Data, Chewing or Swallowing     Electronically signed by Iza Liu RD, MOIZ on 7/1/21 at 4:24 PM EDT    Contact: [phone]

## 2021-07-01 NOTE — PLAN OF CARE
Problem: Falls - Risk of:  Goal: Will remain free from falls  Description: Orthostatic vital signs obtained at start of shift - see flowsheet for details. Pt does not meet criteria for orthostasis. Pt is a Med fall risk. See Mearl Ta Fall Score and ABCDS Injury Risk assessments. - Screening for Orthostasis AND not a [place] Risk per BERMUDEZ/ABCDS: Pt bed is in low position, side rails up, call light and belongings are in reach. Fall risk light is on outside pts room. Pt encouraged to call for assistance as needed. Will continue with hourly rounds for PO intake, pain needs, toileting and repositioning as needed. 7/1/2021 1122 by Jeffrey Elder RN  Outcome: Ongoing  Note: Orthostatic vital signs obtained at start of shift - see flowsheet for details. Pt does not meet criteria for orthostasis. Pt is a Med fall risk. See Mearl Ta Fall Score and ABCDS Injury Risk assessments. - Screening for Orthostasis AND not a [place] Risk per BERMUDEZ/ABCDS: Pt bed is in low position, side rails up, call light and belongings are in reach. Fall risk light is on outside pts room. Pt encouraged to call for assistance as needed. Will continue with hourly rounds for PO intake, pain needs, toileting and repositioning as needed. 7/1/2021 0604 by Susana Ambrocio RN  Outcome: Ongoing     Problem: Infection - Central Venous Catheter-Associated Bloodstream Infection:  Goal: Will show no infection signs and symptoms  Description: Will show no infection signs and symptoms  Outcome: Ongoing  Note: CVC site remains free of signs/symptoms of infection. No drainage, edema, erythema, pain, or warmth noted at site. Dressing changes continue per protocol and on an as needed basis - see flowsheet. Compliant with BCC Bath Protocol:  Performed CHG bath today per BCC protocol utilizing CHG solution in the shower. CVC site cleansed with CHG wipe over dressing, skin surrounding dressing, and first 6\" of IV tubing.   Pt tolerated well.  Continued to encourage daily CHG bathing per [facility] protocol. Problem: Venous Thromboembolism:  Goal: Will show no signs or symptoms of venous thromboembolism  Description: Will show no signs or symptoms of venous thromboembolism  Outcome: Ongoing  Note: Adherent with DVT Prevention: Pt is at risk for DVT d/t decreased mobility and cancer treatment. Pt educated on importance of activity. Pt has orders for Subcut prophylactic lovenox. Pt verbalizes understanding of need for prophylaxis while inpatient.

## 2021-07-01 NOTE — PROGRESS NOTES
[address]  Autologous Progress Note    2021    Yoav Stewart    :  1950    MRN:  [id number]    Referring MD: Bernie Araujo MD  [address] [facility],  [address]      Subjective:  C/o feeling mildly queasy yesterday, but full dose IV compazine was too strong. She feels better today except for anxiety. ECOG PS:  (1) Restricted in physically strenuous activity, ambulatory and able to do work of light nature    KPS: 80% Normal activity with effort; some signs or symptoms of disease    Isolation:  None     Medications    Scheduled Meds:   [START ON 2021] tbo-filgrastim  300 mcg Subcutaneous QPM    enoxaparin  40 mg Subcutaneous Daily    fluconazole  400 mg Oral Daily    Saline Mouthwash  15 mL Swish & Spit 4x Daily AC & HS    sodium chloride flush  5-40 mL Intravenous 2 times per day    valACYclovir  500 mg Oral BID    furosemide  40 mg Intravenous Q12H    LORazepam  1 mg Oral BID    amLODIPine  10 mg Oral Daily     Continuous Infusions:   sodium chloride      sodium chloride      sodium chloride 75 mL/hr at 21 0321     PRN Meds:.sodium chloride, sodium chloride, alteplase, magnesium hydroxide, magnesium sulfate, potassium chloride, Saline Mouthwash, sodium chloride flush, prochlorperazine **OR** prochlorperazine, oxyCODONE, cloNIDine    ROS:  As noted above, otherwise remainder of 10-point ROS negative    Physical Exam:     I&O:      Intake/Output Summary (Last 24 hours) at 2021 0612  Last data filed at 2021 0500  Gross per 24 hour   Intake 1738 ml   Output 3650 ml   Net -1912 ml       Vital Signs:  BP (!) 157/90   Pulse 98   Temp 97.4 °F (36.3 °C) (Oral)   Resp 18   Ht 5' 5\" (1.651 m)   Wt 156 lb 4.8 oz (70.9 kg)   SpO2 100%   BMI 26.01 kg/m²     Weight:    Wt Readings from Last 3 Encounters:   21 156 lb 4.8 oz (70.9 kg)   21 160 lb (72.6 kg)   21 160 lb (72.6 kg)       General: Awake, alert and oriented.   HEENT: normocephalic, alopecia, PERRL, no scleral erythema or icterus, Oral mucosa moist and intact, throat clear  NECK: supple without palpable adenopathy  BACK: Straight negative CVAT  SKIN: warm dry and intact without lesions rashes or masses  CHEST: CTA bilaterally without use of accessory muscles  CV: Normal S1 S2, RRR, no MRG  ABD: NT ND normoactive BS, no palpable masses or hepatosplenomegaly  EXTREMITIES: without edema, denies calf tenderness  NEURO: CN II - XII grossly intact  CATHETER: Left SC Trifusion (6/21/21, Blake) - CDI    Data:   CBC:   Recent Labs     06/30/21  1055 07/01/21  0320   WBC 4.3 4.3   HGB 13.1 12.7   HCT 39.1 37.3   MCV 90.7 90.3    192     BMP/Mag:  Recent Labs     06/30/21  1055 07/01/21  0320    137   K 4.4 4.1    105   CO2 25 23   PHOS 4.9  --    BUN 16 14   CREATININE <0.5* <0.5*   MG 2.20  --      LIVP:   Recent Labs     06/30/21  1055   AST 14*   ALT 10   BILIDIR <0.2   BILITOT 0.6   ALKPHOS 104     Uric Acid:    Recent Labs     07/01/21  0320   LABURIC 3.8     Coags:   Recent Labs     06/30/21  1055 07/01/21  0320   PROTIME 11.6 11.7   INR 1.03 1.03   APTT 39.3* 38.2         PROBLEM LIST:            1. IgA Kappa Multiple Myeloma  2. Hepatitis C (2002, s/p tx with Harvoni 2016)  3. Recurrent shingles (5/20/21)  4. Anxiety/depression  5. Chronic HFrEF (LVEF 40-45%)      TREATMENT:            1. RVD x7 cycles  2. Melphalan 200mg/m2 & ASCT (7/1/21) - 3.51 x10^6 pd23nuoxp/kg      ASSESSMENT AND PLAN:         1. IgA Kappa Multiple Myeloma: Currently in a OK  - Post-Txp Maintenance: Revlimid maintenance  - Post-Txp F/u: Serum myeloma studies        Day 0     2. ID: Afebrile, no evidence of infection.   - H/o recurrent shingles  - Cont Diflucan and Valtrex ppx, cont acyclovir at discharge for VZV positive titer.    - Start Levaquin ppx when ANC < 1.5     3. Heme: stable blood counts   - Transfuse for Hgb < 7 and Platelets < 48A  - No transfusion today     4.  Metabolic: stable renal fxn and e-lytes   - Cont IVF: NS @ 150 mL/hr, decrease to 50 mL/hr after transplant   - Replace potassium and magnesium per PRN orders     5. Cardiac:   - H/o Chronic HFrEF & HTN  - Echo 5/21/21: global hypokinesis EF 40-45%, Mild-Mod MR, Mild TR  - Cleared for transplant by Dr. Mellisa Keith 6/3/21  Chronic HFrEF:  - Monitor fluid status closely   HTN:  - Cont Norvasc 10 mg daily      6. GI / Nutrition:   - H/o hepatitis C  Hepatitis C:  No acute issues   - S/p Harvoni (2016)  Nutrition:  Appetite and oral intake is good. - Cont low microbial diet   - Follow closely with dietary  Nausea:  Mild, improved w/ Compazine  - Cont Compazine and add Zofran as needed     7.  Psych:  - H/o Anxiety / Depression:   Anxiety / Depression:    - Cont Ativan 1mg BID     8.  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 Jennifer Estrada, APRN - CNP

## 2021-07-01 NOTE — PROCEDURES
Transplant (T0) Progress Note      Eriberto Hyde                           Blood Type: B pos    07/01/2021                    Start time:1123   Completion time: 8686    Transplant Type: Autologous    Product Type: PBSC (peripheral blood stem cell)    Product Unit Number: [id number] A0-B0, [id number] A0    Cell Count: 3.51  x 10^6  Volume: 210 mL      Product Manipulation:      Volume Reduction  No  Plasma Depletion  No  RBC Depletion  No    Catheter lumen used for infusion: Trifusion, Red lumen           Positive Blood Return: Yes    Donor Name: self                                     Blood Type: B pos    Relationship: self                          Donor Sex: Female    Premeds Given: 650mg PO tylenol, 25mg PO benadryl, 100mg IV solu-cortef    Adverse Reaction(s) and treatment: Baseline vital signs obtained prior to infusion and monitoring completed throughout per [facility] protocol - see flowsheets. All IVFs turned down to [facility] during stem cell infusion. Stem cell product(s) verified with 2nd RN Bridger Juarez V prior to infusion using 2 patient identifiers. Infused via gravity with rate controlled by Heather Knowles RN per [facility] protocols. Emergency medications epinephrine, benadryl, & hydrocortisone available as needed. Emergency medical equipment available as needed including telemetry monitoring throughout infusion, supplemental O2, suction equipment, and crash cart. RN at bedside throughout infusion.       Electronically signed by Cyndi Vanegas RN on 7/1/2021 at 12:55 PM

## 2021-07-02 LAB
ALBUMIN SERPL-MCNC: 3.7 G/DL (ref 3.4–5)
ALP BLD-CCNC: 74 U/L (ref 40–129)
ALT SERPL-CCNC: 10 U/L (ref 10–40)
ANION GAP SERPL CALCULATED.3IONS-SCNC: 9 MMOL/L (ref 3–16)
ANISOCYTOSIS: ABNORMAL
AST SERPL-CCNC: 15 U/L (ref 15–37)
BANDED NEUTROPHILS RELATIVE PERCENT: 4 % (ref 0–7)
BASOPHILS ABSOLUTE: 0 K/UL (ref 0–0.2)
BASOPHILS RELATIVE PERCENT: 0 %
BILIRUB SERPL-MCNC: 0.5 MG/DL (ref 0–1)
BILIRUBIN DIRECT: <0.2 MG/DL (ref 0–0.3)
BILIRUBIN, INDIRECT: ABNORMAL MG/DL (ref 0–1)
BUN BLDV-MCNC: 14 MG/DL (ref 7–20)
CALCIUM SERPL-MCNC: 8.1 MG/DL (ref 8.3–10.6)
CHLORIDE BLD-SCNC: 106 MMOL/L (ref 99–110)
CO2: 23 MMOL/L (ref 21–32)
CREAT SERPL-MCNC: <0.5 MG/DL (ref 0.6–1.2)
EOSINOPHILS ABSOLUTE: 0 K/UL (ref 0–0.6)
EOSINOPHILS RELATIVE PERCENT: 0 %
GFR AFRICAN AMERICAN: >60
GFR NON-AFRICAN AMERICAN: >60
GLUCOSE BLD-MCNC: 105 MG/DL (ref 70–99)
HCT VFR BLD CALC: 32.4 % (ref 36–48)
HEMOGLOBIN: 11.1 G/DL (ref 12–16)
LACTATE DEHYDROGENASE: 213 U/L (ref 100–190)
LYMPHOCYTES ABSOLUTE: 0.2 K/UL (ref 1–5.1)
LYMPHOCYTES RELATIVE PERCENT: 3 %
MCH RBC QN AUTO: 31.1 PG (ref 26–34)
MCHC RBC AUTO-ENTMCNC: 34.4 G/DL (ref 31–36)
MCV RBC AUTO: 90.4 FL (ref 80–100)
MONOCYTES ABSOLUTE: 0.1 K/UL (ref 0–1.3)
MONOCYTES RELATIVE PERCENT: 2 %
NEUTROPHILS ABSOLUTE: 4.9 K/UL (ref 1.7–7.7)
NEUTROPHILS RELATIVE PERCENT: 91 %
PDW BLD-RTO: 17.1 % (ref 12.4–15.4)
PHOSPHORUS: 3 MG/DL (ref 2.5–4.9)
PLATELET # BLD: 188 K/UL (ref 135–450)
PLATELET SLIDE REVIEW: ADEQUATE
PMV BLD AUTO: 7.8 FL (ref 5–10.5)
POTASSIUM SERPL-SCNC: 3.3 MMOL/L (ref 3.5–5.1)
RBC # BLD: 3.58 M/UL (ref 4–5.2)
SLIDE REVIEW: ABNORMAL
SODIUM BLD-SCNC: 138 MMOL/L (ref 136–145)
TOTAL PROTEIN: 5.8 G/DL (ref 6.4–8.2)
URIC ACID, SERUM: 4.7 MG/DL (ref 2.6–6)
WBC # BLD: 5.2 K/UL (ref 4–11)

## 2021-07-02 PROCEDURE — 84100 ASSAY OF PHOSPHORUS: CPT

## 2021-07-02 PROCEDURE — 6360000002 HC RX W HCPCS: Performed by: NURSE PRACTITIONER

## 2021-07-02 PROCEDURE — 2060000000 HC ICU INTERMEDIATE R&B

## 2021-07-02 PROCEDURE — 6370000000 HC RX 637 (ALT 250 FOR IP): Performed by: INTERNAL MEDICINE

## 2021-07-02 PROCEDURE — 85025 COMPLETE CBC W/AUTO DIFF WBC: CPT

## 2021-07-02 PROCEDURE — 80076 HEPATIC FUNCTION PANEL: CPT

## 2021-07-02 PROCEDURE — 36592 COLLECT BLOOD FROM PICC: CPT

## 2021-07-02 PROCEDURE — 2580000003 HC RX 258: Performed by: NURSE PRACTITIONER

## 2021-07-02 PROCEDURE — 84550 ASSAY OF BLOOD/URIC ACID: CPT

## 2021-07-02 PROCEDURE — 6370000000 HC RX 637 (ALT 250 FOR IP): Performed by: NURSE PRACTITIONER

## 2021-07-02 PROCEDURE — 83615 LACTATE (LD) (LDH) ENZYME: CPT

## 2021-07-02 PROCEDURE — 80048 BASIC METABOLIC PNL TOTAL CA: CPT

## 2021-07-02 RX ORDER — SODIUM CHLORIDE AND POTASSIUM CHLORIDE .9; .15 G/100ML; G/100ML
SOLUTION INTRAVENOUS CONTINUOUS
Status: DISCONTINUED | OUTPATIENT
Start: 2021-07-02 | End: 2021-07-14

## 2021-07-02 RX ADMIN — Medication 10 ML: at 08:21

## 2021-07-02 RX ADMIN — VALACYCLOVIR HYDROCHLORIDE 500 MG: 500 TABLET, FILM COATED ORAL at 20:26

## 2021-07-02 RX ADMIN — AMLODIPINE BESYLATE 10 MG: 10 TABLET ORAL at 08:20

## 2021-07-02 RX ADMIN — POTASSIUM CHLORIDE 20 MEQ: 400 INJECTION, SOLUTION INTRAVENOUS at 08:11

## 2021-07-02 RX ADMIN — FLUCONAZOLE 400 MG: 200 TABLET ORAL at 08:20

## 2021-07-02 RX ADMIN — VALACYCLOVIR HYDROCHLORIDE 500 MG: 500 TABLET, FILM COATED ORAL at 08:21

## 2021-07-02 RX ADMIN — SODIUM CHLORIDE 15 ML: 900 IRRIGANT IRRIGATION at 17:31

## 2021-07-02 RX ADMIN — PROCHLORPERAZINE MALEATE 10 MG: 10 TABLET ORAL at 20:26

## 2021-07-02 RX ADMIN — POTASSIUM CHLORIDE AND SODIUM CHLORIDE: 900; 150 INJECTION, SOLUTION INTRAVENOUS at 08:11

## 2021-07-02 RX ADMIN — SODIUM CHLORIDE 15 ML: 900 IRRIGANT IRRIGATION at 09:12

## 2021-07-02 RX ADMIN — ENOXAPARIN SODIUM 40 MG: 40 INJECTION SUBCUTANEOUS at 17:30

## 2021-07-02 RX ADMIN — SODIUM CHLORIDE 15 ML: 900 IRRIGANT IRRIGATION at 20:27

## 2021-07-02 RX ADMIN — Medication 10 ML: at 20:27

## 2021-07-02 RX ADMIN — PROCHLORPERAZINE MALEATE 10 MG: 10 TABLET ORAL at 14:27

## 2021-07-02 RX ADMIN — LORAZEPAM 1 MG: 1 TABLET ORAL at 20:26

## 2021-07-02 RX ADMIN — POTASSIUM CHLORIDE 20 MEQ: 400 INJECTION, SOLUTION INTRAVENOUS at 10:06

## 2021-07-02 RX ADMIN — LORAZEPAM 1 MG: 1 TABLET ORAL at 08:21

## 2021-07-02 RX ADMIN — PROCHLORPERAZINE MALEATE 10 MG: 10 TABLET ORAL at 08:11

## 2021-07-02 RX ADMIN — POTASSIUM CHLORIDE 20 MEQ: 400 INJECTION, SOLUTION INTRAVENOUS at 11:08

## 2021-07-02 RX ADMIN — POTASSIUM CHLORIDE 20 MEQ: 400 INJECTION, SOLUTION INTRAVENOUS at 09:08

## 2021-07-02 ASSESSMENT — PAIN SCALES - GENERAL
PAINLEVEL_OUTOF10: 0

## 2021-07-02 NOTE — CARE COORDINATION
Type of Admission  Autologous Transplant  7/1/21  Multiple Myeloma       Central venous catheter  6/21/21: trifusion left subclavian. Plan  Auto transplant  D-1  Melphalan 200mg        Update  6/30/21: Pt admitted for auto transplant. Introduced myself and briefly discussed transplant process. Will follow up with patient while inpatient. Reviewed transplant procedure the role of melphalan and the recovery following transplant. 7/2/21:  Discussed recovery and timeline of transplant. Education  6/30/21: Cryotherapy education provided as well as melphalan and emend education. Discharge planning discussed pt spouse will be the caregiver.    7/2/21:  Transplant recovery fall safety        Discharge with the next 30 days          Pending

## 2021-07-02 NOTE — PLAN OF CARE
Problem: Falls - Risk of:  Goal: Will remain free from falls  Description: Orthostatic vital signs obtained at start of shift - see flowsheet for details. Pt does not meet criteria for orthostasis. Pt is a Med fall risk. See [employer] Fall Score and ABCDS Injury Risk assessments. - Screening for Orthostasis AND not a [place] Risk per BERMUDEZ/ABCDS: Pt bed is in low position, side rails up, call light and belongings are in reach. Fall risk light is on outside pts room. Pt encouraged to call for assistance as needed. Will continue with hourly rounds for PO intake, pain needs, toileting and repositioning as needed. Note: Orthostatic vital signs obtained at start of shift - see flowsheet for details. Pt does not meet criteria for orthostasis. Pt is a Med fall risk. See [employer] Fall Score and ABCDS Injury Risk assessments. Explained fall risk precautions to pt and family and rationale behind their use to keep the patient safe. Pt bed is in low position, side rails up, call light and belongings are in reach. Fall risk light is on outside pts room. Pt encouraged to call for assistance as needed. Will continue with hourly rounds for PO intake, pain needs, toileting and repositioning as needed. Problem: Infection - Central Venous Catheter-Associated Bloodstream Infection:  Goal: Will show no infection signs and symptoms  Description: Will show no infection signs and symptoms  Outcome: Ongoing  Note: CVC site remains free of signs/symptoms of infection. No drainage, edema, erythema, pain, or warmth noted at site. Dressing changes continue per protocol and on an as needed basis - see flowsheet. Problem: Venous Thromboembolism:  Goal: Will show no signs or symptoms of venous thromboembolism  Description: Will show no signs or symptoms of venous thromboembolism  Outcome: Ongoing  Note: Pt is at risk for DVT d/t decreased mobility and cancer treatment. Pt educated on importance of activity.   Pt has orders for Subcut prophylactic lovenox. Pt verbalizes understanding of need for prophylaxis while inpatient. Problem: PROTECTIVE PRECAUTIONS  Goal: Patient will remain free of nosocomial Infections  Description: Pt educated on use of mask prior to leaving room. All staff and visitor following handwashing requirements this shift prior to entering room. Low microbial diet order in place and being followed. Linens changed today per protocol and pt reports using chlorhexidine wash per order. Outcome: Ongoing  Note: Pt remains in protective precautions. No living plants or fresh flowers in his/her room. Patient educated on wearing mask when in hallways. Patient, staff, and visitors adhering to handwashing guidelines. Patient cleansed with chlorhexidine wipes and linens changed daily per protocol. Pt verbalizes understanding of low microbial diet. Patient remains free of nosocomial infections. Problem: Bleeding:  Goal: Will show no signs and symptoms of excessive bleeding  Description: Will show no signs and symptoms of excessive bleeding  Note:   Patient's hemoglobin this AM:   Recent Labs     07/02/21  0305   HGB 11.1*     Patient's platelet count this AM:   Recent Labs     07/02/21  0305       Thrombocytopenia Precautions in place. Patient showing no signs or symptoms of active bleeding. Transfusion not indicated at this time. Patient verbalizes understanding of all instructions. Will continue to assess and implement POC. Call light within reach and hourly rounding in place.

## 2021-07-02 NOTE — PROGRESS NOTES
BCC  Autologous Progress Note    2021    Rohit Bowser    :  1950    MRN:  [id number]    Referring MD: Ying Adam MD  [address] [facility],  [address]      Subjective:  Feeling well today. Transplant went well, no acute or subacute issues. ECOG PS:  (1) Restricted in physically strenuous activity, ambulatory and able to do work of light nature    KPS: 80% Normal activity with effort; some signs or symptoms of disease    Isolation:  None     Medications    Scheduled Meds:   [START ON 2021] tbo-filgrastim  300 mcg Subcutaneous QPM    enoxaparin  40 mg Subcutaneous Daily    fluconazole  400 mg Oral Daily    Saline Mouthwash  15 mL Swish & Spit 4x Daily AC & HS    sodium chloride flush  5-40 mL Intravenous 2 times per day    valACYclovir  500 mg Oral BID    LORazepam  1 mg Oral BID    amLODIPine  10 mg Oral Daily     Continuous Infusions:   0.9% NaCl with KCl 20 mEq      sodium chloride      sodium chloride      sodium chloride       PRN Meds:.diphenhydrAMINE, morphine, sodium chloride, sodium chloride, prochlorperazine **OR** prochlorperazine, ondansetron, ondansetron, sodium chloride, sodium chloride, alteplase, magnesium hydroxide, magnesium sulfate, potassium chloride, Saline Mouthwash, sodium chloride flush, oxyCODONE, cloNIDine    ROS:  As noted above, otherwise remainder of 10-point ROS negative    Physical Exam:     I&O:      Intake/Output Summary (Last 24 hours) at 2021 0739  Last data filed at 2021 0617  Gross per 24 hour   Intake 5405 ml   Output 6450 ml   Net -1045 ml       Vital Signs:  BP (!) 152/82   Pulse 95   Temp 97.7 °F (36.5 °C) (Oral)   Resp 18   Ht 5' 5\" (1.651 m)   Wt 164 lb 1.6 oz (74.4 kg)   SpO2 100%   BMI 27.31 kg/m²     Weight:    Wt Readings from Last 3 Encounters:   21 164 lb 1.6 oz (74.4 kg)   21 160 lb (72.6 kg)   21 160 lb (72.6 kg)       General: Awake, alert and oriented.   HEENT: normocephalic, alopecia, PERRL, no scleral erythema or icterus, Oral mucosa moist and intact, throat clear  NECK: supple without palpable adenopathy  BACK: Straight negative CVAT  SKIN: warm dry and intact without lesions rashes or masses  CHEST: CTA bilaterally without use of accessory muscles  CV: Normal S1 S2, RRR, no MRG  ABD: NT ND normoactive BS, no palpable masses or hepatosplenomegaly  EXTREMITIES: without edema, denies calf tenderness  NEURO: CN II - XII grossly intact  CATHETER: Left SC Trifusion (6/21/21, Blake) - CDI    Data:   CBC:   Recent Labs     06/30/21  1055 07/01/21  0320 07/02/21  0305   WBC 4.3 4.3 5.2   HGB 13.1 12.7 11.1*   HCT 39.1 37.3 32.4*   MCV 90.7 90.3 90.4    192 188     BMP/Mag:  Recent Labs     06/30/21  1055 07/01/21  0320 07/02/21  0305    137 138   K 4.4 4.1 3.3*    105 106   CO2 25 23 23   PHOS 4.9  --  3.0   BUN 16 14 14   CREATININE <0.5* <0.5* <0.5*   MG 2.20 2.20  --      LIVP:   Recent Labs     06/30/21  1055 07/02/21  0305   AST 14* 15   ALT 10 10   BILIDIR <0.2 <0.2   BILITOT 0.6 0.5   ALKPHOS 104 74     Uric Acid:    Recent Labs     07/02/21  0305   LABURIC 4.7     Coags:   Recent Labs     06/30/21  1055 07/01/21  0320   PROTIME 11.6 11.7   INR 1.03 1.03   APTT 39.3* 38.2         PROBLEM LIST:            1. IgA Kappa Multiple Myeloma  2. Hepatitis C (2002, s/p tx with Harvoni 2016)  3. Recurrent shingles (5/20/21)  4. Anxiety/depression  5. Chronic HFrEF (LVEF 40-45%)      TREATMENT:            1. RVD x7 cycles  2. Melphalan 200mg/m2 & ASCT (7/1/21) - 3.51 x10^6 [id number]/kg      ASSESSMENT AND PLAN:         1. IgA Kappa Multiple Myeloma: Currently in a GA  - S/p [id number] & ASCT (7/1/21)  - Post-Txp Maintenance: Revlimid maintenance  - Post-Txp F/u: Serum myeloma studies        Day + 1     2.  ID: Afebrile, no evidence of infection.   - H/o recurrent shingles  - Cont Diflucan and Valtrex ppx, cont acyclovir at discharge for VZV positive titer.    - Start Levaquin ppx when ANC < 1.5     3. Heme: stable blood counts   - Transfuse for Hgb < 7 and Platelets < 53M  - No transfusion today  - Star Granix 7/6/21     4. Metabolic: stable renal fxn and e-lytes except for hypoK  - Change IVF: NS + KCl 20 meq @ 50 mL/hr  - Replace potassium and magnesium per PRN orders     5. Cardiac:   - H/o Chronic HFrEF & HTN  - Echo 5/21/21: global hypokinesis EF 40-45%, Mild-Mod MR, Mild TR  - Cleared for transplant by Dr. Antonia Corado 6/3/21  Chronic HFrEF:  - Monitor fluid status closely   HTN:  Intermittently elevated  - Cont Norvasc 10 mg daily   - Cont to monitor      6. GI / Nutrition:   - H/o hepatitis C  Hepatitis C:  No acute issues   - S/p Harvoni (2016)  Nutrition:  Appetite and oral intake is good. - Cont low microbial diet   - Follow closely with dietary  Nausea:  Mild, improved w/ Compazine  - Cont Compazine and add Zofran as needed     7.  Psych:  - H/o Anxiety / Depression:   Anxiety / Depression:  Ongoing anxiety related transplant process  - Cont Ativan 1mg BID  - Psych following      8. 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 Azucena Sue, [address]. Arline Mojica DO, MS  Oncology/Hematology Care    Please contact via:  1.   Perfect Serve  2.  [phone]    7/2/2021   10:28 AM

## 2021-07-02 NOTE — PLAN OF CARE
Problem: Falls - Risk of:  Goal: Will remain free from falls  Description: Orthostatic vital signs obtained at start of shift - see flowsheet for details. Pt does not meet criteria for orthostasis. Pt is a Med fall risk. See Charmayne Sender Fall Score and ABCDS Injury Risk assessments. - Screening for Orthostasis AND not a [place] Risk per BERMUDEZ/ABCDS: Pt bed is in low position, side rails up, call light and belongings are in reach. Fall risk light is on outside pts room. Pt encouraged to call for assistance as needed. Will continue with hourly rounds for PO intake, pain needs, toileting and repositioning as needed. Problem: Infection - Central Venous Catheter-Associated Bloodstream Infection:  Goal: Will show no infection signs and symptoms  Description: Will show no infection signs and symptoms  7/2/2021 1142 by Abdias Gomez RN  Outcome: Ongoing     CVC site remains free of signs/symptoms of infection. No drainage, edema, erythema, pain, or warmth noted at site. Dressing changes continue per protocol and on an as needed basis - see flowsheet. Compliant with [facility] Bath Protocol:  Performed CHG bath today per [facility] protocol utilizing Bath with CHG wipes. CVC site cleansed with CHG wipe over dressing, skin surrounding dressing, and first 6\" of IV tubing. Pt tolerated well. Continued to encourage daily CHG bathing per [facility] protocol. Problem: Venous Thromboembolism:  Goal: Will show no signs or symptoms of venous thromboembolism  Description: Will show no signs or symptoms of venous thromboembolism  7/2/2021 1142 by Abdias Gomez RN  Outcome: Ongoing     Adherent with DVT Prevention: Pt is at risk for DVT d/t decreased mobility and cancer treatment. Pt educated on importance of activity. Pt has orders for SCDs while in bed. Pt verbalizes understanding of need for prophylaxis while inpatient. Pt OOB in chair the majority of this shift.        Problem: PROTECTIVE PRECAUTIONS  Goal: Patient will remain free of nosocomial Infections  Description: Pt educated on use of mask prior to leaving room. All staff and visitor following handwashing requirements this shift prior to entering room. Low microbial diet order in place and being followed. Linens changed today per protocol and pt reports using chlorhexidine wash per order. NO s/sx of infection noted. Pt afebrile. Problem: Bleeding:  Goal: Will show no signs and symptoms of excessive bleeding  Description: Will show no signs and symptoms of excessive bleeding  7/2/2021 1142 by Austin Colon RN  Outcome: Ongoing     Patient's hemoglobin this AM:   Recent Labs     07/02/21  0305   HGB 11.1*     Patient's platelet count this AM:   Recent Labs     07/02/21  0305       Thrombocytopenia not present at this time. Patient showing no signs or symptoms of active bleeding. Transfusion not indicated at this time. Patient verbalizes understanding of all instructions. Will continue to assess and implement POC. Call light within reach and hourly rounding in place.

## 2021-07-02 NOTE — PROGRESS NOTES
Behavioral [facility] Established patient Patient Assessment/Intervention  7/2/2021  Frances Alonzo Psy.D., Clinical Psychologist    Assessment/Plan: Eriberto Hyde is a 72y.o.  year-old female evaluated for routine behavioral health assessment. Main concerns at this time include managing anxiety and stress and interventions will target development of coping mechanisms for anxiety. Behavioral health recommendations for treatment team include none at this time. Interventions this admission: Assessment of current needs, Deep breathing, Acceptance skills, Mindfulness (present moment awareness), discussed alternative coping skills for managing anxiety other than benzodiazepines    Subjective:     Patient reports that she is doing well emotionally. Denies major concerns with anxiety currently. Was willing to learn new coping mechanisms for anxiety. Discussed being aware of what she is thinking and feeling and choosing how she wants to engage with certain thoughts. Patient was agreeable to try coping skills discussed today.     Objective:  Mental status: Appearance: good hygiene , Attitude: cooperative and friendly, Consciousness: alert, Orientation: oriented to person, place, time, general circumstance, Memory: recent and remote memory intact, Attention/Concentration: intact during session, Psychomotor Activity:normal, Eye Contact: normal, Speech: normal rate and volume, well-articulated, Mood: anxious, Affect: anxious, Perception: within normal limits, Thought Content: within normal limits, Thought Process: logical, coherent and goal-directed, Insight: good, Judgment: intact, Ability to understand instructions: Yes, Ability to respond meaningfully: Yes    -----------------------------------------------------------------------------------------------------------------------------------    At initial encounter with patient, discussed role of psychologist on the unit including addressing emotional and behavioral needs while receiving care at the [facility] and [facility]. Also discussed with patient that a component of provider's role is completing the pre-surgical psychological evaluations for bone marrow transplant and CAR-T. Informed patient of psychologist's various roles in order for the patient to be fully informed when consenting to behavioral health treatment. Patient was agreeable to engaging in care with psychologist. Discussed limits of confidentiality including that psychologist coordinates with patient's treatment team and other limits of confidentiality were discussed as needed.

## 2021-07-03 LAB
ANION GAP SERPL CALCULATED.3IONS-SCNC: 9 MMOL/L (ref 3–16)
BASOPHILS ABSOLUTE: 0 K/UL (ref 0–0.2)
BASOPHILS RELATIVE PERCENT: 0.1 %
BUN BLDV-MCNC: 13 MG/DL (ref 7–20)
C DIFF TOXIN/ANTIGEN: NORMAL
CALCIUM SERPL-MCNC: 8.8 MG/DL (ref 8.3–10.6)
CHLORIDE BLD-SCNC: 106 MMOL/L (ref 99–110)
CO2: 24 MMOL/L (ref 21–32)
CREAT SERPL-MCNC: <0.5 MG/DL (ref 0.6–1.2)
EOSINOPHILS ABSOLUTE: 0 K/UL (ref 0–0.6)
EOSINOPHILS RELATIVE PERCENT: 0.4 %
GFR AFRICAN AMERICAN: >60
GFR NON-AFRICAN AMERICAN: >60
GLUCOSE BLD-MCNC: 90 MG/DL (ref 70–99)
HCT VFR BLD CALC: 35.2 % (ref 36–48)
HEMOGLOBIN: 12.1 G/DL (ref 12–16)
LYMPHOCYTES ABSOLUTE: 0.2 K/UL (ref 1–5.1)
LYMPHOCYTES RELATIVE PERCENT: 6.5 %
MCH RBC QN AUTO: 31.2 PG (ref 26–34)
MCHC RBC AUTO-ENTMCNC: 34.2 G/DL (ref 31–36)
MCV RBC AUTO: 91.2 FL (ref 80–100)
MONOCYTES ABSOLUTE: 0.1 K/UL (ref 0–1.3)
MONOCYTES RELATIVE PERCENT: 2.3 %
NEUTROPHILS ABSOLUTE: 2.6 K/UL (ref 1.7–7.7)
NEUTROPHILS RELATIVE PERCENT: 90.7 %
PDW BLD-RTO: 17.4 % (ref 12.4–15.4)
PLATELET # BLD: 247 K/UL (ref 135–450)
PMV BLD AUTO: 7.6 FL (ref 5–10.5)
POTASSIUM SERPL-SCNC: 4.5 MMOL/L (ref 3.5–5.1)
RBC # BLD: 3.86 M/UL (ref 4–5.2)
SODIUM BLD-SCNC: 139 MMOL/L (ref 136–145)
URIC ACID, SERUM: 3.9 MG/DL (ref 2.6–6)
WBC # BLD: 2.8 K/UL (ref 4–11)

## 2021-07-03 PROCEDURE — 85025 COMPLETE CBC W/AUTO DIFF WBC: CPT

## 2021-07-03 PROCEDURE — 6370000000 HC RX 637 (ALT 250 FOR IP): Performed by: INTERNAL MEDICINE

## 2021-07-03 PROCEDURE — 36592 COLLECT BLOOD FROM PICC: CPT

## 2021-07-03 PROCEDURE — 80048 BASIC METABOLIC PNL TOTAL CA: CPT

## 2021-07-03 PROCEDURE — 6370000000 HC RX 637 (ALT 250 FOR IP): Performed by: NURSE PRACTITIONER

## 2021-07-03 PROCEDURE — 2060000000 HC ICU INTERMEDIATE R&B

## 2021-07-03 PROCEDURE — 87324 CLOSTRIDIUM AG IA: CPT

## 2021-07-03 PROCEDURE — 2580000003 HC RX 258: Performed by: NURSE PRACTITIONER

## 2021-07-03 PROCEDURE — 6360000002 HC RX W HCPCS: Performed by: NURSE PRACTITIONER

## 2021-07-03 PROCEDURE — 87449 NOS EACH ORGANISM AG IA: CPT

## 2021-07-03 PROCEDURE — 84550 ASSAY OF BLOOD/URIC ACID: CPT

## 2021-07-03 RX ORDER — LOPERAMIDE HYDROCHLORIDE 2 MG/1
2 CAPSULE ORAL 4 TIMES DAILY PRN
Status: DISCONTINUED | OUTPATIENT
Start: 2021-07-03 | End: 2021-07-08

## 2021-07-03 RX ORDER — LOPERAMIDE HYDROCHLORIDE 2 MG/1
4 CAPSULE ORAL ONCE
Status: COMPLETED | OUTPATIENT
Start: 2021-07-03 | End: 2021-07-03

## 2021-07-03 RX ADMIN — LOPERAMIDE HYDROCHLORIDE 4 MG: 2 CAPSULE ORAL at 06:40

## 2021-07-03 RX ADMIN — ONDANSETRON HYDROCHLORIDE 8 MG: 8 TABLET, FILM COATED ORAL at 20:22

## 2021-07-03 RX ADMIN — Medication 10 ML: at 08:05

## 2021-07-03 RX ADMIN — VALACYCLOVIR HYDROCHLORIDE 500 MG: 500 TABLET, FILM COATED ORAL at 20:22

## 2021-07-03 RX ADMIN — SODIUM CHLORIDE 15 ML: 900 IRRIGANT IRRIGATION at 16:28

## 2021-07-03 RX ADMIN — POTASSIUM CHLORIDE AND SODIUM CHLORIDE: 900; 150 INJECTION, SOLUTION INTRAVENOUS at 06:40

## 2021-07-03 RX ADMIN — ONDANSETRON HYDROCHLORIDE 8 MG: 8 TABLET, FILM COATED ORAL at 02:08

## 2021-07-03 RX ADMIN — FLUCONAZOLE 400 MG: 200 TABLET ORAL at 08:05

## 2021-07-03 RX ADMIN — LOPERAMIDE HYDROCHLORIDE 2 MG: 2 CAPSULE ORAL at 15:25

## 2021-07-03 RX ADMIN — ENOXAPARIN SODIUM 40 MG: 40 INJECTION SUBCUTANEOUS at 17:47

## 2021-07-03 RX ADMIN — PROCHLORPERAZINE MALEATE 10 MG: 10 TABLET ORAL at 04:30

## 2021-07-03 RX ADMIN — SODIUM CHLORIDE 15 ML: 900 IRRIGANT IRRIGATION at 08:07

## 2021-07-03 RX ADMIN — ONDANSETRON HYDROCHLORIDE 8 MG: 8 TABLET, FILM COATED ORAL at 12:03

## 2021-07-03 RX ADMIN — VALACYCLOVIR HYDROCHLORIDE 500 MG: 500 TABLET, FILM COATED ORAL at 08:06

## 2021-07-03 RX ADMIN — PROCHLORPERAZINE MALEATE 10 MG: 10 TABLET ORAL at 00:26

## 2021-07-03 RX ADMIN — PROCHLORPERAZINE MALEATE 10 MG: 10 TABLET ORAL at 16:28

## 2021-07-03 RX ADMIN — LORAZEPAM 1 MG: 1 TABLET ORAL at 20:22

## 2021-07-03 RX ADMIN — SODIUM CHLORIDE 15 ML: 900 IRRIGANT IRRIGATION at 12:03

## 2021-07-03 RX ADMIN — LORAZEPAM 1 MG: 1 TABLET ORAL at 08:05

## 2021-07-03 RX ADMIN — AMLODIPINE BESYLATE 10 MG: 10 TABLET ORAL at 08:06

## 2021-07-03 ASSESSMENT — PAIN SCALES - GENERAL
PAINLEVEL_OUTOF10: 0

## 2021-07-03 NOTE — PROGRESS NOTES
BCC  Autologous Progress Note    7/3/2021    Jane Phelps    :  1950    MRN:  [id number]    Referring MD: Taylor Herbert MD  [address] [facility],  [facility] [address]      Subjective:  Feeling better. No acute issues    ECOG PS:  (1) Restricted in physically strenuous activity, ambulatory and able to do work of light nature    KPS: 80% Normal activity with effort; some signs or symptoms of disease    Isolation:  None     Medications    Scheduled Meds:   [START ON 2021] tbo-filgrastim  300 mcg Subcutaneous QPM    enoxaparin  40 mg Subcutaneous Daily    fluconazole  400 mg Oral Daily    Saline Mouthwash  15 mL Swish & Spit 4x Daily AC & HS    sodium chloride flush  5-40 mL Intravenous 2 times per day    valACYclovir  500 mg Oral BID    LORazepam  1 mg Oral BID    amLODIPine  10 mg Oral Daily     Continuous Infusions:   0.9% NaCl with KCl 20 mEq 50 mL/hr at 21 0640    sodium chloride      sodium chloride      sodium chloride       PRN Meds:. [COMPLETED] loperamide **FOLLOWED BY** loperamide, morphine, sodium chloride, prochlorperazine **OR** prochlorperazine, ondansetron, ondansetron, sodium chloride, sodium chloride, alteplase, magnesium hydroxide, magnesium sulfate, potassium chloride, Saline Mouthwash, sodium chloride flush, oxyCODONE, cloNIDine    ROS:  As noted above, otherwise remainder of 10-point ROS negative    Physical Exam:     I&O:      Intake/Output Summary (Last 24 hours) at 7/3/2021 0839  Last data filed at 7/3/2021 1580  Gross per 24 hour   Intake 2736 ml   Output 6950 ml   Net -4214 ml       Vital Signs:  /77   Pulse 103   Temp 97.4 °F (36.3 °C) (Oral)   Resp 16   Ht 5' 5\" (1.651 m)   Wt 165 lb 11.2 oz (75.2 kg)   SpO2 99%   BMI 27.57 kg/m²     Weight:    Wt Readings from Last 3 Encounters:   21 165 lb 11.2 oz (75.2 kg)   21 160 lb (72.6 kg)   21 160 lb (72.6 kg)       General: Awake, alert and oriented.   HEENT: normocephalic, alopecia, PERRL, no scleral erythema or icterus, Oral mucosa moist and intact, throat clear  NECK: supple without palpable adenopathy  BACK: Straight negative CVAT  SKIN: warm dry and intact without lesions rashes or masses  CHEST: CTA bilaterally without use of accessory muscles  CV: Normal S1 S2, RRR, no MRG  ABD: NT ND normoactive BS, no palpable masses or hepatosplenomegaly  EXTREMITIES: without edema, denies calf tenderness  NEURO: CN II - XII grossly intact  CATHETER: Left SC Trifusion (6/21/21, Blake) - CDI    Data:   CBC:   Recent Labs     07/01/21 0320 07/02/21  0305 07/03/21  0420   WBC 4.3 5.2 2.8*   HGB 12.7 11.1* 12.1   HCT 37.3 32.4* 35.2*   MCV 90.3 90.4 91.2    188 247     BMP/Mag:  Recent Labs     06/30/21  1055 06/30/21  1055 07/01/21 0320 07/02/21  0305 07/03/21  0420      < > 137 138 139   K 4.4   < > 4.1 3.3* 4.5      < > 105 106 106   CO2 25   < > 23 23 24   PHOS 4.9  --   --  3.0  --    BUN 16   < > 14 14 13   CREATININE <0.5*   < > <0.5* <0.5* <0.5*   MG 2.20  --  2.20  --   --     < > = values in this interval not displayed. LIVP:   Recent Labs     06/30/21  1055 07/02/21  0305   AST 14* 15   ALT 10 10   BILIDIR <0.2 <0.2   BILITOT 0.6 0.5   ALKPHOS 104 74     Uric Acid:    Recent Labs     07/03/21  0420   LABURIC 3.9     Coags:   Recent Labs     06/30/21  1055 07/01/21  0320   PROTIME 11.6 11.7   INR 1.03 1.03   APTT 39.3* 38.2         PROBLEM LIST:            1. IgA Kappa Multiple Myeloma  2. Hepatitis C (2002, s/p tx with Harvoni 2016)  3. Recurrent shingles (5/20/21)  4. Anxiety/depression  5. Chronic HFrEF (LVEF 40-45%)      TREATMENT:            1. RVD x7 cycles  2. Melphalan 200mg/m2 & ASCT (7/1/21) - 3.51 x10^6 [id number]/kg      ASSESSMENT AND PLAN:         1. IgA Kappa Multiple Myeloma: Currently in a ID  - S/p [id number] & ASCT (7/1/21)  - Post-Txp Maintenance: Revlimid maintenance  - Post-Txp F/u: Serum myeloma studies        Day + 2     2.  ID: Afebrile, no evidence of infection.   - H/o recurrent shingles  - Cont Diflucan and Valtrex ppx, cont acyclovir at discharge for VZV positive titer.    - Start Levaquin ppx when [address] < 1.5     3. Heme: stable blood counts   - Transfuse for Hgb < 7 and Platelets < 23F  - No transfusion today  - Start Granix 7/6/21     4. Metabolic: stable renal fxn and e-lytes except for hypoK  - Change IVF: NS + KCl 20 meq @ 50 mL/hr  - Replace potassium and magnesium per PRN orders     5. Cardiac:   - H/o Chronic HFrEF & HTN  - Echo 5/21/21: global hypokinesis EF 40-45%, Mild-Mod MR, Mild TR  - Cleared for transplant by Dr. Tyrell Gaspar 6/3/21  Chronic HFrEF:  - Monitor fluid status closely   HTN:  Intermittently elevated  - Cont Norvasc 10 mg daily   - Cont to monitor      6. GI / Nutrition:   - H/o hepatitis C  Hepatitis C:  No acute issues   - S/p Harvoni (2016)  Nutrition:  Appetite and oral intake is good. - Cont low microbial diet   - Follow closely with dietary  Nausea:  Mild, improved w/ Compazine  - Cont Compazine and add Zofran as needed     7.  Psych:  - H/o Anxiety / Depression:   Anxiety / Depression:  Ongoing anxiety related transplant process  - Cont Ativan 1mg BID  - Psych following      8. 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 Agustin Gustafson DO, MS  Oncology/Hematology Care    Please contact via:  1.   Perfect Serve  2.  [phone]    7/3/2021   10:09 AM

## 2021-07-03 NOTE — PLAN OF CARE
Problem: PROTECTIVE PRECAUTIONS  Goal: Patient will remain free of nosocomial Infections  Description: Pt educated on use of mask prior to leaving room. All staff and visitor following handwashing requirements this shift prior to entering room. Low microbial diet order in place and being followed. Linens changed today per protocol and pt reports using chlorhexidine wash per order. Problem: Nutrition  Goal: Optimal nutrition therapy  7/2/2021 2216 by Anuja Ramos RN  Outcome: Ongoing   Patient is attempting to eat at every meal; encouraged small frequent meals as tolerated. Problem: Bleeding:  Goal: Will show no signs and symptoms of excessive bleeding  Description: Will show no signs and symptoms of excessive bleeding  7/2/2021 2216 by Anuja Ramos RN  Outcome: Ongoing     Patient's hemoglobin this AM:   Recent Labs     07/03/21  0420   HGB 12.1     Patient's platelet count this AM:   Recent Labs     07/03/21  0420       Thrombocytopenia not present at this time. Patient showing no signs or symptoms of active bleeding. Transfusion not indicated at this time. Patient verbalizes understanding of all instructions. Will continue to assess and implement POC. Call light within reach and hourly rounding in place. Problem: Nausea/Vomiting:  Goal: Absence of nausea/vomiting  Description: Absence of nausea/vomiting  Outcome: Ongoing   Patient with complaints of nausea- PRN anti-emetic given. Patient reports improvement in nausea with medications. Problem: diarrhea:  Goal: Absence diarrhea  Description: Absence of nausea/vomiting  Outcome:  Patient having multiple loose stools. C-diff sample sent. Problem: Falls - Risk of:  Goal: Will remain free from falls  Description: Orthostatic vital signs obtained at start of shift - see flowsheet for details. Pt does not meet criteria for orthostasis. Pt is a Med fall risk. See Gwen Gaytan Fall Score and ABCDS Injury Risk assessments.    - Screening for Orthostasis AND not a [place] Risk per BERMUDEZ/ABCDS: Pt bed is in low position, side rails up, call light and belongings are in reach. Fall risk light is on outside pts room. Pt encouraged to call for assistance as needed. Will continue with hourly rounds for PO intake, pain needs, toileting and repositioning as needed. Problem: Infection - Central Venous Catheter-Associated Bloodstream Infection:  Goal: Will show no infection signs and symptoms  Description: Will show no infection signs and symptoms  7/2/2021 2216 by Suleiman Wayne RN  Outcome: Ongoing   CVC site remains free of signs/symptoms of infection. No drainage, edema, erythema, pain, or warmth noted at site. Dressing changes continue per protocol and on an as needed basis - see flowsheet. Problem: Venous Thromboembolism:  Goal: Will show no signs or symptoms of venous thromboembolism  Description: Will show no signs or symptoms of venous thromboembolism  7/2/2021 2216 by Suleiman Wayne RN  Outcome: Ongoing   Adherent with DVT Prevention: Pt is at risk for DVT d/t decreased mobility and cancer treatment. Pt educated on importance of activity. Pt has orders for SCDs while in bed. Pt verbalizes understanding of need for prophylaxis while inpatient.

## 2021-07-03 NOTE — PLAN OF CARE
Problem: Falls - Risk of:  Goal: Will remain free from falls  Description: Orthostatic vital signs obtained at start of shift - see flowsheet for details. Pt does not meet criteria for orthostasis. Pt is a Med fall risk. See Debbie Flatten Fall Score and ABCDS Injury Risk assessments. - Screening for Orthostasis AND not a [place] Risk per BERMUDEZ/ABCDS: Pt bed is in low position, side rails up, call light and belongings are in reach. Fall risk light is on outside pts room. Pt encouraged to call for assistance as needed. Will continue with hourly rounds for PO intake, pain needs, toileting and repositioning as needed. Problem: Infection - Central Venous Catheter-Associated Bloodstream Infection:  Goal: Will show no infection signs and symptoms  Description: Will show no infection signs and symptoms  7/2/2021 1142 by Betty Coreas RN  Outcome: Ongoing     CVC site remains free of signs/symptoms of infection. No drainage, edema, erythema, pain, or warmth noted at site. Dressing changes continue per protocol and on an as needed basis - see flowsheet. Compliant with [facility] Bath Protocol:  Performed CHG bath today per [facility] protocol utilizing Bath with CHG wipes. CVC site cleansed with CHG wipe over dressing, skin surrounding dressing, and first 6\" of IV tubing. Pt tolerated well. Continued to encourage daily CHG bathing per [facility] protocol. Problem: Venous Thromboembolism:  Goal: Will show no signs or symptoms of venous thromboembolism  Description: Will show no signs or symptoms of venous thromboembolism  7/2/2021 1142 by Betty Coreas RN  Outcome: Ongoing     Adherent with DVT Prevention: Pt is at risk for DVT d/t decreased mobility and cancer treatment. Pt educated on importance of activity. Pt has orders for SCDs while in bed. Pt verbalizes understanding of need for prophylaxis while inpatient. Pt OOB in chair the majority of this shift.        Problem: PROTECTIVE PRECAUTIONS  Goal: Patient will remain free of nosocomial Infections  Description: Pt educated on use of mask prior to leaving room. All staff and visitor following handwashing requirements this shift prior to entering room. Low microbial diet order in place and being followed. Linens changed today per protocol and pt reports using chlorhexidine wash per order. NO s/sx of infection noted. Pt afebrile. Problem: Bleeding:  Goal: Will show no signs and symptoms of excessive bleeding  Description: Will show no signs and symptoms of excessive bleeding  7/2/2021 1142 by Janet Lucas RN  Outcome: Ongoing     Patient's hemoglobin this AM:   Recent Labs     07/03/21  0420   HGB 12.1     Patient's platelet count this AM:   Recent Labs     07/03/21  0420       Thrombocytopenia not present at this time. Patient showing no signs or symptoms of active bleeding. Transfusion not indicated at this time. Patient verbalizes understanding of all instructions. Will continue to assess and implement POC. Call light within reach and hourly rounding in place.

## 2021-07-04 LAB
ANION GAP SERPL CALCULATED.3IONS-SCNC: 5 MMOL/L (ref 3–16)
BASOPHILS ABSOLUTE: 0 K/UL (ref 0–0.2)
BASOPHILS RELATIVE PERCENT: 0.5 %
BUN BLDV-MCNC: 11 MG/DL (ref 7–20)
CALCIUM SERPL-MCNC: 8.7 MG/DL (ref 8.3–10.6)
CHLORIDE BLD-SCNC: 104 MMOL/L (ref 99–110)
CO2: 26 MMOL/L (ref 21–32)
CREAT SERPL-MCNC: <0.5 MG/DL (ref 0.6–1.2)
EOSINOPHILS ABSOLUTE: 0 K/UL (ref 0–0.6)
EOSINOPHILS RELATIVE PERCENT: 1.9 %
GFR AFRICAN AMERICAN: >60
GFR NON-AFRICAN AMERICAN: >60
GLUCOSE BLD-MCNC: 89 MG/DL (ref 70–99)
HCT VFR BLD CALC: 32.5 % (ref 36–48)
HEMOGLOBIN: 11.2 G/DL (ref 12–16)
LYMPHOCYTES ABSOLUTE: 0.1 K/UL (ref 1–5.1)
LYMPHOCYTES RELATIVE PERCENT: 8.9 %
MCH RBC QN AUTO: 31.5 PG (ref 26–34)
MCHC RBC AUTO-ENTMCNC: 34.3 G/DL (ref 31–36)
MCV RBC AUTO: 91.8 FL (ref 80–100)
MONOCYTES ABSOLUTE: 0 K/UL (ref 0–1.3)
MONOCYTES RELATIVE PERCENT: 2.4 %
NEUTROPHILS ABSOLUTE: 1 K/UL (ref 1.7–7.7)
NEUTROPHILS RELATIVE PERCENT: 86.3 %
PDW BLD-RTO: 17.6 % (ref 12.4–15.4)
PLATELET # BLD: 218 K/UL (ref 135–450)
PMV BLD AUTO: 7.4 FL (ref 5–10.5)
POTASSIUM SERPL-SCNC: 4 MMOL/L (ref 3.5–5.1)
RBC # BLD: 3.55 M/UL (ref 4–5.2)
SODIUM BLD-SCNC: 135 MMOL/L (ref 136–145)
URIC ACID, SERUM: 3.8 MG/DL (ref 2.6–6)
WBC # BLD: 1.1 K/UL (ref 4–11)

## 2021-07-04 PROCEDURE — 80048 BASIC METABOLIC PNL TOTAL CA: CPT

## 2021-07-04 PROCEDURE — 6370000000 HC RX 637 (ALT 250 FOR IP): Performed by: NURSE PRACTITIONER

## 2021-07-04 PROCEDURE — 6360000002 HC RX W HCPCS: Performed by: NURSE PRACTITIONER

## 2021-07-04 PROCEDURE — 2580000003 HC RX 258: Performed by: NURSE PRACTITIONER

## 2021-07-04 PROCEDURE — 85025 COMPLETE CBC W/AUTO DIFF WBC: CPT

## 2021-07-04 PROCEDURE — 6370000000 HC RX 637 (ALT 250 FOR IP): Performed by: INTERNAL MEDICINE

## 2021-07-04 PROCEDURE — 2060000000 HC ICU INTERMEDIATE R&B

## 2021-07-04 PROCEDURE — 84550 ASSAY OF BLOOD/URIC ACID: CPT

## 2021-07-04 PROCEDURE — 36592 COLLECT BLOOD FROM PICC: CPT

## 2021-07-04 RX ADMIN — AMLODIPINE BESYLATE 10 MG: 10 TABLET ORAL at 09:13

## 2021-07-04 RX ADMIN — VALACYCLOVIR HYDROCHLORIDE 500 MG: 500 TABLET, FILM COATED ORAL at 20:21

## 2021-07-04 RX ADMIN — SODIUM CHLORIDE 15 ML: 900 IRRIGANT IRRIGATION at 11:24

## 2021-07-04 RX ADMIN — PROCHLORPERAZINE MALEATE 10 MG: 10 TABLET ORAL at 09:04

## 2021-07-04 RX ADMIN — FLUCONAZOLE 400 MG: 200 TABLET ORAL at 09:13

## 2021-07-04 RX ADMIN — SODIUM CHLORIDE 15 ML: 900 IRRIGANT IRRIGATION at 16:17

## 2021-07-04 RX ADMIN — ONDANSETRON HYDROCHLORIDE 8 MG: 8 TABLET, FILM COATED ORAL at 15:30

## 2021-07-04 RX ADMIN — ONDANSETRON HYDROCHLORIDE 8 MG: 8 TABLET, FILM COATED ORAL at 04:11

## 2021-07-04 RX ADMIN — PROCHLORPERAZINE MALEATE 10 MG: 10 TABLET ORAL at 18:23

## 2021-07-04 RX ADMIN — LORAZEPAM 1 MG: 1 TABLET ORAL at 20:21

## 2021-07-04 RX ADMIN — Medication 10 ML: at 09:14

## 2021-07-04 RX ADMIN — VALACYCLOVIR HYDROCHLORIDE 500 MG: 500 TABLET, FILM COATED ORAL at 09:13

## 2021-07-04 RX ADMIN — ENOXAPARIN SODIUM 40 MG: 40 INJECTION SUBCUTANEOUS at 18:19

## 2021-07-04 RX ADMIN — LORAZEPAM 1 MG: 1 TABLET ORAL at 07:48

## 2021-07-04 RX ADMIN — POTASSIUM CHLORIDE AND SODIUM CHLORIDE: 900; 150 INJECTION, SOLUTION INTRAVENOUS at 00:31

## 2021-07-04 RX ADMIN — SODIUM CHLORIDE 15 ML: 900 IRRIGANT IRRIGATION at 09:16

## 2021-07-04 RX ADMIN — POTASSIUM CHLORIDE AND SODIUM CHLORIDE: 900; 150 INJECTION, SOLUTION INTRAVENOUS at 16:17

## 2021-07-04 ASSESSMENT — PAIN SCALES - GENERAL
PAINLEVEL_OUTOF10: 0

## 2021-07-04 NOTE — PLAN OF CARE
Problem: Falls - Risk of:  Goal: Will remain free from falls  Description: Orthostatic vital signs obtained at start of shift - see flowsheet for details. Pt does not meet criteria for orthostasis. Pt is a Med fall risk. See Toshia Hung Fall Score and ABCDS Injury Risk assessments. - Screening for Orthostasis AND not a [place] Risk per BERMUDEZ/ABCDS: Pt bed is in low position, side rails up, call light and belongings are in reach. Fall risk light is on outside pts room. Pt encouraged to call for assistance as needed. Will continue with hourly rounds for PO intake, pain needs, toileting and repositioning as needed. Outcome: Ongoing  Note: Orthostatic vital signs obtained at start of shift - see flowsheet for details. Pt meets criteria for orthostasis. Pt is a Med fall risk. See Toshia Hung Fall Score and ABCDS Injury Risk assessments. - Screening for Orthostasis AND not a [place] Risk per BERMDUEZ/ABCDS: Pt bed is in low position, side rails up, call light and belongings are in reach. Fall risk light is on outside pts room. Pt encouraged to call for assistance as needed. Will continue with hourly rounds for PO intake, pain needs, toileting and repositioning as needed. Problem: Infection - Central Venous Catheter-Associated Bloodstream Infection:  Goal: Will show no infection signs and symptoms  Description: Will show no infection signs and symptoms  Outcome: Ongoing  Note: CVC site remains free of signs/symptoms of infection. No drainage, edema, erythema, pain, or warmth noted at site. Dressing changes continue per protocol and on an as needed basis - see flowsheet. Compliant with [facility] Bath Protocol:  Performed CHG bath today per [facility] protocol utilizing CHG solution in the shower. CVC site cleansed with CHG wipe over dressing, skin surrounding dressing, and first 6\" of IV tubing. Pt tolerated well. Continued to encourage daily CHG bathing per [employer] protocol.        Problem: Venous

## 2021-07-04 NOTE — PROGRESS NOTES
[facility]  Autologous Progress Note    2021    Eddie Hinkle    :  1950    MRN:  [id number]    Referring MD: Torsten Warren MD  [address] [facility],  [facility] [address]      Subjective:  Feeling better. No acute issues    ECOG PS:  (1) Restricted in physically strenuous activity, ambulatory and able to do work of light nature    KPS: 80% Normal activity with effort; some signs or symptoms of disease    Isolation:  None     Medications    Scheduled Meds:   [START ON 2021] tbo-filgrastim  300 mcg Subcutaneous QPM    enoxaparin  40 mg Subcutaneous Daily    fluconazole  400 mg Oral Daily    Saline Mouthwash  15 mL Swish & Spit 4x Daily AC & HS    sodium chloride flush  5-40 mL Intravenous 2 times per day    valACYclovir  500 mg Oral BID    LORazepam  1 mg Oral BID    amLODIPine  10 mg Oral Daily     Continuous Infusions:   0.9% NaCl with KCl 20 mEq 50 mL/hr at 21 0031    sodium chloride      sodium chloride      sodium chloride       PRN Meds:. [COMPLETED] loperamide **FOLLOWED BY** loperamide, morphine, sodium chloride, prochlorperazine **OR** prochlorperazine, ondansetron, ondansetron, sodium chloride, sodium chloride, alteplase, magnesium hydroxide, magnesium sulfate, potassium chloride, Saline Mouthwash, sodium chloride flush, oxyCODONE, cloNIDine    ROS:  As noted above, otherwise remainder of 10-point ROS negative    Physical Exam:     I&O:      Intake/Output Summary (Last 24 hours) at 2021 1457  Last data filed at 2021 0728  Gross per 24 hour   Intake 2139 ml   Output 5050 ml   Net -2911 ml       Vital Signs:  /78   Pulse 85   Temp 98 °F (36.7 °C) (Oral)   Resp 16   Ht 5' 5\" (1.651 m)   Wt 165 lb 11.2 oz (75.2 kg)   SpO2 100%   BMI 27.57 kg/m²     Weight:    Wt Readings from Last 3 Encounters:   21 165 lb 11.2 oz (75.2 kg)   21 160 lb (72.6 kg)   21 160 lb (72.6 kg)       General: Awake, alert and oriented.   HEENT: 7 and Platelets < 87G  - No transfusion today  - Start Granix 7/6/21     4. Metabolic: stable renal fxn and e-lytes except for hypoK  - Change IVF: NS + KCl 20 meq @ 50 mL/hr  - Replace potassium and magnesium per PRN orders     5. Cardiac:   - H/o Chronic HFrEF & HTN  - Echo 5/21/21: global hypokinesis EF 40-45%, Mild-Mod MR, Mild TR  - Cleared for transplant by Dr. Tyrell Gaspar 6/3/21  Chronic HFrEF:  - Monitor fluid status closely   HTN:  Intermittently elevated  - Cont Norvasc 10 mg daily   - Cont to monitor      6. GI / Nutrition:   - H/o hepatitis C  Hepatitis C:  No acute issues   - S/p Harvoni (2016)  Nutrition:  Appetite and oral intake is good. - Cont low microbial diet   - Follow closely with dietary  Nausea:  Mild, improved w/ Compazine  - Cont Compazine and add Zofran as needed     7.  Psych:  - H/o Anxiety / Depression:   Anxiety / Depression:  Ongoing anxiety related transplant process  - Cont Ativan 1mg BID  - Psych following      8. 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 Agustin Gustfason DO, MS  Oncology/Hematology Care    Please contact via:  1.   Perfect Serve  2.  [phone]    7/4/2021   2:57 PM

## 2021-07-04 NOTE — PLAN OF CARE
Problem: Falls - Risk of:  Goal: Will remain free from falls  Description: Orthostatic vital signs obtained at start of shift - see flowsheet for details. Pt does not meet criteria for orthostasis. Pt is a Med fall risk. See [place] Fall Score and ABCDS Injury Risk assessments. - Screening for Orthostasis AND not a [place] Risk per BERMUDEZ/ABCDS: Pt bed is in low position, side rails up, call light and belongings are in reach. Fall risk light is on outside pts room. Pt encouraged to call for assistance as needed. Will continue with hourly rounds for PO intake, pain needs, toileting and repositioning as needed. Outcome: Ongoing  Note: Orthostatic vital signs obtained at start of shift - see flowsheet for details. Pt meets criteria for orthostasis. Pt is a Med fall risk. See [place] Fall Score and ABCDS Injury Risk assessments. - Screening for Orthostasis AND not a [place] Risk per BERMUDEZ/ABCDS: Pt bed is in low position, side rails up, call light and belongings are in reach. Fall risk light is on outside pts room. Pt encouraged to call for assistance as needed. Will continue with hourly rounds for PO intake, pain needs, toileting and repositioning as needed. Problem: Infection - Central Venous Catheter-Associated Bloodstream Infection:  Goal: Will show no infection signs and symptoms  Description: Will show no infection signs and symptoms  Outcome: Ongoing  Note: CVC site remains free of signs/symptoms of infection. No drainage, edema, erythema, pain, or warmth noted at site. Dressing changes continue per protocol and on an as needed basis - see flowsheet. Compliant with [facility] Bath Protocol:  Performed CHG bath today per [facility] protocol utilizing CHG solution in the shower. CVC site cleansed with CHG wipe over dressing, skin surrounding dressing, and first 6\" of IV tubing. Pt tolerated well. Continued to encourage daily CHG bathing per [facility] protocol.        Problem: Venous Thromboembolism:  Goal: Will show no signs or symptoms of venous thromboembolism  Description: Will show no signs or symptoms of venous thromboembolism  Outcome: Ongoing     Problem: PROTECTIVE PRECAUTIONS  Goal: Patient will remain free of nosocomial Infections  Description: Pt educated on use of mask prior to leaving room. All staff and visitor following handwashing requirements this shift prior to entering room. Low microbial diet order in place and being followed. Linens changed today per protocol and pt reports using chlorhexidine wash per order. Outcome: Ongoing  Note: Pt remains in protective precautions. Pt educated on wearing mask when in hallways. Pt, staff, and visitors adhering to handwashing guidelines. Pt educated to shower or bathe daily with chlorhexidine and linens changed daily per protocol. Pt verbalizes understanding of low microbial diet. Will continue to monitor. Problem: Bleeding:  Goal: Will show no signs and symptoms of excessive bleeding  Description: Will show no signs and symptoms of excessive bleeding  Outcome: Ongoing  Note: Patient's hemoglobin this AM:   Recent Labs     07/04/21  0410   HGB 11.2*     Patient's platelet count this AM:   Recent Labs     07/04/21  0410       Thrombocytopenia not present at this time. Patient showing no signs or symptoms of active bleeding. Transfusion not indicated at this time. Patient verbalizes understanding of all instructions. Will continue to assess and implement POC. Call light within reach and hourly rounding in place.

## 2021-07-05 ENCOUNTER — APPOINTMENT (OUTPATIENT)
Dept: GENERAL RADIOLOGY | Age: 71
DRG: 016 | End: 2021-07-05
Attending: INTERNAL MEDICINE
Payer: MEDICARE

## 2021-07-05 LAB
ALBUMIN SERPL-MCNC: 3.6 G/DL (ref 3.4–5)
ALP BLD-CCNC: 63 U/L (ref 40–129)
ALT SERPL-CCNC: 15 U/L (ref 10–40)
ANION GAP SERPL CALCULATED.3IONS-SCNC: 9 MMOL/L (ref 3–16)
APTT: 32.9 SEC (ref 26.2–38.6)
AST SERPL-CCNC: 20 U/L (ref 15–37)
BASOPHILS ABSOLUTE: 0 K/UL (ref 0–0.2)
BASOPHILS RELATIVE PERCENT: 0.2 %
BILIRUB SERPL-MCNC: 0.7 MG/DL (ref 0–1)
BILIRUBIN DIRECT: <0.2 MG/DL (ref 0–0.3)
BILIRUBIN URINE: NEGATIVE
BILIRUBIN, INDIRECT: ABNORMAL MG/DL (ref 0–1)
BLOOD, URINE: NEGATIVE
BUN BLDV-MCNC: 9 MG/DL (ref 7–20)
CALCIUM SERPL-MCNC: 8.9 MG/DL (ref 8.3–10.6)
CHLORIDE BLD-SCNC: 107 MMOL/L (ref 99–110)
CLARITY: CLEAR
CO2: 24 MMOL/L (ref 21–32)
COLOR: YELLOW
CREAT SERPL-MCNC: <0.5 MG/DL (ref 0.6–1.2)
EOSINOPHILS ABSOLUTE: 0 K/UL (ref 0–0.6)
EOSINOPHILS RELATIVE PERCENT: 2.3 %
GFR AFRICAN AMERICAN: >60
GFR NON-AFRICAN AMERICAN: >60
GLUCOSE BLD-MCNC: 91 MG/DL (ref 70–99)
GLUCOSE URINE: NEGATIVE MG/DL
HCT VFR BLD CALC: 33 % (ref 36–48)
HEMOGLOBIN: 11.3 G/DL (ref 12–16)
INR BLD: 0.95 (ref 0.88–1.12)
KETONES, URINE: NEGATIVE MG/DL
LACTATE DEHYDROGENASE: 169 U/L (ref 100–190)
LEUKOCYTE ESTERASE, URINE: NEGATIVE
LYMPHOCYTES ABSOLUTE: 0.1 K/UL (ref 1–5.1)
LYMPHOCYTES RELATIVE PERCENT: 4.7 %
MAGNESIUM: 2.3 MG/DL (ref 1.8–2.4)
MCH RBC QN AUTO: 31.3 PG (ref 26–34)
MCHC RBC AUTO-ENTMCNC: 34.3 G/DL (ref 31–36)
MCV RBC AUTO: 91.2 FL (ref 80–100)
MICROSCOPIC EXAMINATION: NORMAL
MONOCYTES ABSOLUTE: 0 K/UL (ref 0–1.3)
MONOCYTES RELATIVE PERCENT: 1.5 %
NEUTROPHILS ABSOLUTE: 1.4 K/UL (ref 1.7–7.7)
NEUTROPHILS RELATIVE PERCENT: 91.3 %
NITRITE, URINE: NEGATIVE
PDW BLD-RTO: 17.1 % (ref 12.4–15.4)
PH UA: 7 (ref 5–8)
PHOSPHORUS: 3.1 MG/DL (ref 2.5–4.9)
PLATELET # BLD: 230 K/UL (ref 135–450)
PMV BLD AUTO: 7.3 FL (ref 5–10.5)
POTASSIUM SERPL-SCNC: 4.1 MMOL/L (ref 3.5–5.1)
PROTEIN UA: NEGATIVE MG/DL
PROTHROMBIN TIME: 10.7 SEC (ref 9.9–12.7)
RBC # BLD: 3.62 M/UL (ref 4–5.2)
SODIUM BLD-SCNC: 140 MMOL/L (ref 136–145)
SPECIFIC GRAVITY UA: 1.01 (ref 1–1.03)
TOTAL PROTEIN: 6 G/DL (ref 6.4–8.2)
URIC ACID, SERUM: 3.4 MG/DL (ref 2.6–6)
URINE TYPE: NORMAL
UROBILINOGEN, URINE: 0.2 E.U./DL
WBC # BLD: 1.5 K/UL (ref 4–11)

## 2021-07-05 PROCEDURE — 83735 ASSAY OF MAGNESIUM: CPT

## 2021-07-05 PROCEDURE — 80076 HEPATIC FUNCTION PANEL: CPT

## 2021-07-05 PROCEDURE — 85025 COMPLETE CBC W/AUTO DIFF WBC: CPT

## 2021-07-05 PROCEDURE — 2580000003 HC RX 258: Performed by: NURSE PRACTITIONER

## 2021-07-05 PROCEDURE — 6370000000 HC RX 637 (ALT 250 FOR IP): Performed by: NURSE PRACTITIONER

## 2021-07-05 PROCEDURE — 85730 THROMBOPLASTIN TIME PARTIAL: CPT

## 2021-07-05 PROCEDURE — 84100 ASSAY OF PHOSPHORUS: CPT

## 2021-07-05 PROCEDURE — 81003 URINALYSIS AUTO W/O SCOPE: CPT

## 2021-07-05 PROCEDURE — 83615 LACTATE (LD) (LDH) ENZYME: CPT

## 2021-07-05 PROCEDURE — 2060000000 HC ICU INTERMEDIATE R&B

## 2021-07-05 PROCEDURE — 71045 X-RAY EXAM CHEST 1 VIEW: CPT

## 2021-07-05 PROCEDURE — 94761 N-INVAS EAR/PLS OXIMETRY MLT: CPT

## 2021-07-05 PROCEDURE — 6360000002 HC RX W HCPCS: Performed by: NURSE PRACTITIONER

## 2021-07-05 PROCEDURE — 84550 ASSAY OF BLOOD/URIC ACID: CPT

## 2021-07-05 PROCEDURE — 36592 COLLECT BLOOD FROM PICC: CPT

## 2021-07-05 PROCEDURE — 6370000000 HC RX 637 (ALT 250 FOR IP): Performed by: INTERNAL MEDICINE

## 2021-07-05 PROCEDURE — 85610 PROTHROMBIN TIME: CPT

## 2021-07-05 PROCEDURE — 80048 BASIC METABOLIC PNL TOTAL CA: CPT

## 2021-07-05 RX ORDER — LEVOFLOXACIN 250 MG/1
500 TABLET ORAL DAILY
Status: DISCONTINUED | OUTPATIENT
Start: 2021-07-05 | End: 2021-07-13

## 2021-07-05 RX ADMIN — VALACYCLOVIR HYDROCHLORIDE 500 MG: 500 TABLET, FILM COATED ORAL at 08:20

## 2021-07-05 RX ADMIN — LOPERAMIDE HYDROCHLORIDE 2 MG: 2 CAPSULE ORAL at 09:58

## 2021-07-05 RX ADMIN — PROCHLORPERAZINE MALEATE 10 MG: 10 TABLET ORAL at 14:03

## 2021-07-05 RX ADMIN — SODIUM CHLORIDE 15 ML: 900 IRRIGANT IRRIGATION at 21:00

## 2021-07-05 RX ADMIN — ONDANSETRON HYDROCHLORIDE 8 MG: 8 TABLET, FILM COATED ORAL at 04:11

## 2021-07-05 RX ADMIN — PROCHLORPERAZINE MALEATE 10 MG: 10 TABLET ORAL at 07:38

## 2021-07-05 RX ADMIN — LORAZEPAM 1 MG: 1 TABLET ORAL at 20:45

## 2021-07-05 RX ADMIN — LORAZEPAM 1 MG: 1 TABLET ORAL at 08:20

## 2021-07-05 RX ADMIN — SODIUM CHLORIDE 15 ML: 900 IRRIGANT IRRIGATION at 09:58

## 2021-07-05 RX ADMIN — SODIUM CHLORIDE 15 ML: 900 IRRIGANT IRRIGATION at 15:22

## 2021-07-05 RX ADMIN — ONDANSETRON HYDROCHLORIDE 8 MG: 8 TABLET, FILM COATED ORAL at 15:22

## 2021-07-05 RX ADMIN — AMLODIPINE BESYLATE 10 MG: 10 TABLET ORAL at 08:20

## 2021-07-05 RX ADMIN — Medication 10 ML: at 21:00

## 2021-07-05 RX ADMIN — FLUCONAZOLE 400 MG: 200 TABLET ORAL at 08:20

## 2021-07-05 RX ADMIN — ENOXAPARIN SODIUM 40 MG: 40 INJECTION SUBCUTANEOUS at 18:36

## 2021-07-05 RX ADMIN — Medication 10 ML: at 08:20

## 2021-07-05 RX ADMIN — LEVOFLOXACIN 500 MG: 250 TABLET, FILM COATED ORAL at 20:45

## 2021-07-05 RX ADMIN — VALACYCLOVIR HYDROCHLORIDE 500 MG: 500 TABLET, FILM COATED ORAL at 20:45

## 2021-07-05 RX ADMIN — PROCHLORPERAZINE MALEATE 10 MG: 10 TABLET ORAL at 20:45

## 2021-07-05 ASSESSMENT — PAIN SCALES - GENERAL
PAINLEVEL_OUTOF10: 0

## 2021-07-05 NOTE — PROGRESS NOTES
oriented. HEENT: normocephalic, alopecia, PERRL, no scleral erythema or icterus, Oral mucosa moist and intact, throat clear  NECK: supple without palpable adenopathy  BACK: Straight negative CVAT  SKIN: warm dry and intact without lesions rashes or masses  CHEST: CTA bilaterally without use of accessory muscles  CV: Normal S1 S2, RRR, no MRG  ABD: NT ND normoactive BS, no palpable masses or hepatosplenomegaly  EXTREMITIES: without edema, denies calf tenderness  NEURO: CN II - XII grossly intact  CATHETER: Left SC Trifusion (6/21/21, Blake) - CDI    Data:   CBC:   Recent Labs     07/03/21 0420 07/04/21 0410 07/05/21 0426   WBC 2.8* 1.1* 1.5*   HGB 12.1 11.2* 11.3*   HCT 35.2* 32.5* 33.0*   MCV 91.2 91.8 91.2    218 230     BMP/Mag:  Recent Labs     07/03/21 0420 07/04/21 0410 07/05/21 0426    135* 140   K 4.5 4.0 4.1    104 107   CO2 24 26 24   PHOS  --   --  3.1   BUN 13 11 9   CREATININE <0.5* <0.5* <0.5*   MG  --   --  2.30     LIVP:   Recent Labs     07/05/21 0426   AST 20   ALT 15   BILIDIR <0.2   BILITOT 0.7   ALKPHOS 63     Uric Acid:    Recent Labs     07/05/21 0426   LABURIC 3.4     Coags:   Recent Labs     07/05/21 0426   PROTIME 10.7   INR 0.95   APTT 32.9         PROBLEM LIST:            1. IgA Kappa Multiple Myeloma  2. Hepatitis C (2002, s/p tx with Harvoni 2016)  3. Recurrent shingles (5/20/21)  4. Anxiety/depression  5. Chronic HFrEF (LVEF 40-45%)      TREATMENT:            1. RVD x7 cycles  2. Melphalan 200mg/m2 & ASCT (7/1/21) - 3.51 x10^6 [id number]/kg      ASSESSMENT AND PLAN:         1. IgA Kappa Multiple Myeloma: Currently in a [facility]  - S/p [id number] & ASCT (7/1/21)  - Post-Txp Maintenance: Revlimid maintenance  - Post-Txp F/u: Serum myeloma studies        Day +4     2. ID: Afebrile, no evidence of infection.   - H/o recurrent shingles  - Cont Diflucan and Valtrex ppx, cont acyclovir at discharge for VZV positive titer.    - Levaquin ppx when ANC < 1.5     3.  Heme: stable blood counts   - Transfuse for Hgb < 7 and Platelets < 05Y  - No transfusion today  - Start Granix 7/6/21     4. Metabolic: stable renal fxn and e-lytes except for hypoK  - Change IVF: NS + KCl 20 meq @ 50 mL/hr  - Replace potassium and magnesium per PRN orders     5. Cardiac:   - H/o Chronic HFrEF & HTN  - Echo 5/21/21: global hypokinesis EF 40-45%, Mild-Mod MR, Mild TR  - Cleared for transplant by Dr. Cori Richards 6/3/21  Chronic HFrEF:  - Monitor fluid status closely   HTN:  Intermittently elevated  - Cont Norvasc 10 mg daily   - Cont to monitor      6. GI / Nutrition:   - H/o hepatitis C  Hepatitis C:  No acute issues   - S/p Harvoni (2016)  Nutrition:  Appetite and oral intake is good. - Cont low microbial diet   - Follow closely with dietary  Nausea:  Mild, improved w/ Compazine  - Cont Compazine and add Zofran as needed     7.  Psych:  - H/o Anxiety / Depression:   Anxiety / Depression:  Ongoing anxiety related transplant process  - Cont Ativan 1mg BID  - Psych following      8. 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 Jez Parra DO, MS  Oncology/Hematology Care    Please contact via:  1.   Perfect Serve  2.  [phone]    7/5/2021   1:10 PM

## 2021-07-06 LAB
ANION GAP SERPL CALCULATED.3IONS-SCNC: 8 MMOL/L (ref 3–16)
BASOPHILS ABSOLUTE: 0 K/UL (ref 0–0.2)
BASOPHILS RELATIVE PERCENT: 0.3 %
BUN BLDV-MCNC: 11 MG/DL (ref 7–20)
CALCIUM SERPL-MCNC: 8.7 MG/DL (ref 8.3–10.6)
CHLORIDE BLD-SCNC: 106 MMOL/L (ref 99–110)
CO2: 26 MMOL/L (ref 21–32)
CREAT SERPL-MCNC: <0.5 MG/DL (ref 0.6–1.2)
EOSINOPHILS ABSOLUTE: 0 K/UL (ref 0–0.6)
EOSINOPHILS RELATIVE PERCENT: 1.2 %
GFR AFRICAN AMERICAN: >60
GFR NON-AFRICAN AMERICAN: >60
GLUCOSE BLD-MCNC: 90 MG/DL (ref 70–99)
HCT VFR BLD CALC: 32.6 % (ref 36–48)
HEMOGLOBIN: 11.3 G/DL (ref 12–16)
LYMPHOCYTES ABSOLUTE: 0 K/UL (ref 1–5.1)
LYMPHOCYTES RELATIVE PERCENT: 4.4 %
MCH RBC QN AUTO: 31.6 PG (ref 26–34)
MCHC RBC AUTO-ENTMCNC: 34.7 G/DL (ref 31–36)
MCV RBC AUTO: 90.9 FL (ref 80–100)
MONOCYTES ABSOLUTE: 0 K/UL (ref 0–1.3)
MONOCYTES RELATIVE PERCENT: 1.6 %
NEUTROPHILS ABSOLUTE: 1.1 K/UL (ref 1.7–7.7)
NEUTROPHILS RELATIVE PERCENT: 92.5 %
PDW BLD-RTO: 16.5 % (ref 12.4–15.4)
PLATELET # BLD: 192 K/UL (ref 135–450)
PMV BLD AUTO: 7.1 FL (ref 5–10.5)
POTASSIUM SERPL-SCNC: 3.9 MMOL/L (ref 3.5–5.1)
RBC # BLD: 3.59 M/UL (ref 4–5.2)
SODIUM BLD-SCNC: 140 MMOL/L (ref 136–145)
URIC ACID, SERUM: 3.3 MG/DL (ref 2.6–6)
WBC # BLD: 1.1 K/UL (ref 4–11)

## 2021-07-06 PROCEDURE — 36592 COLLECT BLOOD FROM PICC: CPT

## 2021-07-06 PROCEDURE — 84550 ASSAY OF BLOOD/URIC ACID: CPT

## 2021-07-06 PROCEDURE — 6370000000 HC RX 637 (ALT 250 FOR IP): Performed by: INTERNAL MEDICINE

## 2021-07-06 PROCEDURE — 80048 BASIC METABOLIC PNL TOTAL CA: CPT

## 2021-07-06 PROCEDURE — 6370000000 HC RX 637 (ALT 250 FOR IP): Performed by: NURSE PRACTITIONER

## 2021-07-06 PROCEDURE — 2580000003 HC RX 258: Performed by: NURSE PRACTITIONER

## 2021-07-06 PROCEDURE — 85025 COMPLETE CBC W/AUTO DIFF WBC: CPT

## 2021-07-06 PROCEDURE — 2060000000 HC ICU INTERMEDIATE R&B

## 2021-07-06 PROCEDURE — 6360000002 HC RX W HCPCS: Performed by: NURSE PRACTITIONER

## 2021-07-06 RX ADMIN — LORAZEPAM 1 MG: 1 TABLET ORAL at 08:15

## 2021-07-06 RX ADMIN — LEVOFLOXACIN 500 MG: 250 TABLET, FILM COATED ORAL at 20:17

## 2021-07-06 RX ADMIN — PROCHLORPERAZINE MALEATE 10 MG: 10 TABLET ORAL at 11:38

## 2021-07-06 RX ADMIN — ENOXAPARIN SODIUM 40 MG: 40 INJECTION SUBCUTANEOUS at 17:45

## 2021-07-06 RX ADMIN — LOPERAMIDE HYDROCHLORIDE 2 MG: 2 CAPSULE ORAL at 06:34

## 2021-07-06 RX ADMIN — VALACYCLOVIR HYDROCHLORIDE 500 MG: 500 TABLET, FILM COATED ORAL at 08:15

## 2021-07-06 RX ADMIN — POTASSIUM CHLORIDE AND SODIUM CHLORIDE: 900; 150 INJECTION, SOLUTION INTRAVENOUS at 20:19

## 2021-07-06 RX ADMIN — VALACYCLOVIR HYDROCHLORIDE 500 MG: 500 TABLET, FILM COATED ORAL at 20:17

## 2021-07-06 RX ADMIN — FLUCONAZOLE 400 MG: 200 TABLET ORAL at 08:15

## 2021-07-06 RX ADMIN — ONDANSETRON HYDROCHLORIDE 8 MG: 8 TABLET, FILM COATED ORAL at 15:36

## 2021-07-06 RX ADMIN — PROCHLORPERAZINE MALEATE 10 MG: 10 TABLET ORAL at 17:45

## 2021-07-06 RX ADMIN — Medication 10 ML: at 08:16

## 2021-07-06 RX ADMIN — Medication 10 ML: at 20:17

## 2021-07-06 RX ADMIN — LORAZEPAM 1 MG: 1 TABLET ORAL at 20:17

## 2021-07-06 RX ADMIN — POTASSIUM CHLORIDE AND SODIUM CHLORIDE: 900; 150 INJECTION, SOLUTION INTRAVENOUS at 03:47

## 2021-07-06 RX ADMIN — TBO-FILGRASTIM 300 MCG: 300 INJECTION, SOLUTION SUBCUTANEOUS at 17:45

## 2021-07-06 RX ADMIN — AMLODIPINE BESYLATE 10 MG: 10 TABLET ORAL at 08:15

## 2021-07-06 ASSESSMENT — PAIN SCALES - GENERAL
PAINLEVEL_OUTOF10: 0

## 2021-07-06 NOTE — PROGRESS NOTES
[facility]  Autologous Progress Note    2021    Robel Winslow    :  1950    MRN:  [id number]    Referring MD: Donnie Baltazar MD  [address] [facility],  [address]      Subjective:  Feeling better. Right ear pressure. ECOG PS:  (1) Restricted in physically strenuous activity, ambulatory and able to do work of light nature    KPS: 80% Normal activity with effort; some signs or symptoms of disease    Isolation:  None     Medications    Scheduled Meds:   levoFLOXacin  500 mg Oral Daily    tbo-filgrastim  300 mcg Subcutaneous QPM    enoxaparin  40 mg Subcutaneous Daily    fluconazole  400 mg Oral Daily    Saline Mouthwash  15 mL Swish & Spit 4x Daily AC & HS    sodium chloride flush  5-40 mL Intravenous 2 times per day    valACYclovir  500 mg Oral BID    LORazepam  1 mg Oral BID    amLODIPine  10 mg Oral Daily     Continuous Infusions:   0.9% NaCl with KCl 20 mEq 50 mL/hr at 21 0347    sodium chloride      sodium chloride      sodium chloride       PRN Meds:. [COMPLETED] loperamide **FOLLOWED BY** loperamide, sodium chloride, prochlorperazine **OR** prochlorperazine, ondansetron, ondansetron, sodium chloride, sodium chloride, alteplase, magnesium hydroxide, magnesium sulfate, potassium chloride, Saline Mouthwash, sodium chloride flush, oxyCODONE, cloNIDine    ROS:  As noted above, otherwise remainder of 10-point ROS negative    Physical Exam:     I&O:      Intake/Output Summary (Last 24 hours) at 2021 0924  Last data filed at 2021 0802  Gross per 24 hour   Intake 2098 ml   Output 4000 ml   Net -1902 ml       Vital Signs:  /71   Pulse 92   Temp 97.9 °F (36.6 °C) (Oral)   Resp 16   Ht 5' 5\" (1.651 m)   Wt 155 lb 13.8 oz (70.7 kg)   SpO2 99%   BMI 25.94 kg/m²     Weight:    Wt Readings from Last 3 Encounters:   21 155 lb 13.8 oz (70.7 kg)   21 160 lb (72.6 kg)   21 160 lb (72.6 kg)       General: Awake, alert and oriented. HEENT: normocephalic, alopecia, PERRL, no scleral erythema or icterus, Oral mucosa moist and intact, throat clear  NECK: supple without palpable adenopathy  BACK: Straight negative CVAT  SKIN: warm dry and intact without lesions rashes or masses  CHEST: CTA bilaterally without use of accessory muscles  CV: Normal S1 S2, RRR, no MRG  ABD: NT ND normoactive BS, no palpable masses or hepatosplenomegaly  EXTREMITIES: without edema, denies calf tenderness  NEURO: CN II - XII grossly intact  CATHETER: Left SC Trifusion (6/21/21, [place]) - CDI    Data:   CBC:   Recent Labs     07/04/21 0410 07/05/21 0426 07/06/21  0416   WBC 1.1* 1.5* 1.1*   HGB 11.2* 11.3* 11.3*   HCT 32.5* 33.0* 32.6*   MCV 91.8 91.2 90.9    230 192     BMP/Mag:  Recent Labs     07/04/21 0410 07/05/21 0426 07/06/21  0416   * 140 140   K 4.0 4.1 3.9    107 106   CO2 26 24 26   PHOS  --  3.1  --    BUN 11 9 11   CREATININE <0.5* <0.5* <0.5*   MG  --  2.30  --      LIVP:   Recent Labs     07/05/21  0426   AST 20   ALT 15   BILIDIR <0.2   BILITOT 0.7   ALKPHOS 63     Uric Acid:    Recent Labs     07/06/21 0416   LABURIC 3.3     Coags:   Recent Labs     07/05/21  0426   PROTIME 10.7   INR 0.95   APTT 32.9         PROBLEM LIST:            1. IgA Kappa Multiple Myeloma  2. Hepatitis C (2002, s/p tx with Harvoni 2016)  3. Recurrent shingles (5/20/21)  4. Anxiety/depression  5. Chronic HFrEF (LVEF 40-45%)      TREATMENT:            1. RVD x7 cycles  2. Melphalan 200mg/m2 & ASCT (7/1/21) - 3.51 x10^6 [id number]/kg      ASSESSMENT AND PLAN:         1. IgA Kappa Multiple Myeloma: [facility] in a [facility]  - S/p [id number] & ASCT (7/1/21)  - Post-Txp Maintenance: Revlimid maintenance  - Post-Txp F/u: Serum myeloma studies        Day +5     2. ID: Afebrile, no evidence of infection.   - H/o recurrent shingles  - Cont Diflucan and Valtrex ppx, cont acyclovir at discharge for VZV positive titer.    - Levaquin ppx when ANC < 1.5     3.  Heme: stable blood counts   - Transfuse for Hgb < 7 and Platelets < 16P  - No transfusion today  - Start Granix 7/6/21     4. Metabolic: stable renal fxn and e-lytes except for hypoK  - Change IVF: NS + KCl 20 meq @ 50 mL/hr  - Replace potassium and magnesium per PRN orders     5. Cardiac:   - H/o Chronic HFrEF & HTN  - Echo 5/21/21: global hypokinesis EF 40-45%, Mild-Mod MR, Mild TR  - Cleared for transplant by Dr. Mu Williamson 6/3/21  Chronic HFrEF:  - Monitor fluid status closely   HTN:  Intermittently elevated  - Cont Norvasc 10 mg daily   - Cont to monitor      6. GI / Nutrition:   - H/o hepatitis C  Hepatitis C:  No acute issues   - S/p Harvoni (2016)  Nutrition:  Appetite and oral intake is good. - Cont low microbial diet   - Follow closely with dietary  Nausea:  Mild, improved w/ Compazine  - Cont Compazine and add Zofran as needed     7.  Psych:  - H/o Anxiety / Depression:   Anxiety / Depression:  Ongoing anxiety related transplant process  - Cont Ativan 1mg BID  - Psych following      8. 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   Perfect Serve  2.  [phone]    7/6/2021   9:24 AM

## 2021-07-06 NOTE — PLAN OF CARE
Problem: Falls - Risk of:  Goal: Will remain free from falls  Description: Orthostatic vital signs obtained at start of shift - see flowsheet for details. Pt does not meet criteria for orthostasis. Pt is a Med fall risk. See [place] Fall Score and ABCDS Injury Risk assessments. - Screening for Orthostasis AND not a [place] Risk per BERMUDEZ/ABCDS: Pt bed is in low position, side rails up, call light and belongings are in reach. Fall risk light is on outside pts room. Pt encouraged to call for assistance as needed. Will continue with hourly rounds for PO intake, pain needs, toileting and repositioning as needed. 7/6/2021 1225 by Jayla Werner RN  Outcome: Ongoing        Problem: Infection - Central Venous Catheter-Associated Bloodstream Infection:  Goal: Will show no infection signs and symptoms  Description: Will show no infection signs and symptoms  7/6/2021 1225 by Jayla Werner RN  Outcome: Ongoing   CVC site remains free of signs/symptoms of infection. No drainage, edema, erythema, pain, or warmth noted at site. Dressing changes continue per protocol and on an as needed basis - see flowsheet. Compliant with [facility] Bath Protocol:  Performed CHG bath today per [facility] protocol utilizing CHG solution in the shower. CVC site cleansed with CHG wipe over dressing, skin surrounding dressing, and first 6\" of IV tubing. Pt tolerated well. Continued to encourage daily CHG bathing per [address] protocol. Problem: Venous Thromboembolism:  Goal: Will show no signs or symptoms of venous thromboembolism  Description: Will show no signs or symptoms of venous thromboembolism  7/5/2021 2311 by Kimo Adam RN  Outcome: Ongoing  Note: Adherent with DVT Prevention: Pt is at risk for DVT d/t decreased mobility and cancer treatment. Pt educated on importance of activity. Pt has orders for Subcut prophylactic lovenox. Pt verbalizes understanding of need for prophylaxis while inpatient.       Adherent with DVT Prevention: Pt is at risk for DVT d/t decreased mobility and cancer treatment. Pt educated on importance of activity. Pt has orders for Subcut prophylactic lovenox. Pt verbalizes understanding of need for prophylaxis while inpatient. Problem: PROTECTIVE PRECAUTIONS  Goal: Patient will remain free of nosocomial Infections  Description: Pt educated on use of mask prior to leaving room. All staff and visitor following handwashing requirements this shift prior to entering room. Low microbial diet order in place and being followed. Linens changed today per protocol and pt reports using chlorhexidine wash per order. 7/6/2021 1225 by Jayla Werner RN  Outcome: Ongoing   Pt remains in neutropenic precautions per floor policy. Pt, visitors, and staff noted to be following precautions appropriately. Handwashing in place; pt wearing mask in hallway per protocol. Pt in private room. Low microbial diet in place. Will continue to monitor. Problem: Nutrition  Goal: Optimal nutrition therapy  Outcome: Ongoing   Educated the patient on nutrition. Problem: Nausea/Vomiting:  Goal: Absence of nausea/vomiting  Description: Absence of nausea/vomiting  7/6/2021 1225 by Jayla Werner RN  Outcome: Ongoing   Patient complained of nausea once this shift, gave compazine as ordered. Problem: Bleeding:  Goal: Will show no signs and symptoms of excessive bleeding  Description: Will show no signs and symptoms of excessive bleeding  7/6/2021 1225 by Jayla Werner RN  Outcome: Ongoing   Patient's hemoglobin this AM:   Recent Labs     07/06/21  0416   HGB 11.3*     Patient's platelet count this AM:   Recent Labs     07/06/21  0416       Thrombocytopenia Precautions in place. Patient showing no signs or symptoms of active bleeding. Transfusion not indicated at this time. Patient verbalizes understanding of all instructions. Will continue to assess and implement POC.  Call light within reach and hourly rounding in place.

## 2021-07-06 NOTE — CARE COORDINATION
Type of Admission  Autologous Transplant  7/1/21  Multiple Myeloma       Central venous catheter  6/21/21: trifusion left subclavian. Plan  Auto transplant  D+5   Melphalan 200mg        Update  6/30/21: Pt admitted for auto transplant. Introduced myself and briefly discussed transplant process. Will follow up with patient while inpatient. Reviewed transplant procedure the role of melphalan and the recovery following transplant. 7/2/21:  Discussed recovery and timeline of transplant. 7/6/21:Patient observed walking on the unit several laps. Patient reports that she is feeling pretty good so far. Patient is eating and drinking at this time. Education  6/30/21: Cryotherapy education provided as well as melphalan and emend education. Discharge planning discussed pt spouse will be the caregiver. 7/2/21:  Transplant recovery fall safety  7/6/21:  Educated patient on the importance of actvity during this time.          Discharge with the next 30 days          Pending

## 2021-07-06 NOTE — PLAN OF CARE
Problem: Falls - Risk of:  Goal: Will remain free from falls  Description: Orthostatic vital signs obtained at start of shift - see flowsheet for details. Pt does not meet criteria for orthostasis. Pt is a Med fall risk. See Garrison Mort Fall Score and ABCDS Injury Risk assessments. - Screening for Orthostasis AND not a [place] Risk per BERMUDEZ/ABCDS: Pt bed is in low position, side rails up, call light and belongings are in reach. Fall risk light is on outside pts room. Pt encouraged to call for assistance as needed. Will continue with hourly rounds for PO intake, pain needs, toileting and repositioning as needed. 7/5/2021 2311 by Kavya Velazquez RN  Outcome: Ongoing  Note: Orthostatic vital signs obtained at start of shift - see flowsheet for details. Pt does not meet criteria for orthostasis. Pt is a Med fall risk. See Garrison Mort Fall Score and ABCDS Injury Risk assessments. - Screening for Orthostasis AND not a [place] Risk per BERMUDEZ/ABCDS: Pt bed is in low position, side rails up, call light and belongings are in reach. Fall risk light is on outside pts room. Pt encouraged to call for assistance as needed. Will continue with hourly rounds for PO intake, pain needs, toileting and repositioning as needed. Problem: Infection - Central Venous Catheter-Associated Bloodstream Infection:  Goal: Will show no infection signs and symptoms  Description: Will show no infection signs and symptoms  7/5/2021 2311 by Kavya Velazquez RN  Outcome: Ongoing  Note: CVC site remains free of signs/symptoms of infection. No drainage, edema, erythema, pain, or warmth noted at site. Dressing changes continue per protocol and on an as needed basis - see flowsheet.           Problem: Venous Thromboembolism:  Goal: Will show no signs or symptoms of venous thromboembolism  Description: Will show no signs or symptoms of venous thromboembolism  7/5/2021 2311 by Kavya Velazquez RN  Outcome: Ongoing  Note: Adherent with DVT Prevention: Pt is at risk for DVT d/t decreased mobility and cancer treatment. Pt educated on importance of activity. Pt has orders for Subcut prophylactic lovenox. Pt verbalizes understanding of need for prophylaxis while inpatient. Problem: PROTECTIVE PRECAUTIONS  Goal: Patient will remain free of nosocomial Infections  Description: Pt educated on use of mask prior to leaving room. All staff and visitor following handwashing requirements this shift prior to entering room. Low microbial diet order in place and being followed. Linens changed today per protocol and pt reports using chlorhexidine wash per order. 7/5/2021 2311 by Richy Dc RN  Outcome: Ongoing  Note: ANC today is 1.4. Pt afebrile. No signs/sx of infection this visit. CVC site remains free of signs/symptoms of infection. No drainage, edema, erythema, pain, or warmth noted at site. Line care provided per flowsheets. Medications reviewed, pt taking all prophylactic antimicrobials as prescribed. Problem: Bleeding:  Goal: Will show no signs and symptoms of excessive bleeding  Description: Will show no signs and symptoms of excessive bleeding  7/5/2021 2311 by Richy Dc RN  Outcome: Ongoing  Note: Patient's hemoglobin this AM:   Recent Labs     07/05/21  0426   HGB 11.3*     Patient's platelet count this AM:   Recent Labs     07/05/21  0426       Thrombocytopenia not present at this time. Patient showing no signs or symptoms of active bleeding. Transfusion not indicated at this time. Patient verbalizes understanding of all instructions. Will continue to assess and implement POC. Call light within reach and hourly rounding in place. Problem: Nausea/Vomiting:  Goal: Absence of nausea/vomiting  Description: Absence of nausea/vomiting  7/5/2021 2311 by Richy Dc RN  Outcome: Ongoing  Note: Patient has PRN compazine and Zofran for nausea and vomiting. Patient satisfied with treatment.

## 2021-07-07 LAB
ALBUMIN SERPL-MCNC: 3.9 G/DL (ref 3.4–5)
ALP BLD-CCNC: 54 U/L (ref 40–129)
ALT SERPL-CCNC: 18 U/L (ref 10–40)
ANION GAP SERPL CALCULATED.3IONS-SCNC: 6 MMOL/L (ref 3–16)
ANION GAP SERPL CALCULATED.3IONS-SCNC: 6 MMOL/L (ref 3–16)
AST SERPL-CCNC: 36 U/L (ref 15–37)
BASOPHILS ABSOLUTE: 0 K/UL (ref 0–0.2)
BASOPHILS RELATIVE PERCENT: 0.1 %
BILIRUB SERPL-MCNC: 0.5 MG/DL (ref 0–1)
BILIRUBIN DIRECT: <0.2 MG/DL (ref 0–0.3)
BILIRUBIN, INDIRECT: NORMAL MG/DL (ref 0–1)
BUN BLDV-MCNC: 9 MG/DL (ref 7–20)
BUN BLDV-MCNC: 9 MG/DL (ref 7–20)
CALCIUM SERPL-MCNC: 8.6 MG/DL (ref 8.3–10.6)
CALCIUM SERPL-MCNC: 8.8 MG/DL (ref 8.3–10.6)
CHLORIDE BLD-SCNC: 103 MMOL/L (ref 99–110)
CHLORIDE BLD-SCNC: 106 MMOL/L (ref 99–110)
CO2: 22 MMOL/L (ref 21–32)
CO2: 26 MMOL/L (ref 21–32)
CREAT SERPL-MCNC: <0.5 MG/DL (ref 0.6–1.2)
CREAT SERPL-MCNC: <0.5 MG/DL (ref 0.6–1.2)
EOSINOPHILS ABSOLUTE: 0 K/UL (ref 0–0.6)
EOSINOPHILS RELATIVE PERCENT: 0.1 %
GFR AFRICAN AMERICAN: >60
GFR AFRICAN AMERICAN: >60
GFR NON-AFRICAN AMERICAN: >60
GFR NON-AFRICAN AMERICAN: >60
GLUCOSE BLD-MCNC: 112 MG/DL (ref 70–99)
GLUCOSE BLD-MCNC: 95 MG/DL (ref 70–99)
HCT VFR BLD CALC: 33.4 % (ref 36–48)
HEMOGLOBIN: 11.4 G/DL (ref 12–16)
LACTATE DEHYDROGENASE: 472 U/L (ref 100–190)
LYMPHOCYTES ABSOLUTE: 0.1 K/UL (ref 1–5.1)
LYMPHOCYTES RELATIVE PERCENT: 1.5 %
MCH RBC QN AUTO: 31.2 PG (ref 26–34)
MCHC RBC AUTO-ENTMCNC: 34.2 G/DL (ref 31–36)
MCV RBC AUTO: 91.4 FL (ref 80–100)
MONOCYTES ABSOLUTE: 0 K/UL (ref 0–1.3)
MONOCYTES RELATIVE PERCENT: 0.5 %
NEUTROPHILS ABSOLUTE: 3.4 K/UL (ref 1.7–7.7)
NEUTROPHILS RELATIVE PERCENT: 97.8 %
PDW BLD-RTO: 16.6 % (ref 12.4–15.4)
PERFORMED ON: NORMAL
PHOSPHORUS: 3 MG/DL (ref 2.5–4.9)
PLATELET # BLD: 156 K/UL (ref 135–450)
PMV BLD AUTO: 7 FL (ref 5–10.5)
POC POTASSIUM: 4 MMOL/L (ref 3.5–5.1)
POC SAMPLE TYPE: NORMAL
POC SODIUM: 140 MMOL/L (ref 136–145)
POTASSIUM SERPL-SCNC: 4.2 MMOL/L (ref 3.5–5.1)
POTASSIUM SERPL-SCNC: 6.2 MMOL/L (ref 3.5–5.1)
RBC # BLD: 3.66 M/UL (ref 4–5.2)
SODIUM BLD-SCNC: 131 MMOL/L (ref 136–145)
SODIUM BLD-SCNC: 138 MMOL/L (ref 136–145)
TOTAL PROTEIN: 6.4 G/DL (ref 6.4–8.2)
URIC ACID, SERUM: 2.7 MG/DL (ref 2.6–6)
WBC # BLD: 3.5 K/UL (ref 4–11)

## 2021-07-07 PROCEDURE — 6370000000 HC RX 637 (ALT 250 FOR IP): Performed by: NURSE PRACTITIONER

## 2021-07-07 PROCEDURE — 83615 LACTATE (LD) (LDH) ENZYME: CPT

## 2021-07-07 PROCEDURE — 84295 ASSAY OF SERUM SODIUM: CPT

## 2021-07-07 PROCEDURE — 6360000002 HC RX W HCPCS: Performed by: NURSE PRACTITIONER

## 2021-07-07 PROCEDURE — 2580000003 HC RX 258: Performed by: NURSE PRACTITIONER

## 2021-07-07 PROCEDURE — 36592 COLLECT BLOOD FROM PICC: CPT

## 2021-07-07 PROCEDURE — 84550 ASSAY OF BLOOD/URIC ACID: CPT

## 2021-07-07 PROCEDURE — 84132 ASSAY OF SERUM POTASSIUM: CPT

## 2021-07-07 PROCEDURE — 6370000000 HC RX 637 (ALT 250 FOR IP): Performed by: INTERNAL MEDICINE

## 2021-07-07 PROCEDURE — 84100 ASSAY OF PHOSPHORUS: CPT

## 2021-07-07 PROCEDURE — 80076 HEPATIC FUNCTION PANEL: CPT

## 2021-07-07 PROCEDURE — 2060000000 HC ICU INTERMEDIATE R&B

## 2021-07-07 PROCEDURE — 80048 BASIC METABOLIC PNL TOTAL CA: CPT

## 2021-07-07 PROCEDURE — 85025 COMPLETE CBC W/AUTO DIFF WBC: CPT

## 2021-07-07 RX ADMIN — VALACYCLOVIR HYDROCHLORIDE 500 MG: 500 TABLET, FILM COATED ORAL at 20:19

## 2021-07-07 RX ADMIN — ONDANSETRON HYDROCHLORIDE 8 MG: 8 TABLET, FILM COATED ORAL at 17:43

## 2021-07-07 RX ADMIN — LOPERAMIDE HYDROCHLORIDE 2 MG: 2 CAPSULE ORAL at 17:43

## 2021-07-07 RX ADMIN — Medication 10 ML: at 20:19

## 2021-07-07 RX ADMIN — POTASSIUM CHLORIDE AND SODIUM CHLORIDE: 900; 150 INJECTION, SOLUTION INTRAVENOUS at 13:52

## 2021-07-07 RX ADMIN — TBO-FILGRASTIM 300 MCG: 300 INJECTION, SOLUTION SUBCUTANEOUS at 17:44

## 2021-07-07 RX ADMIN — VALACYCLOVIR HYDROCHLORIDE 500 MG: 500 TABLET, FILM COATED ORAL at 08:49

## 2021-07-07 RX ADMIN — ENOXAPARIN SODIUM 40 MG: 40 INJECTION SUBCUTANEOUS at 17:43

## 2021-07-07 RX ADMIN — LORAZEPAM 1 MG: 1 TABLET ORAL at 08:49

## 2021-07-07 RX ADMIN — SODIUM CHLORIDE 15 ML: 900 IRRIGANT IRRIGATION at 11:00

## 2021-07-07 RX ADMIN — PROCHLORPERAZINE MALEATE 10 MG: 10 TABLET ORAL at 07:53

## 2021-07-07 RX ADMIN — SODIUM CHLORIDE 15 ML: 900 IRRIGANT IRRIGATION at 17:48

## 2021-07-07 RX ADMIN — Medication 10 ML: at 08:51

## 2021-07-07 RX ADMIN — PROCHLORPERAZINE MALEATE 10 MG: 10 TABLET ORAL at 13:51

## 2021-07-07 RX ADMIN — LEVOFLOXACIN 500 MG: 250 TABLET, FILM COATED ORAL at 20:18

## 2021-07-07 RX ADMIN — SODIUM CHLORIDE 15 ML: 900 IRRIGANT IRRIGATION at 20:20

## 2021-07-07 RX ADMIN — AMLODIPINE BESYLATE 10 MG: 10 TABLET ORAL at 08:49

## 2021-07-07 RX ADMIN — LORAZEPAM 1 MG: 1 TABLET ORAL at 20:19

## 2021-07-07 RX ADMIN — LOPERAMIDE HYDROCHLORIDE 2 MG: 2 CAPSULE ORAL at 10:57

## 2021-07-07 RX ADMIN — LOPERAMIDE HYDROCHLORIDE 2 MG: 2 CAPSULE ORAL at 15:10

## 2021-07-07 RX ADMIN — FLUCONAZOLE 400 MG: 200 TABLET ORAL at 08:49

## 2021-07-07 RX ADMIN — ONDANSETRON 8 MG: 2 INJECTION INTRAMUSCULAR; INTRAVENOUS at 03:27

## 2021-07-07 RX ADMIN — PROCHLORPERAZINE MALEATE 10 MG: 10 TABLET ORAL at 20:19

## 2021-07-07 ASSESSMENT — PAIN SCALES - GENERAL
PAINLEVEL_OUTOF10: 0

## 2021-07-07 NOTE — FLOWSHEET NOTE
07/07/21 1526   Encounter Summary   Services provided to: Patient   Referral/Consult From: Rounding   Continue Visiting   (es 7/7)   Complexity of Encounter Moderate   Length of Encounter 45 minutes   Routine   Type Follow up   Assessment Approachable   Intervention Active listening   Outcome Engaged in conversation

## 2021-07-07 NOTE — PLAN OF CARE
Problem: Falls - Risk of:  Goal: Will remain free from falls  Description: Orthostatic vital signs obtained at start of shift - see flowsheet for details. Pt does not meet criteria for orthostasis. Pt is a Med fall risk. See Christopher Nice Fall Score and ABCDS Injury Risk assessments. 7/7/2021 0221 by Gely Nguyen RN  Outcome: Ongoing  Note: Orthostatic vital signs obtained at start of shift - see flowsheet for details. Pt does not meet criteria for orthostasis. Pt is a Med fall risk. See Christopher Nice Fall Score and ABCDS Injury Risk assessments. - Screening for Orthostasis AND not a [place] Risk per BERMUDEZ/ABCDS: Pt bed is in low position, side rails up, call light and belongings are in reach. Fall risk light is on outside pts room. Pt encouraged to call for assistance as needed. Will continue with hourly rounds for PO intake, pain needs, toileting and repositioning as needed. Problem: Infection - Central Venous Catheter-Associated Bloodstream Infection:  Goal: Will show no infection signs and symptoms  Description: Will show no infection signs and symptoms  7/7/2021 0221 by Gely Nguyen RN  Outcome: Ongoing  Note: CVC site remains free of signs/symptoms of infection. No drainage, edema, erythema, pain, or warmth noted at site. Dressing changes continue per protocol and on an as needed basis - see flowsheet. Problem: Venous Thromboembolism:  Goal: Will show no signs or symptoms of venous thromboembolism  Description: Will show no signs or symptoms of venous thromboembolism  7/7/2021 0221 by Gely Nguyen RN  Outcome: Ongoing  Note: Adherent with DVT Prevention: Pt is at risk for DVT d/t decreased mobility and cancer treatment. Pt educated on importance of activity. Pt has orders for Subcut prophylactic lovenox. Pt verbalizes understanding of need for prophylaxis while inpatient.      Problem: PROTECTIVE PRECAUTIONS  Goal: Patient will remain free of nosocomial Infections  Description: 7/7/2021 0221 by Chase Alvarado RN  Outcome: Ongoing  Note: Pt remains in a private room. Pt is compliant with handwashing, low-microbial diet, and bathing per [facility] protocol. Pt remains afebrile at this time with stable vital signs. Will continue to monitor. Problem: Bleeding:  Goal: Will show no signs and symptoms of excessive bleeding  Description: Will show no signs and symptoms of excessive bleeding  7/7/2021 0221 by Chase Alvarado RN  Outcome: Ongoing  Note: Patient's hemoglobin this AM:   Recent Labs     07/06/21  0416   HGB 11.3*     Patient's platelet count this AM:   Recent Labs     07/06/21  0416       Thrombocytopenia not present at this time. Patient showing no signs or symptoms of active bleeding. Transfusion not indicated at this time. Patient verbalizes understanding of all instructions. Will continue to assess and implement POC. Call light within reach and hourly rounding in place. Problem: Nausea/Vomiting:  Goal: Absence of nausea/vomiting  Description: Absence of nausea/vomiting  7/7/2021 0221 by Chase Alvarado RN  Outcome: Ongoing  Note: No complaints of nausea noted this shift. Will continue to monitor.

## 2021-07-07 NOTE — PROGRESS NOTES
including addressing emotional and behavioral needs while receiving care at the [facility] . Also discussed with patient that a component of provider's role is completing the pre-surgical psychological evaluations for bone marrow transplant and CAR-T. Informed patient of psychologist's various roles in order for the patient to be fully informed when consenting to behavioral health treatment. Patient was agreeable to engaging in care with psychologist. Discussed limits of confidentiality including that psychologist coordinates with patient's treatment team and other limits of confidentiality were discussed as needed.

## 2021-07-07 NOTE — PROGRESS NOTES
[facility]  Autologous Progress Note    2021    Elizabeth José    :  1950    MRN:  [id number]    Referring MD: Jonathan Koroma MD  [id number] [facility],  [facility] [place]      Subjective:  Feeling better. Right ear pressure. ECOG PS:  (1) Restricted in physically strenuous activity, ambulatory and able to do work of light nature    KPS: 80% Normal activity with effort; some signs or symptoms of disease    Isolation:  None     Medications    Scheduled Meds:   levoFLOXacin  500 mg Oral Daily    tbo-filgrastim  300 mcg Subcutaneous QPM    enoxaparin  40 mg Subcutaneous Daily    fluconazole  400 mg Oral Daily    Saline Mouthwash  15 mL Swish & Spit 4x Daily AC & HS    sodium chloride flush  5-40 mL Intravenous 2 times per day    valACYclovir  500 mg Oral BID    LORazepam  1 mg Oral BID    amLODIPine  10 mg Oral Daily     Continuous Infusions:   0.9% NaCl with KCl 20 mEq 50 mL/hr at 21    sodium chloride      sodium chloride      sodium chloride       PRN Meds:. [COMPLETED] loperamide **FOLLOWED BY** loperamide, sodium chloride, prochlorperazine **OR** prochlorperazine, ondansetron, ondansetron, sodium chloride, sodium chloride, alteplase, magnesium hydroxide, magnesium sulfate, potassium chloride, Saline Mouthwash, sodium chloride flush, oxyCODONE, cloNIDine    ROS:  As noted above, otherwise remainder of 10-point ROS negative    Physical Exam:     I&O:      Intake/Output Summary (Last 24 hours) at 2021 0640  Last data filed at 2021 0630  Gross per 24 hour   Intake 2646 ml   Output 3800 ml   Net -1154 ml       Vital Signs:  /88   Pulse 110   Temp 97.8 °F (36.6 °C) (Oral)   Resp 18   Ht 5' 5\" (1.651 m)   Wt 155 lb 13.8 oz (70.7 kg)   SpO2 100%   BMI 25.94 kg/m²     Weight:    Wt Readings from Last 3 Encounters:   21 155 lb 13.8 oz (70.7 kg)   21 160 lb (72.6 kg)   21 160 lb (72.6 kg)       General: Awake, alert and oriented. HEENT: normocephalic, alopecia, PERRL, no scleral erythema or icterus, Oral mucosa moist and intact, throat clear  NECK: supple without palpable adenopathy  BACK: Straight negative CVAT  SKIN: warm dry and intact without lesions rashes or masses  CHEST: CTA bilaterally without use of accessory muscles  CV: Normal S1 S2, RRR, no MRG  ABD: NT ND normoactive BS, no palpable masses or hepatosplenomegaly  EXTREMITIES: without edema, denies calf tenderness  NEURO: CN II - XII grossly intact  CATHETER: Left SC Trifusion (6/21/21, Blake) - CDI    Data:   CBC:   Recent Labs     07/05/21 0426 07/06/21  0416 07/07/21  0330   WBC 1.5* 1.1* 3.5*   HGB 11.3* 11.3* 11.4*   HCT 33.0* 32.6* 33.4*   MCV 91.2 90.9 91.4    192 156     BMP/Mag:  Recent Labs     07/05/21 0426 07/05/21 0426 07/06/21 0416 07/07/21  0330 07/07/21  0510      < > 140 131* 138   K 4.1   < > 3.9 6.2* 4.2      < > 106 103 106   CO2 24   < > 26 22 26   PHOS 3.1  --   --  3.0  --    BUN 9   < > 11 9 9   CREATININE <0.5*   < > <0.5* <0.5* <0.5*   MG 2.30  --   --   --   --     < > = values in this interval not displayed. LIVP:   Recent Labs     07/05/21 0426 07/07/21  0330   AST 20 36   ALT 15 18   BILIDIR <0.2 <0.2   BILITOT 0.7 0.5   ALKPHOS 63 54     Uric Acid:    Recent Labs     07/07/21 0330   LABURIC 2.7     Coags:   Recent Labs     07/05/21 0426   PROTIME 10.7   INR 0.95   APTT 32.9         PROBLEM LIST:            1. IgA Kappa Multiple Myeloma  2. Hepatitis C (2002, s/p tx with Harvoni 2016)  3. Recurrent shingles (5/20/21)  4. Anxiety/depression  5. Chronic HFrEF (LVEF 40-45%)      TREATMENT:            1. RVD x7 cycles  2. Melphalan 200mg/m2 & ASCT (7/1/21) - 3.51 x10^6 [id number]/kg      ASSESSMENT AND PLAN:         1. IgA Kappa Multiple Myeloma: Currently in a [place]  - S/p [id number] & ASCT (7/1/21)  - Post-Txp Maintenance: Revlimid maintenance  - Post-Txp F/u: Serum myeloma studies        Day +6     2.  ID: Afebrile, no evidence of infection.   - H/o recurrent shingles  - Cont Levaquin, Diflucan and Valtrex ppx, cont acyclovir at discharge for VZV positive titer.         3. Heme: Leukopenia and anemia r/t recent chemotherapy  - Transfuse for Hgb < 7 and Platelets < 48R  - No transfusion today  - Start Granix 7/6/21     4. Metabolic: stable renal fxn and e-lytes  - Change IVF: NS + KCl 20 meq @ 50 mL/hr  - Replace potassium and magnesium per PRN orders     5. Cardiac:   - H/o Chronic HFrEF & HTN  - Echo 5/21/21: global hypokinesis EF 40-45%, Mild-Mod MR, Mild TR  - Cleared for transplant by Dr. Yoly Bhatt 6/3/21  Chronic HFrEF:  - Monitor fluid status closely   HTN:  Intermittently elevated  - Cont Norvasc 10 mg daily   - Cont to monitor      6. GI / Nutrition:   - H/o hepatitis C  Hepatitis C:  No acute issues   - S/p Harvoni (2016)  Nutrition:  Appetite and oral intake is good. - Cont low microbial diet   - Follow closely with dietary  Nausea:  Mild, improved w/ Compazine  - Cont Compazine and add Zofran as needed     7.  Psych:  - H/o Anxiety / Depression:   Anxiety / Depression:  Ongoing anxiety related transplant process  - Cont Ativan 1mg BID  - Psych following      8. 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 Terrie Martin DO, MS  [facility]    Please contact via:  1.   Perfect [employer]  2.  [phone]    7/7/2021   6:40 AM

## 2021-07-07 NOTE — PROGRESS NOTES
Received call from lab that pt had critical potassium level of 6.2 this morning and that specimen had been hemolyzed. Other notable labs include a sodium of 131 which is lower than pt's baseline. Obtained new BMP as well as ran levels on EPOC. Pt's potassium is 4 and sodium 140 on EPOC. Awaiting new BMP results. Will continue to monitor.

## 2021-07-07 NOTE — PLAN OF CARE
Problem: Falls - Risk of:  Goal: Will remain free from falls  Description: Orthostatic vital signs obtained at start of shift - see flowsheet for details. Pt does not meet criteria for orthostasis. Pt is a Med fall risk. See Parley Raider Fall Score and ABCDS Injury Risk assessments. - Screening for Orthostasis AND not a [place] Risk per BERMUDEZ/ABCDS: Pt bed is in low position, side rails up, call light and belongings are in reach. Fall risk light is on outside pts room. Pt encouraged to call for assistance as needed. Will continue with hourly rounds for PO intake, pain needs, toileting and repositioning as needed. 7/7/2021 1410 by Delma Lund RN  Outcome: Ongoing  Note: Orthostatic vital signs obtained at start of shift - see flowsheet for details. Pt does not meet criteria for orthostasis. Pt is a Low fall risk. See Parley Raider Fall Score and ABCDS Injury Risk assessments. - Screening for Orthostasis AND not a [place] Risk per BERMUDEZ/ABCDS: Pt bed is in low position, side rails up, call light and belongings are in reach. Fall risk light is on outside pts room. Pt encouraged to call for assistance as needed. Will continue with hourly rounds for PO intake, pain needs, toileting and repositioning as needed. Problem: Infection - Central Venous Catheter-Associated Bloodstream Infection:  Goal: Will show no infection signs and symptoms  Description: Will show no infection signs and symptoms  7/7/2021 1413 by Delma Lund RN  Outcome: Ongoing  Note: CVC site remains free of signs/symptoms of infection. No drainage, edema, erythema, pain, or warmth noted at site. Dressing changes continue per protocol and on an as needed basis - see flowsheet. Compliant with [facility] Bath Protocol:  Performed CHG bath today per [facility] protocol utilizing CHG solution in the shower. CVC site cleansed with CHG wipe over dressing, skin surrounding dressing, and first 6\" of IV tubing. Pt tolerated well.   Continued to encourage daily CHG bathing per [facility] protocol. Problem: Venous Thromboembolism:  Goal: Will show no signs or symptoms of venous thromboembolism  Description: Will show no signs or symptoms of venous thromboembolism  7/7/2021 1413 by Sulema Barnes RN  Outcome: Ongoing  Note: Adherent with DVT Prevention: Pt is at risk for DVT d/t decreased mobility and cancer treatment. Pt educated on importance of activity. Pt has orders for Subcut prophylactic lovenox. Pt verbalizes understanding of need for prophylaxis while inpatient. Problem: Nutrition  Goal: Optimal nutrition therapy  7/7/2021 1413 by Sulema Barnes RN  Outcome: Ongoing  Note: Pt educated on importance of PO intake, verbalized understanding      Problem: Bleeding:  Goal: Will show no signs and symptoms of excessive bleeding  Description: Will show no signs and symptoms of excessive bleeding  7/7/2021 1413 by Sulema Barnes RN  Outcome: Ongoing  Note: Patient's hemoglobin this AM:   Recent Labs     07/07/21  0330   HGB 11.4*     Patient's platelet count this AM:   Recent Labs     07/07/21  0330       Thrombocytopenia not present at this time. Patient showing no signs or symptoms of active bleeding. Transfusion not indicated at this time. Patient verbalizes understanding of all instructions. Will continue to assess and implement POC. Call light within reach and hourly rounding in place. Problem: Nausea/Vomiting:  Goal: Absence of nausea/vomiting  Description: Absence of nausea/vomiting  7/7/2021 1413 by Sulema Barnes RN  Outcome: Ongoing  Note: Pt was given oral compazine for complaints of nausea after meals.

## 2021-07-08 LAB
ANION GAP SERPL CALCULATED.3IONS-SCNC: 8 MMOL/L (ref 3–16)
APTT: 35.3 SEC (ref 26.2–38.6)
BUN BLDV-MCNC: 6 MG/DL (ref 7–20)
CALCIUM SERPL-MCNC: 8.6 MG/DL (ref 8.3–10.6)
CHLORIDE BLD-SCNC: 107 MMOL/L (ref 99–110)
CO2: 22 MMOL/L (ref 21–32)
CREAT SERPL-MCNC: <0.5 MG/DL (ref 0.6–1.2)
GFR AFRICAN AMERICAN: >60
GFR NON-AFRICAN AMERICAN: >60
GLUCOSE BLD-MCNC: 90 MG/DL (ref 70–99)
HCT VFR BLD CALC: 30.8 % (ref 36–48)
HEMOGLOBIN: 10.5 G/DL (ref 12–16)
INR BLD: 1.07 (ref 0.88–1.12)
MAGNESIUM: 2.1 MG/DL (ref 1.8–2.4)
MCH RBC QN AUTO: 31 PG (ref 26–34)
MCHC RBC AUTO-ENTMCNC: 34.2 G/DL (ref 31–36)
MCV RBC AUTO: 90.7 FL (ref 80–100)
PDW BLD-RTO: 16.2 % (ref 12.4–15.4)
PLATELET # BLD: 108 K/UL (ref 135–450)
PMV BLD AUTO: 7 FL (ref 5–10.5)
POTASSIUM SERPL-SCNC: 3.9 MMOL/L (ref 3.5–5.1)
PROTHROMBIN TIME: 12.1 SEC (ref 9.9–12.7)
RBC # BLD: 3.4 M/UL (ref 4–5.2)
SODIUM BLD-SCNC: 137 MMOL/L (ref 136–145)
URIC ACID, SERUM: 2.1 MG/DL (ref 2.6–6)
WBC # BLD: 0.2 K/UL (ref 4–11)

## 2021-07-08 PROCEDURE — 6370000000 HC RX 637 (ALT 250 FOR IP): Performed by: INTERNAL MEDICINE

## 2021-07-08 PROCEDURE — 36592 COLLECT BLOOD FROM PICC: CPT

## 2021-07-08 PROCEDURE — 2580000003 HC RX 258: Performed by: NURSE PRACTITIONER

## 2021-07-08 PROCEDURE — 6370000000 HC RX 637 (ALT 250 FOR IP): Performed by: NURSE PRACTITIONER

## 2021-07-08 PROCEDURE — 85610 PROTHROMBIN TIME: CPT

## 2021-07-08 PROCEDURE — 83735 ASSAY OF MAGNESIUM: CPT

## 2021-07-08 PROCEDURE — 2060000000 HC ICU INTERMEDIATE R&B

## 2021-07-08 PROCEDURE — 80048 BASIC METABOLIC PNL TOTAL CA: CPT

## 2021-07-08 PROCEDURE — 6360000002 HC RX W HCPCS: Performed by: NURSE PRACTITIONER

## 2021-07-08 PROCEDURE — 84550 ASSAY OF BLOOD/URIC ACID: CPT

## 2021-07-08 PROCEDURE — 85025 COMPLETE CBC W/AUTO DIFF WBC: CPT

## 2021-07-08 PROCEDURE — 85730 THROMBOPLASTIN TIME PARTIAL: CPT

## 2021-07-08 RX ORDER — LOPERAMIDE HYDROCHLORIDE 2 MG/1
2 CAPSULE ORAL PRN
Status: DISCONTINUED | OUTPATIENT
Start: 2021-07-08 | End: 2021-07-15 | Stop reason: HOSPADM

## 2021-07-08 RX ADMIN — PROCHLORPERAZINE EDISYLATE 5 MG: 5 INJECTION INTRAMUSCULAR; INTRAVENOUS at 20:04

## 2021-07-08 RX ADMIN — SODIUM CHLORIDE 15 ML: 900 IRRIGANT IRRIGATION at 11:36

## 2021-07-08 RX ADMIN — PROCHLORPERAZINE MALEATE 10 MG: 10 TABLET ORAL at 12:54

## 2021-07-08 RX ADMIN — POTASSIUM CHLORIDE AND SODIUM CHLORIDE: 900; 150 INJECTION, SOLUTION INTRAVENOUS at 20:08

## 2021-07-08 RX ADMIN — ENOXAPARIN SODIUM 40 MG: 40 INJECTION SUBCUTANEOUS at 17:34

## 2021-07-08 RX ADMIN — Medication 10 ML: at 11:37

## 2021-07-08 RX ADMIN — SODIUM CHLORIDE 15 ML: 900 IRRIGANT IRRIGATION at 17:37

## 2021-07-08 RX ADMIN — LOPERAMIDE HYDROCHLORIDE 2 MG: 2 CAPSULE ORAL at 17:30

## 2021-07-08 RX ADMIN — AMLODIPINE BESYLATE 10 MG: 10 TABLET ORAL at 08:24

## 2021-07-08 RX ADMIN — PROCHLORPERAZINE EDISYLATE 5 MG: 5 INJECTION INTRAMUSCULAR; INTRAVENOUS at 04:16

## 2021-07-08 RX ADMIN — LEVOFLOXACIN 500 MG: 250 TABLET, FILM COATED ORAL at 20:13

## 2021-07-08 RX ADMIN — Medication 10 ML: at 20:05

## 2021-07-08 RX ADMIN — LOPERAMIDE HYDROCHLORIDE 2 MG: 2 CAPSULE ORAL at 13:27

## 2021-07-08 RX ADMIN — FLUCONAZOLE 400 MG: 200 TABLET ORAL at 08:24

## 2021-07-08 RX ADMIN — SODIUM CHLORIDE 15 ML: 900 IRRIGANT IRRIGATION at 20:15

## 2021-07-08 RX ADMIN — ONDANSETRON 8 MG: 2 INJECTION INTRAMUSCULAR; INTRAVENOUS at 15:33

## 2021-07-08 RX ADMIN — ONDANSETRON HYDROCHLORIDE 8 MG: 8 TABLET, FILM COATED ORAL at 06:21

## 2021-07-08 RX ADMIN — LORAZEPAM 1 MG: 1 TABLET ORAL at 08:24

## 2021-07-08 RX ADMIN — LORAZEPAM 1 MG: 1 TABLET ORAL at 20:07

## 2021-07-08 RX ADMIN — LOPERAMIDE HYDROCHLORIDE 2 MG: 2 CAPSULE ORAL at 19:34

## 2021-07-08 RX ADMIN — TBO-FILGRASTIM 300 MCG: 300 INJECTION, SOLUTION SUBCUTANEOUS at 17:34

## 2021-07-08 RX ADMIN — POTASSIUM CHLORIDE AND SODIUM CHLORIDE: 900; 150 INJECTION, SOLUTION INTRAVENOUS at 03:50

## 2021-07-08 RX ADMIN — VALACYCLOVIR HYDROCHLORIDE 500 MG: 500 TABLET, FILM COATED ORAL at 20:13

## 2021-07-08 RX ADMIN — PROCHLORPERAZINE MALEATE 10 MG: 10 TABLET ORAL at 08:24

## 2021-07-08 RX ADMIN — LOPERAMIDE HYDROCHLORIDE 2 MG: 2 CAPSULE ORAL at 06:20

## 2021-07-08 RX ADMIN — ONDANSETRON 8 MG: 2 INJECTION INTRAMUSCULAR; INTRAVENOUS at 23:48

## 2021-07-08 RX ADMIN — VALACYCLOVIR HYDROCHLORIDE 500 MG: 500 TABLET, FILM COATED ORAL at 08:24

## 2021-07-08 ASSESSMENT — PAIN SCALES - GENERAL
PAINLEVEL_OUTOF10: 0

## 2021-07-08 NOTE — PLAN OF CARE
Problem: Falls - Risk of:  Goal: Will remain free from falls  7/8/2021 0428 by Dorinda Garcia  Outcome: Ongoing  Note: Orthostatic vital signs obtained at start of shift - see flowsheet for details. Pt does not meet criteria for orthostasis. Pt is a Med fall risk. See Liat Bulla Fall Score and ABCDS Injury Risk assessments. Pt bed is in low position, side rails up, call light and belongings are in reach. Fall risk light is on outside pts room. Pt encouraged to call for assistance as needed. Will continue with hourly rounds for PO intake, pain needs, toileting and repositioning as needed. Problem: Infection - Central Venous Catheter-Associated Bloodstream Infection:  Goal: Will show no infection signs and symptoms  Description: Will show no infection signs and symptoms  7/8/2021 0428 by Dorinda Garcia  Outcome: Ongoing  Note: CVC site remains free of signs/symptoms of infection. No drainage, edema, erythema, pain, or warmth noted at site. Dressing changes continue per protocol and on an as needed basis - see flowsheet. Problem: Venous Thromboembolism:  Goal: Will show no signs or symptoms of venous thromboembolism  Description: Will show no signs or symptoms of venous thromboembolism  7/8/2021 0428 by Dorinda Garcia  Outcome: Ongoing  Note: Adherent with DVT Prevention: Pt is at risk for DVT d/t decreased mobility and cancer treatment. Pt educated on importance of activity. Pt has orders for Subcut prophylactic lovenox. Pt verbalizes understanding of need for prophylaxis while inpatient. Problem: PROTECTIVE PRECAUTIONS  Goal: Patient will remain free of nosocomial Infections  7/8/2021 0428 by Leonela Montez  Outcome: Ongoing  Note: Patient educated on use of mask when out of room. Educated on importance of CHG use and saline rinse to prevent infection. Staff following handwashing protocols. No fresh flowers/plants in room. Low microbial diet in place.         Problem: Bleeding:  Goal: Will show no signs and symptoms of excessive bleeding  Description: Will show no signs and symptoms of excessive bleeding  7/8/2021 0428 by Dorinda Garcia  Outcome: Ongoing  Patient's hemoglobin this AM:   Recent Labs     07/08/21  0400   HGB 10.5*     Patient's platelet count this AM:   Recent Labs     07/08/21  0400   *    Thrombocytopenia Precautions in place. Patient showing no signs or symptoms of active bleeding. Transfusion not indicated at this time. Patient verbalizes understanding of all instructions. Will continue to assess and implement POC. Call light within reach and hourly rounding in place. Problem: Nausea/Vomiting:  Goal: Absence of nausea/vomiting  Description: Absence of nausea/vomiting  7/8/2021 0428 by Dorinda Garcia  Outcome: Ongoing  Note: Patient with intermittent nausea. PRN anitemetics given per MAR (compazine, zofran). Patient reports decreased nausea with medicine. Emesis episode x1 with less than 5ml output.

## 2021-07-08 NOTE — PROGRESS NOTES
[address] [facility]  Autologous Progress Note    2021    Elizabeth José    :  1950    MRN:  [id number]    Referring MD: Jonathan Koroma MD  [address] [facility],  [facility] [place]      Subjective:  Typical fatigue and malaise creeping in    ECOG PS:  (1) Restricted in physically strenuous activity, ambulatory and able to do work of light nature    KPS: 80% Normal activity with effort; some signs or symptoms of disease    Isolation:  None     Medications    Scheduled Meds:   levoFLOXacin  500 mg Oral Daily    tbo-filgrastim  300 mcg Subcutaneous QPM    enoxaparin  40 mg Subcutaneous Daily    fluconazole  400 mg Oral Daily    Saline Mouthwash  15 mL Swish & Spit 4x Daily AC & HS    sodium chloride flush  5-40 mL Intravenous 2 times per day    valACYclovir  500 mg Oral BID    LORazepam  1 mg Oral BID    amLODIPine  10 mg Oral Daily     Continuous Infusions:   0.9% NaCl with KCl 20 mEq 50 mL/hr at 21 0350    sodium chloride      sodium chloride      sodium chloride       PRN Meds:. [COMPLETED] loperamide **FOLLOWED BY** loperamide, sodium chloride, prochlorperazine **OR** prochlorperazine, ondansetron, ondansetron, sodium chloride, sodium chloride, alteplase, magnesium hydroxide, magnesium sulfate, potassium chloride, Saline Mouthwash, sodium chloride flush, oxyCODONE, cloNIDine    ROS:  As noted above, otherwise remainder of 10-point ROS negative    Physical Exam:     I&O:      Intake/Output Summary (Last 24 hours) at 2021 0639  Last data filed at 2021 0622  Gross per 24 hour   Intake 2701 ml   Output 3900 ml   Net -1199 ml       Vital Signs:  /71   Pulse 98   Temp 98.2 °F (36.8 °C) (Oral)   Resp 16   Ht 5' 5\" (1.651 m)   Wt 158 lb 6.4 oz (71.8 kg)   SpO2 99%   BMI 26.36 kg/m²     Weight:    Wt Readings from Last 3 Encounters:   21 158 lb 6.4 oz (71.8 kg)   21 160 lb (72.6 kg)   21 160 lb (72.6 kg)       General: Awake, alert and oriented. HEENT: normocephalic, alopecia, PERRL, no scleral erythema or icterus, Oral mucosa moist and intact, throat clear  NECK: supple without palpable adenopathy  BACK: Straight negative CVAT  SKIN: warm dry and intact without lesions rashes or masses  CHEST: CTA bilaterally without use of accessory muscles  CV: Normal S1 S2, RRR, no MRG  ABD: NT ND normoactive BS, no palpable masses or hepatosplenomegaly  EXTREMITIES: without edema, denies calf tenderness  NEURO: CN II - XII grossly intact  CATHETER: Left SC Trifusion (6/21/21, Blake) - CDI    Data:   CBC:   Recent Labs     07/06/21  0416 07/07/21  0330 07/08/21  0400   WBC 1.1* 3.5* 0.2*   HGB 11.3* 11.4* 10.5*   HCT 32.6* 33.4* 30.8*   MCV 90.9 91.4 90.7    156 108*     BMP/Mag:  Recent Labs     07/07/21  0330 07/07/21  0510 07/08/21  0359 07/08/21  0400   * 138  --  137   K 6.2* 4.2  --  3.9    106  --  107   CO2 22 26  --  22   PHOS 3.0  --   --   --    BUN 9 9  --  6*   CREATININE <0.5* <0.5*  --  <0.5*   MG  --   --  2.10  --      LIVP:   Recent Labs     07/07/21  0330   AST 36   ALT 18   BILIDIR <0.2   BILITOT 0.5   ALKPHOS 54     Uric Acid:    Recent Labs     07/08/21  0400   LABURIC 2.1*     Coags:   Recent Labs     07/08/21  0400   PROTIME 12.1   INR 1.07   APTT 35.3         PROBLEM LIST:            1. IgA Kappa Multiple Myeloma  2. Hepatitis C (2002, s/p tx with Cherry 2016)  3. Recurrent shingles (5/20/21)  4. Anxiety/depression  5. Chronic HFrEF (LVEF 40-45%)    Post Transplant Complications:  1. Diarrhea       TREATMENT:            1. RVD x7 cycles  2. Melphalan 200mg/m2 & ASCT (7/1/21) - 3.51 x10^6 [id number]/kg      ASSESSMENT AND PLAN:         1. IgA Kappa Multiple Myeloma: Currently in a [place]  - S/p [id number] & ASCT (7/1/21)  - Post-Txp Maintenance: Revlimid maintenance  - Post-Txp F/u: Serum myeloma studies     Day + 7     2.  ID: Afebrile, no evidence of infection.   - H/o recurrent shingles  - Cont Levaquin, Diflucan and Valtrex ppx, cont acyclovir at discharge for VZV positive titer.         3. Heme: Leukopenia and anemia r/t recent chemotherapy  - Transfuse for Hgb < 7 and Platelets < 80D  - No transfusion today  - Cont Granix (started 1/9/79)     4. Metabolic: stable renal fxn and e-lytes  - Cont IVF: NS + KCl 20 meq @ 50 mL/hr  - Replace potassium and magnesium per PRN orders     5. Cardiac:   - H/o Chronic HFrEF & HTN  - Echo 5/21/21: global hypokinesis EF 40-45%, Mild-Mod MR, Mild TR  - Cleared for transplant by Dr. Madeline Ward 6/3/21  Chronic HFrEF:  - Monitor fluid status closely   HTN:  Intermittently elevated  - Cont Norvasc 10 mg daily   - Cont to monitor      6. GI / Nutrition:   - H/o hepatitis C  Hepatitis C:  No acute issues   - S/p Harvoni (2016)  Nutrition:  Appetite and oral intake has declined, but she is still eating  - Cont low microbial diet   - Follow closely with dietary  Nausea:  Mild, improved w/ Compazine  - Cont Compazine and add Zofran as needed  Diarrhea: Improved  - C. Diff (7/3/21) - negative  - Cont Imodium as needed      7.  Psych:  - H/o Anxiety / Depression:   Anxiety / Depression:  Ongoing anxiety related transplant process  - Cont Ativan 1mg BID  - Psych following      8. 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 Padmini Lowe DO, MS  Oncology/Hematology Care    Please contact via:  1.   Perfect Serve  2.  [phone]    7/8/2021   9:40 AM

## 2021-07-08 NOTE — PLAN OF CARE
Problem: Falls - Risk of:  Goal: Will remain free from falls  Description: Orthostatic vital signs obtained at start of shift - see flowsheet for details. Pt does not meet criteria for orthostasis. Pt is a Med fall risk. See Lon[place] Shade Fall Score and ABCDS Injury Risk assessments. - Screening for Orthostasis AND not a [place] Risk per BERMUDEZ/ABCDS: Pt bed is in low position, side rails up, call light and belongings are in reach. Fall risk light is on outside pts room. Pt encouraged to call for assistance as needed. Will continue with hourly rounds for PO intake, pain needs, toileting and repositioning as needed. 7/8/2021 1439 by Naima Lee RN  Outcome: Ongoing  Note: Orthostatic vital signs obtained at start of shift - see flowsheet for details. Pt does not meet criteria for orthostasis. Pt is a Low fall risk. See Lonne Shade Fall Score and ABCDS Injury Risk assessments. - Screening for Orthostasis AND not a [place] Risk per BERMUDEZ/ABCDS: Pt bed is in low position, side rails up, call light and belongings are in reach. Fall risk light is on outside pts room. Pt encouraged to call for assistance as needed. Will continue with hourly rounds for PO intake, pain needs, toileting and repositioning as needed. Problem: Infection - Central Venous Catheter-Associated Bloodstream Infection:  Goal: Will show no infection signs and symptoms  Description: Will show no infection signs and symptoms  7/8/2021 1439 by Naima Lee RN  Outcome: Ongoing  Note: CVC site remains free of signs/symptoms of infection. No drainage, edema, erythema, pain, or warmth noted at site. Dressing changes continue per protocol and on an as needed basis - see flowsheet. Compliant with [facility] Bath Protocol:  Performed CHG bath today per [facility]CC protocol utilizing CHG solution in the shower. CVC site cleansed with CHG wipe over dressing, skin surrounding dressing, and first 6\" of IV tubing. Pt tolerated well.   Continued to encourage daily CHG bathing per [facility] protocol. Problem: Venous Thromboembolism:  Goal: Will show no signs or symptoms of venous thromboembolism  Description: Will show no signs or symptoms of venous thromboembolism  7/8/2021 1439 by Sandip Valdez RN  Outcome: Ongoing  Note: Adherent with DVT Prevention: Pt is at risk for DVT d/t decreased mobility and cancer treatment. Pt educated on importance of activity. Pt has orders for Subcut prophylactic lovenox. Pt verbalizes understanding of need for prophylaxis while inpatient. On Treatment for DVT/PE: Pt with recent hx of clot. Currently on treatment dose Lovenox. Pt at risk of bleeding d/t anticoagulation. Cautioned pt on safety precautions to decrease risk of bleeding. Will notify provider if platelets drop below 50,000 and/or if pt has invasive procedure scheduled in order to hold anticoagulation. Problem: PROTECTIVE PRECAUTIONS  Goal: Patient will remain free of nosocomial Infections  Description: Pt educated on use of mask prior to leaving room. All staff and visitor following handwashing requirements this shift prior to entering room. Low microbial diet order in place and being followed. Linens changed today per protocol and pt reports using chlorhexidine wash per order. 7/8/2021 1439 by Sandip Valdez RN  Outcome: Ongoing     Problem: Nutrition  Goal: Optimal nutrition therapy  7/8/2021 1439 by Sandip Valdez RN  Outcome: Ongoing  Note: Pt working on increasing PO intake with ensure supplement. Problem: Nausea/Vomiting:  Goal: Absence of nausea/vomiting  Description: Absence of nausea/vomiting  7/8/2021 1439 by Sandip Valdez RN  Outcome: Ongoing  Note: Pt given PRN zofran and compazine for intermittent nausea. Pt did not vomit during shift.      Problem: Bleeding:  Goal: Will show no signs and symptoms of excessive bleeding  Description: Will show no signs and symptoms of excessive bleeding  7/8/2021 1444 by Sandip Valdez RN  Outcome: Ongoing  Note: Patient's hemoglobin this AM:   Recent Labs     07/08/21  0400   HGB 10.5*     Patient's platelet count this AM:   Recent Labs     07/08/21  0400   *    Thrombocytopenia not present at this time. Patient showing no signs or symptoms of active bleeding. Transfusion not indicated at this time. Patient verbalizes understanding of all instructions. Will continue to assess and implement POC. Call light within reach and hourly rounding in place.

## 2021-07-08 NOTE — PROGRESS NOTES
Comprehensive Nutrition Assessment    Type and Reason for Visit:  Reassess    Nutrition Recommendations/Plan:   1. PO Diet: Continue current diet low microbial; encourage dietary protein at each meal/snack  2. ONS: Starting Standard/ High calorie QD. Pt may request more if she wishes. 3. Record and encourage PO intake at all meals through admission. Nutrition Assessment:  Day +7 post BMT, Appetite and oral intake has declined now with nausea, but she is still eating. Accepting to trial ONS and encouraged to have ONS at least QD. RD will continue to monitor per [facility]. MALNUTRITION ASSESSMENT  Context of Malnutrition: Acute Illness (on chronic )   Malnutrition Status: At risk for malnutrition (Comment)  Findings of the 6 clinical characteristics of malnutrition (Minimum of 2 out of 6 clinical characteristics is required to make the diagnosis of moderate or severe Protein Calorie Malnutrition based on AND/ASPEN Guidelines):  Energy Intake: Mild decrease in energy intake (comment)   Energy Intake Time: x1 day   Weight Loss %: No significant loss   Weight loss Time: Greater than or equal to 7 days    Body Fat Loss: No significant loss    Body Fat Location: No Significant   Body Muscle Loss: No significant loss    Body Muscle Loss Location: No significant    Fluid Accumulation: Unable to assess    Fluid Accumulation Location: Unable to assess     Strength: Not Performed; Not Measured     Estimated Daily Nutrient Needs:  Energy (kcal):  8142-7962; Weight Used for Energy Requirements:  Current (74.4 kg )     Protein (g):  74-86g (1.3-1.5); Weight Used for Protein Requirements:  Ideal (57 kg )        Fluid (ml/day):  5568-6415; Method Used for Fluid Requirements:  1 ml/kcal      Nutrition Related Findings:  LBM 7/8. Nausea noted. Meds: reviewed. On scheduled anti emetics. Labs: reviewed    Wounds:  None       Current Nutrition Therapies:    ADULT DIET;  Regular; Low Microbial  Adult Oral Nutrition Supplement; Standard High Calorie/High Protein Oral Supplement   PO Intake: 1-25% and 26-50%  PO Supplement Intake: %  IVF: NS @ 50 ml/hr     ANTHROPOMETRICS  Current Height: 5' 5\" (165.1 cm)  Current Weight: 158 lb 4.6 oz (71.8 kg)    Admission weight: 156 lb 4.8 oz (70.9 kg)  Ideal Body Weight (lbs) (Calculated): 125 lbs (Ideal Body Weight (Kg) (Calculated): 57 kg)  Usual Bodyweight ~165 lb   Weight Changes no significant;        BMI BMI (Calculated): 26.4    Wt Readings from Last 50 Encounters:   07/01/21 164 lb 1.6 oz (74.4 kg)   06/26/21 160 lb (72.6 kg)   06/21/21 160 lb (72.6 kg)   05/25/21 159 lb (72.1 kg)   04/13/21 166 lb 6.4 oz (75.5 kg)   01/11/21 163 lb (73.9 kg)   11/25/20 163 lb 8 oz (74.2 kg)   10/20/20 165 lb 12.8 oz (75.2 kg)   09/25/20 166 lb (75.3 kg)   07/11/20 170 lb 13.7 oz (77.5 kg)   07/01/20 170 lb (77.1 kg)   06/16/20 171 lb (77.6 kg)   01/22/20 179 lb 0.2 oz (81.2 kg)   01/15/20 179 lb (81.2 kg)   01/06/20 177 lb (80.3 kg)   10/03/19 175 lb (79.4 kg)       Nutrition Diagnosis:   · Increased nutrient needs related to catabolic illness as evidenced by  (chemotx then bmt )    Nutrition Interventions:   Food and/or Nutrient Delivery:  Continue Current Diet, Start Oral Nutrition Supplement  Nutrition Education/Counseling:  Education completed   Coordination of Nutrition Care:  Continue to monitor while inpatient    Goals:  pt will maintain adequate po nutrition by consuming > 75% of meals and snacks offered through admission       Nutrition Monitoring and Evaluation:   Food/Nutrient Intake Outcomes:  Food and Nutrient Intake  Physical Signs/Symptoms Outcomes:  Biochemical Data, Nausea or Vomiting     Electronically signed by Travis Dacosta RD, LD on 7/8/21 at 12:01 PM EDT    Contact: [phone]

## 2021-07-09 LAB
ALBUMIN SERPL-MCNC: 3.7 G/DL (ref 3.4–5)
ALP BLD-CCNC: 60 U/L (ref 40–129)
ALT SERPL-CCNC: 11 U/L (ref 10–40)
ANION GAP SERPL CALCULATED.3IONS-SCNC: 9 MMOL/L (ref 3–16)
AST SERPL-CCNC: 13 U/L (ref 15–37)
BILIRUB SERPL-MCNC: 0.5 MG/DL (ref 0–1)
BILIRUBIN DIRECT: <0.2 MG/DL (ref 0–0.3)
BILIRUBIN, INDIRECT: ABNORMAL MG/DL (ref 0–1)
BUN BLDV-MCNC: 9 MG/DL (ref 7–20)
CALCIUM SERPL-MCNC: 9 MG/DL (ref 8.3–10.6)
CHLORIDE BLD-SCNC: 105 MMOL/L (ref 99–110)
CO2: 22 MMOL/L (ref 21–32)
CREAT SERPL-MCNC: <0.5 MG/DL (ref 0.6–1.2)
GFR AFRICAN AMERICAN: >60
GFR NON-AFRICAN AMERICAN: >60
GLUCOSE BLD-MCNC: 98 MG/DL (ref 70–99)
HCT VFR BLD CALC: 31.6 % (ref 36–48)
HEMOGLOBIN: 10.7 G/DL (ref 12–16)
LACTATE DEHYDROGENASE: 186 U/L (ref 100–190)
MCH RBC QN AUTO: 30.7 PG (ref 26–34)
MCHC RBC AUTO-ENTMCNC: 33.9 G/DL (ref 31–36)
MCV RBC AUTO: 90.7 FL (ref 80–100)
PDW BLD-RTO: 16 % (ref 12.4–15.4)
PHOSPHORUS: 3.3 MG/DL (ref 2.5–4.9)
PLATELET # BLD: 80 K/UL (ref 135–450)
PMV BLD AUTO: 6.9 FL (ref 5–10.5)
POTASSIUM SERPL-SCNC: 4 MMOL/L (ref 3.5–5.1)
RBC # BLD: 3.49 M/UL (ref 4–5.2)
SODIUM BLD-SCNC: 136 MMOL/L (ref 136–145)
TOTAL PROTEIN: 5.9 G/DL (ref 6.4–8.2)
URIC ACID, SERUM: 2.2 MG/DL (ref 2.6–6)
WBC # BLD: 0.1 K/UL (ref 4–11)

## 2021-07-09 PROCEDURE — 6360000002 HC RX W HCPCS: Performed by: NURSE PRACTITIONER

## 2021-07-09 PROCEDURE — 2060000000 HC ICU INTERMEDIATE R&B

## 2021-07-09 PROCEDURE — 6370000000 HC RX 637 (ALT 250 FOR IP): Performed by: INTERNAL MEDICINE

## 2021-07-09 PROCEDURE — 2580000003 HC RX 258: Performed by: NURSE PRACTITIONER

## 2021-07-09 PROCEDURE — 6370000000 HC RX 637 (ALT 250 FOR IP): Performed by: NURSE PRACTITIONER

## 2021-07-09 PROCEDURE — 80048 BASIC METABOLIC PNL TOTAL CA: CPT

## 2021-07-09 PROCEDURE — 80076 HEPATIC FUNCTION PANEL: CPT

## 2021-07-09 PROCEDURE — 83615 LACTATE (LD) (LDH) ENZYME: CPT

## 2021-07-09 PROCEDURE — 84100 ASSAY OF PHOSPHORUS: CPT

## 2021-07-09 PROCEDURE — 36592 COLLECT BLOOD FROM PICC: CPT

## 2021-07-09 PROCEDURE — 84550 ASSAY OF BLOOD/URIC ACID: CPT

## 2021-07-09 PROCEDURE — 85025 COMPLETE CBC W/AUTO DIFF WBC: CPT

## 2021-07-09 RX ORDER — PROCHLORPERAZINE MALEATE 10 MG
10 TABLET ORAL EVERY 6 HOURS
Status: DISCONTINUED | OUTPATIENT
Start: 2021-07-09 | End: 2021-07-13

## 2021-07-09 RX ORDER — PANTOPRAZOLE SODIUM 40 MG/1
40 TABLET, DELAYED RELEASE ORAL
Status: DISCONTINUED | OUTPATIENT
Start: 2021-07-09 | End: 2021-07-14

## 2021-07-09 RX ORDER — PROCHLORPERAZINE EDISYLATE 5 MG/ML
5 INJECTION INTRAMUSCULAR; INTRAVENOUS EVERY 6 HOURS
Status: DISCONTINUED | OUTPATIENT
Start: 2021-07-09 | End: 2021-07-13

## 2021-07-09 RX ORDER — LORAZEPAM 1 MG/1
1 TABLET ORAL 3 TIMES DAILY
Status: DISCONTINUED | OUTPATIENT
Start: 2021-07-09 | End: 2021-07-14

## 2021-07-09 RX ADMIN — FLUCONAZOLE 400 MG: 200 TABLET ORAL at 08:06

## 2021-07-09 RX ADMIN — PROCHLORPERAZINE EDISYLATE 5 MG: 5 INJECTION INTRAMUSCULAR; INTRAVENOUS at 01:40

## 2021-07-09 RX ADMIN — TBO-FILGRASTIM 300 MCG: 300 INJECTION, SOLUTION SUBCUTANEOUS at 18:26

## 2021-07-09 RX ADMIN — LOPERAMIDE HYDROCHLORIDE 2 MG: 2 CAPSULE ORAL at 08:05

## 2021-07-09 RX ADMIN — ENOXAPARIN SODIUM 40 MG: 40 INJECTION SUBCUTANEOUS at 18:26

## 2021-07-09 RX ADMIN — LORAZEPAM 1 MG: 1 TABLET ORAL at 08:06

## 2021-07-09 RX ADMIN — POTASSIUM CHLORIDE AND SODIUM CHLORIDE: 900; 150 INJECTION, SOLUTION INTRAVENOUS at 15:21

## 2021-07-09 RX ADMIN — SODIUM CHLORIDE 15 ML: 900 IRRIGANT IRRIGATION at 11:00

## 2021-07-09 RX ADMIN — ONDANSETRON 8 MG: 2 INJECTION INTRAMUSCULAR; INTRAVENOUS at 08:05

## 2021-07-09 RX ADMIN — LORAZEPAM 1 MG: 1 TABLET ORAL at 14:14

## 2021-07-09 RX ADMIN — PROCHLORPERAZINE EDISYLATE 5 MG: 5 INJECTION INTRAMUSCULAR; INTRAVENOUS at 10:58

## 2021-07-09 RX ADMIN — PANTOPRAZOLE SODIUM 40 MG: 40 TABLET, DELAYED RELEASE ORAL at 06:40

## 2021-07-09 RX ADMIN — SODIUM CHLORIDE 15 ML: 900 IRRIGANT IRRIGATION at 20:48

## 2021-07-09 RX ADMIN — PROCHLORPERAZINE EDISYLATE 5 MG: 5 INJECTION INTRAMUSCULAR; INTRAVENOUS at 23:26

## 2021-07-09 RX ADMIN — LOPERAMIDE HYDROCHLORIDE 2 MG: 2 CAPSULE ORAL at 03:58

## 2021-07-09 RX ADMIN — Medication 10 ML: at 08:07

## 2021-07-09 RX ADMIN — ONDANSETRON 8 MG: 2 INJECTION INTRAMUSCULAR; INTRAVENOUS at 20:44

## 2021-07-09 RX ADMIN — PROCHLORPERAZINE EDISYLATE 5 MG: 5 INJECTION INTRAMUSCULAR; INTRAVENOUS at 17:02

## 2021-07-09 RX ADMIN — Medication 10 ML: at 20:44

## 2021-07-09 RX ADMIN — VALACYCLOVIR HYDROCHLORIDE 500 MG: 500 TABLET, FILM COATED ORAL at 08:06

## 2021-07-09 RX ADMIN — VALACYCLOVIR HYDROCHLORIDE 500 MG: 500 TABLET, FILM COATED ORAL at 20:44

## 2021-07-09 RX ADMIN — LEVOFLOXACIN 500 MG: 250 TABLET, FILM COATED ORAL at 20:44

## 2021-07-09 RX ADMIN — LOPERAMIDE HYDROCHLORIDE 2 MG: 2 CAPSULE ORAL at 14:31

## 2021-07-09 RX ADMIN — LOPERAMIDE HYDROCHLORIDE 2 MG: 2 CAPSULE ORAL at 23:26

## 2021-07-09 RX ADMIN — LOPERAMIDE HYDROCHLORIDE 2 MG: 2 CAPSULE ORAL at 10:58

## 2021-07-09 RX ADMIN — PROCHLORPERAZINE EDISYLATE 5 MG: 5 INJECTION INTRAMUSCULAR; INTRAVENOUS at 06:11

## 2021-07-09 RX ADMIN — LORAZEPAM 1 MG: 1 TABLET ORAL at 20:44

## 2021-07-09 ASSESSMENT — PAIN SCALES - GENERAL
PAINLEVEL_OUTOF10: 0

## 2021-07-09 NOTE — PROGRESS NOTES
[facility]  Autologous Progress Note    2021    Doyle Brody    :  1950    MRN:  [id number]    Referring MD: Lilly Vega MD  [address] [facility],  [facility], [facility] [place]      Subjective:  She c/o GI upset w/ increased nausea and episode of vomiting. She also c/o increased loose stools. ECOG PS:  (1) Restricted in physically strenuous activity, ambulatory and able to do work of light nature    KPS: 80% Normal activity with effort; some signs or symptoms of disease    Isolation:  None     Medications    Scheduled Meds:   pantoprazole  40 mg Oral QAM AC    levoFLOXacin  500 mg Oral Daily    tbo-filgrastim  300 mcg Subcutaneous QPM    enoxaparin  40 mg Subcutaneous Daily    fluconazole  400 mg Oral Daily    Saline Mouthwash  15 mL Swish & Spit 4x Daily AC & HS    sodium chloride flush  5-40 mL Intravenous 2 times per day    valACYclovir  500 mg Oral BID    LORazepam  1 mg Oral BID    amLODIPine  10 mg Oral Daily     Continuous Infusions:   0.9% NaCl with KCl 20 mEq 50 mL/hr at 21    sodium chloride      sodium chloride      sodium chloride       PRN Meds:. [COMPLETED] loperamide **FOLLOWED BY** loperamide, sodium chloride, prochlorperazine **OR** prochlorperazine, ondansetron, ondansetron, sodium chloride, sodium chloride, alteplase, magnesium hydroxide, magnesium sulfate, potassium chloride, Saline Mouthwash, sodium chloride flush, oxyCODONE, cloNIDine    ROS:  As noted above, otherwise remainder of 10-point ROS negative    Physical Exam:     I&O:      Intake/Output Summary (Last 24 hours) at 2021 0630  Last data filed at 2021 0617  Gross per 24 hour   Intake 2536 ml   Output 4020 ml   Net -1484 ml       Vital Signs:  BP 99/75   Pulse 105   Temp 98.6 °F (37 °C) (Oral)   Resp 16   Ht 5' 5\" (1.651 m)   Wt 158 lb 4.6 oz (71.8 kg)   SpO2 99%   BMI 26.34 kg/m²     Weight:    Wt Readings from Last 3 Encounters:   21 158 lb 4.6 oz (71.8 kg) 06/26/21 160 lb (72.6 kg)   06/21/21 160 lb (72.6 kg)       General: Awake, alert and oriented. HEENT: normocephalic, alopecia, PERRL, no scleral erythema or icterus, Oral mucosa moist and intact, throat clear  NECK: supple without palpable adenopathy  BACK: Straight negative CVAT  SKIN: warm dry and intact without lesions rashes or masses  CHEST: CTA bilaterally without use of accessory muscles  CV: Normal S1 S2, RRR, no MRG  ABD: NT ND normoactive BS, no palpable masses or hepatosplenomegaly  EXTREMITIES: without edema, denies calf tenderness  NEURO: CN II - XII grossly intact  CATHETER: Left SC Trifusion (6/21/21, Blake) - CDI    Data:   CBC:   Recent Labs     07/07/21 0330 07/08/21 0400 07/09/21 0400   WBC 3.5* 0.2* 0.1*   HGB 11.4* 10.5* 10.7*   HCT 33.4* 30.8* 31.6*   MCV 91.4 90.7 90.7    108* 80*     BMP/Mag:  Recent Labs     07/07/21  0330 07/07/21  0330 07/07/21  0510 07/08/21  0359 07/08/21  0400 07/09/21  0400   *   < > 138  --  137 136   K 6.2*   < > 4.2  --  3.9 4.0      < > 106  --  107 105   CO2 22   < > 26  --  22 22   PHOS 3.0  --   --   --   --  3.3   BUN 9   < > 9  --  6* 9   CREATININE <0.5*   < > <0.5*  --  <0.5* <0.5*   MG  --   --   --  2.10  --   --     < > = values in this interval not displayed. LIVP:   Recent Labs     07/07/21 0330 07/09/21 0400   AST 36 13*   ALT 18 11   BILIDIR <0.2 <0.2   BILITOT 0.5 0.5   ALKPHOS 54 60     Uric Acid:    Recent Labs     07/09/21 0400   LABURIC 2.2*     Coags:   Recent Labs     07/08/21 0400   PROTIME 12.1   INR 1.07   APTT 35.3         PROBLEM LIST:            1. IgA Kappa Multiple Myeloma  2. Hepatitis C (2002, s/p tx with Cherry 2016)  3. Recurrent shingles (5/20/21)  4. Anxiety/depression  5. Chronic HFrEF (LVEF 40-45%)    Post Transplant Complications:  1. Diarrhea   2. Nausea / Vomiting       TREATMENT:            1. RVD x7 cycles  2.  Melphalan 200mg/m2 & ASCT (7/1/21) - 3.51 x10^6 [id number]/kg      ASSESSMENT AND PLAN:         1. IgA Kappa Multiple Myeloma: Currently in a [place]  - S/p [id number] & ASCT (7/1/21)  - Post-Txp Maintenance: Revlimid maintenance  - Post-Txp F/u: Serum myeloma studies     Day + 8     2. ID: Afebrile, no evidence of infection.   - H/o recurrent shingles  - Cont Levaquin, Diflucan and Valtrex ppx, cont acyclovir at discharge for VZV positive titer.       3. Heme: Leukopenia and anemia r/t recent chemotherapy  - Transfuse for Hgb < 7 and Platelets < 30V  - No transfusion today  - Cont Granix (started 2/5/35)     4. Metabolic: stable renal fxn and e-lytes  - Cont IVF: NS + KCl 20 meq @ 50 mL/hr  - Replace potassium and magnesium per PRN orders     5. Cardiac:   - H/o Chronic HFrEF & HTN  - Echo 5/21/21: global hypokinesis EF 40-45%, Mild-Mod MR, Mild TR  - Cleared for transplant by Dr. Flores Precise 6/3/21  Chronic HFrEF:  No evidence of acute exacerbation   - Monitor fluid status closely   HTN:  stable  - Cont Norvasc 10 mg daily   - Cont to monitor      6. GI / Nutrition:   - H/o hepatitis C  Hepatitis C:  No acute issues   - S/p Harvoni (2016)  Nutrition:  Appetite and oral intake has declined, but she is still eating small amounts   - Cont low microbial diet   - Follow closely with dietary  Nausea: Intermittent nausea and vomiting, but still able to eat   - Schedule Compazine q6hrs (started 7/9/21)  - Increase Ativan 1 mg TID (increased 7/9/21)   - Cont Zofran as needed  - Add PO Protonix daily (started 7/9/21)  Diarrhea: Increased diarrhea, multiple small loose stools   - C. Diff (7/3/21) - negative  - Cont Imodium as needed      7.  Psych:  - H/o Anxiety / Depression:   Anxiety / Depression:  Ongoing anxiety related transplant process  - Increase Ativan 1mg TID (increased 7/9/21 from bid)  - Psych following      8.  Neoplasm-Related Chronic Pain: 2/2 myeloma   - Cont Oxycodone PRN - she has not been requiring pain meds          - DVT Prophylaxis: Platelets >15,891 cells/dL, - daily lovenox prophylaxis ordered - last injection 7/9/21  Contraindications to pharmacologic prophylaxis: None  Contraindications to mechanical prophylaxis: None    - Disposition:  Once ANC >1 and recovered from toxicity    Pepito Canada, APRN - [facility]. Merlin Leriche, DO, MS  Oncology/Hematology Care    Please contact via:  1.   Perfect Serve  2.  [phone]    7/13/2021   9:44 AM

## 2021-07-09 NOTE — PLAN OF CARE
Problem: Falls - Risk of:  Goal: Will remain free from falls  7/9/2021 0256 by Dorinda Garcia  Outcome: Ongoing  Note: Orthostatic vital signs obtained at start of shift - see flowsheet for details. Pt does not meet criteria for orthostasis. Pt is a Med fall risk. See Harriett Storyill Fall Score and ABCDS Injury Risk assessments. Pt bed is in low position, side rails up, call light and belongings are in reach. Fall risk light is on outside pts room. Pt encouraged to call for assistance as needed. Will continue with hourly rounds for PO intake, pain needs, toileting and repositioning as needed     Problem: Infection - Central Venous Catheter-Associated Bloodstream Infection:  Goal: Will show no infection signs and symptoms  Description: Will show no infection signs and symptoms  7/9/2021 0256 by Dorinda Garcia  Outcome: Ongoing  Note: CVC site remains free of signs/symptoms of infection. No drainage, edema, erythema, pain, or warmth noted at site. Dressing changes continue per protocol and on an as needed basis - see flowsheet       Problem: Venous Thromboembolism:  Goal: Will show no signs or symptoms of venous thromboembolism  Description: Will show no signs or symptoms of venous thromboembolism  7/9/2021 0256 by Dorinda Garcia  Outcome: Ongoing  Note: Adherent with DVT Prevention: Pt is at risk for DVT d/t decreased mobility and cancer treatment. Pt educated on importance of activity. Pt has orders for Subcut prophylactic lovenox. Pt verbalizes understanding of need for prophylaxis while inpatient. Problem: PROTECTIVE PRECAUTIONS  Goal: Patient will remain free of nosocomial Infections  7/9/2021 0256 by Dorinda Garcia  Outcome: Ongoing  Note: Patient in neutropenic precautions. Educated on use of mask when out of room. Educated on importance of CHG use and saline rinse to prevent infection. Staff following handwashing protocols. No fresh flowers/plants in room. Low microbial diet in place.       Problem: Bleeding:  Goal: Will show no signs and symptoms of excessive bleeding  Description: Will show no signs and symptoms of excessive bleeding  7/9/2021 0256 by Dorinda Garcia  Outcome: Ongoing     Patient's hemoglobin this AM:   Recent Labs     07/09/21  0400   HGB 10.7*     Patient's platelet count this AM:   Recent Labs     07/09/21  0400   PLT 80*    Thrombocytopenia Precautions in place. Patient showing no signs or symptoms of active bleeding. Transfusion not indicated at this time. Patient verbalizes understanding of all instructions. Will continue to assess and implement POC. Call light within reach and hourly rounding in place. Problem: Nausea/Vomiting:  Goal: Absence of nausea/vomiting  Description: Absence of nausea/vomiting  7/9/2021 0256 by Dorinda Garcia  Outcome: Ongoing  Note: Patient with ongoing nausea. PRN anitemetics given per MAR (compazine, zofran). Patient reports decreased nausea with medicine.

## 2021-07-09 NOTE — FLOWSHEET NOTE
07/09/21 1242   Encounter Summary   Services provided to: Patient and family together   Referral/Consult From: Rounding   Continue Visiting   (es 7/9)   Complexity of Encounter Low   Length of Encounter 15 minutes   Routine   Type Follow up   Assessment Approachable   Intervention Active listening   Outcome Engaged in conversation

## 2021-07-09 NOTE — PLAN OF CARE
Problem: Falls - Risk of:  Goal: Will remain free from falls  Description: Orthostatic vital signs obtained at start of shift - see flowsheet for details. Pt does not meet criteria for orthostasis. Pt is a Med fall risk. See Cinderella Hire Fall Score and ABCDS Injury Risk assessments. - Screening for Orthostasis AND not a [place] Risk per BERMUDEZ/ABCDS: Pt bed is in low position, side rails up, call light and belongings are in reach. Fall risk light is on outside pts room. Pt encouraged to call for assistance as needed. Will continue with hourly rounds for PO intake, pain needs, toileting and repositioning as needed. 7/9/2021 1126 by Amber Williamson RN  Outcome: Ongoing   Orthostatic vital signs obtained at start of shift - see flowsheet for details. Pt meets criteria for orthostasis. Pt is a Med fall risk. See Cinderella Hire Fall Score and ABCDS Injury Risk assessments. - Screening for Orthostasis AND not a [place] Risk per BERMUDEZ/ABCDS: Pt bed is in low position, side rails up, call light and belongings are in reach. Fall risk light is on outside pts room. Pt encouraged to call for assistance as needed. Will continue with hourly rounds for PO intake, pain needs, toileting and repositioning as needed. Problem: Infection - Central Venous Catheter-Associated Bloodstream Infection:  Goal: Will show no infection signs and symptoms  Description: Will show no infection signs and symptoms  7/9/2021 1126 by Amber Williamson RN  Outcome: Ongoing   CVC site remains free of signs/symptoms of infection. No drainage, edema, erythema, pain, or warmth noted at site. Dressing changes continue per protocol and on an as needed basis - see flowsheet. Compliant with [facility] Bath Protocol:  Performed CHG bath today per [facility] protocol utilizing CHG solution in the shower. CVC site cleansed with CHG wipe over dressing, skin surrounding dressing, and first 6\" of IV tubing. Pt tolerated well.   Continued to encourage daily CHG bathing per [facility] protocol. Problem: Venous Thromboembolism:  Goal: Will show no signs or symptoms of venous thromboembolism  Description: Will show no signs or symptoms of venous thromboembolism  7/9/2021 1126 by Zoey Grewal RN  Outcome: Ongoing   Adherent with DVT Prevention: Pt is at risk for DVT d/t decreased mobility and cancer treatment. Pt educated on importance of activity. Pt has orders for Subcut prophylactic lovenox. Pt verbalizes understanding of need for prophylaxis while inpatient. Problem: PROTECTIVE PRECAUTIONS  Goal: Patient will remain free of nosocomial Infections  Description: Pt educated on use of mask prior to leaving room. All staff and visitor following handwashing requirements this shift prior to entering room. Low microbial diet order in place and being followed. Linens changed today per protocol and pt reports using chlorhexidine wash per order. 7/9/2021 1126 by Zoey Grewal RN  Outcome: Ongoing   Pt remains in neutropenic precautions per floor policy. Pt, visitors, and staff noted to be following precautions appropriately. Handwashing in place; pt wearing mask in hallway per protocol. Pt in private room. Low microbial diet in place. Will continue to monitor. Problem: Bleeding:  Goal: Will show no signs and symptoms of excessive bleeding  Description: Will show no signs and symptoms of excessive bleeding  7/9/2021 1126 by Zoey Grewal RN  Outcome: Ongoing   Patient's hemoglobin this AM:   Recent Labs     07/09/21  0400   HGB 10.7*     Patient's platelet count this AM:   Recent Labs     07/09/21  0400   PLT 80*    Thrombocytopenia Precautions in place. Patient showing no signs or symptoms of active bleeding. Transfusion not indicated at this time. Patient verbalizes understanding of all instructions. Will continue to assess and implement POC. Call light within reach and hourly rounding in place.       Problem: Nausea/Vomiting:  Goal: Absence of nausea/vomiting  Description: Absence of nausea/vomiting  7/9/2021 1126 by Susan Parker RN  Outcome: Ongoing   Pt complained of nausea several time this shift. Gave nausea meds as indicated.

## 2021-07-10 LAB
ANION GAP SERPL CALCULATED.3IONS-SCNC: 8 MMOL/L (ref 3–16)
BUN BLDV-MCNC: 7 MG/DL (ref 7–20)
CALCIUM SERPL-MCNC: 8.2 MG/DL (ref 8.3–10.6)
CHLORIDE BLD-SCNC: 106 MMOL/L (ref 99–110)
CO2: 21 MMOL/L (ref 21–32)
CREAT SERPL-MCNC: <0.5 MG/DL (ref 0.6–1.2)
GFR AFRICAN AMERICAN: >60
GFR NON-AFRICAN AMERICAN: >60
GLUCOSE BLD-MCNC: 105 MG/DL (ref 70–99)
HCT VFR BLD CALC: 26.9 % (ref 36–48)
HEMOGLOBIN: 9.3 G/DL (ref 12–16)
MCH RBC QN AUTO: 31.2 PG (ref 26–34)
MCHC RBC AUTO-ENTMCNC: 34.5 G/DL (ref 31–36)
MCV RBC AUTO: 90.4 FL (ref 80–100)
PDW BLD-RTO: 16 % (ref 12.4–15.4)
PLATELET # BLD: 40 K/UL (ref 135–450)
PMV BLD AUTO: 6.3 FL (ref 5–10.5)
POTASSIUM SERPL-SCNC: 3.7 MMOL/L (ref 3.5–5.1)
RBC # BLD: 2.98 M/UL (ref 4–5.2)
SODIUM BLD-SCNC: 135 MMOL/L (ref 136–145)
URIC ACID, SERUM: 2 MG/DL (ref 2.6–6)
WBC # BLD: 0.1 K/UL (ref 4–11)

## 2021-07-10 PROCEDURE — 6370000000 HC RX 637 (ALT 250 FOR IP): Performed by: INTERNAL MEDICINE

## 2021-07-10 PROCEDURE — 6370000000 HC RX 637 (ALT 250 FOR IP): Performed by: NURSE PRACTITIONER

## 2021-07-10 PROCEDURE — 2580000003 HC RX 258: Performed by: NURSE PRACTITIONER

## 2021-07-10 PROCEDURE — 80048 BASIC METABOLIC PNL TOTAL CA: CPT

## 2021-07-10 PROCEDURE — 36592 COLLECT BLOOD FROM PICC: CPT

## 2021-07-10 PROCEDURE — 6360000002 HC RX W HCPCS: Performed by: NURSE PRACTITIONER

## 2021-07-10 PROCEDURE — 2060000000 HC ICU INTERMEDIATE R&B

## 2021-07-10 PROCEDURE — 85025 COMPLETE CBC W/AUTO DIFF WBC: CPT

## 2021-07-10 PROCEDURE — 84550 ASSAY OF BLOOD/URIC ACID: CPT

## 2021-07-10 RX ADMIN — LORAZEPAM 1 MG: 1 TABLET ORAL at 09:15

## 2021-07-10 RX ADMIN — PROCHLORPERAZINE EDISYLATE 5 MG: 5 INJECTION INTRAMUSCULAR; INTRAVENOUS at 23:53

## 2021-07-10 RX ADMIN — LORAZEPAM 1 MG: 1 TABLET ORAL at 20:07

## 2021-07-10 RX ADMIN — POTASSIUM CHLORIDE AND SODIUM CHLORIDE: 900; 150 INJECTION, SOLUTION INTRAVENOUS at 11:20

## 2021-07-10 RX ADMIN — Medication 10 ML: at 20:07

## 2021-07-10 RX ADMIN — SODIUM CHLORIDE 15 ML: 900 IRRIGANT IRRIGATION at 17:48

## 2021-07-10 RX ADMIN — TBO-FILGRASTIM 300 MCG: 300 INJECTION, SOLUTION SUBCUTANEOUS at 17:44

## 2021-07-10 RX ADMIN — PROCHLORPERAZINE EDISYLATE 5 MG: 5 INJECTION INTRAMUSCULAR; INTRAVENOUS at 17:44

## 2021-07-10 RX ADMIN — FLUCONAZOLE 400 MG: 200 TABLET ORAL at 09:15

## 2021-07-10 RX ADMIN — VALACYCLOVIR HYDROCHLORIDE 500 MG: 500 TABLET, FILM COATED ORAL at 20:07

## 2021-07-10 RX ADMIN — LEVOFLOXACIN 500 MG: 250 TABLET, FILM COATED ORAL at 20:07

## 2021-07-10 RX ADMIN — VALACYCLOVIR HYDROCHLORIDE 500 MG: 500 TABLET, FILM COATED ORAL at 09:15

## 2021-07-10 RX ADMIN — PROCHLORPERAZINE EDISYLATE 5 MG: 5 INJECTION INTRAMUSCULAR; INTRAVENOUS at 11:19

## 2021-07-10 RX ADMIN — ONDANSETRON 8 MG: 2 INJECTION INTRAMUSCULAR; INTRAVENOUS at 06:51

## 2021-07-10 RX ADMIN — PANTOPRAZOLE SODIUM 40 MG: 40 TABLET, DELAYED RELEASE ORAL at 06:51

## 2021-07-10 RX ADMIN — LORAZEPAM 1 MG: 1 TABLET ORAL at 14:42

## 2021-07-10 RX ADMIN — PROCHLORPERAZINE EDISYLATE 5 MG: 5 INJECTION INTRAMUSCULAR; INTRAVENOUS at 04:01

## 2021-07-10 RX ADMIN — ONDANSETRON 8 MG: 2 INJECTION INTRAMUSCULAR; INTRAVENOUS at 20:07

## 2021-07-10 RX ADMIN — Medication 10 ML: at 09:15

## 2021-07-10 ASSESSMENT — PAIN SCALES - GENERAL
PAINLEVEL_OUTOF10: 0

## 2021-07-10 NOTE — PROGRESS NOTES
[facility]  Autologous Progress Note    7/10/2021    Shantelle Sarkar    :  1950    MRN:  [id number]    Referring MD: Marly Dupree MD  [address] [facility],  [facility] [address] [place]    Subjective:  Reports loose non-bloody BMs and weakness     ECOG PS:  (1) Restricted in physically strenuous activity, ambulatory and able to do work of light nature    KPS: 80% Normal activity with effort; some signs or symptoms of disease    Isolation:  None     Medications    Scheduled Meds:   pantoprazole  40 mg Oral QAM AC    LORazepam  1 mg Oral TID    prochlorperazine  5 mg Intravenous Q6H    Or    prochlorperazine  10 mg Oral Q6H    levoFLOXacin  500 mg Oral Daily    tbo-filgrastim  300 mcg Subcutaneous QPM    fluconazole  400 mg Oral Daily    Saline Mouthwash  15 mL Swish & Spit 4x Daily AC & HS    sodium chloride flush  5-40 mL Intravenous 2 times per day    valACYclovir  500 mg Oral BID    amLODIPine  10 mg Oral Daily     Continuous Infusions:   0.9% NaCl with KCl 20 mEq 50 mL/hr at 21 1521    sodium chloride      sodium chloride      sodium chloride       PRN Meds:. [COMPLETED] loperamide **FOLLOWED BY** loperamide, sodium chloride, ondansetron, ondansetron, sodium chloride, sodium chloride, alteplase, magnesium hydroxide, magnesium sulfate, potassium chloride, Saline Mouthwash, sodium chloride flush, oxyCODONE, cloNIDine    ROS:  As noted above, otherwise remainder of 10-point ROS negative    Physical Exam:     I&O:      Intake/Output Summary (Last 24 hours) at 7/10/2021 0810  Last data filed at 7/10/2021 4457  Gross per 24 hour   Intake 1710 ml   Output 2500 ml   Net -790 ml       Vital Signs:  /73   Pulse 101   Temp 98.5 °F (36.9 °C) (Oral)   Resp 16   Ht 5' 5\" (1.651 m)   Wt 155 lb 13.8 oz (70.7 kg)   SpO2 100%   BMI 25.94 kg/m²     Weight:    Wt Readings from Last 3 Encounters:   21 155 lb 13.8 oz (70.7 kg)   21 160 lb (72.6 kg)   21 160 lb (72.6 kg) General: Awake, alert and oriented. HEENT: normocephalic, alopecia, PERRL, no scleral erythema or icterus, Oral mucosa moist and intact, throat clear  NECK: supple without palpable adenopathy  BACK: Straight negative CVAT  SKIN: warm dry and intact without lesions rashes or masses  CHEST: CTA bilaterally without use of accessory muscles  CV: Normal S1 S2, RRR, no MRG  ABD: NT ND normoactive BS, no palpable masses or hepatosplenomegaly  EXTREMITIES: without edema, denies calf tenderness  NEURO: CN II - XII grossly intact  CATHETER: Left SC Trifusion (6/21/21, Blake) - CDI    Data:   CBC:   Recent Labs     07/08/21  0400 07/09/21  0400 07/10/21  0400   WBC 0.2* 0.1* 0.1*   HGB 10.5* 10.7* 9.3*   HCT 30.8* 31.6* 26.9*   MCV 90.7 90.7 90.4   * 80* 40*     BMP/Mag:  Recent Labs     07/08/21  0359 07/08/21  0400 07/09/21  0400 07/10/21  0400   NA  --  137 136 135*   K  --  3.9 4.0 3.7   CL  --  107 105 106   CO2  --  22 22 21   PHOS  --   --  3.3  --    BUN  --  6* 9 7   CREATININE  --  <0.5* <0.5* <0.5*   MG 2.10  --   --   --      LIVP:   Recent Labs     07/09/21 0400   AST 13*   ALT 11   BILIDIR <0.2   BILITOT 0.5   ALKPHOS 60     Uric Acid:    Recent Labs     07/10/21  0400   LABURIC 2.0*     Coags:   Recent Labs     07/08/21 0400   PROTIME 12.1   INR 1.07   APTT 35.3         PROBLEM LIST:            1. IgA Kappa Multiple Myeloma  2. Hepatitis C (2002, s/p tx with Cherry 2016)  3. Recurrent shingles (5/20/21)  4. Anxiety/depression  5. Chronic HFrEF (LVEF 40-45%)    Post Transplant Complications:  1. Diarrhea   2. Nausea / Vomiting       TREATMENT:            1. RVD x7 cycles  2. Melphalan 200mg/m2 & ASCT (7/1/21) - 3.51 x10^6 [id number]/kg      ASSESSMENT AND PLAN:         1. IgA Kappa Multiple Myeloma: Currently in a SD  - S/p [id number] & ASCT (7/1/21)  - Post-Txp Maintenance: Revlimid maintenance  - Post-Txp F/u: Serum myeloma studies     Day + 9     2.  ID: Afebrile  - H/o recurrent shingles  - Cont Levaquin, Diflucan and Valtrex ppx, cont acyclovir at discharge for VZV positive titer. 3. Heme: pancytopenia - Leukopenia and anemia r/t recent chemotherapy  - Transfuse for Hgb < 7 and Platelets < 10U  - No transfusion today  - Cont Granix (started 8/4/03)     4. Metabolic: hypoNa   - Cont IVF: NS + KCl 20 meq @ 50 mL/hr  - Replace potassium and magnesium per PRN orders     5. Cardiac:   - H/o Chronic HFrEF & HTN  - Echo 5/21/21: global hypokinesis EF 40-45%, Mild-Mod MR, Mild TR  - Cleared for transplant by Dr. Sexton Kinds 6/3/21  Chronic HFrEF:  No evidence of acute exacerbation   - Monitor fluid status closely   HTN:  stable  - Cont Norvasc 10 mg daily   - Cont to monitor      6. GI / Nutrition:     - H/o hepatitis C  Hepatitis C:  No acute issues   - S/p Harvoni (2016)  Nutrition:  Appetite and oral intake has declined, but she is still eating small amounts   - Cont low microbial diet   - Follow closely with dietary  Nausea: Intermittent nausea and vomiting, but still able to eat   - Schedule Compazine q6hrs (started 7/9/21)  - Increase Ativan 1 mg TID (increased 7/9/21)   - Cont Zofran as needed  - Add PO Protonix daily (started 7/9/21)  Diarrhea: Increased diarrhea, multiple small loose stools   - C. Diff (7/3/21) - negative  - Cont Imodium as needed      7.  Psych:  - H/o Anxiety / Depression:   Anxiety / Depression:  Ongoing anxiety related transplant process  - Increase Ativan 1mg TID (increased 7/9/21 from bid)  - Psych following      8.  Neoplasm-Related Chronic Pain: 2/2 myeloma   - Cont Oxycodone PRN - she has not been requiring pain meds      - DVT Prophylaxis: Platelets >16,594 cells/dL, - daily lovenox prophylaxis ordered - last injection 7/9/21  Contraindications to pharmacologic prophylaxis: None  Contraindications to mechanical prophylaxis: None    - Disposition:  Once ANC >1 and recovered from toxicity      Gonzalez Slaughter MD

## 2021-07-10 NOTE — PLAN OF CARE
Problem: Falls - Risk of:  Goal: Will remain free from falls  Description: Orthostatic vital signs obtained at start of shift - see flowsheet for details. Pt does not meet criteria for orthostasis. Pt is a Med fall risk. See Toshia Hung Fall Score and ABCDS Injury Risk assessments. - Screening for Orthostasis AND not a [place] Risk per BERMUDEZ/ABCDS: Pt bed is in low position, side rails up, call light and belongings are in reach. Fall risk light is on outside pts room. Pt encouraged to call for assistance as needed. Will continue with hourly rounds for PO intake, pain needs, toileting and repositioning as needed. Outcome: Ongoing  Note:  Orthostatic vital signs obtained at start of shift - see flowsheet for details. Pt does not meet criteria for orthostasis. Pt is a Med fall risk. See [place] Hung Fall Score and ABCDS Injury Risk assessments. Pt bed is in low position, side rails up, call light and belongings are in reach. Fall risk light is on outside pts room. Pt encouraged to call for assistance as needed. Will continue with hourly rounds for PO intake, pain needs, toileting and repositioning as needed       Problem: Infection - Central Venous Catheter-Associated Bloodstream Infection:  Goal: Will show no infection signs and symptoms  Description: Will show no infection signs and symptoms  Outcome: Ongoing  Note: CVC site remains free of signs/symptoms of infection. No drainage, edema, erythema, pain, or warmth noted at site. Dressing changes continue per protocol and on an as needed basis - see flowsheet       Problem: PROTECTIVE PRECAUTIONS  Goal: Patient will remain free of nosocomial Infections  Description: Pt educated on use of mask prior to leaving room. All staff and visitor following handwashing requirements this shift prior to entering room. Low microbial diet order in place and being followed. Linens changed today per protocol and pt reports using chlorhexidine wash per order.    Outcome: Ongoing  Note: Patient in neutropenic precautions. Educated on use of mask when out of room. Educated on importance of CHG use and saline rinse to prevent infection. Staff following handwashing protocols. No fresh flowers/plants in room. Low microbial diet in place       Problem: Bleeding:  Goal: Will show no signs and symptoms of excessive bleeding  Description: Will show no signs and symptoms of excessive bleeding  Outcome: Ongoing  Note: Patient's hemoglobin this AM:   Recent Labs     07/10/21  0400   HGB 9.3*     Patient's platelet count this AM:   Recent Labs     07/10/21  0400   PLT 40*    Thrombocytopenia Precautions in place. Patient showing no signs or symptoms of active bleeding. Transfusion not indicated at this time. Patient verbalizes understanding of all instructions. Will continue to assess and implement POC. Call light within reach and hourly rounding in place. Problem: Nausea/Vomiting:  Goal: Absence of nausea/vomiting  Description: Absence of nausea/vomiting  Outcome: Ongoing  Note: Patient with ongoing nausea. PRN anitemetics given per MAR (compazine, zofran). Patient reports decreased nausea with medicine.

## 2021-07-11 LAB
ANION GAP SERPL CALCULATED.3IONS-SCNC: 9 MMOL/L (ref 3–16)
BUN BLDV-MCNC: 5 MG/DL (ref 7–20)
CALCIUM SERPL-MCNC: 8.4 MG/DL (ref 8.3–10.6)
CHLORIDE BLD-SCNC: 104 MMOL/L (ref 99–110)
CO2: 22 MMOL/L (ref 21–32)
CREAT SERPL-MCNC: <0.5 MG/DL (ref 0.6–1.2)
GFR AFRICAN AMERICAN: >60
GFR NON-AFRICAN AMERICAN: >60
GLUCOSE BLD-MCNC: 98 MG/DL (ref 70–99)
HCT VFR BLD CALC: 27.7 % (ref 36–48)
HEMOGLOBIN: 9.6 G/DL (ref 12–16)
MCH RBC QN AUTO: 31.2 PG (ref 26–34)
MCHC RBC AUTO-ENTMCNC: 34.7 G/DL (ref 31–36)
MCV RBC AUTO: 89.9 FL (ref 80–100)
PDW BLD-RTO: 15.5 % (ref 12.4–15.4)
PLATELET # BLD: 22 K/UL (ref 135–450)
PMV BLD AUTO: 8.1 FL (ref 5–10.5)
POTASSIUM SERPL-SCNC: 3.7 MMOL/L (ref 3.5–5.1)
RBC # BLD: 3.08 M/UL (ref 4–5.2)
SODIUM BLD-SCNC: 135 MMOL/L (ref 136–145)
URIC ACID, SERUM: 1.9 MG/DL (ref 2.6–6)
WBC # BLD: 0.1 K/UL (ref 4–11)

## 2021-07-11 PROCEDURE — 6370000000 HC RX 637 (ALT 250 FOR IP): Performed by: INTERNAL MEDICINE

## 2021-07-11 PROCEDURE — 6360000002 HC RX W HCPCS: Performed by: NURSE PRACTITIONER

## 2021-07-11 PROCEDURE — 2060000000 HC ICU INTERMEDIATE R&B

## 2021-07-11 PROCEDURE — 80048 BASIC METABOLIC PNL TOTAL CA: CPT

## 2021-07-11 PROCEDURE — 2580000003 HC RX 258: Performed by: NURSE PRACTITIONER

## 2021-07-11 PROCEDURE — 84550 ASSAY OF BLOOD/URIC ACID: CPT

## 2021-07-11 PROCEDURE — 36592 COLLECT BLOOD FROM PICC: CPT

## 2021-07-11 PROCEDURE — 6370000000 HC RX 637 (ALT 250 FOR IP): Performed by: NURSE PRACTITIONER

## 2021-07-11 PROCEDURE — 85025 COMPLETE CBC W/AUTO DIFF WBC: CPT

## 2021-07-11 RX ADMIN — LEVOFLOXACIN 500 MG: 250 TABLET, FILM COATED ORAL at 21:21

## 2021-07-11 RX ADMIN — LORAZEPAM 1 MG: 1 TABLET ORAL at 09:13

## 2021-07-11 RX ADMIN — ONDANSETRON 8 MG: 2 INJECTION INTRAMUSCULAR; INTRAVENOUS at 23:44

## 2021-07-11 RX ADMIN — LORAZEPAM 1 MG: 1 TABLET ORAL at 14:22

## 2021-07-11 RX ADMIN — SODIUM CHLORIDE 15 ML: 900 IRRIGANT IRRIGATION at 11:22

## 2021-07-11 RX ADMIN — LOPERAMIDE HYDROCHLORIDE 2 MG: 2 CAPSULE ORAL at 03:15

## 2021-07-11 RX ADMIN — PROCHLORPERAZINE EDISYLATE 5 MG: 5 INJECTION INTRAMUSCULAR; INTRAVENOUS at 06:08

## 2021-07-11 RX ADMIN — Medication 10 ML: at 21:25

## 2021-07-11 RX ADMIN — LOPERAMIDE HYDROCHLORIDE 2 MG: 2 CAPSULE ORAL at 06:28

## 2021-07-11 RX ADMIN — PANTOPRAZOLE SODIUM 40 MG: 40 TABLET, DELAYED RELEASE ORAL at 06:08

## 2021-07-11 RX ADMIN — LORAZEPAM 1 MG: 1 TABLET ORAL at 21:21

## 2021-07-11 RX ADMIN — FLUCONAZOLE 400 MG: 200 TABLET ORAL at 09:13

## 2021-07-11 RX ADMIN — SODIUM CHLORIDE 15 ML: 900 IRRIGANT IRRIGATION at 18:38

## 2021-07-11 RX ADMIN — LOPERAMIDE HYDROCHLORIDE 2 MG: 2 CAPSULE ORAL at 19:22

## 2021-07-11 RX ADMIN — TBO-FILGRASTIM 300 MCG: 300 INJECTION, SOLUTION SUBCUTANEOUS at 18:36

## 2021-07-11 RX ADMIN — ONDANSETRON 8 MG: 2 INJECTION INTRAMUSCULAR; INTRAVENOUS at 15:43

## 2021-07-11 RX ADMIN — LOPERAMIDE HYDROCHLORIDE 2 MG: 2 CAPSULE ORAL at 16:49

## 2021-07-11 RX ADMIN — PROCHLORPERAZINE EDISYLATE 5 MG: 5 INJECTION INTRAMUSCULAR; INTRAVENOUS at 11:18

## 2021-07-11 RX ADMIN — VALACYCLOVIR HYDROCHLORIDE 500 MG: 500 TABLET, FILM COATED ORAL at 09:13

## 2021-07-11 RX ADMIN — ONDANSETRON 8 MG: 2 INJECTION INTRAMUSCULAR; INTRAVENOUS at 06:08

## 2021-07-11 RX ADMIN — PROCHLORPERAZINE EDISYLATE 5 MG: 5 INJECTION INTRAMUSCULAR; INTRAVENOUS at 23:44

## 2021-07-11 RX ADMIN — VALACYCLOVIR HYDROCHLORIDE 500 MG: 500 TABLET, FILM COATED ORAL at 21:21

## 2021-07-11 RX ADMIN — PROCHLORPERAZINE EDISYLATE 5 MG: 5 INJECTION INTRAMUSCULAR; INTRAVENOUS at 16:48

## 2021-07-11 RX ADMIN — POTASSIUM CHLORIDE AND SODIUM CHLORIDE: 900; 150 INJECTION, SOLUTION INTRAVENOUS at 21:21

## 2021-07-11 RX ADMIN — POTASSIUM CHLORIDE AND SODIUM CHLORIDE: 900; 150 INJECTION, SOLUTION INTRAVENOUS at 06:08

## 2021-07-11 RX ADMIN — Medication 10 ML: at 09:12

## 2021-07-11 ASSESSMENT — PAIN SCALES - GENERAL
PAINLEVEL_OUTOF10: 0

## 2021-07-11 NOTE — PLAN OF CARE
Problem: Falls - Risk of:  Goal: Will remain free from falls  Description: Orthostatic vital signs obtained at start of shift - see flowsheet for details. Pt does not meet criteria for orthostasis. Pt is a Med fall risk. See Gerold Angelucci Fall Score and ABCDS Injury Risk assessments. - Screening for Orthostasis AND not a [place] Risk per BERMUDEZ/ABCDS: Pt bed is in low position, side rails up, call light and belongings are in reach. Fall risk light is on outside pts room. Pt encouraged to call for assistance as needed. Will continue with hourly rounds for PO intake, pain needs, toileting and repositioning as needed. Outcome: Ongoing  Note: Orthostatic vital signs obtained at start of shift - see flowsheet for details. Pt does not meet criteria for orthostasis. Pt is a Med fall risk. See Gerold Angelucci Fall Score and ABCDS Injury Risk assessments. - Screening for Orthostasis AND not a [place] Risk per BERMUDEZ/ABCDS: Pt bed is in low position, side rails up, call light and belongings are in reach. Fall risk light is on outside pts room. Pt encouraged to call for assistance as needed. Will continue with hourly rounds for PO intake, pain needs, toileting and repositioning as needed. Problem: Infection - Central Venous Catheter-Associated Bloodstream Infection:  Goal: Will show no infection signs and symptoms  Description: Will show no infection signs and symptoms  Outcome: Ongoing  Note: CVC site remains free of signs/symptoms of infection. No drainage, edema, erythema, pain, or warmth noted at site. Dressing changes continue per protocol and on an as needed basis - see flowsheet. Compliant with [facility] Bath Protocol:  Performed CHG bath today per [facility] protocol utilizing CHG solution in the shower. CVC site cleansed with CHG wipe over dressing, skin surrounding dressing, and first 6\" of IV tubing. Pt tolerated well. Continued to encourage daily CHG bathing per [facility] protocol.          Problem: PROTECTIVE PRECAUTIONS  Goal: Patient will remain free of nosocomial Infections  Description: Pt educated on use of mask prior to leaving room. All staff and visitor following handwashing requirements this shift prior to entering room. Low microbial diet order in place and being followed. Linens changed today per protocol and pt reports using chlorhexidine wash per order. Outcome: Ongoing  Note: Pt currently in a private, positive pressure room. Educated pt on wearing a mask when out of room. No living plants or flowers allowed. Also reinforced importance of hand hygiene. Pt following a low microbial diet. Surfaces throughout room cleaned with bleach wipes per unit policy. Visitors updated on current policies. Problem: Bleeding:  Goal: Will show no signs and symptoms of excessive bleeding  Description: Will show no signs and symptoms of excessive bleeding  Outcome: Ongoing  Note: Patient's hemoglobin this AM:   Recent Labs     07/11/21  0305   HGB 9.6*     Patient's platelet count this AM:   Recent Labs     07/11/21  0305   PLT 22*    Thrombocytopenia precautions in place. Patient showing no signs or symptoms of active bleeding. Transfusion not indicated at this time. Patient verbalizes understanding of all instructions. Will continue to assess and implement POC. Call light within reach and hourly rounding in place. Problem: Nausea/Vomiting:  Goal: Absence of nausea/vomiting  Description: Absence of nausea/vomiting  Outcome: Ongoing  Note: Pt reports ongoing nausea, no emesis. Controlled with scheduled ativan and compazine and prn zofran. Pt also has some loose stools, but per pt seems to be decreasing.

## 2021-07-11 NOTE — PROGRESS NOTES
[address] [facility]  Autologous Progress Note    2021    Blanca Ring    :  1950    MRN:  [id number]    Referring MD: Leonila Lance MD  [address] [facility],  [facility] [address]    Subjective:  Reports loose non-bloody BMs and weakness     ECOG PS:  (1) Restricted in physically strenuous activity, ambulatory and able to do work of light nature    KPS: 80% Normal activity with effort; some signs or symptoms of disease    Isolation:  None     Medications    Scheduled Meds:   pantoprazole  40 mg Oral QAM AC    LORazepam  1 mg Oral TID    prochlorperazine  5 mg Intravenous Q6H    Or    prochlorperazine  10 mg Oral Q6H    levoFLOXacin  500 mg Oral Daily    tbo-filgrastim  300 mcg Subcutaneous QPM    fluconazole  400 mg Oral Daily    Saline Mouthwash  15 mL Swish & Spit 4x Daily AC & HS    sodium chloride flush  5-40 mL Intravenous 2 times per day    valACYclovir  500 mg Oral BID     Continuous Infusions:   0.9% NaCl with KCl 20 mEq 50 mL/hr at 21 0608    sodium chloride      sodium chloride      sodium chloride       PRN Meds:. [COMPLETED] loperamide **FOLLOWED BY** loperamide, sodium chloride, ondansetron, ondansetron, sodium chloride, sodium chloride, alteplase, magnesium hydroxide, magnesium sulfate, potassium chloride, Saline Mouthwash, sodium chloride flush, oxyCODONE, cloNIDine    ROS:  As noted above, otherwise remainder of 10-point ROS negative    Physical Exam:     I&O:      Intake/Output Summary (Last 24 hours) at 2021 0827  Last data filed at 2021 0616  Gross per 24 hour   Intake 2499 ml   Output 2650 ml   Net -151 ml       Vital Signs:  BP (!) 110/59   Pulse 99   Temp 97.5 °F (36.4 °C) (Oral)   Resp 16   Ht 5' 5\" (1.651 m)   Wt 157 lb 12.8 oz (71.6 kg)   SpO2 100%   BMI 26.26 kg/m²     Weight:    Wt Readings from Last 3 Encounters:   07/10/21 157 lb 12.8 oz (71.6 kg)   21 160 lb (72.6 kg)   21 160 lb (72.6 kg)       General: Awake, alert and oriented. HEENT: normocephalic, alopecia, PERRL, no scleral erythema or icterus, Oral mucosa moist and intact, throat clear  NECK: supple without palpable adenopathy  BACK: Straight negative CVAT  SKIN: warm dry and intact without lesions rashes or masses  CHEST: CTA bilaterally without use of accessory muscles  CV: Normal S1 S2, RRR, no MRG  ABD: NT ND normoactive BS, no palpable masses or hepatosplenomegaly  EXTREMITIES: without edema, denies calf tenderness  NEURO: CN II - XII grossly intact  CATHETER: Left SC Trifusion (6/21/21, Blake) - CDI    Data:   CBC:   Recent Labs     07/09/21  0400 07/10/21  0400 07/11/21  0305   WBC 0.1* 0.1* 0.1*   HGB 10.7* 9.3* 9.6*   HCT 31.6* 26.9* 27.7*   MCV 90.7 90.4 89.9   PLT 80* 40* 22*     BMP/Mag:  Recent Labs     07/09/21  0400 07/10/21  0400 07/11/21  0305    135* 135*   K 4.0 3.7 3.7    106 104   CO2 22 21 22   PHOS 3.3  --   --    BUN 9 7 5*   CREATININE <0.5* <0.5* <0.5*     LIVP:   Recent Labs     07/09/21  0400   AST 13*   ALT 11   BILIDIR <0.2   BILITOT 0.5   ALKPHOS 60     Uric Acid:    Recent Labs     07/11/21  0305   LABURIC 1.9*     Coags:   No results for input(s): PROTIME, INR, APTT in the last 72 hours. PROBLEM LIST:            1. IgA Kappa Multiple Myeloma  2. Hepatitis C (2002, s/p tx with Harvoni 2016)  3. Recurrent shingles (5/20/21)  4. Anxiety/depression  5. Chronic HFrEF (LVEF 40-45%)    Post Transplant Complications:  1. Diarrhea   2. Nausea / Vomiting       TREATMENT:            1. RVD x7 cycles  2. Melphalan 200mg/m2 & ASCT (7/1/21) - 3.51 x10^6 [id number]/kg      ASSESSMENT AND PLAN:         1. IgA Kappa Multiple Myeloma: Currently in a OK  - S/p [id number] & ASCT (7/1/21)  - Post-Txp Maintenance: Revlimid maintenance  - Post-Txp F/u: Serum myeloma studies     Day + 10     2. ID: Afebrile  - H/o recurrent shingles  - Cont Levaquin, Diflucan and Valtrex ppx, cont acyclovir at discharge for VZV positive titer.       3. Heme: pancytopenia - Leukopenia and anemia r/t recent chemotherapy  - Transfuse for Hgb < 7 and Platelets < 61T  - No transfusion today  - Cont Granix (started 8/0/78)     4. Metabolic: hypoNa   - Cont IVF: NS + KCl 20 meq @ 50 mL/hr  - Replace potassium and magnesium per PRN orders     5. Cardiac:   - H/o Chronic HFrEF & HTN  - Echo 5/21/21: global hypokinesis EF 40-45%, Mild-Mod MR, Mild TR  - Cleared for transplant by Dr. Aziza Dunn 6/3/21  Chronic HFrEF:  No evidence of acute exacerbation   - Monitor fluid status closely   HTN:  stable  - Cont Norvasc 10 mg daily   - Cont to monitor      6. GI / Nutrition:     - H/o hepatitis C  Hepatitis C:  No acute issues   - S/p Harvoni (2016)  Nutrition:  Appetite and oral intake has declined, but she is still eating small amounts   - Cont low microbial diet   - Follow closely with dietary  Nausea: Intermittent nausea and vomiting, but still able to eat   - Schedule Compazine q6hrs (started 7/9/21)  - Increase Ativan 1 mg TID (increased 7/9/21)   - Cont Zofran as needed  - Add PO Protonix daily (started 7/9/21)  Diarrhea: Increased diarrhea, multiple small loose stools   - C. Diff (7/3/21) - negative  - Cont Imodium as needed      7.  Psych:  - H/o Anxiety / Depression:   Anxiety / Depression:  Ongoing anxiety related transplant process  - Increase Ativan 1mg TID (increased 7/9/21 from bid)  - Psych following      8.  Neoplasm-Related Chronic Pain: 2/2 myeloma   - Cont Oxycodone PRN - she has not been requiring pain meds      - DVT Prophylaxis: Platelets >60,114 cells/dL, - daily lovenox prophylaxis ordered - last injection 7/9/21  Contraindications to pharmacologic prophylaxis: None  Contraindications to mechanical prophylaxis: None    - Disposition:  Once ANC >1 and recovered from toxicity    Josue Concepcion MD

## 2021-07-12 ENCOUNTER — APPOINTMENT (OUTPATIENT)
Dept: GENERAL RADIOLOGY | Age: 71
DRG: 016 | End: 2021-07-12
Attending: INTERNAL MEDICINE
Payer: MEDICARE

## 2021-07-12 LAB
ALBUMIN SERPL-MCNC: 3.4 G/DL (ref 3.4–5)
ALP BLD-CCNC: 56 U/L (ref 40–129)
ALT SERPL-CCNC: 7 U/L (ref 10–40)
ANION GAP SERPL CALCULATED.3IONS-SCNC: 10 MMOL/L (ref 3–16)
ANISOCYTOSIS: ABNORMAL
AST SERPL-CCNC: 9 U/L (ref 15–37)
ATYPICAL LYMPHOCYTE RELATIVE PERCENT: 4 % (ref 0–6)
BANDED NEUTROPHILS RELATIVE PERCENT: 6 % (ref 0–7)
BASOPHILS ABSOLUTE: 0 K/UL (ref 0–0.2)
BASOPHILS RELATIVE PERCENT: 0 %
BILIRUB SERPL-MCNC: 0.4 MG/DL (ref 0–1)
BILIRUBIN DIRECT: <0.2 MG/DL (ref 0–0.3)
BILIRUBIN URINE: NEGATIVE
BILIRUBIN, INDIRECT: ABNORMAL MG/DL (ref 0–1)
BLOOD, URINE: ABNORMAL
BUN BLDV-MCNC: 4 MG/DL (ref 7–20)
CALCIUM SERPL-MCNC: 8 MG/DL (ref 8.3–10.6)
CHLORIDE BLD-SCNC: 100 MMOL/L (ref 99–110)
CLARITY: CLEAR
CO2: 21 MMOL/L (ref 21–32)
COLOR: YELLOW
CREAT SERPL-MCNC: <0.5 MG/DL (ref 0.6–1.2)
EOSINOPHILS ABSOLUTE: 0 K/UL (ref 0–0.6)
EOSINOPHILS RELATIVE PERCENT: 0 %
EPITHELIAL CELLS, UA: NORMAL /HPF (ref 0–5)
GFR AFRICAN AMERICAN: >60
GFR NON-AFRICAN AMERICAN: >60
GLUCOSE BLD-MCNC: 102 MG/DL (ref 70–99)
GLUCOSE URINE: NEGATIVE MG/DL
HCT VFR BLD CALC: 28 % (ref 36–48)
HEMOGLOBIN: 9.9 G/DL (ref 12–16)
KETONES, URINE: ABNORMAL MG/DL
LACTATE DEHYDROGENASE: 123 U/L (ref 100–190)
LEUKOCYTE ESTERASE, URINE: NEGATIVE
LYMPHOCYTES ABSOLUTE: 0.1 K/UL (ref 1–5.1)
LYMPHOCYTES RELATIVE PERCENT: 14 %
MAGNESIUM: 1.8 MG/DL (ref 1.8–2.4)
MCH RBC QN AUTO: 31.4 PG (ref 26–34)
MCHC RBC AUTO-ENTMCNC: 35.3 G/DL (ref 31–36)
MCV RBC AUTO: 89.1 FL (ref 80–100)
MICROSCOPIC EXAMINATION: YES
MONOCYTES ABSOLUTE: 0.3 K/UL (ref 0–1.3)
MONOCYTES RELATIVE PERCENT: 54 %
NEUTROPHILS ABSOLUTE: 0.2 K/UL (ref 1.7–7.7)
NEUTROPHILS RELATIVE PERCENT: 22 %
NITRITE, URINE: NEGATIVE
PDW BLD-RTO: 15.3 % (ref 12.4–15.4)
PH UA: 6.5 (ref 5–8)
PHOSPHORUS: 1.7 MG/DL (ref 2.5–4.9)
PLATELET # BLD: 17 K/UL (ref 135–450)
PLATELET SLIDE REVIEW: ABNORMAL
PMV BLD AUTO: 8.7 FL (ref 5–10.5)
POLYCHROMASIA: ABNORMAL
POTASSIUM SERPL-SCNC: 3.4 MMOL/L (ref 3.5–5.1)
PROTEIN UA: NEGATIVE MG/DL
RBC # BLD: 3.14 M/UL (ref 4–5.2)
RBC UA: NORMAL /HPF (ref 0–4)
SLIDE REVIEW: ABNORMAL
SODIUM BLD-SCNC: 131 MMOL/L (ref 136–145)
SPECIFIC GRAVITY UA: <=1.005 (ref 1–1.03)
TOTAL PROTEIN: 5.5 G/DL (ref 6.4–8.2)
TOXIC GRANULATION: PRESENT
URIC ACID, SERUM: 2.1 MG/DL (ref 2.6–6)
URINE TYPE: ABNORMAL
UROBILINOGEN, URINE: 0.2 E.U./DL
WBC # BLD: 0.6 K/UL (ref 4–11)
WBC UA: NORMAL /HPF (ref 0–5)

## 2021-07-12 PROCEDURE — 2500000003 HC RX 250 WO HCPCS: Performed by: NURSE PRACTITIONER

## 2021-07-12 PROCEDURE — 6370000000 HC RX 637 (ALT 250 FOR IP): Performed by: INTERNAL MEDICINE

## 2021-07-12 PROCEDURE — 36592 COLLECT BLOOD FROM PICC: CPT

## 2021-07-12 PROCEDURE — 83735 ASSAY OF MAGNESIUM: CPT

## 2021-07-12 PROCEDURE — 81001 URINALYSIS AUTO W/SCOPE: CPT

## 2021-07-12 PROCEDURE — 6370000000 HC RX 637 (ALT 250 FOR IP): Performed by: NURSE PRACTITIONER

## 2021-07-12 PROCEDURE — 85025 COMPLETE CBC W/AUTO DIFF WBC: CPT

## 2021-07-12 PROCEDURE — 80048 BASIC METABOLIC PNL TOTAL CA: CPT

## 2021-07-12 PROCEDURE — 2060000000 HC ICU INTERMEDIATE R&B

## 2021-07-12 PROCEDURE — 83615 LACTATE (LD) (LDH) ENZYME: CPT

## 2021-07-12 PROCEDURE — 6360000002 HC RX W HCPCS: Performed by: NURSE PRACTITIONER

## 2021-07-12 PROCEDURE — 84550 ASSAY OF BLOOD/URIC ACID: CPT

## 2021-07-12 PROCEDURE — 80076 HEPATIC FUNCTION PANEL: CPT

## 2021-07-12 PROCEDURE — 2580000003 HC RX 258: Performed by: NURSE PRACTITIONER

## 2021-07-12 PROCEDURE — 84100 ASSAY OF PHOSPHORUS: CPT

## 2021-07-12 PROCEDURE — 71045 X-RAY EXAM CHEST 1 VIEW: CPT

## 2021-07-12 RX ADMIN — PROCHLORPERAZINE EDISYLATE 5 MG: 5 INJECTION INTRAMUSCULAR; INTRAVENOUS at 17:17

## 2021-07-12 RX ADMIN — LOPERAMIDE HYDROCHLORIDE 2 MG: 2 CAPSULE ORAL at 23:30

## 2021-07-12 RX ADMIN — SODIUM CHLORIDE 15 ML: 900 IRRIGANT IRRIGATION at 20:15

## 2021-07-12 RX ADMIN — LORAZEPAM 1 MG: 1 TABLET ORAL at 20:14

## 2021-07-12 RX ADMIN — POTASSIUM CHLORIDE AND SODIUM CHLORIDE: 900; 150 INJECTION, SOLUTION INTRAVENOUS at 18:48

## 2021-07-12 RX ADMIN — LOPERAMIDE HYDROCHLORIDE 2 MG: 2 CAPSULE ORAL at 18:48

## 2021-07-12 RX ADMIN — LOPERAMIDE HYDROCHLORIDE 2 MG: 2 CAPSULE ORAL at 11:39

## 2021-07-12 RX ADMIN — LORAZEPAM 1 MG: 1 TABLET ORAL at 14:30

## 2021-07-12 RX ADMIN — LOPERAMIDE HYDROCHLORIDE 2 MG: 2 CAPSULE ORAL at 13:45

## 2021-07-12 RX ADMIN — POTASSIUM CHLORIDE 20 MEQ: 400 INJECTION, SOLUTION INTRAVENOUS at 08:42

## 2021-07-12 RX ADMIN — PROCHLORPERAZINE EDISYLATE 5 MG: 5 INJECTION INTRAMUSCULAR; INTRAVENOUS at 10:20

## 2021-07-12 RX ADMIN — ONDANSETRON 8 MG: 2 INJECTION INTRAMUSCULAR; INTRAVENOUS at 07:27

## 2021-07-12 RX ADMIN — LOPERAMIDE HYDROCHLORIDE 2 MG: 2 CAPSULE ORAL at 07:27

## 2021-07-12 RX ADMIN — LOPERAMIDE HYDROCHLORIDE 2 MG: 2 CAPSULE ORAL at 20:14

## 2021-07-12 RX ADMIN — VALACYCLOVIR HYDROCHLORIDE 500 MG: 500 TABLET, FILM COATED ORAL at 07:32

## 2021-07-12 RX ADMIN — FLUCONAZOLE 400 MG: 200 TABLET ORAL at 07:32

## 2021-07-12 RX ADMIN — PROCHLORPERAZINE EDISYLATE 5 MG: 5 INJECTION INTRAMUSCULAR; INTRAVENOUS at 23:30

## 2021-07-12 RX ADMIN — POTASSIUM CHLORIDE 20 MEQ: 400 INJECTION, SOLUTION INTRAVENOUS at 10:21

## 2021-07-12 RX ADMIN — POTASSIUM CHLORIDE 20 MEQ: 400 INJECTION, SOLUTION INTRAVENOUS at 05:50

## 2021-07-12 RX ADMIN — PROCHLORPERAZINE EDISYLATE 5 MG: 5 INJECTION INTRAMUSCULAR; INTRAVENOUS at 04:32

## 2021-07-12 RX ADMIN — POTASSIUM CHLORIDE 20 MEQ: 400 INJECTION, SOLUTION INTRAVENOUS at 07:26

## 2021-07-12 RX ADMIN — POTASSIUM PHOSPHATE, MONOBASIC AND POTASSIUM PHOSPHATE, DIBASIC 30 MMOL: 224; 236 INJECTION, SOLUTION, CONCENTRATE INTRAVENOUS at 08:52

## 2021-07-12 RX ADMIN — PANTOPRAZOLE SODIUM 40 MG: 40 TABLET, DELAYED RELEASE ORAL at 05:50

## 2021-07-12 RX ADMIN — Medication 10 ML: at 20:14

## 2021-07-12 RX ADMIN — TBO-FILGRASTIM 300 MCG: 300 INJECTION, SOLUTION SUBCUTANEOUS at 18:22

## 2021-07-12 RX ADMIN — VALACYCLOVIR HYDROCHLORIDE 500 MG: 500 TABLET, FILM COATED ORAL at 20:14

## 2021-07-12 RX ADMIN — LOPERAMIDE HYDROCHLORIDE 2 MG: 2 CAPSULE ORAL at 08:52

## 2021-07-12 RX ADMIN — LEVOFLOXACIN 500 MG: 250 TABLET, FILM COATED ORAL at 20:14

## 2021-07-12 RX ADMIN — LOPERAMIDE HYDROCHLORIDE 2 MG: 2 CAPSULE ORAL at 04:31

## 2021-07-12 RX ADMIN — LORAZEPAM 1 MG: 1 TABLET ORAL at 07:28

## 2021-07-12 ASSESSMENT — PAIN SCALES - GENERAL
PAINLEVEL_OUTOF10: 0

## 2021-07-12 NOTE — PROGRESS NOTES
[address] [place]  Autologous Progress Note    2021    Zachary Dahl    :  1950    MRN:  [id number]    Referring MD: Umesh Gregg MD  [facility],  [address] [place] [address]    Subjective:  Typical fatigue and malaise, as expected. Increasing diarrhea.     ECOG PS:  (1) Restricted in physically strenuous activity, ambulatory and able to do work of light nature    KPS: 80% Normal activity with effort; some signs or symptoms of disease    Isolation:  None     Medications    Scheduled Meds:   potassium phosphate IVPB  30 mmol Intravenous Once    pantoprazole  40 mg Oral QAM AC    LORazepam  1 mg Oral TID    prochlorperazine  5 mg Intravenous Q6H    Or    prochlorperazine  10 mg Oral Q6H    levoFLOXacin  500 mg Oral Daily    tbo-filgrastim  300 mcg Subcutaneous QPM    fluconazole  400 mg Oral Daily    Saline Mouthwash  15 mL Swish & Spit 4x Daily AC & HS    sodium chloride flush  5-40 mL Intravenous 2 times per day    valACYclovir  500 mg Oral BID     Continuous Infusions:   0.9% NaCl with KCl 20 mEq 50 mL/hr at 21    sodium chloride      sodium chloride      sodium chloride       PRN Meds:.[START ON 2021] potassium phosphate IVPB, [COMPLETED] loperamide **FOLLOWED BY** loperamide, sodium chloride, ondansetron, ondansetron, sodium chloride, sodium chloride, alteplase, magnesium hydroxide, magnesium sulfate, potassium chloride, Saline Mouthwash, sodium chloride flush, oxyCODONE, cloNIDine    ROS:  As noted above, otherwise remainder of 10-point ROS negative    Physical Exam:     I&O:      Intake/Output Summary (Last 24 hours) at 2021 0743  Last data filed at 2021 0641  Gross per 24 hour   Intake 2041 ml   Output 1750 ml   Net 291 ml       Vital Signs:  /65   Pulse 98   Temp 97.9 °F (36.6 °C) (Oral)   Resp 16   Ht 5' 5\" (1.651 m)   Wt 155 lb 9.6 oz (70.6 kg)   SpO2 100%   BMI 25.89 kg/m²     Weight:    Wt Readings from Last 3 Encounters: 07/11/21 155 lb 9.6 oz (70.6 kg)   06/26/21 160 lb (72.6 kg)   06/21/21 160 lb (72.6 kg)       General: Awake, alert and oriented. HEENT: normocephalic, alopecia, PERRL, no scleral erythema or icterus, Oral mucosa moist and intact, throat clear  NECK: supple without palpable adenopathy  BACK: Straight negative CVAT  SKIN: warm dry and intact without lesions rashes or masses  CHEST: CTA bilaterally without use of accessory muscles  CV: Normal S1 S2, RRR, no MRG  ABD: NT ND normoactive BS, no palpable masses or hepatosplenomegaly  EXTREMITIES: without edema, denies calf tenderness  NEURO: CN II - XII grossly intact  CATHETER: Left SC Trifusion (6/21/21, Blake) - CDI    Data:   CBC:   Recent Labs     07/10/21  0400 07/11/21  0305 07/12/21 0435   WBC 0.1* 0.1* 0.6*   HGB 9.3* 9.6* 9.9*   HCT 26.9* 27.7* 28.0*   MCV 90.4 89.9 89.1   PLT 40* 22* 17*     BMP/Mag:  Recent Labs     07/10/21  0400 07/11/21  0305 07/12/21 0435   * 135* 131*   K 3.7 3.7 3.4*    104 100   CO2 21 22 21   PHOS  --   --  1.7*   BUN 7 5* 4*   CREATININE <0.5* <0.5* <0.5*   MG  --   --  1.80     LIVP:   Recent Labs     07/12/21 0435   AST 9*   ALT 7*   BILIDIR <0.2   BILITOT 0.4   ALKPHOS 56     Uric Acid:    Recent Labs     07/12/21 0435   LABURIC 2.1*     Coags:   No results for input(s): PROTIME, INR, APTT in the last 72 hours. PROBLEM LIST:            1. IgA Kappa Multiple Myeloma  2. Hepatitis C (2002, s/p tx with Cherry 2016)  3. Recurrent shingles (5/20/21)  4. Anxiety/depression  5. Chronic HFrEF (LVEF 40-45%)    Post Transplant Complications:  1. Diarrhea   2. Nausea / Vomiting       TREATMENT:            1. RVD x7 cycles  2. Melphalan 200mg/m2 & ASCT (7/1/21) - 3.51 x10^6 [id number]/kg      ASSESSMENT AND PLAN:         1. IgA Kappa Multiple Myeloma: Currently in a [place]  - S/p Vaw600 & ASCT (7/1/21)  - Post-Txp Maintenance: Revlimid maintenance  - Post-Txp F/u: Serum myeloma studies     Day + 11     2.  ID: Afebrile  - H/o recurrent shingles  - Cont Levaquin, Diflucan and Valtrex ppx, cont acyclovir at discharge for VZV positive titer. 3. Heme: pancytopenia - Leukopenia and anemia r/t recent chemotherapy  - Transfuse for Hgb < 7 and Platelets < 58K  - No transfusion today  - Cont Granix (started 8/5/73)     4. Metabolic: hypoNa, hypoK and hypoPhos   - KPhos 30 mmol IV x 1 (7/12/21)  - Cont IVF: NS + KCl 20 meq @ 50 mL/hr  - Replace Phos, K+ and Mg per PRN orders     5. Cardiac:   - H/o Chronic HFrEF & HTN  - Echo 5/21/21: global hypokinesis EF 40-45%, Mild-Mod MR, Mild TR  - Cleared for transplant by Dr. Mary Brown 6/3/21  Chronic HFrEF:  No evidence of acute exacerbation   - Monitor fluid status closely   HTN:  Well controlled off Norvasc  - S/p Norvasc 10 mg daily (stopped 7/9/21) - resume when BP elevates  - Cont to monitor      6. GI / Nutrition:     - H/o hepatitis C  Hepatitis C:  No acute issues   - S/p Harvoni (2016)  Nutrition:  Appetite and oral intake has declined, but she is still eating small amounts   - Cont low microbial diet   - Follow closely with dietary  Nausea: Intermittent nausea and vomiting, but still able to eat   - Schedule Compazine q6hrs (started 7/9/21)  - Cont Ativan 1 mg TID (increased 7/9/21)   - Cont Zofran as needed  - Cont PO Protonix daily (started 7/9/21)  Diarrhea: Increased diarrhea, multiple small loose stools   - C. Diff (7/3/21) - negative  - Cont Imodium as needed      7.  Psych:  - H/o Anxiety / Depression:   Anxiety / Depression:  Ongoing anxiety related transplant process  - Cont Ativan 1mg TID (increased 7/9/21 from bid)  - Psych following      8. Neoplasm-Related Chronic Pain: 2/2 myeloma   - Cont Oxycodone PRN - she has not been requiring pain meds      - DVT Prophylaxis: Platelets <42,223 cells/dL - prophylactic lovenox on hold and mechanical prophylaxis with bilateral SCDs while in bed in place.   Contraindications to pharmacologic prophylaxis: Thrombocytopenia  Contraindications to mechanical prophylaxis: None    - Disposition:  Once ANC >1 and recovered from toxicity    Sarah Tarango, APRN - CNP    Wyatt Part.  Colt Gomez DO, MS

## 2021-07-12 NOTE — PLAN OF CARE
Problem: Falls - Risk of:  Goal: Will remain free from falls  Description: Orthostatic vital signs obtained at start of shift - see flowsheet for details. Pt does not meet criteria for orthostasis. Pt is a Med fall risk. See [place] Siemens Fall Score and ABCDS Injury Risk assessments. - Screening for Orthostasis AND not a Baldwin Risk per BERMUDEZ/ABCDS: Pt bed is in low position, side rails up, call light and belongings are in reach. Fall risk light is on outside pts room. Pt encouraged to call for assistance as needed. Will continue with hourly rounds for PO intake, pain needs, toileting and repositioning as needed. 7/11/2021 2357 by Marisol Lala RN  Outcome: Ongoing  Note: Orthostatic vital signs obtained at start of shift - see flowsheet for details. Pt does not meet criteria for orthostasis. Pt is a Med fall risk. See [place] Siemens Fall Score and ABCDS Injury Risk assessments. - Screening for Orthostasis AND not a Baldwin Risk per BERMUDEZ/ABCDS: Pt bed is in low position, side rails up, call light and belongings are in reach. Fall risk light is on outside pts room. Pt encouraged to call for assistance as needed. Will continue with hourly rounds for PO intake, pain needs, toileting and repositioning as needed. Problem: Infection - Central Venous Catheter-Associated Bloodstream Infection:  Goal: Will show no infection signs and symptoms  Description: Will show no infection signs and symptoms  7/11/2021 2357 by Marisol Lala RN  Outcome: Ongoing  Note: CVC site remains free of signs/symptoms of infection. No drainage, edema, erythema, pain, or warmth noted at site. Dressing changes continue per protocol and on an as needed basis - see flowsheet. Compliant with [facility] Bath Protocol:  Performed CHG bath today per [facility] protocol utilizing CHG solution in the shower. CVC site cleansed with CHG wipe over dressing, skin surrounding dressing, and first 6\" of IV tubing. Pt tolerated well.   Continued to encourage daily CHG bathing per [facility] protocol. Problem: Venous Thromboembolism:  Goal: Will show no signs or symptoms of venous thromboembolism  Description: Will show no signs or symptoms of venous thromboembolism  Outcome: Ongoing     Problem: PROTECTIVE PRECAUTIONS  Goal: Patient will remain free of nosocomial Infections  Description: Pt educated on use of mask prior to leaving room. All staff and visitor following handwashing requirements this shift prior to entering room. Low microbial diet order in place and being followed. Linens changed today per protocol and pt reports using chlorhexidine wash per order. 7/11/2021 2357 by Rhonda Spicer RN  Outcome: Ongoing     Problem: Nutrition  Goal: Optimal nutrition therapy  Outcome: Ongoing     Problem: Bleeding:  Goal: Will show no signs and symptoms of excessive bleeding  Description: Will show no signs and symptoms of excessive bleeding  7/11/2021 2357 by Rhonda Spicer RN  Outcome: Ongoing     Problem: Nausea/Vomiting:  Goal: Absence of nausea/vomiting  Description: Absence of nausea/vomiting  7/11/2021 2357 by Rhonda Spicer RN  Note: Pt continues to complain of nausea with 1 episode of emesis this shift. Compazine and Zofran given. Will continue to monitor.

## 2021-07-13 LAB
ANION GAP SERPL CALCULATED.3IONS-SCNC: 9 MMOL/L (ref 3–16)
ATYPICAL LYMPHOCYTE RELATIVE PERCENT: 3 % (ref 0–6)
BANDED NEUTROPHILS RELATIVE PERCENT: 2 % (ref 0–7)
BASOPHILS ABSOLUTE: 0 K/UL (ref 0–0.2)
BASOPHILS RELATIVE PERCENT: 0 %
BUN BLDV-MCNC: 3 MG/DL (ref 7–20)
CALCIUM SERPL-MCNC: 8 MG/DL (ref 8.3–10.6)
CHLORIDE BLD-SCNC: 103 MMOL/L (ref 99–110)
CO2: 21 MMOL/L (ref 21–32)
CREAT SERPL-MCNC: <0.5 MG/DL (ref 0.6–1.2)
DOHLE BODIES: PRESENT
EOSINOPHILS ABSOLUTE: 0 K/UL (ref 0–0.6)
EOSINOPHILS RELATIVE PERCENT: 1 %
GFR AFRICAN AMERICAN: >60
GFR NON-AFRICAN AMERICAN: >60
GLUCOSE BLD-MCNC: 92 MG/DL (ref 70–99)
HCT VFR BLD CALC: 26.9 % (ref 36–48)
HEMOGLOBIN: 9.5 G/DL (ref 12–16)
LYMPHOCYTES ABSOLUTE: 0.3 K/UL (ref 1–5.1)
LYMPHOCYTES RELATIVE PERCENT: 10 %
MCH RBC QN AUTO: 31.3 PG (ref 26–34)
MCHC RBC AUTO-ENTMCNC: 35.5 G/DL (ref 31–36)
MCV RBC AUTO: 88.2 FL (ref 80–100)
MONOCYTES ABSOLUTE: 0.5 K/UL (ref 0–1.3)
MONOCYTES RELATIVE PERCENT: 22 %
NEUTROPHILS ABSOLUTE: 1.3 K/UL (ref 1.7–7.7)
NEUTROPHILS RELATIVE PERCENT: 62 %
OVALOCYTES: ABNORMAL
PDW BLD-RTO: 15.7 % (ref 12.4–15.4)
PHOSPHORUS: 2.1 MG/DL (ref 2.5–4.9)
PLATELET # BLD: 21 K/UL (ref 135–450)
PLATELET SLIDE REVIEW: ABNORMAL
PMV BLD AUTO: 8.9 FL (ref 5–10.5)
POLYCHROMASIA: ABNORMAL
POTASSIUM SERPL-SCNC: 3.9 MMOL/L (ref 3.5–5.1)
RBC # BLD: 3.05 M/UL (ref 4–5.2)
RBC # BLD: NORMAL 10*6/UL
SODIUM BLD-SCNC: 133 MMOL/L (ref 136–145)
TOXIC GRANULATION: PRESENT
URIC ACID, SERUM: 1.8 MG/DL (ref 2.6–6)
WBC # BLD: 2.1 K/UL (ref 4–11)

## 2021-07-13 PROCEDURE — 2580000003 HC RX 258: Performed by: NURSE PRACTITIONER

## 2021-07-13 PROCEDURE — 84550 ASSAY OF BLOOD/URIC ACID: CPT

## 2021-07-13 PROCEDURE — 6360000002 HC RX W HCPCS: Performed by: NURSE PRACTITIONER

## 2021-07-13 PROCEDURE — 80048 BASIC METABOLIC PNL TOTAL CA: CPT

## 2021-07-13 PROCEDURE — 6370000000 HC RX 637 (ALT 250 FOR IP): Performed by: INTERNAL MEDICINE

## 2021-07-13 PROCEDURE — 36592 COLLECT BLOOD FROM PICC: CPT

## 2021-07-13 PROCEDURE — 85025 COMPLETE CBC W/AUTO DIFF WBC: CPT

## 2021-07-13 PROCEDURE — 84100 ASSAY OF PHOSPHORUS: CPT

## 2021-07-13 PROCEDURE — 2060000000 HC ICU INTERMEDIATE R&B

## 2021-07-13 PROCEDURE — 6370000000 HC RX 637 (ALT 250 FOR IP): Performed by: NURSE PRACTITIONER

## 2021-07-13 PROCEDURE — 36415 COLL VENOUS BLD VENIPUNCTURE: CPT

## 2021-07-13 RX ORDER — DIPHENOXYLATE HYDROCHLORIDE AND ATROPINE SULFATE 2.5; .025 MG/1; MG/1
1 TABLET ORAL 4 TIMES DAILY PRN
Status: DISCONTINUED | OUTPATIENT
Start: 2021-07-13 | End: 2021-07-15 | Stop reason: HOSPADM

## 2021-07-13 RX ORDER — PROCHLORPERAZINE EDISYLATE 5 MG/ML
5 INJECTION INTRAMUSCULAR; INTRAVENOUS EVERY 6 HOURS PRN
Status: DISCONTINUED | OUTPATIENT
Start: 2021-07-13 | End: 2021-07-15 | Stop reason: HOSPADM

## 2021-07-13 RX ORDER — PROCHLORPERAZINE MALEATE 10 MG
10 TABLET ORAL EVERY 6 HOURS PRN
Status: DISCONTINUED | OUTPATIENT
Start: 2021-07-13 | End: 2021-07-15 | Stop reason: HOSPADM

## 2021-07-13 RX ADMIN — LORAZEPAM 1 MG: 1 TABLET ORAL at 08:51

## 2021-07-13 RX ADMIN — DIPHENOXYLATE HYDROCHLORIDE AND ATROPINE SULFATE 1 TABLET: 2.5; .025 TABLET ORAL at 17:33

## 2021-07-13 RX ADMIN — POTASSIUM CHLORIDE AND SODIUM CHLORIDE: 900; 150 INJECTION, SOLUTION INTRAVENOUS at 14:36

## 2021-07-13 RX ADMIN — PROCHLORPERAZINE MALEATE 10 MG: 10 TABLET ORAL at 11:58

## 2021-07-13 RX ADMIN — LOPERAMIDE HYDROCHLORIDE 2 MG: 2 CAPSULE ORAL at 21:03

## 2021-07-13 RX ADMIN — PANTOPRAZOLE SODIUM 40 MG: 40 TABLET, DELAYED RELEASE ORAL at 06:36

## 2021-07-13 RX ADMIN — VALACYCLOVIR HYDROCHLORIDE 500 MG: 500 TABLET, FILM COATED ORAL at 20:10

## 2021-07-13 RX ADMIN — SODIUM CHLORIDE 15 ML: 900 IRRIGANT IRRIGATION at 17:34

## 2021-07-13 RX ADMIN — VALACYCLOVIR HYDROCHLORIDE 500 MG: 500 TABLET, FILM COATED ORAL at 08:51

## 2021-07-13 RX ADMIN — LOPERAMIDE HYDROCHLORIDE 2 MG: 2 CAPSULE ORAL at 14:31

## 2021-07-13 RX ADMIN — LOPERAMIDE HYDROCHLORIDE 2 MG: 2 CAPSULE ORAL at 02:28

## 2021-07-13 RX ADMIN — TBO-FILGRASTIM 300 MCG: 300 INJECTION, SOLUTION SUBCUTANEOUS at 17:32

## 2021-07-13 RX ADMIN — ONDANSETRON 8 MG: 2 INJECTION INTRAMUSCULAR; INTRAVENOUS at 06:36

## 2021-07-13 RX ADMIN — SODIUM CHLORIDE 15 ML: 900 IRRIGANT IRRIGATION at 20:10

## 2021-07-13 RX ADMIN — DIPHENOXYLATE HYDROCHLORIDE AND ATROPINE SULFATE 1 TABLET: 2.5; .025 TABLET ORAL at 09:32

## 2021-07-13 RX ADMIN — LORAZEPAM 1 MG: 1 TABLET ORAL at 14:31

## 2021-07-13 RX ADMIN — ONDANSETRON HYDROCHLORIDE 8 MG: 8 TABLET, FILM COATED ORAL at 21:03

## 2021-07-13 RX ADMIN — Medication 10 ML: at 08:51

## 2021-07-13 RX ADMIN — LORAZEPAM 1 MG: 1 TABLET ORAL at 20:09

## 2021-07-13 RX ADMIN — Medication 10 ML: at 20:10

## 2021-07-13 ASSESSMENT — PAIN SCALES - GENERAL
PAINLEVEL_OUTOF10: 0

## 2021-07-13 NOTE — PLAN OF CARE
Problem: Falls - Risk of:  Goal: Will remain free from falls  Description:     Outcome: Ongoing  Note: Orthostatic vital signs obtained at start of shift - see flowsheet for details. Pt does not meet criteria for orthostasis. Pt is a Med fall risk. See Lawayne Fraction Fall Score and ABCDS Injury Risk assessments. - Screening for Orthostasis AND not a [place] Risk per BERMUDEZ/ABCDS: Pt bed is in low position, side rails up, call light and belongings are in reach. Fall risk light is on outside pts room. Pt encouraged to call for assistance as needed. Will continue with hourly rounds for PO intake, pain needs, toileting and repositioning as needed. Problem: Infection - Central Venous Catheter-Associated Bloodstream Infection:  Goal: Will show no infection signs and symptoms  Description: Will show no infection signs and symptoms  Outcome: Ongoing  Note: CVC site remains free of signs/symptoms of infection. No drainage, edema, erythema, pain, or warmth noted at site. Dressing changes continue per protocol and on an as needed basis - see flowsheet. Compliant with [facility] Bath Protocol:  Performed CHG bath today per [facility] protocol utilizing CHG solution in the shower. CVC site cleansed with CHG wipe over dressing, skin surrounding dressing, and first 6\" of IV tubing. Pt tolerated well. Continued to encourage daily CHG bathing per [facility] protocol. Problem: Venous Thromboembolism:  Goal: Will show no signs or symptoms of venous thromboembolism  Description: Will show no signs or symptoms of venous thromboembolism  Outcome: Ongoing  Note: Refusing DVT Prevention: Pt is at risk for DVT d/t decreased mobility and cancer treatment. Pt educated on importance of activity. Pt has orders for SCDs while in bed, however pt currently refusing treatment. Reviewed risks of DVT & PE development while inpatient. Provider aware of patient's refusal and re-education of importance of prophylaxis. No new orders at this time.   Will continue to re-instruct patient and intervene as appropriate. Problem: PROTECTIVE PRECAUTIONS  Goal: Patient will remain free of nosocomial Infections  Description:   Outcome: Ongoing  Note: Pt remains in a private room. Pt is compliant with handwashing, low-microbial diet, and bathing per [facility] protocol. Pt remains afebrile at this time with stable vital signs. Will continue to monitor. Problem: Bleeding:  Goal: Will show no signs and symptoms of excessive bleeding  Description: Will show no signs and symptoms of excessive bleeding  Outcome: Ongoing  Note: Patient's hemoglobin this AM:   Recent Labs     07/13/21  0230   HGB 9.5*     Patient's platelet count this AM:   Recent Labs     07/13/21  0230   PLT 21*    Thrombocytopenia Precautions in place. Patient showing no signs or symptoms of active bleeding. Transfusion not indicated at this time. Patient verbalizes understanding of all instructions. Will continue to assess and implement POC. Call light within reach and hourly rounding in place. Problem: Nausea/Vomiting:  Goal: Absence of nausea/vomiting  Description: Absence of nausea/vomiting  Outcome: Ongoing  Note: Pt continues with nausea and emesis x1. Scheduled antiemetics administered with some relief noted. Will continue to monitor.

## 2021-07-13 NOTE — DISCHARGE SUMMARY
[facility] Discharge Summary             Attending Physician: Hunter Jett MD    Referring MD: Bernie Araujo MD  [address] [place] [address],  [address] [place] [address] [address]    Name: Yoav Stewart :  1950  MRN:  [id number]    Admission: 2021   Discharge:   7/15/21    Date: 7/15/2021    Diagnosis on admit: IgA Kappa Multiple Myeloma    Procedures: Routine chest x-ray, laboratories, EKG, IV fluid hydration, Chemotherapy, Growth factor    Medications:    Mateo Arley   [place] Medication Instructions [id number]    Printed on:07/15/21 0819   Medication Information                      acetaminophen (TYLENOL) 500 MG tablet  Take 2 tablets by mouth every 6 hours as needed for Pain             acyclovir (ZOVIRAX) 800 MG tablet  Take 1 tablet by mouth 2 times daily             Cholecalciferol (VITAMIN D) 2000 UNITS CAPS capsule  Take by mouth daily Indications: OTC              loperamide (IMODIUM) 2 MG capsule  Take 1 capsule by mouth as needed for Diarrhea             LORazepam (ATIVAN) 1 MG tablet  Take 1 tablet by mouth 2 times daily for 30 days. Multiple Vitamins-Minerals (ICAPS AREDS 2 PO)  Take by mouth             ondansetron (ZOFRAN) 8 MG tablet  Take 1 tablet by mouth every 8 hours as needed for Nausea or Vomiting             oxyCODONE 5 MG capsule  Take 2 capsules by mouth every 4 hours as needed for Pain for up to 7 days. prochlorperazine (COMPAZINE) 10 MG tablet  Take 1 tablet by mouth every 6 hours as needed (nausea)                 Reason for Admission: Melphalan 200mg/m2 & ASCT    Hospital Course: This is a 71 yo female w/ IgA Kappa Multiple Myeloma (Dx 2020). Her PMH is also significant for hepatitis C (, s/p tx with Cherry ), recurrent shingles (21), anxiety/depression, & Chronic HFrEF (LVEF 40-45%). Her myeloma was initially diagnosed after she was found to have pancytopenia in 2020.  SPEP was performed which identified 3 separate M-spikes all c/w IgA Kappa. She initially deferred further w/u but eventually did have BM Bx/Asp (12/10/20) which confirmed multiple myeloma. She subsequently began treatment with RVD and received 7 cycles of therapy and achieved a HI. She was then admitted (6/30/21) for high dose Melphalan (200 mg/m^2) and autologous stem cell transplant. On admission, she was w/out complaints except for being anxious. She tolerated chemotherapy well and then received her stem cell infusion of 3.51 x10^6 [id number]/kg on 7/1/21 w/out incidence. Her post transplant course was fairly uncomplicated but she did experience nausea, vomiting, diarrhea and decreased oral intake. Her nausea improved w/ scheduled Compazine and by increasing her scheduled Ativan dose to 1 mg TID. She developed C. Diff negative (7/3/21) diarrhea which persisted despite taking Imodium. She did not have significant abdominal cramping, but she did experience urgency. As her WBC recovered on Granix her diarrhea improved. She did not experience neutropenic fever or mucositis while hospitalized. She is now eating, drinking and ambulating adequately for discharge home today. She will [facility] to [facility] for follow up and labs.                Physical Exam:     Vital Signs:  /66   Pulse 90   Temp 97.7 °F (36.5 °C) (Oral)   Resp 18   Ht 5' 5\" (1.651 m)   Wt 162 lb 14.7 oz (73.9 kg)   SpO2 99%   BMI 27.11 kg/m²     Weight:    Wt Readings from Last 3 Encounters:   07/15/21 162 lb 14.7 oz (73.9 kg)   06/26/21 160 lb (72.6 kg)   06/21/21 160 lb (72.6 kg)       KPS: 70% Cares for self; unable to carry on normal activity or to do active work    PE performed by Dr. Evangelist Choi: Awake, alert and oriented.   HEENT: normocephalic, alopecia, PERRL, no scleral erythema or icterus, Oral mucosa moist and intact, throat clear  NECK: supple  BACK: Straight   SKIN: warm dry and intact without lesions rashes or masses  CHEST: CTA bilaterally without use of accessory muscles  CV: Normal S1 S2, RRR, no MRG  ABD: NT ND normoactive BS, no palpable masses or hepatosplenomegaly  EXTREMITIES: without edema, denies calf tenderness  NEURO: CN II - XII grossly intact  CATHETER: Left SC Trifusion (6/21/21, Blake) - CDI    Discharge Laboratory Data:  CBC:   Recent Labs     07/13/21 0230 07/14/21 0350 07/15/21  0343   WBC 2.1* 6.8 10.4   HGB 9.5* 9.3* 9.4*   HCT 26.9* 26.0* 25.9*   MCV 88.2 88.1 88.9   PLT 21* 40* 64*     BMP/Mag:  Recent Labs     07/13/21  0230 07/14/21  0350 07/15/21  0343   * 139 137   K 3.9 3.8 3.8    107 105   CO2 21 21 23   PHOS 2.1* 2.0* 3.0   BUN 3* 3* 3*   CREATININE <0.5* <0.5* <0.5*   MG  --   --  1.90     LIVP:   Recent Labs     07/14/21 0350   AST 9*   ALT 7*   BILIDIR <0.2   BILITOT 0.4   ALKPHOS 69     Coags: No results for input(s): PROTIME, INR, APTT in the last 72 hours. Uric Acid   Recent Labs     07/13/21  0230 07/14/21  0350 07/15/21  0343   LABURIC 1.8* 2.4* 2.7       PROBLEM LIST:            1. IgA Kappa Multiple Myeloma  2. Hepatitis C (2002, s/p tx with Harvoni 2016)  3. Recurrent shingles (5/20/21)  4. Anxiety/depression  5. Chronic HFrEF (LVEF 40-45%)     Post Transplant Complications:  1. Diarrhea   2. Nausea / Vomiting       TREATMENT:            1. RVD x7 cycles  2. Melphalan 200mg/m2 & ASCT (7/1/21) - 3.51 x10^6 [id number]/kg      ASSESSMENT AND PLAN:          1. IgA Kappa Multiple Myeloma: Currently in a [place]  - S/p [id number] & ASCT (7/1/21)  - Post-Txp Maintenance: Revlimid maintenance  - Post-Txp F/u: Serum myeloma studies     Day + 14     2. ID: Afebrile  - H/o recurrent shingles  - Cont acyclovir at discharge for VZV positive titer.       3. Heme: Thrombocytopenia and anemia r/t recent chemotherapy  - Transfuse for Hgb < 7 and Platelets < 32C  - No transfusion today  - S/p Granix (last dose 2/56/54)     4. Metabolic: stable renal fxn and e-lytes   - Encourage PO     5.  Cardiac:   - H/o Chronic HFrEF & HTN  - Echo 5/21/21:

## 2021-07-14 LAB
ALBUMIN SERPL-MCNC: 3.2 G/DL (ref 3.4–5)
ALP BLD-CCNC: 69 U/L (ref 40–129)
ALT SERPL-CCNC: 7 U/L (ref 10–40)
ANION GAP SERPL CALCULATED.3IONS-SCNC: 11 MMOL/L (ref 3–16)
ANISOCYTOSIS: ABNORMAL
AST SERPL-CCNC: 9 U/L (ref 15–37)
BANDED NEUTROPHILS RELATIVE PERCENT: 4 % (ref 0–7)
BASOPHILS ABSOLUTE: 0 K/UL (ref 0–0.2)
BASOPHILS RELATIVE PERCENT: 0 %
BILIRUB SERPL-MCNC: 0.4 MG/DL (ref 0–1)
BILIRUBIN DIRECT: <0.2 MG/DL (ref 0–0.3)
BILIRUBIN, INDIRECT: ABNORMAL MG/DL (ref 0–1)
BUN BLDV-MCNC: 3 MG/DL (ref 7–20)
CALCIUM SERPL-MCNC: 8.1 MG/DL (ref 8.3–10.6)
CHLORIDE BLD-SCNC: 107 MMOL/L (ref 99–110)
CO2: 21 MMOL/L (ref 21–32)
CREAT SERPL-MCNC: <0.5 MG/DL (ref 0.6–1.2)
EOSINOPHILS ABSOLUTE: 0 K/UL (ref 0–0.6)
EOSINOPHILS RELATIVE PERCENT: 0 %
GFR AFRICAN AMERICAN: >60
GFR NON-AFRICAN AMERICAN: >60
GLUCOSE BLD-MCNC: 89 MG/DL (ref 70–99)
HCT VFR BLD CALC: 26 % (ref 36–48)
HEMOGLOBIN: 9.3 G/DL (ref 12–16)
HYPOCHROMIA: ABNORMAL
LACTATE DEHYDROGENASE: 162 U/L (ref 100–190)
LYMPHOCYTES ABSOLUTE: 0.4 K/UL (ref 1–5.1)
LYMPHOCYTES RELATIVE PERCENT: 6 %
MCH RBC QN AUTO: 31.5 PG (ref 26–34)
MCHC RBC AUTO-ENTMCNC: 35.8 G/DL (ref 31–36)
MCV RBC AUTO: 88.1 FL (ref 80–100)
MONOCYTES ABSOLUTE: 2.3 K/UL (ref 0–1.3)
MONOCYTES RELATIVE PERCENT: 34 %
NEUTROPHILS ABSOLUTE: 4.1 K/UL (ref 1.7–7.7)
NEUTROPHILS RELATIVE PERCENT: 56 %
OVALOCYTES: ABNORMAL
PDW BLD-RTO: 15.9 % (ref 12.4–15.4)
PHOSPHORUS: 2 MG/DL (ref 2.5–4.9)
PLATELET # BLD: 40 K/UL (ref 135–450)
PLATELET SLIDE REVIEW: ABNORMAL
PMV BLD AUTO: 9.5 FL (ref 5–10.5)
POLYCHROMASIA: ABNORMAL
POTASSIUM SERPL-SCNC: 3.8 MMOL/L (ref 3.5–5.1)
RBC # BLD: 2.95 M/UL (ref 4–5.2)
SLIDE REVIEW: ABNORMAL
SODIUM BLD-SCNC: 139 MMOL/L (ref 136–145)
TOTAL PROTEIN: 5.5 G/DL (ref 6.4–8.2)
TOXIC GRANULATION: PRESENT
URIC ACID, SERUM: 2.4 MG/DL (ref 2.6–6)
WBC # BLD: 6.8 K/UL (ref 4–11)

## 2021-07-14 PROCEDURE — 84550 ASSAY OF BLOOD/URIC ACID: CPT

## 2021-07-14 PROCEDURE — 83615 LACTATE (LD) (LDH) ENZYME: CPT

## 2021-07-14 PROCEDURE — 6370000000 HC RX 637 (ALT 250 FOR IP): Performed by: INTERNAL MEDICINE

## 2021-07-14 PROCEDURE — 6370000000 HC RX 637 (ALT 250 FOR IP): Performed by: NURSE PRACTITIONER

## 2021-07-14 PROCEDURE — 6360000002 HC RX W HCPCS: Performed by: NURSE PRACTITIONER

## 2021-07-14 PROCEDURE — 85025 COMPLETE CBC W/AUTO DIFF WBC: CPT

## 2021-07-14 PROCEDURE — 36592 COLLECT BLOOD FROM PICC: CPT

## 2021-07-14 PROCEDURE — 2060000000 HC ICU INTERMEDIATE R&B

## 2021-07-14 PROCEDURE — 80048 BASIC METABOLIC PNL TOTAL CA: CPT

## 2021-07-14 PROCEDURE — 80076 HEPATIC FUNCTION PANEL: CPT

## 2021-07-14 PROCEDURE — 84100 ASSAY OF PHOSPHORUS: CPT

## 2021-07-14 PROCEDURE — 2580000003 HC RX 258: Performed by: NURSE PRACTITIONER

## 2021-07-14 RX ORDER — LORAZEPAM 1 MG/1
1 TABLET ORAL 2 TIMES DAILY
Status: DISCONTINUED | OUTPATIENT
Start: 2021-07-14 | End: 2021-07-15 | Stop reason: HOSPADM

## 2021-07-14 RX ADMIN — OXYCODONE 10 MG: 5 TABLET ORAL at 22:51

## 2021-07-14 RX ADMIN — VALACYCLOVIR HYDROCHLORIDE 500 MG: 500 TABLET, FILM COATED ORAL at 08:29

## 2021-07-14 RX ADMIN — PANTOPRAZOLE SODIUM 40 MG: 40 TABLET, DELAYED RELEASE ORAL at 06:07

## 2021-07-14 RX ADMIN — LORAZEPAM 1 MG: 1 TABLET ORAL at 08:29

## 2021-07-14 RX ADMIN — DIPHENOXYLATE HYDROCHLORIDE AND ATROPINE SULFATE 1 TABLET: 2.5; .025 TABLET ORAL at 01:55

## 2021-07-14 RX ADMIN — LOPERAMIDE HYDROCHLORIDE 2 MG: 2 CAPSULE ORAL at 14:30

## 2021-07-14 RX ADMIN — VALACYCLOVIR HYDROCHLORIDE 500 MG: 500 TABLET, FILM COATED ORAL at 20:26

## 2021-07-14 RX ADMIN — DIPHENOXYLATE HYDROCHLORIDE AND ATROPINE SULFATE 1 TABLET: 2.5; .025 TABLET ORAL at 09:07

## 2021-07-14 RX ADMIN — LORAZEPAM 1 MG: 1 TABLET ORAL at 20:26

## 2021-07-14 RX ADMIN — ONDANSETRON HYDROCHLORIDE 8 MG: 8 TABLET, FILM COATED ORAL at 16:56

## 2021-07-14 RX ADMIN — LOPERAMIDE HYDROCHLORIDE 2 MG: 2 CAPSULE ORAL at 06:53

## 2021-07-14 RX ADMIN — PROCHLORPERAZINE MALEATE 10 MG: 10 TABLET ORAL at 20:26

## 2021-07-14 RX ADMIN — Medication 10 ML: at 20:26

## 2021-07-14 RX ADMIN — POTASSIUM CHLORIDE AND SODIUM CHLORIDE: 900; 150 INJECTION, SOLUTION INTRAVENOUS at 06:17

## 2021-07-14 ASSESSMENT — PAIN DESCRIPTION - ONSET: ONSET: ON-GOING

## 2021-07-14 ASSESSMENT — PAIN SCALES - GENERAL
PAINLEVEL_OUTOF10: 4
PAINLEVEL_OUTOF10: 0
PAINLEVEL_OUTOF10: 6
PAINLEVEL_OUTOF10: 0

## 2021-07-14 ASSESSMENT — PAIN DESCRIPTION - ORIENTATION: ORIENTATION: RIGHT;LEFT

## 2021-07-14 ASSESSMENT — PAIN - FUNCTIONAL ASSESSMENT: PAIN_FUNCTIONAL_ASSESSMENT: ACTIVITIES ARE NOT PREVENTED

## 2021-07-14 ASSESSMENT — PAIN DESCRIPTION - PAIN TYPE: TYPE: ACUTE PAIN

## 2021-07-14 ASSESSMENT — PAIN DESCRIPTION - FREQUENCY: FREQUENCY: INTERMITTENT

## 2021-07-14 ASSESSMENT — PAIN DESCRIPTION - DESCRIPTORS: DESCRIPTORS: ACHING;SORE

## 2021-07-14 ASSESSMENT — PAIN DESCRIPTION - PROGRESSION: CLINICAL_PROGRESSION: GRADUALLY WORSENING

## 2021-07-14 ASSESSMENT — PAIN DESCRIPTION - LOCATION: LOCATION: LEG

## 2021-07-14 NOTE — PROGRESS NOTES
Behavioral Health Established patient Patient Assessment/Intervention  7/14/2021  Zion Cee Psy.D., Clinical Psychologist    Assessment/Plan: Earl Kidd is a 72y.o.  year-old female evaluated for routine behavioral health assessment. Main concerns at this time include managing anxiety, tearfulness, and stress and interventions will target development of coping mechanisms for anxiety and emotions. Behavioral health recommendations for treatment team include none at this time. Interventions this admission: Assessment of current needs, Deep breathing, Acceptance skills, Mindfulness (present moment awareness), discussed alternative coping skills for managing anxiety other than benzodiazepines, leaves on a stream exercise, anchoring exercise    Subjective:     Patient reports that she has been tearful today and is not sure why. States that things are going well for her and they are planning for discharge tomorrow. However, she has had some stress related to her dog today. Is trying to get him in with a vet. Developed a plan with patient to address tearfulness today. She has a list of songs that she is going to listen to and go for a walk.     Objective:  Mental status: Appearance: good hygiene , Attitude: cooperative and friendly, Consciousness: alert, Orientation: oriented to person, place, time, general circumstance, Memory: recent and remote memory intact, Attention/Concentration: intact during session, Psychomotor Activity:normal, Eye Contact: normal, Speech: normal rate and volume, well-articulated, Mood: sad, Affect: tearful, Perception: within normal limits, Thought Content: within normal limits, Thought Process: logical, coherent and goal-directed, Insight: good, Judgment: intact, Ability to understand instructions: Yes, Ability to respond meaningfully: Yes    -----------------------------------------------------------------------------------------------------------------------------------    At initial encounter with patient, discussed role of psychologist on the unit including addressing emotional and behavioral needs while [facility] Also discussed with patient that a component of provider's role is completing the pre-surgical psychological evaluations for bone marrow transplant and CAR-T. Informed patient of psychologist's various roles in order for the patient to be fully informed when consenting to behavioral health treatment. Patient was agreeable to engaging in care with psychologist. Discussed limits of confidentiality including that psychologist coordinates with patient's treatment team and other limits of confidentiality were discussed as needed.

## 2021-07-14 NOTE — PROGRESS NOTES
Comprehensive Nutrition Assessment    Type and Reason for Visit:  Reassess    Nutrition Recommendations/Plan:   1. PO Diet: Continue current diet low microbial; encourage dietary protein at each meal/snack  2. ONS: Continue with Standard/ High calorie QD. Pt may request more if she wishes. 3. Record and encourage PO intake at all meals through admission. Nutrition Assessment:  Nutritionally stable day +13 post BMT. No concern noted. RD recommend continuing with current plan and will continue to monitor per [facility]. MALNUTRITION ASSESSMENT  Context of Malnutrition: Acute Illness (on chronic )   Malnutrition Status: No malnutrition    Estimated Daily Nutrient Needs:  Energy (kcal):  3065-7997; Weight Used for Energy Requirements: Current (74.4 kg )     Protein (g):  74-86g (1.3-1.5); Weight Used for Protein Requirements:  Ideal (57 kg )        Fluid (ml/day):  6792-5779; Method Used for Fluid Requirements:  1 ml/kcal      Nutrition Related Findings:  LBM 7/14    Meds: reviewed. On scheduled anti emetics. Labs: reviewed; WBC 6.8    Wounds:  None       Current Nutrition Therapies:    ADULT DIET;  Regular; Low Microbial  Adult Oral Nutrition Supplement; Standard High Calorie/High Protein Oral Supplement   PO Intake: 1-25% and 26-50%  PO Supplement Intake: %  IVF: None    ANTHROPOMETRICS  Current Height: 5' 5\" (165.1 cm)  Current Weight: 164 lb 14.5 oz (74.8 kg)    Admission weight: 156 lb 4.8 oz (70.9 kg)  Ideal Body Weight (lbs) (Calculated): 125 lbs (Ideal Body Weight (Kg) (Calculated): 57 kg)  Usual Bodyweight ~165 lb   Weight Changes no significant;        BMI BMI (Calculated): 27.5    Wt Readings from Last 50 Encounters:   07/01/21 164 lb 1.6 oz (74.4 kg)   06/26/21 160 lb (72.6 kg)   06/21/21 160 lb (72.6 kg)   05/25/21 159 lb (72.1 kg)   04/13/21 166 lb 6.4 oz (75.5 kg)   01/11/21 163 lb (73.9 kg)   11/25/20 163 lb 8 oz (74.2 kg)   10/20/20 165 lb 12.8 oz (75.2 kg)   09/25/20 166 lb (75.3 kg) 07/11/20 170 lb 13.7 oz (77.5 kg)   07/01/20 170 lb (77.1 kg)   06/16/20 171 lb (77.6 kg)   01/22/20 179 lb 0.2 oz (81.2 kg)   01/15/20 179 lb (81.2 kg)   01/06/20 177 lb (80.3 kg)   10/03/19 175 lb (79.4 kg)       Nutrition Diagnosis:   · Increased nutrient needs related to catabolic illness as evidenced by  (chemotx then bmt )    Nutrition Interventions:   Food and/or Nutrient Delivery:  Continue Current Diet, Continue Oral Nutrition Supplement  Nutrition Education/Counseling:  Education completed   Coordination of Nutrition Care:  Continue to monitor while inpatient    Goals:  pt will maintain adequate po nutrition by consuming > 75% of meals and snacks offered through admission       Nutrition Monitoring and Evaluation:   Food/Nutrient Intake Outcomes:  Food and Nutrient Intake  Physical Signs/Symptoms Outcomes:  Biochemical Data, Nausea or Vomiting     Moses Montoya RD, LD:    See: Gris Smith [phone]  Office:  [phone]

## 2021-07-14 NOTE — CARE COORDINATION
Case Management Assessment           Initial Evaluation                Date / Time of Evaluation: 7/14/2021 11:47 AM                 Assessment Completed by: HILLARY Rios    Patient Name: Doyle Brody     YOB: 1950  Diagnosis: Multiple myeloma not having achieved remission [C90.00]  Multiple myeloma in remission [facility]) [C90.01]     Date / Time: 6/30/2021  9:27 AM    Patient Admission Status: Inpatient    If patient is discharged prior to next notation, then this note serves as note for discharge by case management.      Current PCP: Pilar Calix MD  Clinic Patient: No    Chart Reviewed: Yes  Patient/ Family Interviewed: Yes    Initial assessment completed at bedside with: patient    Hospitalization in the last 30 days: Yes    Emergency Contacts:  Extended Emergency Contact Information  Primary Emergency Contact: Yosef Leyva  Address: [address] [place] [address], [address] [place] [address] Phone: [phone]  Mobile Phone: [phone]  Relation: Spouse  Secondary Emergency Contact: Para [place] [address] Phone: [phone]  Relation: Brother/Sister    Advance Directives:   Code Status: [address]: Yes  Agent: Papo Dunbar, spouse  Contact Number: [phone]    Copy present: No     In paper Chart: No    Scanned into EMR No    Financial  Payor: MEDICARE / Plan: MEDICARE PART A AND B / Product Type: *No Product type* /     Pre-cert required for SNF: Yes    [address]  Phone: [phone] Fax: [address] [address]  Phone: [phone] Fax: [phone]      Potential assistance Purchasing Medications: [facility] Medications: No  Does Patient want to participate in local refill/ meds to beds program?: No    Meds To Beds General Rules:  1. Can ONLY be done Monday- Friday between 8:30am-5pm  2. Prescription(s) must be in pharmacy by 3pm to be filled same day  3. Copy of patient's insurance/ prescription drug card and patient face sheet must be sent along with the prescription(s)  4. Cost of Rx cannot be added to hospital bill. If financial assistance is needed, please contact unit  or ;  or  CANNOT provide pharmacy voucher for patients co-pays  5.  Patients can then  the prescription on their way out of the hospital at discharge, or pharmacy can deliver to the bedside if staff is available. (payment due at time of pick-up or delivery - cash, check, or card accepted)     Able to afford home medications/ co-pay costs: Yes    ADLS  Support Systems: Spouse/Significant Other    PT AM-PAC:   /24  OT AM-PAC:   /24    [place]: two story home  Steps: 3 steps to enter    Plans to RETURN to current housing: Yes  Barriers to RETURNING to current housing: medical clearance    Home Care Information  Currently ACTIVE with [address]: No  Home Care Agency: Not Applicable    Currently ACTIVE with [place] on Aging: No  Passport/ Waiver: No  Passport/ Waiver Services: Not Applicable      Durable Medical Equipment  DME Provider: none  Equipment: none    Home Oxygen and [address] [place] prior to admission: No  [address]: Not Applicable  Other Respiratory Equipment: none    Informed of need to bring portable home O2 tank on day of DISCHARGE for nursing to connect prior to leaving: No  Verbalized agreement/Understanding: No  Person to bring portable tank at discharge: none    Dialysis  Active with HD/PD prior to admission: No  Nephrologist: none    HD Center:  Not Applicable    DISCHARGE PLAN:  Disposition: Home- No Services Needed    Transportation PLAN for discharge: family     Factors facilitating achievement of predicted outcomes: Family support, Caregiver support, Motivated and Cooperative    Barriers to discharge: medical clearance    Additional Case Management Notes: Patient is from home with her . She is independent at baseline. She informs that she has a stair lift in her home from when her mother lived with her and her . She also has grab bars in the shower. She reports no needs at this time. Will plan to discharge on 7/15. The Plan for Transition of Care is related to the following treatment goals of Multiple myeloma not having achieved remission [C90.00]  Multiple myeloma in remission ([facility] [address] [C90.01]    The Patient and/or patient representative Blu Wayne and her family were provided with a choice of provider and agrees with the discharge plan Yes    Freedom of choice list was provided with basic dialogue that supports the patient's individualized plan of care/goals and shares the quality data associated with the providers.  Yes    Care Transition patient: Yes    Jovani Wilson [facility]  Case Management Department  Ph: [phone]   Fax: [phone]

## 2021-07-14 NOTE — PLAN OF CARE
Problem: Falls - Risk of:  Goal: Will remain free from falls  Description:     Outcome: Ongoing  Note: Orthostatic vital signs obtained at start of shift - see flowsheet for details. Pt does not meet criteria for orthostasis. Pt is a Med fall risk. See Mearl Ta Fall Score and ABCDS Injury Risk assessments. - Screening for Orthostasis AND not a [place] Risk per BERMUDEZ/ABCDS: Pt bed is in low position, side rails up, call light and belongings are in reach. Fall risk light is on outside pts room. Pt encouraged to call for assistance as needed. Will continue with hourly rounds for PO intake, pain needs, toileting and repositioning as needed. Problem: Infection - Central Venous Catheter-Associated Bloodstream Infection:  Goal: Will show no infection signs and symptoms  Description: Will show no infection signs and symptoms  Outcome: Ongoing  Note: CVC site remains free of signs/symptoms of infection. No drainage, edema, erythema, pain, or warmth noted at site. Dressing changes continue per protocol and on an as needed basis - see flowsheet. Compliant with [facility] Bath Protocol:  Performed CHG bath today per [facility] protocol utilizing CHG solution in the shower. CVC site cleansed with CHG wipe over dressing, skin surrounding dressing, and first 6\" of IV tubing. Pt tolerated well. Continued to encourage daily CHG bathing per [facility] protocol. Problem: Venous Thromboembolism:  Goal: Will show no signs or symptoms of venous thromboembolism  Description: Will show no signs or symptoms of venous thromboembolism  Outcome: Ongoing  Note: Refusing DVT Prevention: Pt is at risk for DVT d/t decreased mobility and cancer treatment. Pt educated on importance of activity. Pt has orders for SCDs while in bed, however pt currently refusing treatment. Reviewed risks of DVT & PE development while inpatient. Provider aware of patient's refusal and re-education of importance of prophylaxis. No new orders at this time.   Will continue to re-instruct patient and intervene as appropriate. Problem: PROTECTIVE PRECAUTIONS  Goal: Patient will remain free of nosocomial Infections  Description:   Outcome: Ongoing  Note: Pt remains in a private room. Pt is compliant with handwashing, low-microbial diet, and bathing per [facility] protocol. Pt remains afebrile at this time with stable vital signs. Will continue to monitor. Problem: Bleeding:  Goal: Will show no signs and symptoms of excessive bleeding  Description: Will show no signs and symptoms of excessive bleeding  Outcome: Ongoing  Note: Patient's hemoglobin this AM:   Recent Labs     07/13/21  0230   HGB 9.5*     Patient's platelet count this AM:   Recent Labs     07/13/21  0230   PLT 21*    Thrombocytopenia Precautions in place. Patient showing no signs or symptoms of active bleeding. Transfusion not indicated at this time. Patient verbalizes understanding of all instructions. Will continue to assess and implement POC. Call light within reach and hourly rounding in place. Problem: Nausea/Vomiting:  Goal: Absence of nausea/vomiting  Description: Absence of nausea/vomiting  Outcome: Ongoing  Note: No complaints of nausea noted this shift. Will continue to monitor.

## 2021-07-14 NOTE — PROGRESS NOTES
*DISCHARGE INSTRUCTIONS AFTER LUNG [address] [facility]  [address] [facility] [address]  [phone]    Leave the chest tube dressing in place for 48 hours (9/18/20), then you can remove it and shower. SURGICAL INCISION:    You will have four small incisions. These incisions are sealed with sutures that dissolve. The lower incision is from the chest tube. This lower incision will seal itself in 5 to 7 days. Until then you may have drainage from that incision. The drainage will be thin yellowish or red in color and may drain for 5 to 7 days. This is normal and expected. INCISION CARE:    Wash incisions daily with soap. Pat dry. Showers only, no tub baths. If you have drainage, you can place a bandage over the area, otherwise keep open to air. You may experience drainage of clear-strawberry colored fluid from the chest tube site. This is to be expected and will stop in 24-48 hours. PAIN:    What you will experience:     Tenderness and soreness around incisions   Burning and/or numbness   Soreness around front/back of chest    For relief of discomfort:     Take the pain medicine you were given at discharge   Aleve one or two tablets every 12 hrs (with food) along with pain medicine (this can be purchased over the counter at your local pharmacy/drug store). Advil may be substituted. Start this after you finish taking Toradol if prescribed.  Heating pad 10 minutes at a time to the upper incision area as often as you wish.  For the Ladies: Spandex or elastic sports bra will give you the support you need without pressure on the incision. Most will find this to be a great comfort. COUGHING:    Coughing is helpful after lung surgery. Place a pillow over your incision and apply pressure when coughing to reduce pain. ACTIVITY:    DO: 1. Walk daily outside if weather permits, at a mall if not.  Increase your distance [address] [facility]  Autologous Progress Note    2021    Antonina Chi    :  1950    MRN:  [id number]    Referring MD: Shoaib Camarena MD  [address] [facility],  [facility] Prasad    Subjective:  Diarrhea better. LE edema mild. ECOG PS:  (1) Restricted in physically strenuous activity, ambulatory and able to do work of light nature    KPS: 80% Normal activity with effort; some signs or symptoms of disease    Isolation:  None     Medications    Scheduled Meds:   pantoprazole  40 mg Oral QAM AC    LORazepam  1 mg Oral TID    tbo-filgrastim  300 mcg Subcutaneous QPM    Saline Mouthwash  15 mL Swish & Spit 4x Daily AC & HS    sodium chloride flush  5-40 mL Intravenous 2 times per day    valACYclovir  500 mg Oral BID     Continuous Infusions:   0.9% NaCl with KCl 20 mEq 50 mL/hr at 21 0617    sodium chloride      sodium chloride      sodium chloride       PRN Meds:.diphenoxylate-atropine, prochlorperazine **OR** prochlorperazine, potassium phosphate IVPB, [COMPLETED] loperamide **FOLLOWED BY** loperamide, sodium chloride, ondansetron, ondansetron, sodium chloride, sodium chloride, alteplase, magnesium hydroxide, magnesium sulfate, potassium chloride, Saline Mouthwash, sodium chloride flush, oxyCODONE, cloNIDine    ROS:  As noted above, otherwise remainder of 10-point ROS negative    Physical Exam:     I&O:      Intake/Output Summary (Last 24 hours) at 2021 1003  Last data filed at 2021 0659  Gross per 24 hour   Intake 2733 ml   Output 1900 ml   Net 833 ml       Vital Signs:  BP (!) 107/95   Pulse 89   Temp 97.9 °F (36.6 °C) (Oral)   Resp 20   Ht 5' 5\" (1.651 m)   Wt 164 lb 14.5 oz (74.8 kg)   SpO2 100%   BMI 27.44 kg/m²     Weight:    Wt Readings from Last 3 Encounters:   21 164 lb 14.5 oz (74.8 kg)   21 160 lb (72.6 kg)   21 160 lb (72.6 kg)       General: Awake, alert and oriented.   HEENT: normocephalic, alopecia, PERRL, no scleral erythema or each day. Exercise has many benefits. It promotes healing, expands your lungs,                helps you cough, relaxes your body, tones muscles, lowers blood pressure and               improves your appetite. After you exercise expect to feel tired and probably short                of breath. Plan to take a nap 1-2 times a day to regain your strength . 2. Climb stairs           3. Ride in a car           4. Perform breathing exercises (incentive spirometer)    DO NOT:               1. Lift heavy objects (8-10 pounds)  2. Drive a car/truck until after your post-op visit  3. Do heavy yard or housework     APPETITE:    It is usual to have a decreased appetite after surgery. Exercise is the best stimulant. You may expect to slowly improve over 4-10 days. CALL THE OFFICE IF YOU DEVELOP:     Increasing pain that is not controlled by the pain medicine.  Increasing redness or drainage around the incision.  Unusual or increasing shortness of breath   Fever greater than 101 degrees   Change in the color of your sputum to yellow or green, especially if you also have increasing shortness of breath and a fever greater than 101. YOUR MEDICATIONS:  As instructed        FOLLOW-UP APPOINTMENT:  Future Appointments   Date Time Provider Carla Grant   9/29/2020  9:15 AM MARLEY Franklin BS AMB   10/19/2020 11:00 AM LINDA Allred BS AMB     On 9/29/2020 Please arrive 45 minutes prior to you appointment time in order to have a chest X-Ray. Check in at [address] [facility] on the ground floor of the [facility]. After your X-ray come to the [address] [place] [address] for your appointment.       Physician Signature icterus, Oral mucosa moist and intact, throat clear  NECK: supple without palpable adenopathy  BACK: Straight negative CVAT  SKIN: warm dry and intact without lesions rashes or masses  CHEST: CTA bilaterally without use of accessory muscles  CV: Normal S1 S2, RRR, no MRG  ABD: NT ND normoactive BS, no palpable masses or hepatosplenomegaly  EXTREMITIES: without edema, denies calf tenderness  NEURO: CN II - XII grossly intact  CATHETER: Left SC Trifusion (6/21/21, Blake) - CDI    Data:   CBC:   Recent Labs     07/12/21 0435 07/13/21  0230 07/14/21  0350   WBC 0.6* 2.1* 6.8   HGB 9.9* 9.5* 9.3*   HCT 28.0* 26.9* 26.0*   MCV 89.1 88.2 88.1   PLT 17* 21* 40*     BMP/Mag:  Recent Labs     07/12/21 0435 07/13/21  0230 07/14/21  0350   * 133* 139   K 3.4* 3.9 3.8    103 107   CO2 21 21 21   PHOS 1.7* 2.1* 2.0*   BUN 4* 3* 3*   CREATININE <0.5* <0.5* <0.5*   MG 1.80  --   --      LIVP:   Recent Labs     07/12/21 0435 07/14/21  0350   AST 9* 9*   ALT 7* 7*   BILIDIR <0.2 <0.2   BILITOT 0.4 0.4   ALKPHOS 56 69     Uric Acid:    Recent Labs     07/14/21  0350   LABURIC 2.4*     Coags:   No results for input(s): PROTIME, INR, APTT in the last 72 hours. PROBLEM LIST:            1. IgA Kappa Multiple Myeloma  2. Hepatitis C (2002, s/p tx with Cherry 2016)  3. Recurrent shingles (5/20/21)  4. Anxiety/depression  5. Chronic HFrEF (LVEF 40-45%)    Post Transplant Complications:  1. Diarrhea   2. Nausea / Vomiting       TREATMENT:            1. RVD x7 cycles  2. Melphalan 200mg/m2 & ASCT (7/1/21) - 3.51 x10^6 [id number]/kg      ASSESSMENT AND PLAN:         1. IgA Kappa Multiple Myeloma: Currently in a MS  - S/p [id number] & ASCT (7/1/21)  - Post-Txp Maintenance: Revlimid maintenance  - Post-Txp F/u: Serum myeloma studies     Day + 13     2. ID: Afebrile  - H/o recurrent shingles  - Valtrex ppx, cont acyclovir at discharge for VZV positive titer. - stop diflucan and levaquin    3.  Heme: pancytopenia - Leukopenia and anemia r/t recent chemotherapy  - Transfuse for Hgb < 7 and Platelets < 95R  - No transfusion today  - Cont Granix (started 7/6/21) - may be able to stop on 7/14/21     4. Metabolic: stable renal fxn and e-lytes except for hypoNa  - KPhos 30 mmol IV x 1 (7/12/21)  - Stop IVF: NS + KCl 20 meq @ 50 mL/hr  - Replace Phos, K+ and Mg per PRN orders     5. Cardiac:   - H/o Chronic HFrEF & HTN  - Echo 5/21/21: global hypokinesis EF 40-45%, Mild-Mod MR, Mild TR  - Cleared for transplant by Dr. Luigi Guzman 6/3/21  Chronic HFrEF:  No evidence of acute exacerbation   - Monitor fluid status closely   HTN:  Well controlled off Norvasc  - S/p Norvasc 10 mg daily (stopped 7/9/21) - resume when BP elevates  - Cont to monitor      6. GI / Nutrition:   - H/o hepatitis C  Hepatitis C:  No acute issues   - S/p Harvoni (2016)  Nutrition:  Appetite and oral intake has declined, but she is still eating small amounts   - Cont low microbial diet   - Follow closely with dietary  Nausea:  N/V has improved   - Change Compazine q6hrs prn   - Cont Ativan 1 mg TID (increased 7/9/21)   - Cont Zofran as needed  - Cont PO Protonix daily (started 7/9/21)  Diarrhea: Increased diarrhea, multiple small loose stools; chemo and transplant related   - C. Diff (7/3/21) - negative  - Cont Imodium as needed; she used 8x on 7/12/21  - Add Lomotil as needed (started 7/13/21)     7.  Psych:  - H/o Anxiety / Depression:   Anxiety / Depression:  Ongoing anxiety related transplant process  - Cont Ativan 1 mg TID (increased 7/9/21 from bid)  - Psych following      8. Neoplasm-Related Chronic Pain: 2/2 myeloma   - Cont Oxycodone PRN - she has not been requiring pain meds        - DVT Prophylaxis: Platelets <21,060 cells/dL - prophylactic lovenox on hold and mechanical prophylaxis with bilateral SCDs while in bed in place.   Contraindications to pharmacologic prophylaxis: Thrombocytopenia  Contraindications to mechanical prophylaxis: None    - Disposition:  Once ANC >1 and recovered from toxicity; once diarrhea is manageable and e-lytes stable      Adrian Peck DO, MS  Oncology/Hematology Care    Please contact via:  1.   Perfect Serve  2. [phone]    7/14/2021   10:03 AM

## 2021-07-15 VITALS
SYSTOLIC BLOOD PRESSURE: 106 MMHG | OXYGEN SATURATION: 99 % | BODY MASS INDEX: 27.14 KG/M2 | HEART RATE: 90 BPM | TEMPERATURE: 97.7 F | HEIGHT: 65 IN | DIASTOLIC BLOOD PRESSURE: 66 MMHG | WEIGHT: 162.92 LBS | RESPIRATION RATE: 18 BRPM

## 2021-07-15 LAB
ANION GAP SERPL CALCULATED.3IONS-SCNC: 9 MMOL/L (ref 3–16)
BANDED NEUTROPHILS RELATIVE PERCENT: 15 % (ref 0–7)
BASOPHILS ABSOLUTE: 0 K/UL (ref 0–0.2)
BASOPHILS RELATIVE PERCENT: 0 %
BUN BLDV-MCNC: 3 MG/DL (ref 7–20)
CALCIUM SERPL-MCNC: 8.5 MG/DL (ref 8.3–10.6)
CHLORIDE BLD-SCNC: 105 MMOL/L (ref 99–110)
CO2: 23 MMOL/L (ref 21–32)
CREAT SERPL-MCNC: <0.5 MG/DL (ref 0.6–1.2)
EOSINOPHILS ABSOLUTE: 0 K/UL (ref 0–0.6)
EOSINOPHILS RELATIVE PERCENT: 0 %
GFR AFRICAN AMERICAN: >60
GFR NON-AFRICAN AMERICAN: >60
GLUCOSE BLD-MCNC: 91 MG/DL (ref 70–99)
HCT VFR BLD CALC: 25.9 % (ref 36–48)
HEMOGLOBIN: 9.4 G/DL (ref 12–16)
LYMPHOCYTES ABSOLUTE: 0.3 K/UL (ref 1–5.1)
LYMPHOCYTES RELATIVE PERCENT: 3 %
MAGNESIUM: 1.9 MG/DL (ref 1.8–2.4)
MCH RBC QN AUTO: 32.1 PG (ref 26–34)
MCHC RBC AUTO-ENTMCNC: 36.1 G/DL (ref 31–36)
MCV RBC AUTO: 88.9 FL (ref 80–100)
METAMYELOCYTES RELATIVE PERCENT: 5 %
MONOCYTES ABSOLUTE: 1.8 K/UL (ref 0–1.3)
MONOCYTES RELATIVE PERCENT: 17 %
MYELOCYTE PERCENT: 4 %
NEUTROPHILS ABSOLUTE: 8.3 K/UL (ref 1.7–7.7)
NEUTROPHILS RELATIVE PERCENT: 56 %
PDW BLD-RTO: 16.2 % (ref 12.4–15.4)
PHOSPHORUS: 3 MG/DL (ref 2.5–4.9)
PLATELET # BLD: 64 K/UL (ref 135–450)
PMV BLD AUTO: 9.1 FL (ref 5–10.5)
POTASSIUM SERPL-SCNC: 3.8 MMOL/L (ref 3.5–5.1)
RBC # BLD: 2.91 M/UL (ref 4–5.2)
SODIUM BLD-SCNC: 137 MMOL/L (ref 136–145)
URIC ACID, SERUM: 2.7 MG/DL (ref 2.6–6)
WBC # BLD: 10.4 K/UL (ref 4–11)

## 2021-07-15 PROCEDURE — 84100 ASSAY OF PHOSPHORUS: CPT

## 2021-07-15 PROCEDURE — 6370000000 HC RX 637 (ALT 250 FOR IP): Performed by: INTERNAL MEDICINE

## 2021-07-15 PROCEDURE — 6370000000 HC RX 637 (ALT 250 FOR IP): Performed by: NURSE PRACTITIONER

## 2021-07-15 PROCEDURE — 85025 COMPLETE CBC W/AUTO DIFF WBC: CPT

## 2021-07-15 PROCEDURE — 80048 BASIC METABOLIC PNL TOTAL CA: CPT

## 2021-07-15 PROCEDURE — 6360000002 HC RX W HCPCS: Performed by: NURSE PRACTITIONER

## 2021-07-15 PROCEDURE — 84550 ASSAY OF BLOOD/URIC ACID: CPT

## 2021-07-15 PROCEDURE — 36592 COLLECT BLOOD FROM PICC: CPT

## 2021-07-15 PROCEDURE — 83735 ASSAY OF MAGNESIUM: CPT

## 2021-07-15 RX ORDER — ACYCLOVIR 800 MG/1
800 TABLET ORAL 2 TIMES DAILY
Qty: 60 TABLET | Refills: 11 | Status: SHIPPED | OUTPATIENT
Start: 2021-07-15 | End: 2022-07-26 | Stop reason: ALTCHOICE

## 2021-07-15 RX ORDER — PROCHLORPERAZINE MALEATE 10 MG
10 TABLET ORAL EVERY 6 HOURS PRN
Qty: 60 TABLET | Refills: 3 | Status: SHIPPED | OUTPATIENT
Start: 2021-07-15

## 2021-07-15 RX ORDER — ACETAMINOPHEN 500 MG
1000 TABLET ORAL EVERY 6 HOURS PRN
Qty: 540 TABLET | Refills: 1 | COMMUNITY
Start: 2021-07-15 | End: 2022-07-26 | Stop reason: ALTCHOICE

## 2021-07-15 RX ORDER — LOPERAMIDE HYDROCHLORIDE 2 MG/1
2 CAPSULE ORAL PRN
COMMUNITY
Start: 2021-07-15 | End: 2021-07-25

## 2021-07-15 RX ORDER — OXYCODONE HYDROCHLORIDE 5 MG/1
10 CAPSULE ORAL EVERY 4 HOURS PRN
Qty: 20 CAPSULE | Refills: 0 | Status: SHIPPED | OUTPATIENT
Start: 2021-07-15 | End: 2021-07-22

## 2021-07-15 RX ORDER — ONDANSETRON HYDROCHLORIDE 8 MG/1
8 TABLET, FILM COATED ORAL EVERY 8 HOURS PRN
Qty: 60 TABLET | Refills: 3 | Status: SHIPPED | OUTPATIENT
Start: 2021-07-15

## 2021-07-15 RX ORDER — LORAZEPAM 1 MG/1
1 TABLET ORAL 2 TIMES DAILY
Qty: 60 TABLET | Refills: 0 | Status: SHIPPED | OUTPATIENT
Start: 2021-07-15 | End: 2021-08-11 | Stop reason: SDUPTHER

## 2021-07-15 RX ORDER — HEPARIN SODIUM (PORCINE) LOCK FLUSH IV SOLN 100 UNIT/ML 100 UNIT/ML
1500 SOLUTION INTRAVENOUS ONCE
Status: COMPLETED | OUTPATIENT
Start: 2021-07-15 | End: 2021-07-15

## 2021-07-15 RX ADMIN — LORAZEPAM 1 MG: 1 TABLET ORAL at 07:50

## 2021-07-15 RX ADMIN — Medication 1500 UNITS: at 12:30

## 2021-07-15 RX ADMIN — OXYCODONE 10 MG: 5 TABLET ORAL at 03:24

## 2021-07-15 RX ADMIN — VALACYCLOVIR HYDROCHLORIDE 500 MG: 500 TABLET, FILM COATED ORAL at 07:50

## 2021-07-15 RX ADMIN — OXYCODONE 10 MG: 5 TABLET ORAL at 08:54

## 2021-07-15 ASSESSMENT — PAIN DESCRIPTION - LOCATION: LOCATION: LEG

## 2021-07-15 ASSESSMENT — PAIN DESCRIPTION - DESCRIPTORS: DESCRIPTORS: ACHING;CONSTANT

## 2021-07-15 ASSESSMENT — PAIN SCALES - GENERAL
PAINLEVEL_OUTOF10: 7
PAINLEVEL_OUTOF10: 1
PAINLEVEL_OUTOF10: 7
PAINLEVEL_OUTOF10: 4
PAINLEVEL_OUTOF10: 6

## 2021-07-15 ASSESSMENT — PAIN DESCRIPTION - PROGRESSION
CLINICAL_PROGRESSION: NOT CHANGED

## 2021-07-15 ASSESSMENT — PAIN DESCRIPTION - ORIENTATION: ORIENTATION: RIGHT

## 2021-07-15 ASSESSMENT — PAIN - FUNCTIONAL ASSESSMENT: PAIN_FUNCTIONAL_ASSESSMENT: ACTIVITIES ARE NOT PREVENTED

## 2021-07-15 ASSESSMENT — PAIN DESCRIPTION - ONSET: ONSET: ON-GOING

## 2021-07-15 ASSESSMENT — PAIN DESCRIPTION - FREQUENCY: FREQUENCY: CONTINUOUS

## 2021-07-15 ASSESSMENT — PAIN DESCRIPTION - PAIN TYPE: TYPE: ACUTE PAIN

## 2021-07-15 NOTE — CARE COORDINATION
Reviewed discharge instructions with patient and  caregiver. Reviewed discharge medications including dosing, schedule, indication, and adverse reactions. Reviewed which medications were already taken today and next dosage due for each medication. Reviewed signs and symptoms that prompt a call to the physician and appropriate phone numbers. [facility] ER card given to the patient with explanations of its use. Reviewed follow up appointments that have been made in [facility] and Outpatient Oncology. Low microbial diet, activity restrictions, and increased risk of infection were reviewed. Patient is being discharged with IV access d/t need for ongoing therapy:      Type:  Trifusion                      Date of placement:  6/21/21    Plan:continue   Next dressing change due on: 7/21/21  Cap changes due on:7/21/21  CVC care and maintenance was reviewed with patient and caregiver. Pt verbalizes understanding of line care and maintenance. Patient verbalized understanding of all instructions and questions were answered to her. satisfaction. Signed discharge instructions were given to the patient and a copy placed in the paper-lite chart. Patient discharged to home per self with caregiver. Patient was educated on her appointment time, infection prevention as well as restrictions while recovering. Emphasized fall safety at home, and no driving until cleared by physician.         Karo PERALES [employer]

## 2021-07-15 NOTE — CARE COORDINATION
Case Management Assessment            Discharge Note                    Date / Time of Note: 7/15/2021 1:59 PM                  Discharge Note Completed by: HILLARY Foreman    Patient Name: Herminio Prajapati   YOB: 1950  Diagnosis: Multiple myeloma not having achieved remission [C90.00]  Multiple myeloma in remission [facility]) [C90.01]   Date / Time: 6/30/2021  9:27 AM    Current PCP: Russ Rivera MD  Clinic patient: No    Hospitalization in the last 30 days: Yes    Advance Directives:  Code Status: Full Code  [place] DNR form completed and on chart: No    Financial:  Payor: MEDICARE / Plan: MEDICARE PART A AND B / Product Type: *No Product type* /      Pharmacy:    [address] [place] [address],[address], [address]  Phone: [phone] Fax: [address] [place] [address]  Phone: [phone] Fax: [phone]      Assistance purchasing medications?: Potential Assistance Purchasing Medications: No  Assistance provided by Case Management: None at this time    Does patient want to participate in local refill/ meds to beds program?: No    Meds To Beds General Rules:  1. Can ONLY be done Monday- Friday between 8:30am-5pm  2. Prescription(s) must be in pharmacy by 3pm to be filled same day  3. Copy of patient's insurance/ prescription drug card and patient face sheet must be sent along with the prescription(s)  4. Cost of Rx cannot be added to hospital bill. If financial assistance is needed, please contact unit  or ;  or  CANNOT provide pharmacy voucher for patients co-pays  5.  Patients can then  the prescription on their way out of the hospital at discharge, or pharmacy can deliver to the bedside if staff is available. (payment due at time of pick-up or delivery - cash, check, or card accepted)     Able to afford home medications/ co-pay costs: Yes    ADLS:  Current PT AM-PAC Score:   /24  Current OT AM-PAC Score:   /24      DISCHARGE Disposition: Home- No Services Needed    LOC at discharge: Not Applicable  MOODY Completed: No    Notification completed in HENS/PAS?:  Not Applicable    IMM Completed:   Yes, Case management has presented and reviewed IMM letter #2 to the patient and/or family/ POA. Patient and/or family/POA verbalized understanding of their medicare rights and appeal process if needed. Patient and/or family/POA has signed, initialed and placed today's date (7/15/2021) and time (1330) on IMM letter #2 on the the appropriate lines. Patient and/or family/POA, copy of letter offered and they are aware that this original copy of IMM letter #2 is available prior to discharge from the paper chart on the unit. Electronic documentation has been entered into epic for IMM letter #2 and original paper copy has been added to the paper chart at the nurses station.      Transportation:  Transportation PLAN for discharge: family   Mode of Transport: Private Car  Reason for medical transport: Not Applicable  Name of [address] [place] [address],[address]: Not Applicable  Time of Transport: afternoon    Transport form completed: No    Home Care:  [address] ordered at discharge: No  [address]: Not Applicable  Orders faxed: No    Durable Medical Equipment:  DME Provider: none  Equipment obtained during hospitalization: none    Home Oxygen and Respiratory Equipment:  Oxygen needed at discharge?: No  [address]: Not Applicable  Portable tank available for discharge?: No    Dialysis:  Dialysis patient: No    Dialysis Center:  Not Applicable    Hospice Services:  Location: Not Applicable  Agency: Not Applicable    Consents signed: No    Referrals made at [facility] for outpatient continued care:  Not Applicable    Additional CM Notes:  Patient to discharge home with no needs today. The Plan for Transition of Care is related to the following treatment goals of Multiple myeloma not having achieved remission [C90.00]  Multiple myeloma in remission ([facility] [address] [C90.01]    The Patient and/or patient representative Amarjit Chavis and her family were provided with a choice of provider and agrees with the discharge plan Yes    Freedom of choice list was provided with basic dialogue that supports the patient's individualized plan of care/goals and shares the quality data associated with the providers.  Yes    Care Transitions patient: Yes    [place], [place]  [facility] [employer]  Case Management Department  Ph: [phone]  Fax: [phone]

## 2021-07-15 NOTE — PLAN OF CARE
Problem: Falls - Risk of:  Goal: Will remain free from falls  Description: Orthostatic vital signs obtained at start of shift - see flowsheet for details. Pt does not meet criteria for orthostasis. Pt is a Med fall risk. See [facility] Fall Score and ABCDS Injury Risk assessments. - Screening for Orthostasis AND not a [place] Risk per BERMUDEZ/ABCDS: Pt bed is in low position, side rails up, call light and belongings are in reach. Fall risk light is on outside pts room. Pt encouraged to call for assistance as needed. Will continue with hourly rounds for PO intake, pain needs, toileting and repositioning as needed. Outcome: Ongoing  Note: Patient remained free of falls during shift. Patient is a B[place]ude[place] Fall Risk: Medium (25-44); uses call light appropriately, ambulates with a steady gait and no assistive devices. Orthostatic blood pressures assessed and patient remains negative. Will continue to encourage ambulation and implement POC. Call light within reach and hourly rounding in place. Problem: Falls - Risk of:  Goal: Absence of physical injury  Description: Absence of physical injury  Outcome: Ongoing     Problem: Infection - Central Venous Catheter-Associated Bloodstream Infection:  Goal: Will show no infection signs and symptoms  Description: Will show no infection signs and symptoms  Outcome: Ongoing  Note: Patient's CVC site shows no signs or symptoms of infection. No drainage, edema, erythema, pain, or warmth noted at site. Vital signs stable and all lines flushed with blood return noted. Dressing changes continue per protocol and on an as needed basis - see flowsheet. Will continue to monitor for symptoms of infection, pain and/or discomfort. Call light within reach and hourly rounding in place.       Problem: Venous Thromboembolism:  Goal: Will show no signs or symptoms of venous thromboembolism  Description: Will show no signs or symptoms of venous thromboembolism  Outcome: Ongoing     Problem: Venous Thromboembolism:  Goal: Absence of signs or symptoms of impaired coagulation  Description: Absence of signs or symptoms of impaired coagulation  Outcome: Ongoing     Problem: PROTECTIVE PRECAUTIONS  Goal: Patient will remain free of nosocomial Infections  Description: Pt educated on use of mask prior to leaving room. All staff and visitor following handwashing requirements this shift prior to entering room. Low microbial diet order in place and being followed. Linens changed today per protocol and pt reports using chlorhexidine wash per order. Outcome: Ongoing  Note: Patient is showing no signs or symptoms of nosocomial infections. Patient's temp during shift are as follows: Temp (8hrs), Av.4 °F (36.9 °C), Min:98 °F (36.7 °C), Max:98.8 °F (37.1 °C)  . Patient placed in private room and instructed on importance of handwashing and when to wear a mask when ambulating in hallway. Will continue to assess for s/s of infection and implement POC. Call light within reach and hourly rounding in place. Problem: Nutrition  Goal: Optimal nutrition therapy  Outcome: Ongoing  Note: Pt with adequate nutritional intake this shift. See doc flowsheets. Will continue to monitor and encourage PO consumption. Problem: Bleeding:  Goal: Will show no signs and symptoms of excessive bleeding  Description: Will show no signs and symptoms of excessive bleeding  Outcome: Ongoing  Note: Patient's hemoglobin this AM: No blood transfusion required. Recent Labs     07/15/21  0343   HGB 9.4*     Patient's platelet count this AM:   Recent Labs     07/15/21  0343   PLT 64*    Thrombocytopenia Precautions in place. Patient showing no signs or symptoms of active bleeding. Transfusion not indicated at this time. Patient verbalizes understanding of all instructions. Will continue to assess and implement POC. Call light within reach and hourly rounding in place.        Problem: Nausea/Vomiting:  Goal: Absence of nausea/vomiting  Description: Absence of nausea/vomiting  Outcome: Met This Shift  Note: Patient has not experienced nausea during shift. Will continue to assess and implement POC. Call light within reach and hourly rounding in place. Problem: Nausea/Vomiting:  Goal: Able to drink  Description: Able to drink  Outcome: Met This Shift     Problem: Pain:  Description: Pain management should include both nonpharmacologic and pharmacologic interventions. Goal: Pain level will decrease  Description: Pain level will decrease  Outcome: Ongoing  Note: Patient with complaints of leg pain during shift. Rates RT leg with increased pain. Pt states\" I walked too long and used the wrong shoes on the treadmill. I caused all this pain on my self. \" Pt ambulating in room to \"stretch\" leg out she states. Heat packs are also being utilized besides Oxycontin 10mg po. Will continue to assess comfort and pain on 0-10 number scale and medicate per order while encouraging non pharmacological techniques as well. Call light within reach and hourly rounding in place. Problem: Pain:  Description: Pain management should include both nonpharmacologic and pharmacologic interventions.   Goal: Control of acute pain  Description: Control of acute pain  Outcome: Ongoing

## 2021-07-16 ENCOUNTER — CARE COORDINATION (OUTPATIENT)
Dept: CASE MANAGEMENT | Age: 71
End: 2021-07-16

## 2021-07-16 NOTE — CARE COORDINATION
[facility] Transitions Initial Follow Up Call    Call within 2 business days of discharge: Yes    Patient:  Lucita Bowman Patient :  1950  MRN:  [id number]   Reason for Admission: COVID -19 Monitoring  Discharge Date:  7/15/21  RARS:       Non-face-to-face services provided:    1ST CTC attempt to reach Pt regarding recent hospital discharge. CTC left voice recording with call back number requesting a call back.     Follow up appointments:    Future Appointments   Date Time Provider Shaun Jack   2021  9:20 MD Yoel Gonzalez [facility]       Thank You,    Oli Rios, RN  Care Transition Coordinator  Contact [id number]:[phone]
and confirmed pulse oximeter discharge instructions and when to notify provider or seek emergency care. CTN spoke with patient this afternoon for initial 24 hour discharge follow up [place] [address] call. Patient reporting she is doing well, no issues or concerns. CTN provided contact information. Plan for follow up CTN call in 7-14 days based on severity of symptoms and risk factors.     Thank Denis Diaz RN  Care Transition Coordinator  Contact [facility] [place]:[phone]

## 2021-07-29 ENCOUNTER — CARE COORDINATION (OUTPATIENT)
Dept: CASE MANAGEMENT | Age: 71
End: 2021-07-29

## 2021-07-29 NOTE — PROGRESS NOTES
[facility] patients having surgery or anesthesia are required to be Covid tested OR to have been vaccinated at least 14 days prior to your procedure. It is very important to return our call to [phone] and notify the staff of your last vaccination date otherwise you will be required to complete Covid PCR test within the 5-6 days prior to surgery & quarantine. The results will need to be faxed to PreAdmission Testing at [phone]. PRIOR TO PROCEDURE DATE:        1. PLEASE FOLLOW ANY  GUIDELINES/ INSTRUCTIONS PRIOR TO YOUR PROCEDURE AS ADVISED BY YOUR SURGEON. 2. Arrange for someone to drive you home and be with you for the first 24 hours after discharge for your safety after your procedure for which you received sedation. Ensure it is someone we can share information with regarding your discharge. 3. You must contact your surgeon for instructions IF:   You are taking any blood thinners, aspirin, anti-inflammatory or vitamin E.   There is a change in your physical condition such as a cold, fever, rash, cuts, sores or any other infection, especially near your surgical site. 4. Do not drink alcohol the day before or day of your procedure. 5. A Pre-op History and Physical for surgery MUST be completed by your Physician or Urgent Care within 30 days of your procedure date. Please bring a copy with you on the day of your procedure and along with any other testing performed. THE DAY OF YOUR PROCEDURE:  1. Follow instructions for ARRIVAL TIME as DIRECTED BY YOUR SURGEON. 2. Enter the MAIN entrance from [employer] and follow the signs to the free [employer] or [facility] parking (offered free of charge 6am-5pm). 3. Enter the Main Entrance of the hospital (do not enter from the lower level of the parking garage). Upon entrance, check in with the  at the main desk on your left. If no one is available at the desk, proceed into the [facility] Waiting Room and go through the door directly into the [facility]. There is a Check-in desk ACROSS from Room 5 (marked with a sign hanging from the ceiling). The phone number for the surgery center is [phone]. 4. Please call [phone] option #2 option #2 if you have not been preregistered yet. On the day of your procedure bring your insurance card and photo ID. You will be registered at your bedside once brought back to your room. 5. DO NOT EAT ANYTHING eight hours prior to your arrival for surgery. May have 8 ounces of water 4 hours prior to your arrival for surgery. NOTE: ALL Gastric, Bariatric and Bowel surgery patients MUST follow their surgeon's instructions. 6. MEDICATIONS    Take the following medications with a SMALL sip of water: ativan,    Bariatric patient's call surgeon if on diabetic medications as some need to be stopped 1 week preop   Use your usual dose of inhalers the morning of surgery. BRING your rescue inhaler with you to hospital.    Anesthesia does NOT want you to take insulin the morning of surgery. They will control your blood sugar while you are at the hospital. Please contact your ordering physician for instructions regarding your insulin the night before your procedure. If you have an insulin pump, please keep it set on basal rate. 7. Do not swallow water when brushing teeth. No gum, candy, mints or ice chips. Refrain from smoking or at least decrease the amount. 8. Dress in loose, comfortable clothing appropriate for redressing after your procedure. Do not wear jewelry (including body piercings), make-up (especially NO eye make-up), fingernail polish (NO toenail polish if foot/leg surgery), lotion, powders or metal hairclips. 9. Dentures, glasses, or contacts will need to be removed before your procedure.  Bring cases for your glasses, contacts, dentures, or hearing aids to protect them while you are in surgery. 10. If you use a CPAP, please bring it with you on the day of your procedure. 11. We recommend that valuable personal  belongings such as cash, cell phones, e-tablets or jewelry, be left at home during your stay. The hospital will not be responsible for valuables that are not secured in the hospital safe. However, if your insurance requires a co-pay, you may want to bring a method of payment, i.e. Check or credit card, if you wish to pay your co-pay the day of surgery. 12. If you are to stay overnight, you may bring a bag with personal items. Please have any large items you may need brought in by your family after your arrival to your hospital room. 15. If you have a Living Will or Durable Power of , please bring a copy on the day of your procedure. 15. With your permission, one family member may accompany you while you are being prepared for surgery. Once you are ready, additional family members may join you. HOW WE KEEP YOU SAFE and WORK TO PREVENT SURGICAL SITE INFECTIONS:  1. Health care workers should always check your ID bracelet to verify your name and birth date. You will be asked many times to state your name, date of birth, and allergies. 2. Health care workers should always clean their hands with soap or alcohol gel before providing care to you. It is okay to ask anyone if they cleaned their hands before they touch you. 3. You will be actively involved in verifying the type of procedure you are having and ensuring the correct surgical site. This will be confirmed multiple times prior to your procedure. Do NOT allison your surgery site UNLESS instructed to by your surgeon. 4. Do not shave or wax for 72 hours prior to procedure near your operative site. Shaving with a razor can irritate your skin and make it easier to develop an infection.  On the day of your procedure, any hair that needs to be removed near the surgical site will be clipped by a healthcare worker using a special clippers designed to avoid skin irritation. 5. When you are in the operating room, your surgical site will be cleansed with a special soap, and in most cases, you will be given an antibiotic before the surgery begins. What to expect AFTER YOUR PROCEDURE:  1. Immediately following your procedure, your will be taken to the PACU for the first phase of your recovery. Your nurse will help you recover from any potential side effects of anesthesia, such as extreme drowsiness, changes in your vital signs or breathing patterns. Nausea, headache, muscle aches, or sore throat may also occur after anesthesia. Your nurse will help you manage these potential side effects. 2. For comfort and safety, arrange to have someone at home with you for the first 24 hours after discharge. 3. You and your family will be given written instructions about your diet, activity, dressing care, medications, and return visits. 4. Once at home, should issues with nausea, pain, or bleeding occur, or should you notice any signs of infection, you should call your surgeon. 5. Always clean your hands before and after caring for your wound. Do not let your family touch your surgery site without cleaning their hands. 6. Narcotic pain medications can cause significant constipation. You may want to add a stool softener to your postoperative medication schedule or speak to your surgeon on how best to manage this SIDE EFFECT. SPECIAL INSTRUCTIONS     Thank you for allowing us to care for you. We strive to exceed your expectations in the delivery of care and service provided to you and your family. If you need to contact the [place] [address] staff for any reason, please call us at [phone]    Instructions reviewed with patient during preadmission testing phone interview.   Tracy Bliss RN.7/29/2021 .2:45 PM      ADDITIONAL EDUCATIONAL INFORMATION REVIEWED PER PHONE WITH YOU AND/OR YOUR FAMILY:  No Hibiclens® Bathing Instructions   Yes Antibacterial Soap

## 2021-07-29 NOTE — PROGRESS NOTES
Name:   :   MR#:   Place patient label inside box (if no patient label, complete above)  PROCEDURAL INFORMED [address] / PROCEDURE  1. I (we), Floryalfiesailaja Ajit (Patient Name) authorize TIM MACIEL(Provider / Proceduralist) and/or such assistants as may be selected by him/her, to perform the following operation/procedure(s):  TRIFUSION CATHETER REMOVAL    Note: If unable to obtain consent prior to an emergent procedure, document the emergent reason in the medical record. This procedure has been explained to my (our) satisfaction and included in the explanation was:   A) The intended benefit, nature, and extent of the procedure to be performed;  B) The significant risks involved and the probability of success;  C) Alternative procedures and methods of treatment;  D) The dangers and probable consequences of such alternatives (including no procedure or treatment); E) The expected consequences of the procedure on my future health;  F) Whether other qualified individuals would be performing important surgical tasks and/or whether  would be present to advise or support the procedure. I (we) understand that there are other risks of infection and other serious complications in the pre-operative/procedural and postoperative/procedural stages of my (our) care. I (we) have asked all of the questions which I (we) thought were important in deciding whether or not to undergo treatment or diagnosis. These questions have been answered to my (our) satisfaction. I (we) understand that no assurance can be given that the procedure will be a success, and no guarantee or warranty of success has been given to me (us). 2. It has been explained to me (us) that during the course of the operation/procedure, unforeseen conditions may be revealed that necessitate extension of the original procedure(s) or different procedure(s) than those set forth in Paragraph 1.  I (we) authorize and request that the above-named physician, his/her assistants or his/her designees, perform procedures as necessary and desirable if deemed to be in my (our) best interest.     3. I acknowledge that health care personnel may be observing this procedure for the purpose of medical education or other specified purposes as may be necessary as requested and/or approved by my (our) physician. 4. I (we) consent to the disposal by the hospital Pathologist of the removed tissue, parts or organs in accordance with hospital policy. 5. I do ____ do not ____ consent to the use of a local infiltration pain blocking agent that will be used by my provider/surgical provider to help alleviate pain during my procedure. 6. I do ____ do not ____ consent to an emergent blood transfusion in the case of a life-threatening situation that requires blood components to be administered. This consent is valid for 24 hours from the beginning of the procedure. 7. This patient does ____ or does not ____ currently have a DNR status/order. If DNR order is in place, obtain Addendum to the Surgical Consent for ALL Patients with a DNR Order to address naeem-operative status for limited intervention or DNR suspension.      8. I have read and fully understand the above Consent for Operation/Procedure and that all blanks were completed before I signed the consent.     ____________________          _____________________          __________/_________am/pm  Signature of Patient or legal representative               Printed Name / Relationship                      Date / Time      ____________________          _____________________          __________/__________am/pm  Witness to Signature                     Printed Name                      Date / Time     (If patient is unable to sign or is a minor, complete the following)  Patient is a minor, ____ years of age, or unable to sign because: ____________________________________________________________________    [employer] If a phone consent is obtained, consent will be documented by using two health care professionals, each affirming that the consenting party has no questions and gives consent for the procedure discussed with the physician/provider.   _____________________          ____________________       __________/__________am/pm   2nd witness to phone consent   Printed name                Date / Time      Informed Consent:  I have provided the explanation described above in section 1 to the patient and/or legal representative. I have provided the patient and/or legal representative with an opportunity to ask any questions about the proposed operation/procedure.   ____________________          ____________________          __________/__________am/pm  Provider / Proceduralist                     Printed name                   Date / Time        This Provider / Proceduralist performing the surgery is ONLY for Office-based procedures in [facility] or Board eligible by one of the [employer] of [facility] [employer], the [employer] of the [employer], the [employer] of Podiatric Medicine, the [employer] of Foot and Ankle Surgery, or other board as approved by the [facility] for medical staff appointment. Revised 8. 2.2021          page 2 of 2

## 2021-08-02 ENCOUNTER — ANESTHESIA EVENT (OUTPATIENT)
Dept: OPERATING ROOM | Age: 71
End: 2021-08-02
Payer: MEDICARE

## 2021-08-03 ENCOUNTER — HOSPITAL ENCOUNTER (OUTPATIENT)
Age: 71
Setting detail: OUTPATIENT SURGERY
Discharge: HOME | End: 2021-08-03
Attending: SURGERY | Admitting: SURGERY
Payer: MEDICARE

## 2021-08-03 ENCOUNTER — ANESTHESIA (OUTPATIENT)
Dept: OPERATING ROOM | Age: 71
End: 2021-08-03
Payer: MEDICARE

## 2021-08-03 VITALS
RESPIRATION RATE: 14 BRPM | OXYGEN SATURATION: 100 % | SYSTOLIC BLOOD PRESSURE: 105 MMHG | DIASTOLIC BLOOD PRESSURE: 60 MMHG

## 2021-08-03 VITALS
DIASTOLIC BLOOD PRESSURE: 81 MMHG | BODY MASS INDEX: 26.16 KG/M2 | HEART RATE: 86 BPM | TEMPERATURE: 96.6 F | OXYGEN SATURATION: 99 % | HEIGHT: 65 IN | SYSTOLIC BLOOD PRESSURE: 147 MMHG | WEIGHT: 157 LBS | RESPIRATION RATE: 13 BRPM

## 2021-08-03 PROCEDURE — 3600000014 HC SURGERY LEVEL 4 ADDTL 15MIN: Performed by: SURGERY

## 2021-08-03 PROCEDURE — 7100000001 HC PACU RECOVERY - ADDTL 15 MIN: Performed by: SURGERY

## 2021-08-03 PROCEDURE — 3700000001 HC ADD 15 MINUTES (ANESTHESIA): Performed by: SURGERY

## 2021-08-03 PROCEDURE — 2709999900 HC NON-CHARGEABLE SUPPLY: Performed by: SURGERY

## 2021-08-03 PROCEDURE — 2580000003 HC RX 258: Performed by: ANESTHESIOLOGY

## 2021-08-03 PROCEDURE — 7100000011 HC PHASE II RECOVERY - ADDTL 15 MIN: Performed by: SURGERY

## 2021-08-03 PROCEDURE — 7100000010 HC PHASE II RECOVERY - FIRST 15 MIN: Performed by: SURGERY

## 2021-08-03 PROCEDURE — 6360000002 HC RX W HCPCS: Performed by: NURSE ANESTHETIST, CERTIFIED REGISTERED

## 2021-08-03 PROCEDURE — 7100000000 HC PACU RECOVERY - FIRST 15 MIN: Performed by: SURGERY

## 2021-08-03 PROCEDURE — 3700000000 HC ANESTHESIA ATTENDED CARE: Performed by: SURGERY

## 2021-08-03 PROCEDURE — 36589 REMOVAL TUNNELED CV CATH: CPT | Performed by: SURGERY

## 2021-08-03 PROCEDURE — 2500000003 HC RX 250 WO HCPCS: Performed by: SURGERY

## 2021-08-03 PROCEDURE — 2580000003 HC RX 258: Performed by: SURGERY

## 2021-08-03 PROCEDURE — 3600000004 HC SURGERY LEVEL 4 BASE: Performed by: SURGERY

## 2021-08-03 RX ORDER — HYDRALAZINE HYDROCHLORIDE 20 MG/ML
5 INJECTION INTRAMUSCULAR; INTRAVENOUS EVERY 10 MIN PRN
Status: DISCONTINUED | OUTPATIENT
Start: 2021-08-03 | End: 2021-08-03 | Stop reason: HOSPADM

## 2021-08-03 RX ORDER — LIDOCAINE HYDROCHLORIDE 10 MG/ML
INJECTION, SOLUTION INFILTRATION; PERINEURAL PRN
Status: DISCONTINUED | OUTPATIENT
Start: 2021-08-03 | End: 2021-08-03 | Stop reason: ALTCHOICE

## 2021-08-03 RX ORDER — OXYCODONE HYDROCHLORIDE AND ACETAMINOPHEN 5; 325 MG/1; MG/1
1 TABLET ORAL PRN
Status: DISCONTINUED | OUTPATIENT
Start: 2021-08-03 | End: 2021-08-03 | Stop reason: HOSPADM

## 2021-08-03 RX ORDER — FENTANYL CITRATE 50 UG/ML
INJECTION, SOLUTION INTRAMUSCULAR; INTRAVENOUS PRN
Status: DISCONTINUED | OUTPATIENT
Start: 2021-08-03 | End: 2021-08-03 | Stop reason: SDUPTHER

## 2021-08-03 RX ORDER — LABETALOL HYDROCHLORIDE 5 MG/ML
5 INJECTION, SOLUTION INTRAVENOUS EVERY 10 MIN PRN
Status: DISCONTINUED | OUTPATIENT
Start: 2021-08-03 | End: 2021-08-03 | Stop reason: HOSPADM

## 2021-08-03 RX ORDER — SODIUM CHLORIDE 9 MG/ML
25 INJECTION, SOLUTION INTRAVENOUS PRN
Status: DISCONTINUED | OUTPATIENT
Start: 2021-08-03 | End: 2021-08-03 | Stop reason: HOSPADM

## 2021-08-03 RX ORDER — SODIUM CHLORIDE 0.9 % (FLUSH) 0.9 %
10 SYRINGE (ML) INJECTION PRN
Status: DISCONTINUED | OUTPATIENT
Start: 2021-08-03 | End: 2021-08-03 | Stop reason: HOSPADM

## 2021-08-03 RX ORDER — FENTANYL CITRATE 50 UG/ML
25 INJECTION, SOLUTION INTRAMUSCULAR; INTRAVENOUS EVERY 5 MIN PRN
Status: DISCONTINUED | OUTPATIENT
Start: 2021-08-03 | End: 2021-08-03 | Stop reason: HOSPADM

## 2021-08-03 RX ORDER — SODIUM CHLORIDE 0.9 % (FLUSH) 0.9 %
10 SYRINGE (ML) INJECTION EVERY 12 HOURS SCHEDULED
Status: DISCONTINUED | OUTPATIENT
Start: 2021-08-03 | End: 2021-08-03 | Stop reason: HOSPADM

## 2021-08-03 RX ORDER — SODIUM CHLORIDE, SODIUM LACTATE, POTASSIUM CHLORIDE, CALCIUM CHLORIDE 600; 310; 30; 20 MG/100ML; MG/100ML; MG/100ML; MG/100ML
INJECTION, SOLUTION INTRAVENOUS CONTINUOUS
Status: DISCONTINUED | OUTPATIENT
Start: 2021-08-03 | End: 2021-08-03 | Stop reason: HOSPADM

## 2021-08-03 RX ORDER — ONDANSETRON 2 MG/ML
4 INJECTION INTRAMUSCULAR; INTRAVENOUS
Status: DISCONTINUED | OUTPATIENT
Start: 2021-08-03 | End: 2021-08-03 | Stop reason: HOSPADM

## 2021-08-03 RX ORDER — MIDAZOLAM HYDROCHLORIDE 1 MG/ML
INJECTION INTRAMUSCULAR; INTRAVENOUS PRN
Status: DISCONTINUED | OUTPATIENT
Start: 2021-08-03 | End: 2021-08-03 | Stop reason: SDUPTHER

## 2021-08-03 RX ORDER — PROPOFOL 10 MG/ML
INJECTION, EMULSION INTRAVENOUS PRN
Status: DISCONTINUED | OUTPATIENT
Start: 2021-08-03 | End: 2021-08-03 | Stop reason: SDUPTHER

## 2021-08-03 RX ORDER — MAGNESIUM HYDROXIDE 1200 MG/15ML
LIQUID ORAL CONTINUOUS PRN
Status: COMPLETED | OUTPATIENT
Start: 2021-08-03 | End: 2021-08-03

## 2021-08-03 RX ORDER — OXYCODONE HYDROCHLORIDE AND ACETAMINOPHEN 5; 325 MG/1; MG/1
2 TABLET ORAL PRN
Status: DISCONTINUED | OUTPATIENT
Start: 2021-08-03 | End: 2021-08-03 | Stop reason: HOSPADM

## 2021-08-03 RX ORDER — SODIUM CHLORIDE 9 MG/ML
INJECTION, SOLUTION INTRAVENOUS CONTINUOUS
Status: DISCONTINUED | OUTPATIENT
Start: 2021-08-03 | End: 2021-08-03 | Stop reason: HOSPADM

## 2021-08-03 RX ORDER — FENTANYL CITRATE 50 UG/ML
50 INJECTION, SOLUTION INTRAMUSCULAR; INTRAVENOUS EVERY 5 MIN PRN
Status: DISCONTINUED | OUTPATIENT
Start: 2021-08-03 | End: 2021-08-03 | Stop reason: HOSPADM

## 2021-08-03 RX ADMIN — SODIUM CHLORIDE, POTASSIUM CHLORIDE, SODIUM LACTATE AND CALCIUM CHLORIDE: 600; 310; 30; 20 INJECTION, SOLUTION INTRAVENOUS at 08:44

## 2021-08-03 RX ADMIN — FENTANYL CITRATE 50 MCG: 50 INJECTION, SOLUTION INTRAMUSCULAR; INTRAVENOUS at 10:10

## 2021-08-03 RX ADMIN — PROPOFOL 50 MG: 10 INJECTION, EMULSION INTRAVENOUS at 10:11

## 2021-08-03 RX ADMIN — MIDAZOLAM HYDROCHLORIDE 2 MG: 2 INJECTION, SOLUTION INTRAMUSCULAR; INTRAVENOUS at 09:56

## 2021-08-03 RX ADMIN — FENTANYL CITRATE 50 MCG: 50 INJECTION, SOLUTION INTRAMUSCULAR; INTRAVENOUS at 10:01

## 2021-08-03 RX ADMIN — PROPOFOL 100 MG: 10 INJECTION, EMULSION INTRAVENOUS at 10:03

## 2021-08-03 ASSESSMENT — PULMONARY FUNCTION TESTS
PIF_VALUE: 1

## 2021-08-03 ASSESSMENT — PAIN - FUNCTIONAL ASSESSMENT: PAIN_FUNCTIONAL_ASSESSMENT: 0-10

## 2021-08-03 ASSESSMENT — PAIN SCALES - GENERAL
PAINLEVEL_OUTOF10: 0

## 2021-08-03 NOTE — BRIEF OP NOTE
Brief Postoperative Note      Patient: Stacey Dinh  YOB: 1950  MRN: [id number]    Date of Procedure: 8/3/2021    Pre-Op Diagnosis: Multiple myeloma, [C90.00]    Post-Op Diagnosis: Same       Procedure(s):  TRIFUSION CATHETER REMOVAL    Surgeon(s):  Luna Lin MD    Assistant:  Resident: Hardik Crockett DO    Anesthesia: Monitor Anesthesia Care    Estimated Blood Loss (mL): Minimal    Complications: None    Specimens:   * No specimens in log *    Implants:  * No implants in log *      Drains: * No LDAs found *    Findings: Left subclavian catheter removed.     Electronically signed by Hardik Crockett DO on 8/3/2021 at 10:22 AM

## 2021-08-03 NOTE — PROGRESS NOTES
Patient to pacu 13 s/p TRIFUSION CATHETER REMOVAL, repot received from JAYDA and Dr Carlos Davis.  Reported hemodynamically stable intr op

## 2021-08-03 NOTE — PROGRESS NOTES
Ambulatory Surgery/Procedure Discharge Note    Vitals:    08/03/21 1101   BP: (!) 147/81   Pulse: 86   Resp: 13   Temp: 96.6 °F (35.9 °C)   SpO2: 99%     Pt met criteria for discharge per Alvino score. In: 240 [P.O.:240]  Out: 25     Restroom use offered before discharge. Yes    Pain assessment:  none  Pain Level: 0      Pt and S.O./family states \"ready to go home\". Pt alert and oriented x4. IV removed. Denies N/V or pain. Dressing L CW CDI. Discharge instructions given to pt and  with pt permission. Pt and  verbalized understanding of all instructions. Left with all belongings, 0 prescriptions, and discharge instructions. Patient discharged to home/self care. Patient discharged via wheel chair by transporter to waiting family/S. O. Pt taken via WC to [facility] for her appointment. Called [facility] and they did need to see her today.       8/3/2021 11:09 AM

## 2021-08-03 NOTE — H&P
[address] Same Day Surgery Update H & P  Department of General Surgery   Surgical Service   Pre-operative History and Physical  Last H & P within the last 30 days. DIAGNOSIS:   Multiple myeloma, remission status unspecified ([facility] [C90.00]    Procedure(s):  TRIFUSION CATHETER REMOVAL     HISTORY OF PRESENT ILLNESS:   Patient with hx of multiple myeloma presents today for removal of trifusion catheter. Covid 19:  Patient denies fever, chills, cough or known exposure to Covid-19.        Past Medical History:        Diagnosis Date    Anxiety     Dental crown present     Depression     Hepatitis C      states resolved     Multiple myeloma not having achieved remission ([facility] 06/15/2021     Past Surgical History:        Procedure Laterality Date    CATHETER INSERTION N/A 2021    TRIFUSION CATHETER INSERTION performed by Abel Collet, MD at [place] [address] AND CURETTAGE OF UTERUS      NOSE SURGERY       Past Social History:  Social History     Socioeconomic History    Marital status:      Spouse name: None    Number of children: None    Years of education: None    Highest education level: None   Occupational History    None   Tobacco Use    Smoking status: Former Smoker     Packs/day: 1.00     Years: 15.00     Pack years: 15.00     Types: Cigarettes     Quit date: 2013     Years since quittin.7    Smokeless tobacco: Never Used   Vaping Use    Vaping Use: Never used   Substance and Sexual Activity    Alcohol use: No    Drug use: No    Sexual activity: Never   Other Topics Concern    None   Social History Narrative    None     Social Determinants of Health     Financial Resource Strain:     Difficulty of Paying Living Expenses:    Food Insecurity:     Worried About Running Out of Food in the Last Year:     Ran Out of Food in the Last Year:    Transportation Needs:     Lack of Transportation (Medical): Detail Level: Generalized  Lack of Transportation (Non-Medical):    Physical Activity:     Days of Exercise per Week:     Minutes of Exercise per Session:    Stress:     Feeling of Stress :    Social Connections:     Frequency of Communication with Friends and Family:     Frequency of Social Gatherings with Friends and Family:     Attends Church Services:     Active Member of Clubs or Organizations:     Attends Club or Organization Meetings:     Marital Status:    Intimate Partner Violence:     Fear of Current or Ex-Partner:     Emotionally Abused:     Physically Abused:     Sexually Abused:          Medications Prior to Admission:      Prior to Admission medications    Medication Sig Start Date End Date Taking? Authorizing Provider   acyclovir (ZOVIRAX) 800 MG tablet Take 1 tablet by mouth 2 times daily 7/15/21  Yes Jai Otero, DO   prochlorperazine (COMPAZINE) 10 MG tablet Take 1 tablet by mouth every 6 hours as needed (nausea) 7/15/21  Yes Jai Otero, DO   [facility] 8 MG tablet Take 1 tablet by mouth every 8 hours as needed for Nausea or Vomiting 7/15/21  Yes Jai Otero, DO   LORazepam (ATIVAN) 1 MG tablet Take 1 tablet by mouth 2 times daily for 30 days.  7/15/21 8/14/21 Yes Jai Otero, DO   acetaminophen (TYLENOL) 500 MG tablet Take 2 tablets by mouth every 6 hours as needed for Pain 7/15/21  Yes Jai Otero, DO   Multiple Vitamins-Minerals (ICAPS AREDS 2 PO) Take by mouth   Yes Historical Provider, MD   Cholecalciferol (VITAMIN D) 2000 UNITS CAPS capsule Take by mouth daily Indications: OTC    Yes Historical Provider, MD         Allergies:  Methylprednisolone    PHYSICAL EXAM:      /88   Pulse 103   Temp 98.1 °F (36.7 °C) (Oral)   Resp 16   Ht 5' 5\" (1.651 m)   Wt 157 lb (71.2 kg)   SpO2 99%   BMI 26.13 kg/m²      Airway:  Airway patent with no audible stridor    Heart:  Regular rate and rhythm, No murmur noted    Lungs:  No increased work of breathing, good air exchange, clear to auscultation bilaterally, no crackles or wheezing    Abdomen:  Soft, non-distended, non-tender, no rebound tenderness or guarding, and no masses palpated    ASSESSMENT AND PLAN     Patient is a 72 y.o. female with above specified procedure planned. 1.  The patients history and physical was obtained and signed off by the pre-admission testing department. Patient seen and focused exam done today- no new changes since last physical exam on 7/27/21    2. Access to ancillary services are available per request of the provider.     Sanjiv Flores, APRN - CNP     8/3/2021 Detail Level: Zone Detail Level: Detailed Doxycycline Counseling:  Patient counseled regarding possible photosensitivity and increased risk for sunburn.  Patient instructed to avoid sunlight, if possible.  When exposed to sunlight, patients should wear protective clothing, sunglasses, and sunscreen.  The patient was instructed to call the office immediately if the following severe adverse effects occur:  hearing changes, easy bruising/bleeding, severe headache, or vision changes.  The patient verbalized understanding of the proper use and possible adverse effects of doxycycline.  All of the patient's questions and concerns were addressed. Benzoyl Peroxide Pregnancy And Lactation Text: This medication is Pregnancy Category C. It is unknown if benzoyl peroxide is excreted in breast milk. Minocycline Pregnancy And Lactation Text: This medication is Pregnancy Category D and not consider safe during pregnancy. It is also excreted in breast milk. Topical Clindamycin Counseling: Patient counseled that this medication may cause skin irritation or allergic reactions.  In the event of skin irritation, the patient was advised to reduce the amount of the drug applied or use it less frequently.   The patient verbalized understanding of the proper use and possible adverse effects of clindamycin.  All of the patient's questions and concerns were addressed. Azithromycin Counseling:  I discussed with the patient the risks of azithromycin including but not limited to GI upset, allergic reaction, drug rash, diarrhea, and yeast infections. Isotretinoin Pregnancy And Lactation Text: This medication is Pregnancy Category X and is considered extremely dangerous during pregnancy. It is unknown if it is excreted in breast milk. Birth Control Pills Counseling: Birth Control Pill Counseling: I discussed with the patient the potential side effects of OCPs including but not limited to increased risk of stroke, heart attack, thrombophlebitis, deep venous thrombosis, hepatic adenomas, breast changes, GI upset, headaches, and depression.  The patient verbalized understanding of the proper use and possible adverse effects of OCPs. All of the patient's questions and concerns were addressed. Spironolactone Pregnancy And Lactation Text: This medication can cause feminization of the male fetus and should be avoided during pregnancy. The active metabolite is also found in breast milk. Birth Control Pills Pregnancy And Lactation Text: This medication should be avoided if pregnant and for the first 30 days post-partum. Tetracycline Counseling: Patient counseled regarding possible photosensitivity and increased risk for sunburn.  Patient instructed to avoid sunlight, if possible.  When exposed to sunlight, patients should wear protective clothing, sunglasses, and sunscreen.  The patient was instructed to call the office immediately if the following severe adverse effects occur:  hearing changes, easy bruising/bleeding, severe headache, or vision changes.  The patient verbalized understanding of the proper use and possible adverse effects of tetracycline.  All of the patient's questions and concerns were addressed. Patient understands to avoid pregnancy while on therapy due to potential birth defects. Doxycycline Pregnancy And Lactation Text: This medication is Pregnancy Category D and not consider safe during pregnancy. It is also excreted in breast milk but is considered safe for shorter treatment courses. Topical Retinoid counseling:  Patient advised to apply a pea-sized amount only at bedtime and wait 30 minutes after washing their face before applying.  If too drying, patient may add a non-comedogenic moisturizer. The patient verbalized understanding of the proper use and possible adverse effects of retinoids.  All of the patient's questions and concerns were addressed. Topical Retinoid Pregnancy And Lactation Text: This medication is Pregnancy Category C. It is unknown if this medication is excreted in breast milk. Erythromycin Counseling:  I discussed with the patient the risks of erythromycin including but not limited to GI upset, allergic reaction, drug rash, diarrhea, increase in liver enzymes, and yeast infections. Azithromycin Pregnancy And Lactation Text: This medication is considered safe during pregnancy and is also secreted in breast milk. Sarecycline Counseling: Patient advised regarding possible photosensitivity and discoloration of the teeth, skin, lips, tongue and gums.  Patient instructed to avoid sunlight, if possible.  When exposed to sunlight, patients should wear protective clothing, sunglasses, and sunscreen.  The patient was instructed to call the office immediately if the following severe adverse effects occur:  hearing changes, easy bruising/bleeding, severe headache, or vision changes.  The patient verbalized understanding of the proper use and possible adverse effects of sarecycline.  All of the patient's questions and concerns were addressed. Use Enhanced Medication Counseling?: No Dapsone Counseling: I discussed with the patient the risks of dapsone including but not limited to hemolytic anemia, agranulocytosis, rashes, methemoglobinemia, kidney failure, peripheral neuropathy, headaches, GI upset, and liver toxicity.  Patients who start dapsone require monitoring including baseline LFTs and weekly CBCs for the first month, then every month thereafter.  The patient verbalized understanding of the proper use and possible adverse effects of dapsone.  All of the patient's questions and concerns were addressed. High Dose Vitamin A Counseling: Side effects reviewed, pt to contact office should one occur. Topical Clindamycin Pregnancy And Lactation Text: This medication is Pregnancy Category B and is considered safe during pregnancy. It is unknown if it is excreted in breast milk. High Dose Vitamin A Pregnancy And Lactation Text: High dose vitamin A therapy is contraindicated during pregnancy and breast feeding. Topical Sulfur Applications Counseling: Topical Sulfur Counseling: Patient counseled that this medication may cause skin irritation or allergic reactions.  In the event of skin irritation, the patient was advised to reduce the amount of the drug applied or use it less frequently.   The patient verbalized understanding of the proper use and possible adverse effects of topical sulfur application.  All of the patient's questions and concerns were addressed. Bactrim Counseling:  I discussed with the patient the risks of sulfa antibiotics including but not limited to GI upset, allergic reaction, drug rash, diarrhea, dizziness, photosensitivity, and yeast infections.  Rarely, more serious reactions can occur including but not limited to aplastic anemia, agranulocytosis, methemoglobinemia, blood dyscrasias, liver or kidney failure, lung infiltrates or desquamative/blistering drug rashes. Benzoyl Peroxide Counseling: Patient counseled that medicine may cause skin irritation and bleach clothing.  In the event of skin irritation, the patient was advised to reduce the amount of the drug applied or use it less frequently.   The patient verbalized understanding of the proper use and possible adverse effects of benzoyl peroxide.  All of the patient's questions and concerns were addressed. Dapsone Pregnancy And Lactation Text: This medication is Pregnancy Category C and is not considered safe during pregnancy or breast feeding. Spironolactone Counseling: Patient advised regarding risks of diarrhea, abdominal pain, hyperkalemia, birth defects (for female patients), liver toxicity and renal toxicity. The patient may need blood work to monitor liver and kidney function and potassium levels while on therapy. The patient verbalized understanding of the proper use and possible adverse effects of spironolactone.  All of the patient's questions and concerns were addressed. Erythromycin Pregnancy And Lactation Text: This medication is Pregnancy Category B and is considered safe during pregnancy. It is also excreted in breast milk. Tazorac Counseling:  Patient advised that medication is irritating and drying.  Patient may need to apply sparingly and wash off after an hour before eventually leaving it on overnight.  The patient verbalized understanding of the proper use and possible adverse effects of tazorac.  All of the patient's questions and concerns were addressed. Isotretinoin Counseling: Patient should get monthly blood tests, not donate blood, not drive at night if vision affected, not share medication, and not undergo elective surgery for 6 months after tx completed. Side effects reviewed, pt to contact office should one occur. Tazorac Pregnancy And Lactation Text: This medication is not safe during pregnancy. It is unknown if this medication is excreted in breast milk. Topical Sulfur Applications Pregnancy And Lactation Text: This medication is Pregnancy Category C and has an unknown safety profile during pregnancy. It is unknown if this topical medication is excreted in breast milk. Bactrim Pregnancy And Lactation Text: This medication is Pregnancy Category D and is known to cause fetal risk.  It is also excreted in breast milk. Minocycline Counseling: Patient advised regarding possible photosensitivity and discoloration of the teeth, skin, lips, tongue and gums.  Patient instructed to avoid sunlight, if possible.  When exposed to sunlight, patients should wear protective clothing, sunglasses, and sunscreen.  The patient was instructed to call the office immediately if the following severe adverse effects occur:  hearing changes, easy bruising/bleeding, severe headache, or vision changes.  The patient verbalized understanding of the proper use and possible adverse effects of minocycline.  All of the patient's questions and concerns were addressed.

## 2021-08-03 NOTE — ANESTHESIA POSTPROCEDURE EVALUATION
Department of Anesthesiology  Postprocedure Note    Patient: Evita Steele  MRN: [id number]  YOB: 1950  Date of evaluation: 8/3/2021  Time:  10:53 AM     Procedure Summary     Date: 08/03/21 Room / Location: [address] [place] [address]    Anesthesia Start: 0560 Anesthesia Stop: 5989    Procedure: TRIFUSION CATHETER REMOVAL (N/A Chest) Diagnosis:       [facility]      (Multiple myeloma, [C90.00])    Surgeons: Junie Bence, MD Responsible Provider: Elizabeth Ram MD    Anesthesia Type: MAC ASA Status: 3          Anesthesia Type: MAC    Alvino Phase I: Alvino Score: 10    Alvino Phase II:      Last vitals: Reviewed and per EMR flowsheets.        Anesthesia Post Evaluation    Patient location during evaluation: PACU  Level of consciousness: awake and alert  Airway patency: patent  Nausea & Vomiting: no vomiting  Complications: no  Cardiovascular status: blood pressure returned to baseline  Respiratory status: acceptable  Hydration status: euvolemic

## 2021-08-03 NOTE — ANESTHESIA PRE PROCEDURE
Department of Anesthesiology  Preprocedure Note       Name:  Robel Winslow   Age:  72 y.o.  :  1950                                          MRN:  [id number]         Date:  8/3/2021      Surgeon: Yumiko Jenkins):  Rk Mac MD    Procedure: Procedure(s):  TRIFUSION CATHETER REMOVAL    Medications prior to admission:   Prior to Admission medications    Medication Sig Start Date End Date Taking? Authorizing Provider   acyclovir (ZOVIRAX) 800 MG tablet Take 1 tablet by mouth 2 times daily 7/15/21   Soco Lias., DO   prochlorperazine (COMPAZINE) 10 MG tablet Take 1 tablet by mouth every 6 hours as needed (nausea) 7/15/21   Soco Lias., DO   [facility]etron [facility]) 8 MG tablet Take 1 tablet by mouth every 8 hours as needed for Nausea or Vomiting 7/15/21   Soco Lias., DO   LORazepam (ATIVAN) 1 MG tablet Take 1 tablet by mouth 2 times daily for 30 days. 7/15/21 8/14/21  Soco Lias., DO   acetaminophen (TYLENOL) 500 MG tablet Take 2 tablets by mouth every 6 hours as needed for Pain 7/15/21   Soco Lias., DO   Multiple Vitamins-Minerals (ICAPS AREDS 2 PO) Take by mouth    Historical Provider, MD   Cholecalciferol (VITAMIN D) 2000 UNITS CAPS capsule Take by mouth daily Indications: OTC     Historical Provider, MD       Current medications:    No current facility-administered medications for this visit. No current outpatient medications on file.      Facility-Administered Medications Ordered in Other Visits   Medication Dose Route Frequency Provider Last Rate Last Admin    ceFAZolin (ANCEF) 2000 mg in dextrose 5 % 50 mL IVPB  2,000 mg Intravenous Once Rk Mac MD        sodium chloride flush 0.9 % injection 10 mL  10 mL Intravenous 2 times per day Pepper Binet, DO        sodium chloride flush 0.9 % injection 10 mL  10 mL Intravenous PRN Pepper Binet, DO        0.9 % sodium chloride infusion  25 mL Intravenous PRN Pepper Binet, DO        0.9 % sodium chloride infusion Intravenous Continuous Hershal Maria D, DO        lactated ringers infusion   Intravenous Continuous Hershal Maria D, DO           Allergies: Allergies   Allergen Reactions    Methylprednisolone Other (See Comments)     Severe body pain       Problem List:    Patient Active Problem List   Diagnosis Code    Depression F32.9    Anxiety F41.9    History of hepatitis C Z86.19    Abnormal liver function tests     Ex-smoker Z87.891    Pain medication agreement signed Z02.89    Normocytic anemia D64.9    Neutropenia (HCC) D70.9    Hyponatremia L12.6    Metabolic encephalopathy H97.29    Multiple myeloma not having achieved remission (HCC) C90.00    Autologous donor, stem cells Z52.011    [facility] myeloma in [facility] ([facility] C90.01       Past Medical History:        Diagnosis Date    Anxiety     Dental crown present     Depression     Hepatitis C      states resolved     [facility] 06/15/2021       Past Surgical History:        Procedure Laterality Date    CATHETER INSERTION N/A 2021    TRIFUSION CATHETER INSERTION performed by Bob Hill MD at [facility]         Social History:    Social History     Tobacco Use    Smoking status: Former Smoker     Packs/day: 1.00     Years: 15.00     Pack years: 15.00     Types: Cigarettes     Quit date: 2013     Years since quittin.7    Smokeless tobacco: Never Used   Substance Use Topics    Alcohol use: No                                Counseling given: Not Answered      Vital Signs (Current): There were no vitals filed for this visit.                                            BP Readings from Last 3 Encounters:   21 121/88   07/15/21 106/66   21 (!) 164/95       NPO Status:                                                                                 BMI:   Wt Readings from Last 3 Encounters:   21 157 lb (71.2 kg)   07/15/21 162 lb 14.7 oz (73.9 kg)   06/26/21 160 lb (72.6 kg)     There is no height or weight on file to calculate BMI.    CBC:   Lab Results   Component Value Date    WBC 10.4 07/15/2021    RBC 2.91 07/15/2021    HGB 9.4 07/15/2021    HCT 25.9 07/15/2021    MCV 88.9 07/15/2021    RDW 16.2 07/15/2021    PLT 64 07/15/2021       CMP:   Lab Results   Component Value Date     07/15/2021    K 3.8 07/15/2021    K 4.2 06/26/2021     07/15/2021    CO2 23 07/15/2021    BUN 3 07/15/2021    CREATININE <0.5 07/15/2021    GFRAA >60 07/15/2021    GFRAA >60 03/26/2013    AGRATIO 1.6 06/23/2021    LABGLOM >60 07/15/2021    GLUCOSE 91 07/15/2021    PROT 5.5 07/14/2021    PROT 7.3 11/27/2012    CALCIUM 8.5 07/15/2021    BILITOT 0.4 07/14/2021    ALKPHOS 69 07/14/2021    AST 9 07/14/2021    ALT 7 07/14/2021       POC Tests:   No results for input(s): POCGLU, POCNA, POCK, POCCL, POCBUN, POCHEMO, POCHCT in the last 72 hours.     Coags:   Lab Results   Component Value Date    PROTIME 12.1 07/08/2021    INR 1.07 07/08/2021    APTT 35.3 07/08/2021       HCG (If Applicable): No results found for: PREGTESTUR, PREGSERUM, HCG, HCGQUANT     ABGs: No results found for: PHART, PO2ART, ATB8LLQ, JQV7QYK, BEART, M9JWNDXQ     Type & Screen (If Applicable):  No results found for: LABABO, LABRH    Drug/Infectious Status (If Applicable):  No results found for: HIV, HEPCAB    COVID-19 Screening (If Applicable):   Lab Results   Component Value Date    COVID19 Not Detected 06/25/2021           Anesthesia Evaluation  Patient summary reviewed and Nursing notes reviewed no history of anesthetic complications:   Airway: Mallampati: II  TM distance: >3 FB   Neck ROM: full  Mouth opening: > = 3 FB Dental:          Pulmonary:Negative Pulmonary ROS breath sounds clear to auscultation                             Cardiovascular:Negative CV ROS            Rhythm: regular  Rate: normal                    Neuro/Psych:   (+) depression/anxiety GI/Hepatic/Renal:   (+) hepatitis: C, liver disease:,           Endo/Other:    (+) blood dyscrasia: anemia:., malignancy/cancer (Multiple myeloma). Abdominal:             Vascular: negative vascular ROS. Other Findings:               Anesthesia Plan      MAC     ASA 3       Induction: intravenous. Anesthetic plan and risks discussed with patient. Plan discussed with CRNA.             Grey Davis MD   8/3/2021

## 2021-08-03 NOTE — PROGRESS NOTES
PACU Transfer to \[facility]\""    Vitals:    08/03/21 1045   BP: 132/78   Pulse: 92   Resp:    Temp: 97.6 °F (36.4 °C)   SpO2: 100%         Intake/Output Summary (Last 24 hours) at 8/3/2021 1046  Last data filed at 8/3/2021 1017  Gross per 24 hour   Intake --   Output 25 ml   Net -25 ml       Pain assessment:  level of pain (1-10, 10 severe),   Pain Level: 0    Patient transferred to care of \[facility]\"" RN.    8/3/2021 10:46 AM

## 2021-08-11 ENCOUNTER — OFFICE VISIT (OUTPATIENT)
Dept: FAMILY MEDICINE CLINIC | Age: 71
End: 2021-08-11
Payer: MEDICARE

## 2021-08-11 VITALS
TEMPERATURE: 97.7 F | OXYGEN SATURATION: 99 % | SYSTOLIC BLOOD PRESSURE: 110 MMHG | DIASTOLIC BLOOD PRESSURE: 60 MMHG | WEIGHT: 146 LBS | BODY MASS INDEX: 24.3 KG/M2 | HEART RATE: 103 BPM

## 2021-08-11 DIAGNOSIS — Z23 NEED FOR SHINGLES VACCINE: ICD-10-CM

## 2021-08-11 DIAGNOSIS — C90.00 MULTIPLE MYELOMA, REMISSION STATUS UNSPECIFIED (HCC): ICD-10-CM

## 2021-08-11 DIAGNOSIS — F41.9 ANXIETY: Primary | ICD-10-CM

## 2021-08-11 PROBLEM — F13.99 SEDATIVE, HYPNOTIC OR ANXIOLYTIC USE, UNSPECIFIED WITH UNSPECIFIED SEDATIVE, HYPNOTIC OR ANXIOLYTIC-INDUCED DISORDER (HCC): Status: ACTIVE | Noted: 2021-08-11

## 2021-08-11 PROBLEM — F13.29 SEDATIVE, HYPNOTIC OR ANXIOLYTIC DEPENDENCE WITH UNSPECIFIED SEDATIVE, HYPNOTIC OR ANXIOLYTIC-INDUCED DISORDER (HCC): Status: ACTIVE | Noted: 2021-08-11

## 2021-08-11 PROBLEM — F13.20 SEDATIVE, HYPNOTIC OR ANXIOLYTIC DEPENDENCE, UNCOMPLICATED (HCC): Status: ACTIVE | Noted: 2021-08-11

## 2021-08-11 PROCEDURE — G8399 PT W/DXA RESULTS DOCUMENT: HCPCS | Performed by: INTERNAL MEDICINE

## 2021-08-11 PROCEDURE — G8427 DOCREV CUR MEDS BY ELIG CLIN: HCPCS | Performed by: INTERNAL MEDICINE

## 2021-08-11 PROCEDURE — 1036F TOBACCO NON-USER: CPT | Performed by: INTERNAL MEDICINE

## 2021-08-11 PROCEDURE — 1090F PRES/ABSN URINE INCON ASSESS: CPT | Performed by: INTERNAL MEDICINE

## 2021-08-11 PROCEDURE — 1123F ACP DISCUSS/DSCN MKR DOCD: CPT | Performed by: INTERNAL MEDICINE

## 2021-08-11 PROCEDURE — 99213 OFFICE O/P EST LOW 20 MIN: CPT | Performed by: INTERNAL MEDICINE

## 2021-08-11 PROCEDURE — G8420 CALC BMI NORM PARAMETERS: HCPCS | Performed by: INTERNAL MEDICINE

## 2021-08-11 PROCEDURE — 4040F PNEUMOC VAC/ADMIN/RCVD: CPT | Performed by: INTERNAL MEDICINE

## 2021-08-11 PROCEDURE — 3017F COLORECTAL CA SCREEN DOC REV: CPT | Performed by: INTERNAL MEDICINE

## 2021-08-11 PROCEDURE — 1111F DSCHRG MED/CURRENT MED MERGE: CPT | Performed by: INTERNAL MEDICINE

## 2021-08-11 RX ORDER — ZOSTER VACCINE RECOMBINANT, ADJUVANTED 50 MCG/0.5
0.5 KIT INTRAMUSCULAR SEE ADMIN INSTRUCTIONS
Qty: 0.5 ML | Refills: 1 | Status: SHIPPED | OUTPATIENT
Start: 2021-08-11 | End: 2021-12-01 | Stop reason: ALTCHOICE

## 2021-08-11 RX ORDER — LORAZEPAM 1 MG/1
1 TABLET ORAL 2 TIMES DAILY
Qty: 60 TABLET | Refills: 2 | Status: SHIPPED | OUTPATIENT
Start: 2021-08-11 | End: 2021-11-25

## 2021-08-11 RX ORDER — AMLODIPINE BESYLATE 5 MG/1
TABLET ORAL
COMMUNITY
Start: 2021-07-30 | End: 2021-12-03 | Stop reason: SDUPTHER

## 2021-08-11 NOTE — PROGRESS NOTES
Shantelle Sarkar (:  1950) is a 72 y.o. female,Established patient, here for evaluation of the following chief complaint(s):  Diabetes         ASSESSMENT/PLAN:  1. Anxiety    - LORazepam (ATIVAN) 1 MG tablet; Take 1 tablet by mouth 2 times daily for 30 days. Dispense: 60 tablet; Refill: 2    2. Need for shingles vaccine    - zoster recombinant adjuvanted vaccine [facility]) 50 MCG/0.5ML SUSR injection; Inject 0.5 mLs into the muscle See Admin Instructions 1 dose now and repeat in 2-6 months  Dispense: 0.5 mL; Refill: 1    3. Multiple myeloma, remission status unspecified (HCC)  -under oncology's care        Follow up in 3 months       Subjective   SUBJECTIVE/OBJECTIVE:  HPI   CC_- anxiety- suffers from a lot of anxiety , already has chronic anxiety and has gotten worse with the diagnosis of multiple myeloma. Need shingle vaccine . Jackie Vogt Review of Systems-unremarkable except for what is noted in the HPI       Objective   Physical Exam  Vitals and nursing note reviewed. Constitutional:       Appearance: She is well-developed. HENT:      Head: Normocephalic. Cardiovascular:      Rate and Rhythm: Normal rate and regular rhythm. Pulmonary:      Breath sounds: Normal breath sounds. Abdominal:      Palpations: Abdomen is soft. Musculoskeletal:         General: Normal range of motion. Cervical back: Normal range of motion and neck supple. Skin:     Findings: No rash. Comments:      Neurological:      General: No focal deficit present. Mental Status: She is alert. Psychiatric:         Mood and Affect: Mood normal.         Behavior: Behavior normal.     iNstruction:  -continue lorazepam  -follow up with oncology       Reviewed  previous notes, test results and face to face with the patient discussing the diagnosis and importance of compliance with the treatment plan as well as documenting on the day of the visit.       An electronic signature was used to authenticate this note.    --Laura Kinney MD

## 2021-08-12 NOTE — OP NOTE
[address], [address]                                OPERATIVE REPORT    PATIENT NAME: [facility]                   :        1950  MED REC NO:   [id number]                          ROOM:  ACCOUNT NO:   [de-identified]                           ADMIT DATE: 2021  PROVIDER:     Karmen Gagnon MD    DATE OF PROCEDURE:  2021    PREOPERATIVE DIAGNOSIS:  Multiple myeloma in remission. POSTOPERATIVE DIAGNOSIS:  Multiple myeloma in remission. PROCEDURE PERFORMED:  Removal of left subclavian Trifusion catheter. SURGEON FOR THE CASE:  Karmen Gagnon MD    ANESTHESIA:  Local, MAC.    ESTIMATED BLOOD LOSS:  Minimal.    COMPLICATIONS:  None. SPECIMENS:  None. INDICATIONS FOR THE PROCEDURE:  The patient is a 61-year-old female who  is status post treatment for her multiple myelomas, no longer needing  her Trifusion catheter. As a result of this, a decision was made to  proceed with removal.  The patient wished this to be done under local,  MAC and the risks and benefits of the procedure were discussed with the  patient. Consent was obtained. DETAILS OF THE OPERATION:  The patient was brought to the operating  room, placed in the supine position. After adequate sedation, the  patient was prepped and draped in a sterile fashion. The site for the  exit of the Trifusion catheter was anesthetized with 0.5% Marcaine. Following adequate local anesthetic, the cuff of the catheter was  mobilized up from the surrounding subcutaneous tissue sharply. This was  then carried out to fully expose the catheter proximal to the cuff. Once we were able to do this, the catheter was removed without  difficulty. Pressure was then held and hemostasis was noted and the  wound was then dressed with Polysporin ointment, gauze, and Tegaderm.    The patient tolerated the procedure well and was taken to the recovery  room in stable condition.         Hue Lizarraga MD    D: 08/11/2021 13:34:43       T: 08/11/2021 17:13:45     [email or web address]  Job#: [id number]     Doc#: [id number]    CC:

## 2021-08-13 ENCOUNTER — OFFICE VISIT (OUTPATIENT)
Dept: SURGERY | Age: 71
End: 2021-08-13

## 2021-08-13 VITALS
DIASTOLIC BLOOD PRESSURE: 86 MMHG | TEMPERATURE: 97.9 F | BODY MASS INDEX: 25.03 KG/M2 | WEIGHT: 146.6 LBS | HEIGHT: 64 IN | HEART RATE: 92 BPM | SYSTOLIC BLOOD PRESSURE: 142 MMHG

## 2021-08-13 DIAGNOSIS — Z09 POSTOP CHECK: Primary | ICD-10-CM

## 2021-08-13 PROCEDURE — 99024 POSTOP FOLLOW-UP VISIT: CPT | Performed by: SURGERY

## 2021-08-20 NOTE — PROGRESS NOTES
PATIENT NAME: Rohit Bowser     YOB: 1950     TODAY'S DATE: 2021    Reason for Visit:  Post-op check    Requesting Physician:  Dr. Conor Baig:              The patient is a 72 y.o. female with a PMHx as delineated below who presents for follow up without complaints.      Chief Complaint   Patient presents with    Post-Op Check     Surgery on 21       REVIEW OF SYSTEMS:  CONSTITUTIONAL:  negative  HEENT:  negative  RESPIRATORY:  negative  CARDIOVASCULAR:  negative  GASTROINTESTINAL:  negative  GENITOURINARY:  negative  HEMATOLOGIC/LYMPHATIC:  negative  MUSCULOSKELETAL: negative  NEUROLOGICAL:  negative    PMH  Past Medical History:   Diagnosis Date    Anxiety     Dental crown present     Depression     Hepatitis C      states resolved     [facility] 06/15/2021       [address]  Past Surgical History:   Procedure Laterality Date    CATHETER INSERTION N/A 2021    TRIFUSION CATHETER INSERTION performed by Abel Collet, MD at [address] [place] [address] Road N/A 8/3/2021    TRIFUSION CATHETER REMOVAL performed by Abel Collet, MD at [place] [address] AND CURETTAGE OF UTERUS      NOSE SURGERY         Social History  Social History     Socioeconomic History    Marital status:      Spouse name: Not on file    Number of children: Not on file    Years of education: Not on file    Highest education level: Not on file   Occupational History    Not on file   Tobacco Use    Smoking status: Former Smoker     Packs/day: 1.00     Years: 15.00     Pack years: 15.00     Types: Cigarettes     Quit date: 2013     Years since quittin.7    Smokeless tobacco: Never Used   Vaping Use    Vaping Use: Never used   Substance and Sexual Activity    Alcohol use: No    Drug use: No    Sexual activity: Never   Other Topics Concern    Not on file   Social History Narrative    Not on file Social Determinants of Health     Financial Resource Strain:     Difficulty of Paying Living Expenses:    Food Insecurity:     Worried About Running Out of Food in the Last Year:     [address] in the Last Year:    Transportation Needs:     Lack of Transportation (Medical):      Lack of Transportation (Non-Medical):    Physical Activity:     Days of Exercise per Week:     Minutes of Exercise per Session:    Stress:     Feeling of Stress :    Social Connections:     Frequency of Communication with Friends and Family:     Frequency of Social Gatherings with Friends and Family:     Attends Jew Services:     Active Member of Clubs or Organizations:     Attends Club or Organization Meetings:     Marital Status:    Intimate Partner Violence:     Fear of Current or Ex-Partner:     Emotionally Abused:     Physically Abused:     Sexually Abused:        Family History:       Problem Relation Age of Onset    Arthritis Mother     High Blood Pressure Mother     Vision Loss Mother     Arthritis Father     Cancer Father     Heart Disease Father     Arthritis Sister     Learning Disabilities Sister     Arthritis Brother        Allergy:   Allergies   Allergen Reactions    Methylprednisolone Other (See Comments)     Severe body pain       PHYSICAL EXAM:  VITALS:  BP (!) 142/86   Pulse 92   Temp 97.9 °F (36.6 °C)   Ht 5' 4\" (1.626 m)   Wt 146 lb 9.6 oz (66.5 kg)   BMI 25.16 kg/m²     CONSTITUTIONAL:  alert, no apparent distress and normal weight  EYES:  sclera clear  ENT:  normocepalic, without obvious abnormality  NECK:  supple, symmetrical, trachea midline and no carotid bruits  LUNGS:  clear to auscultation  CARDIOVASCULAR:  regular rate and rhythm and no murmur noted  ABDOMEN:  no scars, normal bowel sounds, soft, non-distended, non-tender, voluntary guarding absent, no masses palpated and hernia absent  MUSCULOSKELETAL:  0+ pitting edema lower extremities  NEUROLOGIC:  Mental Status Exam:  Level of Alertness:   awake  Orientation:   person, place, time  SKIN:  Over the left chest, the wound is healing well    IMPRESSION/RECOMMENDATIONS:    S/P Trifusion catheter removal. No complaint. The wound is healing well. F/U PRN.     Maria Del Rosario Ferguson MD

## 2021-08-30 PROBLEM — C90.02 MULTIPLE MYELOMA IN RELAPSE (HCC): Status: ACTIVE | Noted: 2021-06-15

## 2021-12-01 ENCOUNTER — OFFICE VISIT (OUTPATIENT)
Dept: FAMILY MEDICINE CLINIC | Age: 71
End: 2021-12-01
Payer: MEDICARE

## 2021-12-01 VITALS
RESPIRATION RATE: 16 BRPM | DIASTOLIC BLOOD PRESSURE: 80 MMHG | HEART RATE: 88 BPM | TEMPERATURE: 96.6 F | HEIGHT: 63 IN | WEIGHT: 145 LBS | OXYGEN SATURATION: 100 % | SYSTOLIC BLOOD PRESSURE: 118 MMHG | BODY MASS INDEX: 25.69 KG/M2

## 2021-12-01 DIAGNOSIS — C90.00 MULTIPLE MYELOMA, REMISSION STATUS UNSPECIFIED (HCC): Primary | ICD-10-CM

## 2021-12-01 DIAGNOSIS — L98.9 SKIN LESION: ICD-10-CM

## 2021-12-01 DIAGNOSIS — F13.99 SEDATIVE, HYPNOTIC OR ANXIOLYTIC USE, UNSPECIFIED WITH UNSPECIFIED SEDATIVE, HYPNOTIC OR ANXIOLYTIC-INDUCED DISORDER (HCC): ICD-10-CM

## 2021-12-01 DIAGNOSIS — F41.9 ANXIETY: ICD-10-CM

## 2021-12-01 DIAGNOSIS — Z00.00 ROUTINE GENERAL MEDICAL EXAMINATION AT A HEALTH CARE FACILITY: ICD-10-CM

## 2021-12-01 DIAGNOSIS — Z23 FLU VACCINE NEED: ICD-10-CM

## 2021-12-01 DIAGNOSIS — B00.9 RECURRENT HSV (HERPES SIMPLEX VIRUS): ICD-10-CM

## 2021-12-01 PROCEDURE — G0439 PPPS, SUBSEQ VISIT: HCPCS | Performed by: INTERNAL MEDICINE

## 2021-12-01 PROCEDURE — 3017F COLORECTAL CA SCREEN DOC REV: CPT | Performed by: INTERNAL MEDICINE

## 2021-12-01 PROCEDURE — 90694 VACC AIIV4 NO PRSRV 0.5ML IM: CPT | Performed by: INTERNAL MEDICINE

## 2021-12-01 PROCEDURE — 4040F PNEUMOC VAC/ADMIN/RCVD: CPT | Performed by: INTERNAL MEDICINE

## 2021-12-01 PROCEDURE — G0008 ADMIN INFLUENZA VIRUS VAC: HCPCS | Performed by: INTERNAL MEDICINE

## 2021-12-01 PROCEDURE — G8484 FLU IMMUNIZE NO ADMIN: HCPCS | Performed by: INTERNAL MEDICINE

## 2021-12-01 PROCEDURE — 1123F ACP DISCUSS/DSCN MKR DOCD: CPT | Performed by: INTERNAL MEDICINE

## 2021-12-01 RX ORDER — TETRACYCLINE HYDROCHLORIDE 250 MG/1
250 CAPSULE ORAL 2 TIMES DAILY
Qty: 180 CAPSULE | Refills: 3 | Status: SHIPPED | OUTPATIENT
Start: 2021-12-01 | End: 2022-07-26

## 2021-12-01 RX ORDER — TETRACYCLINE HYDROCHLORIDE 250 MG/1
250 CAPSULE ORAL 3 TIMES DAILY
COMMUNITY
End: 2021-12-01 | Stop reason: SDUPTHER

## 2021-12-01 RX ORDER — LENALIDOMIDE 10 MG/1
CAPSULE ORAL
COMMUNITY
Start: 2021-10-27

## 2021-12-01 RX ORDER — LORATADINE 10 MG/1
TABLET ORAL
COMMUNITY

## 2021-12-01 RX ORDER — LORAZEPAM 1 MG/1
1 TABLET ORAL 2 TIMES DAILY
Qty: 180 TABLET | Refills: 0 | Status: SHIPPED | OUTPATIENT
Start: 2021-12-01 | End: 2022-03-05 | Stop reason: SDUPTHER

## 2021-12-01 SDOH — ECONOMIC STABILITY: FOOD INSECURITY: WITHIN THE PAST 12 MONTHS, THE FOOD YOU BOUGHT JUST DIDN'T LAST AND YOU DIDN'T HAVE MONEY TO GET MORE.: NEVER TRUE

## 2021-12-01 SDOH — ECONOMIC STABILITY: FOOD INSECURITY: WITHIN THE PAST 12 MONTHS, YOU WORRIED THAT YOUR FOOD WOULD RUN OUT BEFORE YOU GOT MONEY TO BUY MORE.: NEVER TRUE

## 2021-12-01 ASSESSMENT — PATIENT HEALTH QUESTIONNAIRE - PHQ9
2. FEELING DOWN, DEPRESSED OR HOPELESS: 0
SUM OF ALL RESPONSES TO PHQ9 QUESTIONS 1 & 2: 0
SUM OF ALL RESPONSES TO PHQ QUESTIONS 1-9: 0
1. LITTLE INTEREST OR PLEASURE IN DOING THINGS: 0
SUM OF ALL RESPONSES TO PHQ QUESTIONS 1-9: 0
SUM OF ALL RESPONSES TO PHQ QUESTIONS 1-9: 0

## 2021-12-01 ASSESSMENT — SOCIAL DETERMINANTS OF HEALTH (SDOH): HOW HARD IS IT FOR YOU TO PAY FOR THE VERY BASICS LIKE FOOD, HOUSING, MEDICAL CARE, AND HEATING?: NOT HARD AT ALL

## 2021-12-01 NOTE — PATIENT INSTRUCTIONS
Learning About Living Mehnaztosha Cameron  What is a living will? A living will, also called a declaration, is a legal form. It tells your family and your doctor your wishes when you can't speak for yourself. It's used by the health professionals who will treat you as you near the end of your life or if you get seriously hurt or ill. If you put your wishes in writing, your loved ones and others will know what kind of care you want. They won't need to guess. This can ease your mind and be helpful to others. And you can change or cancel your living will at any time. A living will is not the same as an estate or property will. An estate will explains what you want to happen with your money and property after you die. How do you use it? A living will is used to describe the kinds of treatment or life support you want as you near the end of your life or if you get seriously hurt or ill. Keep these facts in mind about living tirado. · Your living will is used only if you can't speak or make decisions for yourself. Most often, one or more doctors must certify that you can't speak or decide for yourself before your living will takes effect. · If you get better and can speak for yourself again, you can accept or refuse any treatment. It doesn't matter what you said in your living will. · Some states may limit your right to refuse treatment in certain cases. For example, you may need to clearly state in your living will that you don't want artificial hydration and nutrition, such as being fed through a tube. Is a living will a legal document? A living will is a legal document. Each state has its own laws about living tirado. And a living will may be called something else in your state. Here are some things to know about living tirado. · You don't need an  to complete a living will. But legal advice can be helpful if your state's laws are unclear.  It can also help if your health history is complicated or your family can't agree on what should be in your living will. · You can change your living will at any time. Some people find that their wishes about end-of-life care change as their health changes. If you make big changes to your living will, complete a new form. · If you move to another state, make sure that your living will is legal in the state where you now live. In most cases, doctors will respect your wishes even if you have a form from a different state. · You might use a universal form that has been approved by many states. This kind of form can sometimes be filled out and stored online. Your digital copy will then be available wherever you have a connection to the internet. The doctors and nurses who need to treat you can find it right away. · Your state may offer an online registry. This is another place where you can store your living will online. · It's a good idea to get your living will notarized. This means using a person called a  to watch two people sign, or witness, your living will. What should you know when you create a living will? Here are some questions to ask yourself as you make your living will:  · Do you know enough about life support methods that might be used? If not, talk to your doctor so you know what might be done if you can't breathe on your own, your heart stops, or you can't swallow. · What things would you still want to be able to do after you receive life-support methods? Would you want to be able to walk? To speak? To eat on your own? To live without the help of machines? · Do you want certain [employer] practices performed if you become very ill? · If you have a choice, where do you want to be cared for? In your home? At a hospital or nursing home? · If you have a choice at the end of your life, where would you prefer to die? At home? In a hospital or nursing home? Somewhere else? · Would you prefer to be buried or cremated?   · Do you want your organs to be donated after you die? What should you do with your living will? · Make sure that your family members and your health care agent have copies of your living will (also called a declaration). · Give your doctor a copy of your living will. Ask him or her to keep it as part of your medical record. If you have more than one doctor, make sure that each one has a copy. · Put a copy of your living will where it can be easily found. For example, some people may put a copy on their refrigerator door. If you are using a digital copy, be sure your doctor, family members, and health care agent know how to find and access it. Where can you learn more? Go to [email or web address]://ZoweeTVpeDownrange Enterpriseseb.[email or web address] org and sign in to your [employer] account. Enter S687 in the Source MDx box to learn more about \"Learning About Living [place]. \"     If you do not have an account, please click on the \"Sign Up Now\" link. Current as of: March 17, 2021               Content Version: 13.0  © 2006-2021 [employer]. Care instructions adapted under license by [facility] [address] If you have questions about a medical condition or this instruction, always ask your healthcare professional. [place] [address] any warranty or liability for your use of this information. Deciding About Using Medicines To Quit Smoking  How can you decide about using medicines to quit smoking? What are the medicines you can use? Your doctor may prescribe varenicline (Chantix) or bupropion (Zyban). These medicines can help you cope with cravings for tobacco. They are pills that don't contain nicotine. You also can use nicotine replacement products. These do contain nicotine. There are many types. · Gum and lozenges slowly release nicotine into your mouth. · Patches stick to your skin. They slowly release nicotine into your bloodstream.  · An inhaler has a vela that contains nicotine.  You breathe in a puff of nicotine vapor through your mouth and throat. · Nasal spray releases a mist that contains nicotine. What are key points about this decision? · Using medicines can double your chances of quitting smoking. They can ease cravings and withdrawal symptoms. · Getting counseling along with using medicine can raise your chances of quitting even more. · If you smoke fewer than 5 cigarettes a day, you may not need medicines to help you quit smoking. · These medicines have less nicotine than cigarettes. And by itself, nicotine is not nearly as harmful as smoking. The tars, carbon monoxide, and other toxic chemicals in tobacco cause the harmful effects. · The side effects of nicotine replacement products depend on the type of product. For example, a patch can make your skin red and itchy. Medicines in pill form can make you sick to your stomach. They can also cause dry mouth and trouble sleeping. For most people, the side effects are not bad enough to make them stop using the products. Why might you choose to use medicines to quit smoking? · You have tried on your own to stop smoking, but you were not able to stop. · You smoke more than 5 cigarettes a day. · You want to increase your chances of quitting smoking. · You want to reduce your cravings and withdrawal symptoms. · You feel the benefits of medicine outweigh the side effects. Why might you choose not to use medicine? · You want to try quitting on your own by stopping all at once (\"cold turkey\"). · You want to cut back slowly on the number of cigarettes you smoke. · You smoke fewer than 5 cigarettes a day. · You do not like using medicine. · You feel the side effects of medicines outweigh the benefits. · You are worried about the cost of medicines. Your decision  Thinking about the facts and your feelings can help you make a decision that is right for you.  Be sure you understand the benefits and risks of your options, and think about what else you need to do before you make the decision. Where can you learn more? Go to https://chpepiceweb.healthbackstitch. org and sign in to your [employer] account. Enter N168 in the State mental health facility box to learn more about \"Deciding About Using Medicines To Quit Smoking. \"     If you do not have an account, please click on the \"Sign Up Now\" link. Current as of: February 11, 2021               Content Version: 13.0  © 9411-5856 Healthwise, [employer]. Care instructions adapted under license by [facility] ([facility]). If you have questions about a medical condition or this instruction, always ask your healthcare professional. [address] any warranty or liability for your use of this information. Personalized Preventive Plan for Lucy Carrillo - 12/1/2021  Medicare offers a range of preventive health benefits. Some of the tests and screenings are paid in full while other may be subject to a deductible, co-insurance, and/or copay. Some of these benefits include a comprehensive review of your medical history including lifestyle, illnesses that may run in your family, and various assessments and screenings as appropriate. After reviewing your medical record and screening and assessments performed today your provider may have ordered immunizations, labs, imaging, and/or referrals for you. A list of these orders (if applicable) as well as your Preventive Care list are included within your After Visit Summary for your review. Other Preventive Recommendations:    · A preventive eye exam performed by an eye specialist is recommended every 1-2 years to screen for glaucoma; cataracts, macular degeneration, and other eye disorders. · A preventive dental visit is recommended every 6 months. · Try to get at least 150 minutes of exercise per week or 10,000 steps per day on a pedometer . · Order or download the FREE \"Exercise & Physical Activity: Your Everyday Guide\" from The [employer] on Aging.  Call 4-757-627-0282 or search The [employer] Data on [address] [address]. · You need 1522-9638 mg of calcium and 0920-5872 IU of vitamin D per day. It is possible to meet your calcium requirement with diet alone, but a vitamin D supplement is usually necessary to meet this goal.  · When exposed to the sun, use a sunscreen that protects against both UVA and UVB radiation with an SPF of 30 or greater. Reapply every 2 to 3 hours or after sweating, drying off with a towel, or swimming. · Always wear a seat belt when traveling in a car. Always wear a helmet when riding a bicycle or motorcycle. Learning About Yang Fournier  What is a living will? A living will, also called a declaration, is a legal form. It tells your family and your doctor your wishes when you can't speak for yourself. It's used by the health professionals who will treat you as you near the end of your life or if you get seriously hurt or ill. If you put your wishes in writing, your loved ones and others will know what kind of care you want. They won't need to guess. This can ease your mind and be helpful to others. And you can change or cancel your living will at any time. A living will is not the same as an estate or property will. An estate will explains what you want to happen with your money and property after you die. How do you use it? A living will is used to describe the kinds of treatment or life support you want as you near the end of your life or if you get seriously hurt or ill. Keep these facts in mind about living tirado. Your living will is used only if you can't speak or make decisions for yourself. Most often, one or more doctors must certify that you can't speak or decide for yourself before your living will takes effect. If you get better and can speak for yourself again, you can accept or refuse any treatment. It doesn't matter what you said in your living will.   Some states may limit your right to refuse treatment in certain cases. For example, you may need to clearly state in your living will that you don't want artificial hydration and nutrition, such as being fed through a tube. Is a living will a legal document? A living will is a legal document. Each state has its own laws about living tirado. And a living will may be called something else in your state. Here are some things to know about living tirado. You don't need an  to complete a living will. But legal advice can be helpful if your state's laws are unclear. It can also help if your health history is complicated or your family can't agree on what should be in your living will. You can change your living will at any time. Some people find that their wishes about end-of-life care change as their health changes. If you make big changes to your living will, complete a new form. If you move to another state, make sure that your living will is legal in the state where you now live. In most cases, doctors will respect your wishes even if you have a form from a different state. You might use a universal form that has been approved by many states. This kind of form can sometimes be filled out and stored online. Your digital copy will then be available wherever you have a connection to the internet. The doctors and nurses who need to treat you can find it right away. Your state may offer an online registry. This is another place where you can store your living will online. It's a good idea to get your living will notarized. This means using a person called a  to watch two people sign, or witness, your living will. What should you know when you create a living will? Here are some questions to ask yourself as you make your living will:  Do you know enough about life support methods that might be used? If not, talk to your doctor so you know what might be done if you can't breathe on your own, your heart stops, or you can't swallow.   What things would you still want to be able to do after you receive life-support methods? Would you want to be able to walk? To speak? To eat on your own? To live without the help of machines? Do you want certain Christian practices performed if you become very ill? If you have a choice, where do you want to be cared for? In your home? At a hospital or nursing home? If you have a choice at the end of your life, where would you prefer to die? At home? In a hospital or nursing home? Somewhere else? Would you prefer to be buried or cremated? Do you want your organs to be donated after you die? What should you do with your living will? Make sure that your family members and your health care agent have copies of your living will (also called a declaration). Give your doctor a copy of your living will. Ask him or her to keep it as part of your medical record. If you have more than one doctor, make sure that each one has a copy. Put a copy of your living will where it can be easily found. For example, some people may put a copy on their refrigerator door. If you are using a digital copy, be sure your doctor, family members, and health care agent know how to find and access it. Where can you learn more? Go to https://QXL ricardo plcpepiceweb.Applied [employer]omics. org and sign in to your [employer] account. Enter A984 in the DailyTicket box to learn more about \"Learning About Living [place]. \"     If you do not have an account, please click on the \"Sign Up Now\" link. Current as of: March 17, 2021               Content Version: 13.0  © 5921-0094 Healthwise, Incorporated. Care instructions adapted under license by [facility] If you have questions about a medical condition or this instruction, always ask your healthcare professional. [place] [address] any warranty or liability for your use of this information.     ·   Personalized Preventive Plan for Judy Ames - 12/1/2021  Medicare offers a range of preventive health benefits. Some of the tests and screenings are paid in full while other may be subject to a deductible, co-insurance, and/or copay. Some of these benefits include a comprehensive review of your medical history including lifestyle, illnesses that may run in your family, and various assessments and screenings as appropriate. After reviewing your medical record and screening and assessments performed today your provider may have ordered immunizations, labs, imaging, and/or referrals for you. A list of these orders (if applicable) as well as your Preventive Care list are included within your After Visit Summary for your review. Other Preventive Recommendations:    · A preventive eye exam performed by an eye specialist is recommended every 1-2 years to screen for glaucoma; cataracts, macular degeneration, and other eye disorders. · A preventive dental visit is recommended every 6 months. · Try to get at least 150 minutes of exercise per week or 10,000 steps per day on a pedometer . · Order or download the FREE \"Exercise & Physical Activity: Your Everyday Guide\" from The "[employer] Data on Aging. Call 9-439.731.8359 or search The "[employer] Data on Aging online. · You need 8730-6873 mg of calcium and 9975-2255 IU of vitamin D per day. It is possible to meet your calcium requirement with diet alone, but a vitamin D supplement is usually necessary to meet this goal.  · When exposed to the sun, use a sunscreen that protects against both UVA and UVB radiation with an SPF of 30 or greater. Reapply every 2 to 3 hours or after sweating, drying off with a towel, or swimming. · Always wear a seat belt when traveling in a car. Always wear a helmet when riding a bicycle or motorcycle. Learning About Medical Power of   What is a medical power of ?      A medical power of , also called a durable power of  for health care, is one type of the legal forms called advance directives. It lets you name the person you want to make treatment decisions for you if you can't speak or decide for yourself. The person you choose is called your health care agent. This person is also called a health care proxy or health care surrogate. A medical power of  may be called something else in your state. How do you choose a health care agent? Choose your health care agent carefully. This person may or may not be a family member. Talk to the person before you make your final decision. Make sure he or she is comfortable with this responsibility. It's a good idea to choose someone who:  Is at least 25years old. Knows you well and understands what makes life meaningful for you. Understands your Faith and moral values. Will do what you want, not what he or she wants. Will be able to make difficult choices at a stressful time. Will be able to refuse or stop treatment, if that is what you would want, even if you could die. Will be firm and confident with health professionals if needed. Will ask questions to get needed information. Lives near you or agrees to travel to you if needed. Your family may help you make medical decisions while you can still be part of that process. But it's important to choose one person to be your health care agent in case you aren't able to make decisions for yourself. If you don't fill out the legal form and name a health care agent, the decisions your family can make may be limited. A health care agent may be called something else in your state. Who will make decisions for you if you don't have a health care agent? If you don't have a health care agent or a living will, you may not get the care you want. Decisions may be made by family members who disagree about your medical care. Or decisions may be made by a medical professional who doesn't know you well. In some cases, a  makes the decisions.   When you name a health care agent, it is very

## 2021-12-01 NOTE — PROGRESS NOTES
Medicare Annual Wellness Visit  Name: Dustin Mcrae Date: 2021   MRN: <[id number]> Sex: Female   Age: 72 y.o. Ethnicity: Non- / Non    : 1950 Race: White (non-)      Marcie Frank is here for Medicare AWV    Screenings for behavioral, psychosocial and functional/safety risks, and cognitive dysfunction are all negative except as indicated below. These results, as well as other patient data from the [address] form, are documented in Flowsheets linked to this Encounter. Allergies   Allergen Reactions    Methylprednisolone Other (See Comments)     Severe body pain       Prior to Visit Medications    Medication Sig Taking? Authorizing Provider   REVLIMID 10 MG chemo capsule  Yes Historical Provider, MD   loratadine (CLARITIN) 10 MG tablet loratadine 10 MG Oral Tablet  oral  one daily    Inactive Yes Historical Provider, MD   tetracycline (ACHROMYCIN;SUMYCIN) 250 MG capsule Take 250 mg by mouth 3 times daily Yes Historical Provider, MD   LORazepam (ATIVAN) 1 MG tablet TAKE 1 TABLET BY MOUTH 2 TIMES DAILY FOR 30 DAYS.  Yes Jody Tucker MD   amLODIPine (NORVASC) 5 MG tablet  Yes Historical Provider, MD   acyclovir (ZOVIRAX) 800 MG tablet Take 1 tablet by mouth 2 times daily Yes Cesia Whittaker., DO   ondansetron (ZOFRAN) 8 MG tablet Take 1 tablet by mouth every 8 hours as needed for Nausea or Vomiting Yes Cesia Whittaker., DO   acetaminophen (TYLENOL) 500 MG tablet Take 2 tablets by mouth every 6 hours as needed for Pain Yes Cesia Whittaker., DO   Multiple Vitamins-Minerals (ICAPS AREDS 2 PO) Take by mouth Yes Historical Provider, MD   Cholecalciferol (VITAMIN D) 2000 UNITS CAPS capsule Take by mouth daily Indications: OTC  Yes Historical Provider, MD   prochlorperazine (COMPAZINE) 10 MG tablet Take 1 tablet by mouth every 6 hours as needed (nausea)  Patient not taking: Reported on 2021  Cesia Whittaker., DO       Past Medical History: Diagnosis Date    Anxiety     Dental crown present     Depression     Hepatitis C      states resolved     Multiple myeloma not having achieved remission ([facility] [place] [address] 06/15/2021       Past Surgical History:   Procedure Laterality Date    CATHETER INSERTION N/A 6/21/2021    TRIFUSION CATHETER INSERTION performed by Pankaj Paz MD at [address] N/A 8/3/2021    TRIFUSION CATHETER REMOVAL performed by Pankaj Paz MD at [facility] 10 AND CURETTAGE OF UTERUS      NOSE SURGERY         Family History   Problem Relation Age of Onset    Arthritis Mother     High Blood Pressure Mother     Vision Loss Mother     Arthritis Father     Cancer Father     Heart Disease Father     Arthritis Sister     Learning Disabilities Sister     Arthritis Brother        CareTeam (Including outside providers/suppliers regularly involved in providing care):   Patient Care Team:  Red Klein MD as PCP - General (Family Medicine)  Red Klein MD as PCP - [facility] Empaneled Provider  Eugene Rao MD (Orthopedic Surgery)  [place], [place] as Physician Assistant (Physician Assistant Surgical)  Rasheed Cardenas MD as Consulting Physician (Gastroenterology)    Wt Readings from Last 3 Encounters:   12/01/21 145 lb (65.8 kg)   08/13/21 146 lb 9.6 oz (66.5 kg)   08/11/21 146 lb (66.2 kg)     Vitals:    12/01/21 0753   BP: 118/80   Site: Left Upper Arm   Position: Sitting   Cuff Size: Medium Adult   Pulse: 88   Resp: 16   Temp: 96.6 °F (35.9 °C)   TempSrc: Temporal   SpO2: 100%   Weight: 145 lb (65.8 kg)   Height: 5' 3\" (1.6 m)     Body mass index is 25.69 kg/m². Based upon direct observation of the patient, evaluation of cognition reveals recent and remote memory intact. Patient's complete Health Risk Assessment and screening values have been reviewed and are found in Flowsheets.  The following problems were reviewed today and where indicated follow up appointments were made and/or referrals ordered.     Positive Risk Factor Screenings with Interventions:          General Health and ACP:     Advance Directives     Power of  Living Will ACP-Advance Directive ACP-Power of     Not on File Not on File Not on File Not on File      General Health Risk Interventions:  · Recovering from  multiple myeloma        Personalized Preventive Plan   Current Health Maintenance Status  Immunization History   Administered Date(s) Administered    COVID-19, Pan Taylor, PF, 30mcg/0.3mL 02/22/2021, 03/15/2021, 10/08/2021, 11/05/2021    Influenza 09/13/2011, 09/28/2012, 09/30/2013    Influenza A ([id number]) Vaccine PF IM 12/18/2009    Influenza Vaccine, unspecified formulation 09/30/2013    Influenza Virus Vaccine 10/19/2015, 11/07/2016    Influenza Whole 11/08/2010    Influenza, MDCK Quadv, IM, PF (Flucelvax 2 yrs and older) 10/05/2018    Influenza, MDCK Quadv, with preserv IM (Flucelvax 2 yrs and older) 10/05/2018    Influenza, Quadv, IM, (6 mo and older Fluzone, Flulaval, Fluarix and 3 yrs and older Afluria) 11/07/2016    Influenza, Quadv, IM, PF (6 mo and older Fluzone, Flulaval, Fluarix, and 3 yrs and older Afluria) 10/08/2020    Influenza, Triv, inactivated, subunit, adjuvanted, IM (Fluad 65 yrs and older) 10/03/2019    Pneumococcal Conjugate 13-valent ([id number]) 02/12/2018    Pneumococcal Polysaccharide ([id number]) 02/07/2017    Tdap (Boostrix, Adacel) 09/30/2013    Tetanus 07/08/2003    Zoster Live (Zostavax) 07/03/2012        Health Maintenance   Topic Date Due    Colon Cancer Screen FIT/FOBT  07/18/2020    Flu vaccine (1) 09/01/2021    Annual Wellness Visit (AWV)  10/21/2021    Shingles Vaccine (2 of 3) 01/11/2022 (Originally 8/28/2012)    COVID-19 Vaccine (4 - Booster for Pfizer series) 05/05/2022    Breast cancer screen  06/03/2022    DTaP/Tdap/Td vaccine (2 - Td or Tdap) 09/30/2023    Lipid screen  11/27/2025    DEXA (modify frequency per FRAX score)  Completed    Pneumococcal 65+ yrs at Risk Vaccine  Completed    Hepatitis A vaccine  Aged Out    Hepatitis B vaccine  Aged Out    Hib vaccine  Aged Out    Meningococcal (ACWY) vaccine  Aged Out     Recommendations for [email or web address] Due: see orders and patient instructions/AVS.  . Recommended screening schedule for the next 5-10 years is provided to the patient in written form: see Patient Instructions/AVS.     Ninfa Nick came in today for [facility]        Diagnosis Orders   1. Multiple myeloma, remission status unspecified ([facility] [address]     2. Routine general medical examination at a health care facility  INFLUENZA, QUADV, ADJUVANTED, 65 YRS =, IM, PF, PREFILL SYR, 0.5ML (FLUAD)   3. Anxiety  LORazepam (ATIVAN) 1 MG tablet   4. Sedative, hypnotic or anxiolytic use, unspecified with unspecified sedative, hypnotic or anxiolytic-induced disorder     5. Recurrent HSV (herpes simplex virus)     6. Skin lesion  tetracycline (ACHROMYCIN;SUMYCIN) 250 MG capsule   7.  Flu vaccine need

## 2021-12-02 ENCOUNTER — TELEPHONE (OUTPATIENT)
Dept: FAMILY MEDICINE CLINIC | Age: 71
End: 2021-12-02

## 2021-12-02 NOTE — TELEPHONE ENCOUNTER
Pharmacy called stating that they normally compound the strength 225 mg. They are asking to change the strength from 250 mg to 225 mg.     Please advise- Pk Tao w/ [address] [facility] [phone]

## 2021-12-03 RX ORDER — AMLODIPINE BESYLATE 5 MG/1
5 TABLET ORAL DAILY
Qty: 30 TABLET | Refills: 2 | Status: SHIPPED | OUTPATIENT
Start: 2021-12-03 | End: 2022-03-21 | Stop reason: SDUPTHER

## 2021-12-03 NOTE — TELEPHONE ENCOUNTER
Future Appointments   Date Time Provider Shaun Jack   2/23/2022  7:40 AM MD OZZY Shepherd - NICKIE MCGARRY 12/1/2021

## 2021-12-03 NOTE — TELEPHONE ENCOUNTER
----- Message from Brenda Arias sent at 12/3/2021  8:45 AM EST -----  Subject: Refill Request    QUESTIONS  Name of Medication? amLODIPine (NORVASC) 5 MG tablet  Patient-reported dosage and instructions? 5mg i tab every morning  How many days do you have left? 15  Preferred Pharmacy? [email or web address] phone number (if available)? [phone]  Additional Information for Provider? PT was seen at ED in November and   prescribed blood pressure medication AMLODIPINE 5mg and PT states she will   need a refill and would like Dr. Earl Lawton to refill it for her  ---------------------------------------------------------------------------  --------------  [address]  What is the best way for the office to contact you? OK to leave message on   voicemail  Preferred Call Back Phone Number?  [phone]

## 2021-12-08 ENCOUNTER — TELEPHONE (OUTPATIENT)
Dept: FAMILY MEDICINE CLINIC | Age: 71
End: 2021-12-08

## 2021-12-08 NOTE — TELEPHONE ENCOUNTER
Miriam from [facility] called. They received an RX 12/1/21 for Tetracycline 250 mg that they compound. In the past patient's Rx has been for 225 mg.   Please clarify and send new RX if needed    Marlen's # is [phone]

## 2021-12-09 ENCOUNTER — TELEPHONE (OUTPATIENT)
Dept: FAMILY MEDICINE CLINIC | Age: 71
End: 2021-12-09

## 2021-12-09 NOTE — TELEPHONE ENCOUNTER
Future Appointments   Date Time Provider Shaun Marcie   12/13/2021  9:00 AM MD OZZY Arredondo - NICK[place]   2/23/2022  7:40 AM MD OZZY Arredondo

## 2021-12-09 NOTE — TELEPHONE ENCOUNTER
----- Message from Sydneyshelli Barragan sent at 12/9/2021 10:51 AM EST -----  Subject: Message to Provider    QUESTIONS  Information for Provider? PT HAD HER FLU VACCINE LAST WED AND BROKE OUT   WITH A LITTLE BIT OF SHINGLES. SHE IS TAKING ACYCLOVIR AND WOULD LIKE TO   KNOW IF SHE SHOULD UP HER DOSAGE AS IT HAS NOT GONE AWAY YET. SHE WANTED   ME TO LET YOU KNOW THAT SHE IS ALSO GOING TO LET HER ONCOLOGIST KNOW AS   WELL.   ---------------------------------------------------------------------------  --------------  CALL BACK INFO  What is the best way for the office to contact you? Do not leave any   message, patient will call back for answer  Preferred Call Back Phone Number? [phone]  ---------------------------------------------------------------------------  --------------  SCRIPT ANSWERS  Relationship to Patient?  Self

## 2021-12-10 NOTE — TELEPHONE ENCOUNTER
Gave verbal order to cristel lorenz for 225mg. It is much cheaper for patient to get 225mg than 250mg.

## 2022-02-15 ENCOUNTER — PATIENT MESSAGE (OUTPATIENT)
Dept: FAMILY MEDICINE CLINIC | Age: 72
End: 2022-02-15

## 2022-02-15 NOTE — TELEPHONE ENCOUNTER
From: Lion Rosen  To: Dr. Karel Pearson: 2/15/2022 1:18 PM EST  Subject: neurologist    Hi. My  wants to know if you know of any Neurologist that you could recommend for him. His Doctor is leaving  and Denisa Price would like to get someone closer.  Thanks

## 2022-03-04 DIAGNOSIS — F41.9 ANXIETY: ICD-10-CM

## 2022-03-04 NOTE — TELEPHONE ENCOUNTER
Pt called because she is out of medication tomorrow and her pharmacy is not open Sunday. She would like for this to be filled today if possible.      Future Appointments   Date Time Provider Shaun Jack   3/7/2022  2:20 PM Jes Garcia MD [place] [id number]

## 2022-03-04 NOTE — TELEPHONE ENCOUNTER
12/1/2021        Future Appointments   Date Time Provider Shaun Jack   3/7/2022  2:20 PM Daija Salas MD [place] [id number]

## 2022-03-05 RX ORDER — LORAZEPAM 1 MG/1
1 TABLET ORAL 2 TIMES DAILY
Qty: 180 TABLET | Refills: 0 | Status: SHIPPED | OUTPATIENT
Start: 2022-03-05 | End: 2022-07-26 | Stop reason: SDUPTHER

## 2022-03-07 ENCOUNTER — OFFICE VISIT (OUTPATIENT)
Dept: FAMILY MEDICINE CLINIC | Age: 72
End: 2022-03-07
Payer: MEDICARE

## 2022-03-07 VITALS
OXYGEN SATURATION: 99 % | RESPIRATION RATE: 16 BRPM | HEIGHT: 63 IN | TEMPERATURE: 97 F | HEART RATE: 73 BPM | BODY MASS INDEX: 27.18 KG/M2 | WEIGHT: 153.4 LBS | DIASTOLIC BLOOD PRESSURE: 62 MMHG | SYSTOLIC BLOOD PRESSURE: 106 MMHG

## 2022-03-07 DIAGNOSIS — F41.9 ANXIETY: Primary | ICD-10-CM

## 2022-03-07 DIAGNOSIS — D70.9 NEUTROPENIA, UNSPECIFIED TYPE (HCC): ICD-10-CM

## 2022-03-07 DIAGNOSIS — R21 SKIN RASH: ICD-10-CM

## 2022-03-07 DIAGNOSIS — C90.00 MULTIPLE MYELOMA, REMISSION STATUS UNSPECIFIED (HCC): ICD-10-CM

## 2022-03-07 DIAGNOSIS — F13.99 SEDATIVE, HYPNOTIC OR ANXIOLYTIC USE, UNSPECIFIED WITH UNSPECIFIED SEDATIVE, HYPNOTIC OR ANXIOLYTIC-INDUCED DISORDER (HCC): ICD-10-CM

## 2022-03-07 PROCEDURE — G8484 FLU IMMUNIZE NO ADMIN: HCPCS | Performed by: INTERNAL MEDICINE

## 2022-03-07 PROCEDURE — G8427 DOCREV CUR MEDS BY ELIG CLIN: HCPCS | Performed by: INTERNAL MEDICINE

## 2022-03-07 PROCEDURE — 1090F PRES/ABSN URINE INCON ASSESS: CPT | Performed by: INTERNAL MEDICINE

## 2022-03-07 PROCEDURE — 1036F TOBACCO NON-USER: CPT | Performed by: INTERNAL MEDICINE

## 2022-03-07 PROCEDURE — G8399 PT W/DXA RESULTS DOCUMENT: HCPCS | Performed by: INTERNAL MEDICINE

## 2022-03-07 PROCEDURE — G8417 CALC BMI ABV UP PARAM F/U: HCPCS | Performed by: INTERNAL MEDICINE

## 2022-03-07 PROCEDURE — 1123F ACP DISCUSS/DSCN MKR DOCD: CPT | Performed by: INTERNAL MEDICINE

## 2022-03-07 PROCEDURE — 4040F PNEUMOC VAC/ADMIN/RCVD: CPT | Performed by: INTERNAL MEDICINE

## 2022-03-07 PROCEDURE — 3017F COLORECTAL CA SCREEN DOC REV: CPT | Performed by: INTERNAL MEDICINE

## 2022-03-07 PROCEDURE — 99213 OFFICE O/P EST LOW 20 MIN: CPT | Performed by: INTERNAL MEDICINE

## 2022-03-07 RX ORDER — CLOTRIMAZOLE AND BETAMETHASONE DIPROPIONATE 10; .64 MG/G; MG/G
CREAM TOPICAL 2 TIMES DAILY
Qty: 45 G | Refills: 1 | Status: SHIPPED | OUTPATIENT
Start: 2022-03-07

## 2022-03-07 NOTE — PROGRESS NOTES
Sd Oseguera (:  1950) is a 80 y.o. female,Established patient, here for evaluation of the following chief complaint(s):  Depression, Anxiety, and Rash (intermittent rash on neck x 1 week)         ASSESSMENT/PLAN:         Subjective   SUBJECTIVE/OBJECTIVE:  HPI  CC- skin rash- comes and goes for the past 2 weeks. Started on the left side of the neck then spread t he right side of her chest. Sounds andlook like the fungal rash sh ehad in the past. Will try lotrisone cream.    Is less anxious since her multiple myeloma treatment is completed. Feeling better with her illness now is worried about her 's medical condition. Review of Systems   Constitutional: Negative for activity change. HENT: Negative for congestion. Respiratory: Negative for cough. Cardiovascular: Negative for chest pain. Genitourinary: Negative. Musculoskeletal: Negative for arthralgias. Skin: Positive for rash. Neurological: Negative for dizziness. Seizures:    Hematological: Negative for adenopathy. Psychiatric/Behavioral: Positive for sleep disturbance. The patient is nervous/anxious. Objective   Physical Exam  Vitals and nursing note reviewed. Constitutional:       Appearance: Normal appearance. She is well-developed. HENT:      Head: Normocephalic. Nose: Nose normal.   Eyes:      Pupils: Pupils are equal, round, and reactive to light. Cardiovascular:      Rate and Rhythm: Normal rate and regular rhythm. Pulmonary:      Breath sounds: Normal breath sounds. Abdominal:      Palpations: Abdomen is soft. Musculoskeletal:         General: Normal range of motion. Cervical back: Normal range of motion and neck supple. Skin:     Findings: Rash present. Comments:      Neurological:      General: No focal deficit present. Mental Status: She is alert.    Psychiatric:         Mood and Affect: Mood normal.         Behavior: Behavior normal.     Instruction:  -continue lorazepam  -try lotrisone cream, call if not improved     Face to face with the patient discussing the diagnosis and importance of compliance with the treatment plan as well as documenting on the day of the visit. An electronic signature was used to authenticate this note.     --Mary Carlos MD

## 2022-03-13 ASSESSMENT — ENCOUNTER SYMPTOMS: COUGH: 0

## 2022-03-21 RX ORDER — AMLODIPINE BESYLATE 5 MG/1
5 TABLET ORAL DAILY
Qty: 30 TABLET | Refills: 2 | Status: SHIPPED | OUTPATIENT
Start: 2022-03-21 | End: 2022-06-10 | Stop reason: SDUPTHER

## 2022-06-10 RX ORDER — AMLODIPINE BESYLATE 5 MG/1
5 TABLET ORAL DAILY
Qty: 30 TABLET | Refills: 2 | Status: SHIPPED | OUTPATIENT
Start: 2022-06-10 | End: 2022-07-26 | Stop reason: ALTCHOICE

## 2022-06-29 ENCOUNTER — HOSPITAL ENCOUNTER (OUTPATIENT)
Dept: GENERAL RADIOLOGY | Age: 72
Discharge: HOME | End: 2022-06-29
Payer: MEDICARE

## 2022-06-29 ENCOUNTER — OFFICE VISIT (OUTPATIENT)
Dept: FAMILY MEDICINE CLINIC | Age: 72
End: 2022-06-29
Payer: MEDICARE

## 2022-06-29 VITALS
DIASTOLIC BLOOD PRESSURE: 76 MMHG | HEART RATE: 67 BPM | BODY MASS INDEX: 27.63 KG/M2 | WEIGHT: 156 LBS | TEMPERATURE: 97.1 F | OXYGEN SATURATION: 99 % | RESPIRATION RATE: 16 BRPM | SYSTOLIC BLOOD PRESSURE: 127 MMHG

## 2022-06-29 DIAGNOSIS — M25.512 ACUTE PAIN OF LEFT SHOULDER: Primary | ICD-10-CM

## 2022-06-29 DIAGNOSIS — M25.512 ACUTE PAIN OF LEFT SHOULDER: ICD-10-CM

## 2022-06-29 PROCEDURE — 1036F TOBACCO NON-USER: CPT | Performed by: NURSE PRACTITIONER

## 2022-06-29 PROCEDURE — 3017F COLORECTAL CA SCREEN DOC REV: CPT | Performed by: NURSE PRACTITIONER

## 2022-06-29 PROCEDURE — 99213 OFFICE O/P EST LOW 20 MIN: CPT | Performed by: NURSE PRACTITIONER

## 2022-06-29 PROCEDURE — 1090F PRES/ABSN URINE INCON ASSESS: CPT | Performed by: NURSE PRACTITIONER

## 2022-06-29 PROCEDURE — 1123F ACP DISCUSS/DSCN MKR DOCD: CPT | Performed by: NURSE PRACTITIONER

## 2022-06-29 PROCEDURE — 73030 X-RAY EXAM OF SHOULDER: CPT

## 2022-06-29 PROCEDURE — G8417 CALC BMI ABV UP PARAM F/U: HCPCS | Performed by: NURSE PRACTITIONER

## 2022-06-29 PROCEDURE — G8399 PT W/DXA RESULTS DOCUMENT: HCPCS | Performed by: NURSE PRACTITIONER

## 2022-06-29 PROCEDURE — G8427 DOCREV CUR MEDS BY ELIG CLIN: HCPCS | Performed by: NURSE PRACTITIONER

## 2022-06-29 RX ORDER — TIZANIDINE 2 MG/1
2 TABLET ORAL 3 TIMES DAILY PRN
Qty: 30 TABLET | Refills: 0 | Status: SHIPPED | OUTPATIENT
Start: 2022-06-29 | End: 2022-07-20 | Stop reason: SDUPTHER

## 2022-06-29 ASSESSMENT — ANXIETY QUESTIONNAIRES
4. TROUBLE RELAXING: 0
6. BECOMING EASILY ANNOYED OR IRRITABLE: 0
IF YOU CHECKED OFF ANY PROBLEMS ON THIS QUESTIONNAIRE, HOW DIFFICULT HAVE THESE PROBLEMS MADE IT FOR YOU TO DO YOUR WORK, TAKE CARE OF THINGS AT HOME, OR GET ALONG WITH OTHER PEOPLE: NOT DIFFICULT AT ALL
1. FEELING NERVOUS, ANXIOUS, OR ON EDGE: 0
GAD7 TOTAL SCORE: 0
7. FEELING AFRAID AS IF SOMETHING AWFUL MIGHT HAPPEN: 0
2. NOT BEING ABLE TO STOP OR CONTROL WORRYING: 0
3. WORRYING TOO MUCH ABOUT DIFFERENT THINGS: 0
5. BEING SO RESTLESS THAT IT IS HARD TO SIT STILL: 0

## 2022-06-29 ASSESSMENT — PATIENT HEALTH QUESTIONNAIRE - PHQ9
SUM OF ALL RESPONSES TO PHQ9 QUESTIONS 1 & 2: 0
DEPRESSION UNABLE TO ASSESS: FUNCTIONAL CAPACITY MOTIVATION LIMITS ACCURACY
SUM OF ALL RESPONSES TO PHQ QUESTIONS 1-9: 0
1. LITTLE INTEREST OR PLEASURE IN DOING THINGS: 0
SUM OF ALL RESPONSES TO PHQ QUESTIONS 1-9: 0
2. FEELING DOWN, DEPRESSED OR HOPELESS: 0

## 2022-06-29 ASSESSMENT — ENCOUNTER SYMPTOMS
COUGH: 0
DIARRHEA: 0
VOMITING: 0
NAUSEA: 0
SHORTNESS OF BREATH: 0

## 2022-06-29 NOTE — PROGRESS NOTES
2022     Chief Complaint   Patient presents with    Joint Pain     thinks it may be joint pain left shoulder and arm      Catalina Fernandez (:  1950) is a 80 y.o. female, here for evaluation of the following medical concerns:    HPI    Shoulder pain: left x 3 days, no injury. Pain worsening. Increases with movement. Had trouble laying down to sleep last night due to pain, had to sleep sitting up on the couch. Pain is located in the posterior shoulder, left side of the neck, anterior shoulder. At times pain shoots down the inner arm to the elbow. Denies paresthesias. Sometimes left shoulder pain will increase with right shoulder movement. Denies history of shoulder problems with the shoulder previously. She has tried Aleve which is provided minimal improvement in her pain. Review of Systems   Constitutional: Negative for chills, fatigue and fever. Respiratory: Negative for cough and shortness of breath. Cardiovascular: Negative for chest pain and leg swelling. Gastrointestinal: Negative for diarrhea, nausea and vomiting. Musculoskeletal: Positive for arthralgias and neck pain. Neurological: Negative for dizziness and headaches. All other systems reviewed and are negative. Prior to Visit Medications    Medication Sig Taking?  Authorizing Provider   tiZANidine (ZANAFLEX) 2 MG tablet Take 1 tablet by mouth 3 times daily as needed (muscle pain) Yes DELORES Jaquez - MANDA   amLODIPine (NORVASC) 5 MG tablet Take 1 tablet by mouth daily  Jaimee Rausch MD   clotrimazole-betamethasone (LOTRISONE) 1-0.05 % cream Apply topically 2 times daily  Jaimee Rausch MD   REVLIMID 10 MG chemo capsule   Historical Provider, MD   loratadine (CLARITIN) 10 MG tablet loratadine 10 MG Oral Tablet  oral  one daily    Inactive  Historical Provider, MD   tetracycline (ACHROMYCIN;SUMYCIN) 250 MG capsule Take 1 capsule by mouth 2 times daily  Patient not taking: Reported on 3/7/2022  Piotr Fitzgerald Cassandra Avery MD   acyclovir (ZOVIRAX) 800 MG tablet Take 1 tablet by mouth 2 times daily  Patient not taking: Reported on 3/7/2022  Conor Blade., DO   prochlorperazine (COMPAZINE) 10 MG tablet Take 1 tablet by mouth every 6 hours as needed (nausea)  Conor Blade., DO   ondansetron (ZOFRAN) 8 MG tablet Take 1 tablet by mouth every 8 hours as needed for Nausea or Vomiting  Conor Blade., DO   acetaminophen (TYLENOL) 500 MG tablet Take 2 tablets by mouth every 6 hours as needed for Pain  Patient not taking: Reported on 3/7/2022  Conor Blade., DO   Multiple Vitamins-Minerals (ICAPS AREDS 2 PO) Take by mouth  Historical Provider, MD   Cholecalciferol (VITAMIN D) 2000 UNITS CAPS capsule Take by mouth daily Indications: OTC   Historical Provider, MD        Social History     Tobacco Use    Smoking status: Former Smoker     Packs/day: 1.00     Years: 15.00     Pack years: 15.00     Types: Cigarettes     Quit date: 2013     Years since quittin.6    Smokeless tobacco: Never Used   Substance Use Topics    Alcohol use: No        Vitals:    22 1313   BP: 127/76   Site: Right Upper Arm   Position: Sitting   Pulse: 67   Resp: 16   Temp: 97.1 °F (36.2 °C)   TempSrc: Temporal   SpO2: 99%   Weight: 156 lb (70.8 kg)     Estimated body mass index is 27.63 kg/m² as calculated from the following:    Height as of 3/7/22: 5' 3\" (1.6 m). Weight as of this encounter: 156 lb (70.8 kg). Physical Exam  Vitals and nursing note reviewed. Constitutional:       General: She is not in acute distress. Appearance: Normal appearance. She is well-developed. She is not ill-appearing, toxic-appearing or diaphoretic. HENT:      Head: Normocephalic and atraumatic. Eyes:      Extraocular Movements: Extraocular movements intact. Pupils: Pupils are equal, round, and reactive to light. Cardiovascular:      Rate and Rhythm: Normal rate and regular rhythm.       Heart sounds: Normal heart sounds, S1 normal and S2 normal. No murmur heard. No friction rub. No gallop. Pulmonary:      Effort: Pulmonary effort is normal. No respiratory distress. Breath sounds: Normal breath sounds. No wheezing or rales. Musculoskeletal:      Left shoulder: Tenderness present. Decreased range of motion. Decreased strength. Neurological:      General: No focal deficit present. Mental Status: She is alert and oriented to person, place, and time. Mental status is at baseline. Cranial Nerves: No cranial nerve deficit. Psychiatric:         Speech: Speech normal.         ASSESSMENT/PLAN:  1. Acute pain of left shoulder  - XR SHOULDER LEFT (MIN 2 VIEWS); Future  - tiZANidine (ZANAFLEX) 2 MG tablet; Take 1 tablet by mouth 3 times daily as needed (muscle pain)  Dispense: 30 tablet; Refill: 0    Shoulder x-ray today  Continue Aleve  Tizanidine as needed  Consider Ortho referral    Return if symptoms worsen or fail to improve. An electronic signature was used to authenticate this note.     --DELORES Mehta - CNP on 6/29/2022 at 4:10 PM

## 2022-06-30 DIAGNOSIS — M25.512 ACUTE PAIN OF LEFT SHOULDER: Primary | ICD-10-CM

## 2022-07-20 ENCOUNTER — OFFICE VISIT (OUTPATIENT)
Dept: ORTHOPEDIC SURGERY | Age: 72
End: 2022-07-20
Payer: MEDICARE

## 2022-07-20 VITALS — HEIGHT: 63 IN | BODY MASS INDEX: 27.64 KG/M2 | WEIGHT: 156 LBS

## 2022-07-20 DIAGNOSIS — M25.512 ACUTE PAIN OF LEFT SHOULDER: ICD-10-CM

## 2022-07-20 DIAGNOSIS — M54.2 CERVICAL PAIN: ICD-10-CM

## 2022-07-20 DIAGNOSIS — M50.30 DDD (DEGENERATIVE DISC DISEASE), CERVICAL: Primary | ICD-10-CM

## 2022-07-20 PROCEDURE — 1036F TOBACCO NON-USER: CPT | Performed by: PHYSICIAN ASSISTANT

## 2022-07-20 PROCEDURE — 1090F PRES/ABSN URINE INCON ASSESS: CPT | Performed by: PHYSICIAN ASSISTANT

## 2022-07-20 PROCEDURE — G8417 CALC BMI ABV UP PARAM F/U: HCPCS | Performed by: PHYSICIAN ASSISTANT

## 2022-07-20 PROCEDURE — 3017F COLORECTAL CA SCREEN DOC REV: CPT | Performed by: PHYSICIAN ASSISTANT

## 2022-07-20 PROCEDURE — 99204 OFFICE O/P NEW MOD 45 MIN: CPT | Performed by: PHYSICIAN ASSISTANT

## 2022-07-20 PROCEDURE — 1123F ACP DISCUSS/DSCN MKR DOCD: CPT | Performed by: PHYSICIAN ASSISTANT

## 2022-07-20 PROCEDURE — G8399 PT W/DXA RESULTS DOCUMENT: HCPCS | Performed by: PHYSICIAN ASSISTANT

## 2022-07-20 PROCEDURE — G8427 DOCREV CUR MEDS BY ELIG CLIN: HCPCS | Performed by: PHYSICIAN ASSISTANT

## 2022-07-20 RX ORDER — TIZANIDINE 2 MG/1
2 TABLET ORAL 3 TIMES DAILY PRN
Qty: 30 TABLET | Refills: 0 | Status: SHIPPED | OUTPATIENT
Start: 2022-07-20 | End: 2022-08-03

## 2022-07-20 NOTE — PROGRESS NOTES
Outpatient Medications:     tiZANidine (ZANAFLEX) 2 MG tablet, Take 1 tablet by mouth 3 times daily as needed (muscle pain), Disp: 30 tablet, Rfl: 0    amLODIPine (NORVASC) 5 MG tablet, Take 1 tablet by mouth daily, Disp: 30 tablet, Rfl: 2    clotrimazole-betamethasone (LOTRISONE) 1-0.05 % cream, Apply topically 2 times daily, Disp: 45 g, Rfl: 1    REVLIMID 10 MG chemo capsule, , Disp: , Rfl:     loratadine (CLARITIN) 10 MG tablet, loratadine 10 MG Oral Tablet  oral  one daily    Inactive, Disp: , Rfl:     tetracycline (ACHROMYCIN;SUMYCIN) 250 MG capsule, Take 1 capsule by mouth 2 times daily (Patient not taking: Reported on 3/7/2022), Disp: 180 capsule, Rfl: 3    acyclovir (ZOVIRAX) 800 MG tablet, Take 1 tablet by mouth 2 times daily (Patient not taking: Reported on 3/7/2022), Disp: 60 tablet, Rfl: 11    prochlorperazine (COMPAZINE) 10 MG tablet, Take 1 tablet by mouth every 6 hours as needed (nausea), Disp: 60 tablet, Rfl: 3    ondansetron (ZOFRAN) 8 MG tablet, Take 1 tablet by mouth every 8 hours as needed for Nausea or Vomiting, Disp: 60 tablet, Rfl: 3    acetaminophen (TYLENOL) 500 MG tablet, Take 2 tablets by mouth every 6 hours as needed for Pain (Patient not taking: Reported on 3/7/2022), Disp: 540 tablet, Rfl: 1    Multiple Vitamins-Minerals (ICAPS AREDS 2 PO), Take by mouth, Disp: , Rfl:     Cholecalciferol (VITAMIN D) 2000 UNITS CAPS capsule, Take by mouth daily Indications: OTC , Disp: , Rfl:     Allergies:  Methylprednisolone    Social History:    reports that she quit smoking about 8 years ago. Her smoking use included cigarettes. She has a 15.00 pack-year smoking history. She has never used smokeless tobacco. She reports that she does not drink alcohol and does not use drugs.     Family History:   Family History   Problem Relation Age of Onset    Arthritis Mother     High Blood Pressure Mother     Vision Loss Mother     Arthritis Father     Cancer Father     Heart Disease Father     Arthritis Sister Learning Disabilities Sister     Arthritis Brother        REVIEW OF SYSTEMS: Full ROS noted & scanned   CONSTITUTIONAL: Denies unexplained weight loss, fevers, chills or fatigue  NEUROLOGICAL: Denies unsteady gait or progressive weakness  MUSCULOSKELETAL: Denies joint swelling or redness  PSYCHOLOGICAL: Patient has a history of anxiety and depression  SKIN: Denies skin changes, delayed healing, rash, itching   HEMATOLOGIC: Denies easy bleeding or bruising  ENDOCRINE: Denies excessive thirst, urination, heat/cold  RESPIRATORY: Denies current dyspnea, cough  GI: Denies nausea, vomiting, diarrhea   : Denies bowel or bladder issues       PHYSICAL EXAM:    Vitals: not currently breastfeeding. GENERAL EXAM:  General Apparence: Patient is adequately groomed with no evidence of malnutrition. Orientation: The patient is oriented to time, place and person. Mood & Affect:The patient's mood and affect are appropriate   Vascular: Examination reveals no swelling tenderness in upper or lower extremities. Good capillary refill  Lymphatic: The lymphatic examination bilaterally reveals all areas to be without enlargement or induration  Sensation: Sensation is intact without deficit  Coordination/Balance: Good coordination. CERVICAL EXAMINATION:  Inspection: Local inspection shows no step-off or bruising. Cervical alignment is normal.     Palpation: No evidence of tenderness at the midline, and trapezius. Paraspinal tenderness is present. There is no step-off or paraspinal spasm. Range of Motion: Cervical flexion, extension, and rotation are mildly reduced with pain. Strength: 5/5 bilateral upper extremities   Special Tests:     Spurling's negative & Flores's negative bilaterally. Cubital and Carpal tunnel Tinel's negative bilaterally. Skin:There are no rashes, ulcerations or lesions in right & left upper extremities. Reflexes: Bilaterally triceps, biceps and brachioradialis are 2+.   Clonus absent bilaterally at the feet. Gait & station: normal, patient ambulates without assistance       Additional Examinations:       RIGHT UPPER EXTREMITY:  Inspection/examination of the right upper extremity does not show any tenderness, deformity or injury. Range of motion is unremarkable. There is no gross instability. There are no rashes, ulcerations or lesions. Strength and tone are normal.  LEFT UPPER EXTREMITY: Inspection/examination of the left upper extremity does not show any tenderness, deformity or injury. Range of motion is unremarkable. There is no gross instability. There are no rashes, ulcerations or lesions. Strength and tone are normal.    Diagnostic Testing:  X-rays: 2 views of the cervical spine include AP and lateral were obtained today in the office and independently reviewed with the patient which shows disc base narrowing at C5-6 and C6-7 with anterior peripheral osteophyte formation noted. Impression:   DDD cervical spine    1. Cervical pain        Plan:      We discussed the diagnosis and treatment options including observation, physical therapy, epidural injections or additional imaging. She wishes to proceed with outpatient physical therapy for range of motion progressive strengthening exercises with modalities of choice to include dry needling. She was also given a refill of her Zanaflex that she is to take as needed. She was also given information on obtaining a home cervical traction unit that she should use on a nightly basis. Follow Up: If the patient has no durable benefit from these conservative treatment she will contact the office for scheduling of a cervical MRI. Old records were reviewed.     Jose Elias Gu PA-C  Board certified by the [facility] [address] [facility]

## 2022-07-25 ENCOUNTER — HOSPITAL ENCOUNTER (OUTPATIENT)
Dept: PHYSICAL THERAPY | Age: 72
Setting detail: THERAPIES SERIES
Discharge: HOME | End: 2022-07-25

## 2022-07-25 NOTE — FLOWSHEET NOTE
[address] and [facility], [facility]5 [address], [address] [place] [address] [address]  Phone: [phone]   Fax:     [phone]    Physical Therapy  Cancellation/No-show Note  Patient Name:  Umair Toro  :  1950   Date:  2022    Cancelled visits to date: 1  No-shows to date: 0    For today's appointment patient:  [x]  Cancelled  []  Rescheduled appointment  []  No-show     Reason given by patient:  []  Patient ill  []  Conflicting appointment  []  No transportation    []  Conflict with work  []  No reason given  [x]  Other:     Comments: going to different clinic      Phone call information:   []  Phone call made today to patient at am/pm at the number provided:      []  Patient answered, conversation as follows:    []  Patient did not answer, message left as follows:  [x]  Phone call not needed - pt contacted us to cancel and provided reason for cancellation.      Electronically signed by:  Shabana Roper, PT, PT

## 2022-07-26 ENCOUNTER — OFFICE VISIT (OUTPATIENT)
Dept: FAMILY MEDICINE CLINIC | Age: 72
End: 2022-07-26
Payer: MEDICARE

## 2022-07-26 VITALS
SYSTOLIC BLOOD PRESSURE: 140 MMHG | WEIGHT: 152 LBS | HEIGHT: 63 IN | TEMPERATURE: 96.9 F | HEART RATE: 68 BPM | BODY MASS INDEX: 26.93 KG/M2 | OXYGEN SATURATION: 98 % | DIASTOLIC BLOOD PRESSURE: 82 MMHG | RESPIRATION RATE: 16 BRPM

## 2022-07-26 DIAGNOSIS — F41.9 ANXIETY: Primary | ICD-10-CM

## 2022-07-26 DIAGNOSIS — F43.9 STRESS AT HOME: ICD-10-CM

## 2022-07-26 DIAGNOSIS — Z12.31 ENCOUNTER FOR SCREENING MAMMOGRAM FOR MALIGNANT NEOPLASM OF BREAST: ICD-10-CM

## 2022-07-26 PROCEDURE — G8427 DOCREV CUR MEDS BY ELIG CLIN: HCPCS | Performed by: INTERNAL MEDICINE

## 2022-07-26 PROCEDURE — 1090F PRES/ABSN URINE INCON ASSESS: CPT | Performed by: INTERNAL MEDICINE

## 2022-07-26 PROCEDURE — 1036F TOBACCO NON-USER: CPT | Performed by: INTERNAL MEDICINE

## 2022-07-26 PROCEDURE — G8399 PT W/DXA RESULTS DOCUMENT: HCPCS | Performed by: INTERNAL MEDICINE

## 2022-07-26 PROCEDURE — G8417 CALC BMI ABV UP PARAM F/U: HCPCS | Performed by: INTERNAL MEDICINE

## 2022-07-26 PROCEDURE — 1123F ACP DISCUSS/DSCN MKR DOCD: CPT | Performed by: INTERNAL MEDICINE

## 2022-07-26 PROCEDURE — 3017F COLORECTAL CA SCREEN DOC REV: CPT | Performed by: INTERNAL MEDICINE

## 2022-07-26 PROCEDURE — 99213 OFFICE O/P EST LOW 20 MIN: CPT | Performed by: INTERNAL MEDICINE

## 2022-07-26 RX ORDER — LORAZEPAM 1 MG/1
1 TABLET ORAL 2 TIMES DAILY
Qty: 180 TABLET | Refills: 0 | Status: SHIPPED | OUTPATIENT
Start: 2022-07-26 | End: 2022-10-20 | Stop reason: SDUPTHER

## 2022-07-26 RX ORDER — AMLODIPINE BESYLATE 5 MG/1
1 TABLET ORAL
COMMUNITY
Start: 2021-07-30 | End: 2022-09-22

## 2022-07-26 ASSESSMENT — PATIENT HEALTH QUESTIONNAIRE - PHQ9
6. FEELING BAD ABOUT YOURSELF - OR THAT YOU ARE A FAILURE OR HAVE LET YOURSELF OR YOUR FAMILY DOWN: 0
SUM OF ALL RESPONSES TO PHQ9 QUESTIONS 1 & 2: 1
5. POOR APPETITE OR OVEREATING: 1
4. FEELING TIRED OR HAVING LITTLE ENERGY: 0
SUM OF ALL RESPONSES TO PHQ QUESTIONS 1-9: 2
SUM OF ALL RESPONSES TO PHQ9 QUESTIONS 1 & 2: 1
SUM OF ALL RESPONSES TO PHQ QUESTIONS 1-9: 1
3. TROUBLE FALLING OR STAYING ASLEEP: 0
2. FEELING DOWN, DEPRESSED OR HOPELESS: 0
SUM OF ALL RESPONSES TO PHQ QUESTIONS 1-9: 1
8. MOVING OR SPEAKING SO SLOWLY THAT OTHER PEOPLE COULD HAVE NOTICED. OR THE OPPOSITE, BEING SO FIGETY OR RESTLESS THAT YOU HAVE BEEN MOVING AROUND A LOT MORE THAN USUAL: 0
7. TROUBLE CONCENTRATING ON THINGS, SUCH AS READING THE NEWSPAPER OR WATCHING TELEVISION: 0
2. FEELING DOWN, DEPRESSED OR HOPELESS: 1
SUM OF ALL RESPONSES TO PHQ QUESTIONS 1-9: 2
1. LITTLE INTEREST OR PLEASURE IN DOING THINGS: 0
SUM OF ALL RESPONSES TO PHQ QUESTIONS 1-9: 1
1. LITTLE INTEREST OR PLEASURE IN DOING THINGS: 1
SUM OF ALL RESPONSES TO PHQ QUESTIONS 1-9: 2
10. IF YOU CHECKED OFF ANY PROBLEMS, HOW DIFFICULT HAVE THESE PROBLEMS MADE IT FOR YOU TO DO YOUR WORK, TAKE CARE OF THINGS AT HOME, OR GET ALONG WITH OTHER PEOPLE: 0
SUM OF ALL RESPONSES TO PHQ QUESTIONS 1-9: 2
9. THOUGHTS THAT YOU WOULD BE BETTER OFF DEAD, OR OF HURTING YOURSELF: 0
SUM OF ALL RESPONSES TO PHQ QUESTIONS 1-9: 1

## 2022-07-26 NOTE — PROGRESS NOTES
22    Deanna Zamorano (: 1950) is a 80 y.o. female, here for evaluation of the following medical concerns:    HPI;  Mood Disorder:  Patient presents for follow-up of depression and anxiety disorder. Current complaints include: depressed mood and fatigue. She denies any other symptoms. Symptoms/signs of john: none. External stressors: nothing new. Current treatment includes: Take lorazepam  Medication side effects: nonenone. 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 Multiple myeloma in remission. Review of Systems   Constitutional:  Negative for activity change. HENT:  Negative for congestion. Respiratory:  Negative for cough. Cardiovascular:  Negative for chest pain. Genitourinary: Negative. Musculoskeletal:  Negative for arthralgias. Skin:  Negative for rash. Neurological:  Negative for dizziness. Seizures: . Hematological:  Negative for adenopathy. Psychiatric/Behavioral:  Positive for sleep disturbance. The patient is nervous/anxious.       Current Outpatient Medications   Medication Sig Dispense Refill    amLODIPine (NORVASC) 5 MG tablet Take 1 tablet by mouth      tiZANidine (ZANAFLEX) 2 MG tablet Take 1 tablet by mouth 3 times daily as needed (muscle pain) 30 tablet 0    REVLIMID 10 MG chemo capsule       loratadine (CLARITIN) 10 MG tablet loratadine 10 MG Oral Tablet  oral  one daily    Inactive      Multiple Vitamins-Minerals (ICAPS AREDS 2 PO) Take by mouth      Cholecalciferol (VITAMIN D) 2000 UNITS CAPS capsule Take by mouth daily Indications: OTC       clotrimazole-betamethasone (LOTRISONE) 1-0.05 % cream Apply topically 2 times daily (Patient not taking: Reported on 2022) 45 g 1    prochlorperazine (COMPAZINE) 10 MG tablet Take 1 tablet by mouth every 6 hours as needed (nausea) (Patient not taking: Reported on 2022) 60 tablet 3    ondansetron (ZOFRAN) 8 MG tablet Take 1 tablet by mouth every 8 hours as needed for Nausea or Vomiting (Patient not taking: Reported on 2022) 60 tablet 3     No current facility-administered medications for this visit. Social History     Socioeconomic History    Marital status:      Spouse name: Not on file    Number of children: Not on file    Years of education: Not on file    Highest education level: Not on file   Occupational History    Not on file   Tobacco Use    Smoking status: Former     Packs/day: 1.00     Years: 15.00     Pack years: 15.00     Types: Cigarettes     Quit date: 2013     Years since quittin.7    Smokeless tobacco: Never   Vaping Use    Vaping Use: Never used   Substance and Sexual Activity    Alcohol use: No    Drug use: No    Sexual activity: Never   Other Topics Concern    Not on file   Social History Narrative    Not on file     Social Determinants of Health     Financial Resource Strain: Low Risk     Difficulty of Paying Living Expenses: Not hard at all   Food Insecurity: No Food Insecurity    Worried About Running Out of Food in the Last Year: Never true    Ran Out of Food in the Last Year: Never true   Transportation Needs: Not on file   Physical Activity: Not on file   Stress: Not on file   Social Connections: Not on file   Intimate Partner Violence: Not on file   Housing Stability: Not on file       Vitals:    22 0807   BP: (!) 140/82   Pulse:    Resp:    Temp:    SpO2:         Body mass index is 26.93 kg/m². Physical Exam  Vitals and nursing note reviewed. Constitutional:       Appearance: Normal appearance. She is well-developed. HENT:      Head: Normocephalic. Nose: Nose normal.   Eyes:      Pupils: Pupils are equal, round, and reactive to light. Cardiovascular:      Rate and Rhythm: Normal rate and regular rhythm. Pulmonary:      Breath sounds: Normal breath sounds. Abdominal:      Palpations: Abdomen is soft. Musculoskeletal:         General: Normal range of motion.       Cervical back: Normal range of motion and neck supple. Skin:     Findings: No rash. Comments:      Neurological:      General: No focal deficit present. Mental Status: She is alert. Psychiatric:         Mood and Affect: Mood normal.         Behavior: Behavior normal.     ASSESSMENT/PLAN:  1. Encounter for screening mammogram for malignant neoplasm of breast  -need her mammogram    2. Anxiety    - LORazepam (ATIVAN) 1 MG tablet; Take 1 tablet by mouth in the morning and 1 tablet before bedtime. Do all this for 90 days. Dispense: 180 tablet; Refill: 0    3. Stress at home  -lorazepam helps    INstruction;  -take lorazepam  as needed, not more than 2 aday  -follow up with oncology    Follow up in 4 months for wellness    Reviewed previous notes, tests results and face to face with the patient discussing the diagnosis and importance  of compliance with the treatments as well as documenting on the day of visit. Answered questions and encouraged to call  for any other concerns. An electronic signature was used to authenticate this note.     --Ramona Palacios MD on 07/26/22 at 8:38 AM

## 2022-07-31 ASSESSMENT — ENCOUNTER SYMPTOMS: COUGH: 0

## 2022-08-03 ENCOUNTER — TELEPHONE (OUTPATIENT)
Dept: ORTHOPEDIC SURGERY | Age: 72
End: 2022-08-03

## 2022-08-03 RX ORDER — TIZANIDINE 4 MG/1
4 TABLET ORAL EVERY 8 HOURS PRN
Qty: 90 TABLET | Refills: 0 | Status: SHIPPED | OUTPATIENT
Start: 2022-08-03 | End: 2022-09-02

## 2022-08-03 NOTE — TELEPHONE ENCOUNTER
The patient has been taking the muscle relaxer that Goran Mejia order for her 2 every 3 hrs like her referring doctor told her too because it wasn't working the regular way and she forgot to tell Goran Mejia about it so she will need a refill before her 30 days is up. Please call.

## 2022-08-31 ENCOUNTER — OFFICE VISIT (OUTPATIENT)
Dept: FAMILY MEDICINE CLINIC | Age: 72
End: 2022-08-31
Payer: MEDICARE

## 2022-08-31 VITALS
SYSTOLIC BLOOD PRESSURE: 129 MMHG | TEMPERATURE: 98.2 F | HEART RATE: 76 BPM | DIASTOLIC BLOOD PRESSURE: 87 MMHG | WEIGHT: 150 LBS | OXYGEN SATURATION: 99 % | RESPIRATION RATE: 16 BRPM | BODY MASS INDEX: 26.58 KG/M2

## 2022-08-31 DIAGNOSIS — J02.9 SORE THROAT: Primary | ICD-10-CM

## 2022-08-31 DIAGNOSIS — R09.81 NASAL CONGESTION: ICD-10-CM

## 2022-08-31 LAB — S PYO AG THROAT QL: NORMAL

## 2022-08-31 PROCEDURE — 3017F COLORECTAL CA SCREEN DOC REV: CPT | Performed by: NURSE PRACTITIONER

## 2022-08-31 PROCEDURE — 1123F ACP DISCUSS/DSCN MKR DOCD: CPT | Performed by: NURSE PRACTITIONER

## 2022-08-31 PROCEDURE — G8399 PT W/DXA RESULTS DOCUMENT: HCPCS | Performed by: NURSE PRACTITIONER

## 2022-08-31 PROCEDURE — G8417 CALC BMI ABV UP PARAM F/U: HCPCS | Performed by: NURSE PRACTITIONER

## 2022-08-31 PROCEDURE — 99213 OFFICE O/P EST LOW 20 MIN: CPT | Performed by: NURSE PRACTITIONER

## 2022-08-31 PROCEDURE — 87880 STREP A ASSAY W/OPTIC: CPT | Performed by: NURSE PRACTITIONER

## 2022-08-31 PROCEDURE — G8427 DOCREV CUR MEDS BY ELIG CLIN: HCPCS | Performed by: NURSE PRACTITIONER

## 2022-08-31 PROCEDURE — 1036F TOBACCO NON-USER: CPT | Performed by: NURSE PRACTITIONER

## 2022-08-31 PROCEDURE — 1090F PRES/ABSN URINE INCON ASSESS: CPT | Performed by: NURSE PRACTITIONER

## 2022-08-31 ASSESSMENT — PATIENT HEALTH QUESTIONNAIRE - PHQ9
SUM OF ALL RESPONSES TO PHQ9 QUESTIONS 1 & 2: 0
SUM OF ALL RESPONSES TO PHQ QUESTIONS 1-9: 0
7. TROUBLE CONCENTRATING ON THINGS, SUCH AS READING THE NEWSPAPER OR WATCHING TELEVISION: 0
3. TROUBLE FALLING OR STAYING ASLEEP: 0
1. LITTLE INTEREST OR PLEASURE IN DOING THINGS: 0
5. POOR APPETITE OR OVEREATING: 0
6. FEELING BAD ABOUT YOURSELF - OR THAT YOU ARE A FAILURE OR HAVE LET YOURSELF OR YOUR FAMILY DOWN: 0
SUM OF ALL RESPONSES TO PHQ QUESTIONS 1-9: 0
9. THOUGHTS THAT YOU WOULD BE BETTER OFF DEAD, OR OF HURTING YOURSELF: 0
SUM OF ALL RESPONSES TO PHQ QUESTIONS 1-9: 0
DEPRESSION UNABLE TO ASSESS: FUNCTIONAL CAPACITY MOTIVATION LIMITS ACCURACY
SUM OF ALL RESPONSES TO PHQ QUESTIONS 1-9: 0
4. FEELING TIRED OR HAVING LITTLE ENERGY: 0
10. IF YOU CHECKED OFF ANY PROBLEMS, HOW DIFFICULT HAVE THESE PROBLEMS MADE IT FOR YOU TO DO YOUR WORK, TAKE CARE OF THINGS AT HOME, OR GET ALONG WITH OTHER PEOPLE: 0
2. FEELING DOWN, DEPRESSED OR HOPELESS: 0
8. MOVING OR SPEAKING SO SLOWLY THAT OTHER PEOPLE COULD HAVE NOTICED. OR THE OPPOSITE, BEING SO FIGETY OR RESTLESS THAT YOU HAVE BEEN MOVING AROUND A LOT MORE THAN USUAL: 0

## 2022-08-31 ASSESSMENT — ENCOUNTER SYMPTOMS
FACIAL SWELLING: 0
SORE THROAT: 1
SINUS PRESSURE: 0
SHORTNESS OF BREATH: 0
COUGH: 0
VOMITING: 0
NAUSEA: 0
DIARRHEA: 0
SINUS PAIN: 0

## 2022-08-31 ASSESSMENT — ANXIETY QUESTIONNAIRES
2. NOT BEING ABLE TO STOP OR CONTROL WORRYING: 1
4. TROUBLE RELAXING: 0
1. FEELING NERVOUS, ANXIOUS, OR ON EDGE: 1
IF YOU CHECKED OFF ANY PROBLEMS ON THIS QUESTIONNAIRE, HOW DIFFICULT HAVE THESE PROBLEMS MADE IT FOR YOU TO DO YOUR WORK, TAKE CARE OF THINGS AT HOME, OR GET ALONG WITH OTHER PEOPLE: NOT DIFFICULT AT ALL
3. WORRYING TOO MUCH ABOUT DIFFERENT THINGS: 0
6. BECOMING EASILY ANNOYED OR IRRITABLE: 0
7. FEELING AFRAID AS IF SOMETHING AWFUL MIGHT HAPPEN: 0
5. BEING SO RESTLESS THAT IT IS HARD TO SIT STILL: 0
GAD7 TOTAL SCORE: 2

## 2022-08-31 NOTE — PROGRESS NOTES
needed (nausea) Yes Georgeann Klinefelter., DO   ondansetron (ZOFRAN) 8 MG tablet Take 1 tablet by mouth every 8 hours as needed for Nausea or Vomiting Yes Georgeann Klinefelter., DO   Multiple Vitamins-Minerals (ICAPS AREDS 2 PO) Take by mouth Yes Historical Provider, MD   Cholecalciferol (VITAMIN D) 2000 UNITS CAPS capsule Take by mouth daily Indications: OTC  Yes Historical Provider, MD        Social History     Tobacco Use    Smoking status: Former     Packs/day: 1.00     Years: 15.00     Pack years: 15.00     Types: Cigarettes     Quit date: 2013     Years since quittin.8    Smokeless tobacco: Never   Substance Use Topics    Alcohol use: No        Vitals:    22 1104   BP: 129/87   Site: Left Upper Arm   Position: Sitting   Pulse: 76   Resp: 16   Temp: 98.2 °F (36.8 °C)   TempSrc: Temporal   SpO2: 99%   Weight: 150 lb (68 kg)     Estimated body mass index is 26.58 kg/m² as calculated from the following:    Height as of 22: 5' 2.99\" (1.6 m). Weight as of this encounter: 150 lb (68 kg). Physical Exam  Vitals and nursing note reviewed. Constitutional:       General: She is not in acute distress. Appearance: Normal appearance. She is well-developed. She is not ill-appearing, toxic-appearing or diaphoretic. HENT:      Head: Normocephalic and atraumatic. Mouth/Throat:      Pharynx: No pharyngeal swelling, oropharyngeal exudate, posterior oropharyngeal erythema or uvula swelling. Eyes:      Extraocular Movements: Extraocular movements intact. Pupils: Pupils are equal, round, and reactive to light. Cardiovascular:      Rate and Rhythm: Normal rate and regular rhythm. Heart sounds: Normal heart sounds, S1 normal and S2 normal. No murmur heard. No friction rub. No gallop. Pulmonary:      Effort: Pulmonary effort is normal. No respiratory distress. Breath sounds: Normal breath sounds. Neurological:      General: No focal deficit present.       Mental Status: She is alert and oriented to person, place, and time. Mental status is at baseline. Cranial Nerves: No cranial nerve deficit. Psychiatric:         Speech: Speech normal.       ASSESSMENT/PLAN:  1. Sore throat  - POCT rapid strep A  - COVID-19    2. Nasal congestion  - COVID-19    Rapid strep negative  Will send COVID swab today  Normal physical exam today  Recommend monitoring symptoms- treat symptomatically  Call with any new or worsening of symptoms    Patient Instructions   Will send COVID test  Push fluids, warm salt water gargles, Tylenol as needed. Monitor symptoms. Call for any new or worsening of symptoms     Return if symptoms worsen or fail to improve. An electronic signature was used to authenticate this note.     --DELORES Dalal - CNP on 8/31/2022 at 11:32 AM

## 2022-09-01 LAB — SARS-COV-2: NOT DETECTED

## 2022-09-21 NOTE — TELEPHONE ENCOUNTER
7/26/2022    Future Appointments   Date Time Provider Shaun Jack   10/20/2022  8:00 AM MD OZZY Fernandez

## 2022-09-22 RX ORDER — AMLODIPINE BESYLATE 5 MG/1
TABLET ORAL
Qty: 30 TABLET | Refills: 2 | Status: SHIPPED | OUTPATIENT
Start: 2022-09-22 | End: 2022-10-20 | Stop reason: ALTCHOICE

## 2022-10-20 ENCOUNTER — OFFICE VISIT (OUTPATIENT)
Dept: FAMILY MEDICINE CLINIC | Age: 72
End: 2022-10-20
Payer: MEDICARE

## 2022-10-20 VITALS
TEMPERATURE: 96.9 F | DIASTOLIC BLOOD PRESSURE: 68 MMHG | HEIGHT: 63 IN | HEART RATE: 67 BPM | RESPIRATION RATE: 16 BRPM | BODY MASS INDEX: 27.32 KG/M2 | OXYGEN SATURATION: 100 % | SYSTOLIC BLOOD PRESSURE: 110 MMHG | WEIGHT: 154.2 LBS

## 2022-10-20 DIAGNOSIS — Z23 FLU VACCINE NEED: ICD-10-CM

## 2022-10-20 DIAGNOSIS — F41.9 ANXIETY: Primary | ICD-10-CM

## 2022-10-20 PROCEDURE — G8399 PT W/DXA RESULTS DOCUMENT: HCPCS | Performed by: INTERNAL MEDICINE

## 2022-10-20 PROCEDURE — 1090F PRES/ABSN URINE INCON ASSESS: CPT | Performed by: INTERNAL MEDICINE

## 2022-10-20 PROCEDURE — 90694 VACC AIIV4 NO PRSRV 0.5ML IM: CPT | Performed by: INTERNAL MEDICINE

## 2022-10-20 PROCEDURE — 99213 OFFICE O/P EST LOW 20 MIN: CPT | Performed by: INTERNAL MEDICINE

## 2022-10-20 PROCEDURE — G8427 DOCREV CUR MEDS BY ELIG CLIN: HCPCS | Performed by: INTERNAL MEDICINE

## 2022-10-20 PROCEDURE — 3017F COLORECTAL CA SCREEN DOC REV: CPT | Performed by: INTERNAL MEDICINE

## 2022-10-20 PROCEDURE — G8417 CALC BMI ABV UP PARAM F/U: HCPCS | Performed by: INTERNAL MEDICINE

## 2022-10-20 PROCEDURE — G8484 FLU IMMUNIZE NO ADMIN: HCPCS | Performed by: INTERNAL MEDICINE

## 2022-10-20 PROCEDURE — 1123F ACP DISCUSS/DSCN MKR DOCD: CPT | Performed by: INTERNAL MEDICINE

## 2022-10-20 PROCEDURE — G0008 ADMIN INFLUENZA VIRUS VAC: HCPCS | Performed by: INTERNAL MEDICINE

## 2022-10-20 PROCEDURE — 1036F TOBACCO NON-USER: CPT | Performed by: INTERNAL MEDICINE

## 2022-10-20 RX ORDER — LORAZEPAM 1 MG/1
1 TABLET ORAL 2 TIMES DAILY
Qty: 180 TABLET | Refills: 0 | Status: SHIPPED | OUTPATIENT
Start: 2022-10-20 | End: 2023-01-18

## 2022-10-23 NOTE — PROGRESS NOTES
Mikala Echavarria (:  1950) is a 80 y.o. female,Established patient, here for evaluation of the following chief complaint(s): Anxiety         ASSESSMENT/PLAN:  1. Anxiety  -     LORazepam (ATIVAN) 1 MG tablet; Take 1 tablet by mouth 2 times daily for 90 days. , Disp-180 tablet, R-0Normal  2. Flu vaccine need       - given      Return in 13 weeks (on 2023). Subjective   SUBJECTIVE/OBJECTIVE:  HPI    CC- anxiety- Upset about what is going on at home with her 's health . She is  also trying to stay well  with her own issue. Very concerned about her   and stays anxious. He had fallen a week ago outside their home and had to have stitches on the back of his head. She is regularly followed up bu oncology for her multiple myeloma. Review of Systems- Unremarkable except for what is noted in the HPI. Objective   Physical Exam  Vitals and nursing note reviewed. Constitutional:       Appearance: Normal appearance. She is well-developed. HENT:      Head: Normocephalic. Cardiovascular:      Rate and Rhythm: Normal rate and regular rhythm. Pulmonary:      Breath sounds: Normal breath sounds. Musculoskeletal:         General: Normal range of motion. Cervical back: Normal range of motion and neck supple. Skin:     Comments:      Neurological:      General: No focal deficit present. Mental Status: She is alert. Psychiatric:         Mood and Affect: Mood normal.         Behavior: Behavior normal.        On this date 10/20/2022 I have spent 20 minutes  face to face with the patient discussing the diagnosis and importance of compliance with the treatment plan as well as documenting on the day of the visit. An electronic signature was used to authenticate this note.     --Jacey Olivarez MD

## 2022-11-14 ENCOUNTER — OFFICE VISIT (OUTPATIENT)
Dept: FAMILY MEDICINE CLINIC | Age: 72
End: 2022-11-14
Payer: MEDICARE

## 2022-11-14 VITALS
BODY MASS INDEX: 26.05 KG/M2 | HEART RATE: 81 BPM | SYSTOLIC BLOOD PRESSURE: 110 MMHG | DIASTOLIC BLOOD PRESSURE: 81 MMHG | WEIGHT: 147 LBS

## 2022-11-14 DIAGNOSIS — C90.01 MULTIPLE MYELOMA IN REMISSION (HCC): ICD-10-CM

## 2022-11-14 DIAGNOSIS — F41.9 ANXIETY: ICD-10-CM

## 2022-11-14 DIAGNOSIS — Z01.818 PRE-OPERATIVE EXAMINATION: Primary | ICD-10-CM

## 2022-11-14 PROCEDURE — 1090F PRES/ABSN URINE INCON ASSESS: CPT | Performed by: STUDENT IN AN ORGANIZED HEALTH CARE EDUCATION/TRAINING PROGRAM

## 2022-11-14 PROCEDURE — 3017F COLORECTAL CA SCREEN DOC REV: CPT | Performed by: STUDENT IN AN ORGANIZED HEALTH CARE EDUCATION/TRAINING PROGRAM

## 2022-11-14 PROCEDURE — G8399 PT W/DXA RESULTS DOCUMENT: HCPCS | Performed by: STUDENT IN AN ORGANIZED HEALTH CARE EDUCATION/TRAINING PROGRAM

## 2022-11-14 PROCEDURE — G8427 DOCREV CUR MEDS BY ELIG CLIN: HCPCS | Performed by: STUDENT IN AN ORGANIZED HEALTH CARE EDUCATION/TRAINING PROGRAM

## 2022-11-14 PROCEDURE — 99214 OFFICE O/P EST MOD 30 MIN: CPT | Performed by: STUDENT IN AN ORGANIZED HEALTH CARE EDUCATION/TRAINING PROGRAM

## 2022-11-14 PROCEDURE — 1036F TOBACCO NON-USER: CPT | Performed by: STUDENT IN AN ORGANIZED HEALTH CARE EDUCATION/TRAINING PROGRAM

## 2022-11-14 PROCEDURE — G8484 FLU IMMUNIZE NO ADMIN: HCPCS | Performed by: STUDENT IN AN ORGANIZED HEALTH CARE EDUCATION/TRAINING PROGRAM

## 2022-11-14 PROCEDURE — G8417 CALC BMI ABV UP PARAM F/U: HCPCS | Performed by: STUDENT IN AN ORGANIZED HEALTH CARE EDUCATION/TRAINING PROGRAM

## 2022-11-14 PROCEDURE — 1123F ACP DISCUSS/DSCN MKR DOCD: CPT | Performed by: STUDENT IN AN ORGANIZED HEALTH CARE EDUCATION/TRAINING PROGRAM

## 2022-11-14 RX ORDER — SERTRALINE HYDROCHLORIDE 25 MG/1
25 TABLET, FILM COATED ORAL DAILY
Qty: 30 TABLET | Refills: 1 | Status: SHIPPED | OUTPATIENT
Start: 2022-11-14

## 2022-11-14 NOTE — Clinical Note
Just wanted to let you know that I saw her for a pre-op and she had questions about antidepressants/antianxiety medications and she ultimately decided that she would like to start an SSRI. I started her on Zoloft 25 mg and she is currently scheduled to see me in about a month for follow-up of that. You are certainly welcome to move her over to your schedule if you would like as she is your patient, but I am happy to see her in follow-up for this. She does have a f/u with you about 1 month after that as well which I told her to keep. Just wanted to let you know/give you a heads up that we started that medication today.    Sadi Almanza

## 2022-12-19 ENCOUNTER — OFFICE VISIT (OUTPATIENT)
Dept: FAMILY MEDICINE CLINIC | Age: 72
End: 2022-12-19
Payer: MEDICARE

## 2022-12-19 VITALS
BODY MASS INDEX: 26.15 KG/M2 | SYSTOLIC BLOOD PRESSURE: 115 MMHG | DIASTOLIC BLOOD PRESSURE: 71 MMHG | HEART RATE: 68 BPM | WEIGHT: 147.6 LBS

## 2022-12-19 DIAGNOSIS — F41.9 ANXIETY: Primary | ICD-10-CM

## 2022-12-19 PROCEDURE — 1036F TOBACCO NON-USER: CPT | Performed by: STUDENT IN AN ORGANIZED HEALTH CARE EDUCATION/TRAINING PROGRAM

## 2022-12-19 PROCEDURE — G8427 DOCREV CUR MEDS BY ELIG CLIN: HCPCS | Performed by: STUDENT IN AN ORGANIZED HEALTH CARE EDUCATION/TRAINING PROGRAM

## 2022-12-19 PROCEDURE — G8484 FLU IMMUNIZE NO ADMIN: HCPCS | Performed by: STUDENT IN AN ORGANIZED HEALTH CARE EDUCATION/TRAINING PROGRAM

## 2022-12-19 PROCEDURE — 1090F PRES/ABSN URINE INCON ASSESS: CPT | Performed by: STUDENT IN AN ORGANIZED HEALTH CARE EDUCATION/TRAINING PROGRAM

## 2022-12-19 PROCEDURE — 3017F COLORECTAL CA SCREEN DOC REV: CPT | Performed by: STUDENT IN AN ORGANIZED HEALTH CARE EDUCATION/TRAINING PROGRAM

## 2022-12-19 PROCEDURE — 1123F ACP DISCUSS/DSCN MKR DOCD: CPT | Performed by: STUDENT IN AN ORGANIZED HEALTH CARE EDUCATION/TRAINING PROGRAM

## 2022-12-19 PROCEDURE — G8399 PT W/DXA RESULTS DOCUMENT: HCPCS | Performed by: STUDENT IN AN ORGANIZED HEALTH CARE EDUCATION/TRAINING PROGRAM

## 2022-12-19 PROCEDURE — 99213 OFFICE O/P EST LOW 20 MIN: CPT | Performed by: STUDENT IN AN ORGANIZED HEALTH CARE EDUCATION/TRAINING PROGRAM

## 2022-12-19 PROCEDURE — G8417 CALC BMI ABV UP PARAM F/U: HCPCS | Performed by: STUDENT IN AN ORGANIZED HEALTH CARE EDUCATION/TRAINING PROGRAM

## 2022-12-19 RX ORDER — SERTRALINE HYDROCHLORIDE 25 MG/1
25 TABLET, FILM COATED ORAL DAILY
Qty: 90 TABLET | Refills: 1 | Status: SHIPPED | OUTPATIENT
Start: 2022-12-19

## 2022-12-19 NOTE — ASSESSMENT & PLAN NOTE
Her symptoms are pretty well controlled at this time. We will continue her current dose of Zoloft at 25 mg. She will follow-up with her regular primary care doctor soon.

## 2022-12-19 NOTE — PROGRESS NOTES
CC:   Chief Complaint   Patient presents with    Follow-up     Anxiety medication. HPI  Kevin Varner is a 80 y.o. female here for a follow-up appointment for anxiety/depression. At her pre-op exam on 11/14 she mentioned anxiety and depressive symptoms. We ultimately agreed to start her on zoloft at that time. Ports that since being started on the Zoloft that she has been feeling quite a bit better. She had been having a lot of anxiety, depressive symptoms, and loose stool which we suspected may have also been due to her anxiety. All of these things have improved since being on the Zoloft. She feels happy and comfortable with the current dose and does not want to increase it further at this time. Review of Systems  As above. Vitals:    12/19/22 0858   BP: 115/71   Site: Right Upper Arm   Position: Sitting   Cuff Size: Small Adult   Pulse: 68   Weight: 147 lb 9.6 oz (67 kg)     Physical Exam  Vitals reviewed. Constitutional:       General: She is not in acute distress. Appearance: She is not ill-appearing. HENT:      Head: Normocephalic and atraumatic. Nose: No congestion. Eyes:      Extraocular Movements: Extraocular movements intact. Conjunctiva/sclera: Conjunctivae normal.   Pulmonary:      Effort: Pulmonary effort is normal. No respiratory distress. Musculoskeletal:      Cervical back: Normal range of motion. Skin:     General: Skin is warm and dry. Neurological:      Mental Status: She is alert and oriented to person, place, and time. Psychiatric:         Mood and Affect: Mood normal.         Behavior: Behavior normal.       A/P  1. Anxiety  Assessment & Plan:  Her symptoms are pretty well controlled at this time. We will continue her current dose of Zoloft at 25 mg. She will follow-up with her regular primary care doctor soon. Orders:  -     sertraline (ZOLOFT) 25 MG tablet;  Take 1 tablet by mouth daily, Disp-90 tablet, R-1Normal      Alarcon Oil, MD  Internal Medicine and Sports Medicine    Please note that this chart was at least partially generated using dragon dictation software. Although every effort was made to ensure the accuracy of this automated transcription, some errors in transcription may have occurred.

## 2022-12-20 RX ORDER — AMLODIPINE BESYLATE 5 MG/1
TABLET ORAL
Qty: 30 TABLET | Refills: 2 | Status: SHIPPED | OUTPATIENT
Start: 2022-12-20

## 2022-12-20 NOTE — TELEPHONE ENCOUNTER
This medication was removed from her med list in October, please call her to make sure she is still taking it.

## 2023-01-09 ENCOUNTER — OFFICE VISIT (OUTPATIENT)
Dept: FAMILY MEDICINE CLINIC | Age: 73
End: 2023-01-09
Payer: MEDICARE

## 2023-01-09 VITALS
SYSTOLIC BLOOD PRESSURE: 106 MMHG | OXYGEN SATURATION: 100 % | TEMPERATURE: 97 F | BODY MASS INDEX: 26.01 KG/M2 | DIASTOLIC BLOOD PRESSURE: 70 MMHG | WEIGHT: 146.8 LBS | HEIGHT: 63 IN | RESPIRATION RATE: 16 BRPM | HEART RATE: 64 BPM

## 2023-01-09 DIAGNOSIS — C90.01 MULTIPLE MYELOMA IN REMISSION (HCC): ICD-10-CM

## 2023-01-09 DIAGNOSIS — F13.99 SEDATIVE, HYPNOTIC OR ANXIOLYTIC USE, UNSPECIFIED WITH UNSPECIFIED SEDATIVE, HYPNOTIC OR ANXIOLYTIC-INDUCED DISORDER (HCC): ICD-10-CM

## 2023-01-09 DIAGNOSIS — H25.012 CORTICAL AGE-RELATED CATARACT OF LEFT EYE: Primary | ICD-10-CM

## 2023-01-09 DIAGNOSIS — Z01.818 PRE-OP EXAM: ICD-10-CM

## 2023-01-09 PROCEDURE — 1036F TOBACCO NON-USER: CPT | Performed by: INTERNAL MEDICINE

## 2023-01-09 PROCEDURE — G8399 PT W/DXA RESULTS DOCUMENT: HCPCS | Performed by: INTERNAL MEDICINE

## 2023-01-09 PROCEDURE — G8484 FLU IMMUNIZE NO ADMIN: HCPCS | Performed by: INTERNAL MEDICINE

## 2023-01-09 PROCEDURE — G8427 DOCREV CUR MEDS BY ELIG CLIN: HCPCS | Performed by: INTERNAL MEDICINE

## 2023-01-09 PROCEDURE — 1090F PRES/ABSN URINE INCON ASSESS: CPT | Performed by: INTERNAL MEDICINE

## 2023-01-09 PROCEDURE — G8417 CALC BMI ABV UP PARAM F/U: HCPCS | Performed by: INTERNAL MEDICINE

## 2023-01-09 PROCEDURE — 3017F COLORECTAL CA SCREEN DOC REV: CPT | Performed by: INTERNAL MEDICINE

## 2023-01-09 PROCEDURE — 99213 OFFICE O/P EST LOW 20 MIN: CPT | Performed by: INTERNAL MEDICINE

## 2023-01-09 PROCEDURE — 1123F ACP DISCUSS/DSCN MKR DOCD: CPT | Performed by: INTERNAL MEDICINE

## 2023-01-09 SDOH — ECONOMIC STABILITY: FOOD INSECURITY: WITHIN THE PAST 12 MONTHS, THE FOOD YOU BOUGHT JUST DIDN'T LAST AND YOU DIDN'T HAVE MONEY TO GET MORE.: NEVER TRUE

## 2023-01-09 SDOH — ECONOMIC STABILITY: FOOD INSECURITY: WITHIN THE PAST 12 MONTHS, YOU WORRIED THAT YOUR FOOD WOULD RUN OUT BEFORE YOU GOT MONEY TO BUY MORE.: NEVER TRUE

## 2023-01-09 ASSESSMENT — SOCIAL DETERMINANTS OF HEALTH (SDOH): HOW HARD IS IT FOR YOU TO PAY FOR THE VERY BASICS LIKE FOOD, HOUSING, MEDICAL CARE, AND HEATING?: NOT HARD AT ALL

## 2023-01-09 ASSESSMENT — PATIENT HEALTH QUESTIONNAIRE - PHQ9
SUM OF ALL RESPONSES TO PHQ QUESTIONS 1-9: 1
6. FEELING BAD ABOUT YOURSELF - OR THAT YOU ARE A FAILURE OR HAVE LET YOURSELF OR YOUR FAMILY DOWN: 0
2. FEELING DOWN, DEPRESSED OR HOPELESS: 0
9. THOUGHTS THAT YOU WOULD BE BETTER OFF DEAD, OR OF HURTING YOURSELF: 0
10. IF YOU CHECKED OFF ANY PROBLEMS, HOW DIFFICULT HAVE THESE PROBLEMS MADE IT FOR YOU TO DO YOUR WORK, TAKE CARE OF THINGS AT HOME, OR GET ALONG WITH OTHER PEOPLE: 0
8. MOVING OR SPEAKING SO SLOWLY THAT OTHER PEOPLE COULD HAVE NOTICED. OR THE OPPOSITE, BEING SO FIGETY OR RESTLESS THAT YOU HAVE BEEN MOVING AROUND A LOT MORE THAN USUAL: 0
7. TROUBLE CONCENTRATING ON THINGS, SUCH AS READING THE NEWSPAPER OR WATCHING TELEVISION: 0
SUM OF ALL RESPONSES TO PHQ QUESTIONS 1-9: 1
SUM OF ALL RESPONSES TO PHQ QUESTIONS 1-9: 1
1. LITTLE INTEREST OR PLEASURE IN DOING THINGS: 0
4. FEELING TIRED OR HAVING LITTLE ENERGY: 1
SUM OF ALL RESPONSES TO PHQ9 QUESTIONS 1 & 2: 0
SUM OF ALL RESPONSES TO PHQ QUESTIONS 1-9: 1
5. POOR APPETITE OR OVEREATING: 0
3. TROUBLE FALLING OR STAYING ASLEEP: 0

## 2023-01-09 NOTE — PROGRESS NOTES
Preoperative Consultation      Oscar Valdez  YOB: 1950    Date of Service:  1/9/2023    Vitals:    01/09/23 1441   BP: 106/70   Site: Left Upper Arm   Position: Sitting   Cuff Size: Medium Adult   Pulse: 64   Resp: 16   Temp: 97 °F (36.1 °C)   TempSrc: Temporal   SpO2: 100%   Weight: 146 lb 12.8 oz (66.6 kg)   Height: 5' 3.3\" (1.608 m)      Wt Readings from Last 2 Encounters:   01/09/23 146 lb 12.8 oz (66.6 kg)   12/19/22 147 lb 9.6 oz (67 kg)     BP Readings from Last 3 Encounters:   01/09/23 106/70   12/19/22 115/71   11/14/22 110/81        Chief Complaint   Patient presents with    Pre-op Exam     Left eye surgery surgery on 1/12/23 and right eye cataract surgery on 1/26/23 by Dr. Elise Ohara at [facility]     Past due for AWV     Allergies   Allergen Reactions    Methylprednisolone Other (See Comments)     Severe body pain     Outpatient Medications Marked as Taking for the 1/9/23 encounter (Office Visit) with Lisa Kumar MD   Medication Sig Dispense Refill    amLODIPine (NORVASC) 5 MG tablet TAKE ONE TABLET BY MOUTH EVERY DAY 30 tablet 2    sertraline (ZOLOFT) 25 MG tablet Take 1 tablet by mouth daily 90 tablet 1    Multiple Vitamin (MULTIVITAMIN ADULT PO) Take by mouth      LORazepam (ATIVAN) 1 MG tablet Take 1 tablet by mouth 2 times daily for 90 days.  180 tablet 0    REVLIMID 10 MG chemo capsule       loratadine (CLARITIN) 10 MG tablet loratadine 10 MG Oral Tablet  oral  one daily    Inactive      prochlorperazine (COMPAZINE) 10 MG tablet Take 1 tablet by mouth every 6 hours as needed (nausea) 60 tablet 3    ondansetron (ZOFRAN) 8 MG tablet Take 1 tablet by mouth every 8 hours as needed for Nausea or Vomiting 60 tablet 3    Multiple Vitamins-Minerals (ICAPS AREDS 2 PO) Take by mouth      Cholecalciferol (VITAMIN D) 2000 UNITS CAPS capsule Take by mouth daily Indications: OTC          This patient presents to the office today for a preoperative consultation at the request of surgeon, Dr. Stefany Ovalle, who plans on performing left cataract surgery on January 12 , 2023 at [facility]. The current problem began 5 years ago, and symptoms have been worsening with time. Conservative therapy: N/A.     Planned anesthesia: Local   Known anesthesia problems: None   Bleeding risk: No recent or remote history of abnormal bleeding  Personal or FH of DVT/PE: No    Patient objection to receiving blood products: No    Patient Active Problem List   Diagnosis    Depression    Anxiety    History of hepatitis C    Abnormal liver function tests    Ex-smoker    Pain medication agreement signed    Normocytic anemia    Neutropenia (HCC)    Hyponatremia    Metabolic encephalopathy    Multiple myeloma in relapse (HCC)    Autologous donor, stem cells    Multiple myeloma in remission (HCC)    Sedative, hypnotic or anxiolytic use, unspecified with unspecified sedative, hypnotic or anxiolytic-induced disorder    Sedative, 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, hypnotic or anxiolytic-induced disorder    Sedative, hypnotic or anxiolytic dependence, uncomplicated       Past Medical History:   Diagnosis Date    Anxiety     Dental crown present     Depression     Hepatitis C      states resolved     Multiple myeloma not having achieved remission ([facility] [address] 06/15/2021     Past Surgical History:   Procedure Laterality Date    CATHETER INSERTION N/A 6/21/2021    TRIFUSION CATHETER INSERTION performed by Lenton Meigs, MD at [address] N/A 8/3/2021    TRIFUSION CATHETER REMOVAL performed by Lenton Meigs, MD at [facility]      NOSE SURGERY       Family History   Problem Relation Age of Onset    Arthritis Mother     High Blood Pressure Mother     Vision Loss Mother     Arthritis Father     Cancer Father     Heart Disease Father     Arthritis Sister     Learning Disabilities Sister     Arthritis Brother      Social History Socioeconomic History    Marital status:      Spouse name: Not on file    Number of children: Not on file    Years of education: Not on file    Highest education level: Not on file   Occupational History    Not on file   Tobacco Use    Smoking status: Former     Packs/day: 1.00     Years: 15.00     Pack years: 15.00     Types: Cigarettes     Quit date: 2013     Years since quittin.1    Smokeless tobacco: Never   Vaping Use    Vaping Use: Never used   Substance and Sexual Activity    Alcohol use: No    Drug use: No    Sexual activity: Never   Other Topics Concern    Not on file   Social History Narrative    Not on file     Social Determinants of Health     Financial Resource Strain: Low Risk     Difficulty of Paying Living Expenses: Not hard at all   Food Insecurity: No Food Insecurity    Worried About Running Out of Food in the Last Year: Never true    Ran Out of Food in the Last Year: Never true   Transportation Needs: Not on file   Physical Activity: Not on file   Stress: Not on file   Social Connections: Not on file   Intimate Partner Violence: Not on file   Housing Stability: Not on file       Review of Systems  A comprehensive review of systems was negative except for what was noted in the HPI. Physical Exam   Constitutional: She is oriented to person, place, and time. She appears well-developed and well-nourished. No distress. Head: Normocephalic and atraumatic. Mouth/Throat: Uvula is midline, oropharynx is clear and moist and mucous membranes are normal.   Eyes: Conjunctivae and EOM are normal. Pupils are equal, round, and reactive to light. Bilateral cataract  Neck: Trachea normal and normal range of motion. Neck supple. No JVD present. Carotid bruit is not present. No mass and no thyromegaly present. Cardiovascular: Normal rate, regular rhythm, normal heart sounds . Exam reveals no gallop and no friction rub. No murmur heard.   Pulmonary/Chest: Effort normal and breath sounds normal. No respiratory distress. She has no wheezes. She has no rales. Abdominal: Soft. Bowel sounds are normal. She exhibits no distension and no mass. . No tenderness. Musculoskeletal: She exhibits no edema and no tenderness. Neurological: She is alert and oriented to person, place, and time. She has normal strength. No cranial nerve deficit or sensory deficit. Coordination and gait normal.   Skin: Skin is warm and dry. No rash noted. No erythema. Psychiatric: She has a normal mood and affect. Her behavior is normal. Mostly concerned about 's health. Assessment:       71 y.o. patient with planned surgery as above. Known risk factors for perioperative complications: multiple  myeloma  Current medications which may produce withdrawal symptoms if withheld perioperatively: none      Plan:     1. Preoperative workup as follows: none  2. Change in medication regimen before surgery: None  3. Prophylaxis for cardiac events with perioperative beta-blockers: Not indicated  ACC/AHA indications for pre-operative beta-blocker use:    Vascular surgery with history of postitive stress test  Intermediate or high risk surgery with history of CAD   Intermediate or high risk surgery with multiple clinical predictors of CAD- 2 of the following: history of compensated or prior heart failure, history of cerebrovascular disease, DM, or renal insufficiency    Routine administration of higher-dose, long-acting metoprolol in beta-blocker-naïve patients on the day of surgery, and in the absence of dose titration is associated with an overall increase in mortality. Beta-blockers should be started days to weeks prior to surgery and titrated to pulse < 70.  4. Deep vein thrombosis prophylaxis:;NA  5.  No contraindications to planned surgery            -op

## 2023-01-20 ENCOUNTER — TELEPHONE (OUTPATIENT)
Dept: FAMILY MEDICINE CLINIC | Age: 73
End: 2023-01-20

## 2023-01-20 DIAGNOSIS — F32.9 REACTIVE DEPRESSION: Primary | ICD-10-CM

## 2023-01-20 RX ORDER — CITALOPRAM 10 MG/1
10 TABLET ORAL DAILY
Qty: 30 TABLET | Refills: 2 | Status: SHIPPED | OUTPATIENT
Start: 2023-01-20 | End: 2023-03-21

## 2023-01-20 NOTE — TELEPHONE ENCOUNTER
Dr. Laisha Pires put patient on   sertraline (ZOLOFT) 25 MG tablet     Her depression is gone but at the same time she doesn't feel like she is getting anything done, no energy also  Making her nervous and confused. Patient is asking for a different antidepressant. Patient states years ago Dr. Lily Nolasco had her on antidepressant and patient would like to go back onto that medication from years ago    [address] [address] [address] - F [phone]

## 2023-01-21 DIAGNOSIS — F41.9 ANXIETY: ICD-10-CM

## 2023-01-23 NOTE — TELEPHONE ENCOUNTER
1/9/2023    Future Appointments   Date Time Provider Shaun Jack   3/30/2023  9:00 AM MD OZZY Dominguez

## 2023-01-24 RX ORDER — LORAZEPAM 1 MG/1
TABLET ORAL
Qty: 180 TABLET | Refills: 0 | Status: SHIPPED | OUTPATIENT
Start: 2023-01-24 | End: 2023-04-24

## 2023-02-13 PROBLEM — B19.20 HEPATITIS C VIRUS INFECTION: Status: ACTIVE | Noted: 2023-02-13

## 2023-02-13 PROBLEM — I10 HYPERTENSION: Status: ACTIVE | Noted: 2023-02-13

## 2023-02-13 PROBLEM — C79.51 MALIGNANT NEOPLASM METASTATIC TO BONE (HCC): Status: ACTIVE | Noted: 2023-02-13

## 2023-02-13 PROBLEM — R21 RASH: Status: ACTIVE | Noted: 2023-02-13

## 2023-02-13 PROBLEM — G89.3 PAIN DUE TO NEOPLASM: Status: ACTIVE | Noted: 2023-02-13

## 2023-03-06 ENCOUNTER — TELEPHONE (OUTPATIENT)
Dept: FAMILY MEDICINE CLINIC | Age: 73
End: 2023-03-06

## 2023-03-06 NOTE — PROGRESS NOTES
Subjective:    Chief Complaint: Pre-Operative Exam     Francisca Officer is a 80 y.o. female with multiple myeloma with history of bone marrow transplant who presents for a preoperative physical examination and anxiety/depression. She is scheduled to have cataract done by Dr. Adrienne Griffin on 11/17/22. She did discuss procedure with Oncology who were okay with her having surgery and didn't recommend any changes to medications. History of problems with anesthesia? No issues  History of bleeding or clotting problems? No, but does have MM so relatively increased risk of bleeding. History of Present Illness:    Emergent procedure NO  Active Cardiac Condition NO (decompensated HF, Arrhythmia, MI <3 weeks, severe valve disease), mild MVP  Risk Level of Procedure Low Risk (endoscopy, superficial skin, breast, ambulatory, or cataract, etc.)  Revised Cardiac Risk Index Risk factors: None  Able to reach >4 METS: YES, no CP, SOB with walking or flight of stairs. Surgeon's notes reviewed. Past Medical History:   Diagnosis Date    Anxiety     Dental crown present     Depression     Hepatitis C      states resolved     Multiple myeloma not having achieved remission ([facility] [address] 06/15/2021        Review of patient's past surgical history indicates:     Past Surgical History:   Procedure Laterality Date    CATHETER INSERTION N/A 6/21/2021    TRIFUSION CATHETER INSERTION performed by Isabel Mayo MD at [place] [address] 8/3/2021    TRIFUSION CATHETER REMOVAL performed by Isabel Mayo MD at [facility]                                                     Current Outpatient Medications   Medication Sig Dispense Refill    Multiple Vitamin (MULTIVITAMIN ADULT PO) Take by mouth      sertraline (ZOLOFT) 25 MG tablet Take 1 tablet by mouth daily 30 tablet 1    LORazepam (ATIVAN) 1 MG tablet Take 1 tablet by mouth 2 times daily for 90 days.  [address] [place] [address] tablet 0    REVLIMID 10 MG chemo capsule       loratadine (CLARITIN) 10 MG tablet loratadine 10 MG Oral Tablet  oral  one daily    Inactive      Multiple Vitamins-Minerals (ICAPS AREDS 2 PO) Take by mouth      Cholecalciferol (VITAMIN D) 2000 UNITS CAPS capsule Take by mouth daily Indications: OTC       clotrimazole-betamethasone (LOTRISONE) 1-0.05 % cream Apply topically 2 times daily (Patient not taking: Reported on 10/20/2022) 45 g 1    prochlorperazine (COMPAZINE) 10 MG tablet Take 1 tablet by mouth every 6 hours as needed (nausea) (Patient not taking: Reported on 10/20/2022) 60 tablet 3    ondansetron (ZOFRAN) 8 MG tablet Take 1 tablet by mouth every 8 hours as needed for Nausea or Vomiting (Patient not taking: Reported on 10/20/2022) 60 tablet 3     No current facility-administered medications for this visit.        Allergies   Allergen Reactions    Methylprednisolone Other (See Comments)     Severe body pain       Social History     Tobacco Use    Smoking status: Former     Packs/day: 1.00     Years: 15.00     Pack years: 15.00     Types: Cigarettes     Quit date: 2013     Years since quittin.0    Smokeless tobacco: Never   Vaping Use    Vaping Use: Never used   Substance Use Topics    Alcohol use: No    Drug use: No        Family History   Problem Relation Age of Onset    Arthritis Mother     High Blood Pressure Mother     Vision Loss Mother     Arthritis Father     Cancer Father     Heart Disease Father     Arthritis Sister     Learning Disabilities Sister     Arthritis Brother         Review Of Systems  Skin: no abnormal pigmentation, rash, scaling, itching, masses, hair or nail changes  Eyes: negative  Ears/Nose/Throat: negative  Respiratory: negative  Cardiovascular: negative  Gastrointestinal: negative  Genitourinary: negative  Musculoskeletal: negative  Neurologic: negative  Psychiatric: negative  Hematologic/Lymphatic/Immunologic: negative  Endocrine: negative       Objective:      /81 (Site: Right Upper Arm, Position: Sitting, Cuff Size: Small Adult)   Pulse 81   Wt 147 lb (66.7 kg)   BMI 26.05 kg/m²   General appearance - healthy, alert, no distress  Skin - Skin color, texture, turgor normal. No rashes. Head - Normocephalic. No masses or abnormalities  Eyes - conjunctivae/corneas clear. EOM's intact. Ears - External ears normal. Canals clear. TM's normal.  Nose/Sinuses - Nares normal. Septum midline. Mucosa normal. No drainage or sinus tenderness. Oropharynx - Lips, mucosa, and tongue normal. Teeth and gums normal. Oropharynx pink and patent  Neck - Neck supple. No adenopathy. Lungs - Good diaphragmatic excursion. Lungs clear to ausc BL  Heart - Regular rate and rhythm, with no rub, murmur or gallop noted. Abdomen - Abdomen soft, non-tender. Extremities - Extremities normal. No deformities, edema, or skin discoloration  Musculoskeletal - spont ROM normal in all extremities. Peripheral pulses - radial=4/4, dorsalis pedis=4/4  Neuro - Sensation grossly normal.  No focal weakness    EKG from 6/30/21 reviewed independently today and showed: normal sinus rhythm. Echocardiogram from 5/21/2021 showed mildly reduced EF of 45% and mild to moderate MR. Laboratory findings from 2/22/2022 were reviewed: Normal basic metabolic panel. Labs from 7/15/2021 were reviewed and showed hemoglobin of 9.4, decreased total protein and albumin on liver function tests. Assessment and Plan:   Mikala Echavarria has an RCRI score of 0-1 putting him/her in: RCI RISK CLASS I-II (0-1 risk factors, risk of major cardiac compl. appr. 0.5-1.3%) during a low risk procedure. There is no further workup recommended prior to surgery. Medication Recommendations/adjustments:  May continue medications as normally prescribed     Diagnosis Orders   1. Pre-operative examination        2. Multiple myeloma in remission ([facility] [address]        3. Anxiety          I did also see her for anxiety and possible depression today. She has had a worsening anxiety symptoms with her 's health getting worse recently. She is asking about other medications for anxiety and depression and after our discussion we decided to start her on an SSRI after her cataract surgery. The risks, benefits, side effects of this medication were discussed with her. She will follow-up with me in 1 month for this to check on progress and then she will see Dr. Jose R Arteaga at her follow-up visit about 1 month later. - start Eri Oliva MD  Internal Medicine/Sports Medicine    On this date, 11/14/2022 I have spent greater than 45 minutes reviewing previous notes, test results, and coordinating care as well as documenting and discussing the plan of care with the patient. Ndc (300 Mg Prefilled Syringe): [address]-40 Ndc (300 Mg Prefilled Syringe): [address]-90

## 2023-03-06 NOTE — TELEPHONE ENCOUNTER
LVM to verify that patient is not taking both CITALOPRAM 10MG and SERTRALINE 25MG. Our records show that she is taking CITALOPRAM 10MG only. We received a notice form [employer] that she is taking both.

## 2023-04-04 RX ORDER — AMLODIPINE BESYLATE 5 MG/1
TABLET ORAL
Qty: 30 TABLET | Refills: 2 | Status: SHIPPED | OUTPATIENT
Start: 2023-04-04

## 2023-04-24 ENCOUNTER — OFFICE VISIT (OUTPATIENT)
Dept: FAMILY MEDICINE CLINIC | Age: 73
End: 2023-04-24

## 2023-04-24 VITALS
TEMPERATURE: 97 F | DIASTOLIC BLOOD PRESSURE: 64 MMHG | BODY MASS INDEX: 26.47 KG/M2 | WEIGHT: 149.4 LBS | HEIGHT: 63 IN | HEART RATE: 55 BPM | RESPIRATION RATE: 16 BRPM | OXYGEN SATURATION: 99 % | SYSTOLIC BLOOD PRESSURE: 122 MMHG

## 2023-04-24 DIAGNOSIS — F41.9 ANXIETY: ICD-10-CM

## 2023-04-24 DIAGNOSIS — Z00.00 MEDICARE ANNUAL WELLNESS VISIT, SUBSEQUENT: Primary | ICD-10-CM

## 2023-04-24 DIAGNOSIS — F32.9 REACTIVE DEPRESSION: ICD-10-CM

## 2023-04-24 RX ORDER — HYDROXYZINE HYDROCHLORIDE 10 MG/1
TABLET, FILM COATED ORAL
COMMUNITY
Start: 2023-04-05

## 2023-04-24 RX ORDER — LORAZEPAM 1 MG/1
1 TABLET ORAL 2 TIMES DAILY
Qty: 180 TABLET | Refills: 0 | Status: SHIPPED | OUTPATIENT
Start: 2023-04-24 | End: 2023-07-23

## 2023-04-24 RX ORDER — CITALOPRAM 10 MG/1
TABLET ORAL
Qty: 90 TABLET | Refills: 2 | Status: SHIPPED | OUTPATIENT
Start: 2023-04-24

## 2023-04-24 SDOH — ECONOMIC STABILITY: INCOME INSECURITY: HOW HARD IS IT FOR YOU TO PAY FOR THE VERY BASICS LIKE FOOD, HOUSING, MEDICAL CARE, AND HEATING?: NOT HARD AT ALL

## 2023-04-24 SDOH — ECONOMIC STABILITY: FOOD INSECURITY: WITHIN THE PAST 12 MONTHS, THE FOOD YOU BOUGHT JUST DIDN'T LAST AND YOU DIDN'T HAVE MONEY TO GET MORE.: NEVER TRUE

## 2023-04-24 SDOH — ECONOMIC STABILITY: FOOD INSECURITY: WITHIN THE PAST 12 MONTHS, YOU WORRIED THAT YOUR FOOD WOULD RUN OUT BEFORE YOU GOT MONEY TO BUY MORE.: NEVER TRUE

## 2023-04-24 SDOH — ECONOMIC STABILITY: HOUSING INSECURITY
IN THE LAST 12 MONTHS, WAS THERE A TIME WHEN YOU DID NOT HAVE A STEADY PLACE TO SLEEP OR SLEPT IN A SHELTER (INCLUDING NOW)?: NO

## 2023-04-24 ASSESSMENT — PATIENT HEALTH QUESTIONNAIRE - PHQ9
3. TROUBLE FALLING OR STAYING ASLEEP: 1
1. LITTLE INTEREST OR PLEASURE IN DOING THINGS: 0
SUM OF ALL RESPONSES TO PHQ QUESTIONS 1-9: 0
2. FEELING DOWN, DEPRESSED OR HOPELESS: 0
SUM OF ALL RESPONSES TO PHQ QUESTIONS 1-9: 0
SUM OF ALL RESPONSES TO PHQ9 QUESTIONS 1 & 2: 0
5. POOR APPETITE OR OVEREATING: 0
SUM OF ALL RESPONSES TO PHQ QUESTIONS 1-9: 0
SUM OF ALL RESPONSES TO PHQ QUESTIONS 1-9: 1
SUM OF ALL RESPONSES TO PHQ QUESTIONS 1-9: 1
9. THOUGHTS THAT YOU WOULD BE BETTER OFF DEAD, OR OF HURTING YOURSELF: 0
SUM OF ALL RESPONSES TO PHQ QUESTIONS 1-9: 1
SUM OF ALL RESPONSES TO PHQ9 QUESTIONS 1 & 2: 0
4. FEELING TIRED OR HAVING LITTLE ENERGY: 0
8. MOVING OR SPEAKING SO SLOWLY THAT OTHER PEOPLE COULD HAVE NOTICED. OR THE OPPOSITE, BEING SO FIGETY OR RESTLESS THAT YOU HAVE BEEN MOVING AROUND A LOT MORE THAN USUAL: 0
SUM OF ALL RESPONSES TO PHQ QUESTIONS 1-9: 0
10. IF YOU CHECKED OFF ANY PROBLEMS, HOW DIFFICULT HAVE THESE PROBLEMS MADE IT FOR YOU TO DO YOUR WORK, TAKE CARE OF THINGS AT HOME, OR GET ALONG WITH OTHER PEOPLE: 0
7. TROUBLE CONCENTRATING ON THINGS, SUCH AS READING THE NEWSPAPER OR WATCHING TELEVISION: 0
2. FEELING DOWN, DEPRESSED OR HOPELESS: 0
SUM OF ALL RESPONSES TO PHQ QUESTIONS 1-9: 1
6. FEELING BAD ABOUT YOURSELF - OR THAT YOU ARE A FAILURE OR HAVE LET YOURSELF OR YOUR FAMILY DOWN: 0
1. LITTLE INTEREST OR PLEASURE IN DOING THINGS: 0

## 2023-04-24 ASSESSMENT — LIFESTYLE VARIABLES
HOW OFTEN DO YOU HAVE A DRINK CONTAINING ALCOHOL: NEVER
HOW MANY STANDARD DRINKS CONTAINING ALCOHOL DO YOU HAVE ON A TYPICAL DAY: PATIENT DOES NOT DRINK

## 2023-04-24 NOTE — PROGRESS NOTES
Medicare Annual Wellness Visit    Sabrina Hairston is here for Medicare AWV    Assessment & Plan   Medicare annual wellness visit, subsequent      Recommendations for Preventive Services Due: see orders and patient instructions/AVS.  Recommended screening schedule for the next 5-10 years is provided to the patient in written form: see Patient Instructions/AVS.     Return in about 3 months (around 7/24/2023). Patient's complete Health Risk Assessment and screening values have been reviewed and are found in Flowsheets. The following problems were reviewed today and where indicated follow up appointments were made and/or referrals ordered. Positive Risk Factor Screenings with Interventions:       Cognitive:       Clock Drawing Test (CDT): Normal       Total Score Interpretation: Abnormal Mini-Cog      Interventions:  Patient declines any further evaluation or treatment           General HRA Questions:           Dentist Screen:  Have you seen the dentist within the past year?: (!) No    Intervention:  Patient declines any further evaluation or treatment                  Objective   Vitals:    04/24/23 1501   BP: 122/64   Site: Right Lower Arm   Position: Sitting   Cuff Size: Medium Adult   Pulse: 55   Resp: 16   Temp: 97 °F (36.1 °C)   TempSrc: Temporal   SpO2: 99%   Weight: 149 lb 6.4 oz (67.8 kg)   Height: 5' 3.3\" (1.608 m)      Body mass index is 26.21 kg/m². Allergies   Allergen Reactions    Methylprednisolone Other (See Comments)     Severe body pain     Prior to Visit Medications    Medication Sig Taking?  Authorizing Provider   hydrOXYzine HCl (ATARAX) 10 MG tablet TAKE ONE TABLET BY MOUTH THREE TIMES DAILY AS NEEDED FOR anxiety Yes Historical Provider, MD   amLODIPine (NORVASC) 5 MG tablet TAKE ONE TABLET BY MOUTH EVERY DAY Yes Sheri Macias MD   LORazepam (ATIVAN) 1 MG tablet TAKE ONE TABLET BY MOUTH TWICE DAILY Yes Sheri Macias MD   citalopram (CELEXA) 10 MG tablet Take 1 tablet by

## 2023-04-24 NOTE — PATIENT INSTRUCTIONS

## 2023-05-23 ENCOUNTER — TELEPHONE (OUTPATIENT)
Dept: FAMILY MEDICINE CLINIC | Age: 73
End: 2023-05-23

## 2023-05-26 PROBLEM — C79.51 MALIGNANT NEOPLASM METASTATIC TO BONE (HCC): Status: RESOLVED | Noted: 2023-02-13 | Resolved: 2023-05-26

## 2023-06-09 ENCOUNTER — HOSPITAL ENCOUNTER (OUTPATIENT)
Dept: GENERAL RADIOLOGY | Age: 73
Discharge: HOME | End: 2023-06-09
Payer: MEDICARE

## 2023-06-09 DIAGNOSIS — C90.00 MULTIPLE MYELOMA, REMISSION STATUS UNSPECIFIED (HCC): ICD-10-CM

## 2023-06-09 PROCEDURE — 77074 RADEX OSSEOUS SURVEY LMTD: CPT

## 2023-07-12 RX ORDER — AMLODIPINE BESYLATE 5 MG/1
TABLET ORAL
Qty: 30 TABLET | Refills: 2 | Status: SHIPPED | OUTPATIENT
Start: 2023-07-12

## 2023-07-12 NOTE — TELEPHONE ENCOUNTER
LOV 4/24/2023    Future Appointments   Date Time Provider [address]   7/25/2023  9:30 AM MD OZZY Sainz

## 2023-07-25 ENCOUNTER — OFFICE VISIT (OUTPATIENT)
Dept: FAMILY MEDICINE CLINIC | Age: 73
End: 2023-07-25

## 2023-07-25 VITALS
BODY MASS INDEX: 25.48 KG/M2 | HEIGHT: 63 IN | HEART RATE: 62 BPM | SYSTOLIC BLOOD PRESSURE: 116 MMHG | TEMPERATURE: 97 F | OXYGEN SATURATION: 98 % | DIASTOLIC BLOOD PRESSURE: 78 MMHG | RESPIRATION RATE: 16 BRPM | WEIGHT: 143.8 LBS

## 2023-07-25 DIAGNOSIS — F41.9 ANXIETY: Primary | ICD-10-CM

## 2023-07-25 DIAGNOSIS — Z12.11 SCREENING FOR COLON CANCER: ICD-10-CM

## 2023-07-25 DIAGNOSIS — Z12.31 ENCOUNTER FOR SCREENING MAMMOGRAM FOR MALIGNANT NEOPLASM OF BREAST: ICD-10-CM

## 2023-07-25 PROBLEM — I50.22 CHRONIC SYSTOLIC (CONGESTIVE) HEART FAILURE (HCC): Status: ACTIVE | Noted: 2023-07-25

## 2023-07-25 PROBLEM — D68.9 COAGULATION DEFECT (HCC): Status: ACTIVE | Noted: 2023-07-25

## 2023-07-25 RX ORDER — LORAZEPAM 1 MG/1
1 TABLET ORAL 2 TIMES DAILY
Qty: 180 TABLET | Refills: 0 | Status: SHIPPED | OUTPATIENT
Start: 2023-07-25 | End: 2023-10-23

## 2023-07-30 NOTE — PROGRESS NOTES
Britt Echeverria (:  1950) is a 71 y.o. female,Established patient, here for evaluation of the following chief complaint(s):  Back Injury (Hurt lower back while at home x 1 day //Cannot schedule mammogram since she has had a diagnostic mammogram in the past./)         ASSESSMENT/PLAN:  1. Anxiety  -     LORazepam (ATIVAN) 1 MG tablet; Take 1 tablet by mouth 2 times daily for 90 days. , Disp-180 tablet, R-0Normal  2. Screening for colon cancer  -     Fecal DNA Colorectal cancer screening (Cologuard)  3. Encounter for screening mammogram for malignant neoplasm of breast  -     PRATEEK OLIVIA DIGITAL SCREEN BILATERAL; Future      Return in about 3 months (around 10/25/2023). Subjective   SUBJECTIVE/OBJECTIVE:  HPI  CC- anxiety- anxious  about her 's health. Take her lorazepam 1 mg  twice a day to keep her calm. For  now she feels better about  her own medical issue. She is in remission  from her multiple myeloma. Review of Systems- Unremarkable except for what is noted in her HPI. Objective   Physical Exam  Vitals reviewed. Constitutional:       General: She is not in acute distress. Appearance: She is not ill-appearing. HENT:      Head: Normocephalic. Pulmonary:      Effort: Pulmonary effort is normal. No respiratory distress. Musculoskeletal:         General: Normal range of motion. Cervical back: Normal range of motion. Neurological:      Mental Status: She is alert. Mental status is at baseline. Psychiatric:         Mood and Affect: Mood normal.         Behavior: Behavior normal.        On this date 2023 I have spent 15 minutes reviewing previous notes, test results and face to face with the patient discussing the diagnosis and importance of compliance with the treatment plan as well as documenting on the day of the visit. An electronic signature was used to authenticate this note.     --Nneka Hobbs MD

## 2023-08-16 ENCOUNTER — TELEPHONE (OUTPATIENT)
Dept: FAMILY MEDICINE CLINIC | Age: 73
End: 2023-08-16

## 2023-08-16 NOTE — TELEPHONE ENCOUNTER
----- Message from Su Carrillo MD sent at 8/16/2023  2:21 PM EDT -----  Ask Nadira Seen, I think we discussed about Cologuard for cancer screening,I can order one, Bonnie Mclaughlin will sendit to her house, all she need to do is follow the instruction and send it back [employer]. I highly recommend she get it done unless she prefers colonoscopy  which is the best test. Let me know.

## 2023-08-31 LAB — NONINV COLON CA DNA+OCC BLD SCRN STL QL: NEGATIVE

## 2023-09-26 ENCOUNTER — HOSPITAL ENCOUNTER (OUTPATIENT)
Dept: MRI IMAGING | Age: 73
Discharge: HOME | End: 2023-09-26
Attending: INTERNAL MEDICINE
Payer: MEDICARE

## 2023-09-26 DIAGNOSIS — C90.00 MYELOMA ASSOCIATED AMYLOIDOSIS (HCC): ICD-10-CM

## 2023-09-26 DIAGNOSIS — E85.9 MYELOMA ASSOCIATED AMYLOIDOSIS (HCC): ICD-10-CM

## 2023-09-26 PROCEDURE — 6360000004 HC RX CONTRAST MEDICATION: Performed by: INTERNAL MEDICINE

## 2023-09-26 PROCEDURE — 73723 MRI JOINT LWR EXTR W/O&W/DYE: CPT

## 2023-09-26 PROCEDURE — A9579 GAD-BASE MR CONTRAST NOS,1ML: HCPCS | Performed by: INTERNAL MEDICINE

## 2023-09-26 RX ADMIN — GADOTERIDOL 13 ML: 279.3 INJECTION, SOLUTION INTRAVENOUS at 10:37

## 2023-10-04 NOTE — PROCEDURES
Pt scheduled for bone marrow biopsy with conscious sedation for diagnosis of Multiple Myeloma in Relapse. Pt's history, allergies, and medication list reviewed by nursing and physician prior to start of procedure. Pt assessed and deemed fit for procedure by physician. Height: 5'4\"  Weight: 140 lbs    Pre-Procedural Assessment:  Signed, Dated, and timed Informed Consent on the chart  VS's obtained and documented  Pt has a patent IV site (see flowsheets)  Pt has been NPO since 2100 ON 10/4/2023  Current Medications, Allergies, and recent H&P reviewed and on chart  Emergency medications and equipment available (crash cart, narcan, flumazenil, O2, suction, etc)  Time out performed prior to procedure (see sedation documentation)  Pre-Procedure Alvino Score: 10    Procedure:  Nursing present throughout procedure to assess, assist, and monitor. Vital Signs monitored throughout - see sedation documentation. Patient to be discharged from OPO once Alvino Score of 8 or better is achieved or once pre-procedural Alvino Score is obtained. Start Time: 0915  Stop Time: 0921  Medications Given  Fentanyl: 25 mcg  Versed: 4 mg    Labs:  CBC:   Recent Labs     10/05/23  0843   WBC 1.9*   HGB 10.1*   HCT 28.5*          Post-Procedure:  Pt vital signs stable. Discharge teaching provided (see AVS). Post procedure Alvino Score 10. Pt and family verbalized understanding of all instructions. Pt discharged from [address] [place] [address] with Jonathan Grace (her best friend) who will drive her home.     Signed by Lambert Holt RN on 10/5/2023 at 10:23 AM

## 2023-10-04 NOTE — PROGRESS NOTES
Pt scheduled for bone marrow biopsy for diagnosis of Multiple Myeloma in Relapse. Pt's history, allergies, and medication list reviewed by nursing and provider prior to start of procedure. Pt assessed and deemed fit for procedure by provider. Explanation of procedure provided to patient & family. Signed, Dated, and timed Informed Consent obtained & on the chart. Current Medications, Allergies, and recent H&P reviewed. Emergency medications and equipment available. Time out performed prior to procedure - see flowsheets. Nursing present throughout procedure to assess, assist, and monitor. No complications encountered during procedure. Pt tolerated well. Right dressed with sterile dressing. Pt instructed to lie flat with pressure applied to site for 30-60 minutes. Also instructed to leave dressing in place x24 hours and report any \"wetness\" or bleeding to nurse immediately. Pt may shower after 24 hours. Pt verbalizes understanding of all instructions. Will monitor site closely for any signs of bleeding.     Signed by Judit Hamilton RN on 10/5/2023 at 10:24 AM

## 2023-10-05 ENCOUNTER — HOSPITAL ENCOUNTER (OUTPATIENT)
Dept: ONCOLOGY | Age: 73
Setting detail: INFUSION SERIES
Discharge: HOME | End: 2023-10-05
Payer: MEDICARE

## 2023-10-05 VITALS
HEIGHT: 65 IN | RESPIRATION RATE: 19 BRPM | HEART RATE: 67 BPM | DIASTOLIC BLOOD PRESSURE: 68 MMHG | TEMPERATURE: 98.1 F | OXYGEN SATURATION: 100 % | BODY MASS INDEX: 23.43 KG/M2 | SYSTOLIC BLOOD PRESSURE: 141 MMHG | WEIGHT: 140.65 LBS

## 2023-10-05 LAB
BASOPHILS # BLD: 0 K/UL (ref 0–0.2)
BASOPHILS NFR BLD: 0.6 %
DEPRECATED RDW RBC AUTO: 16.3 % (ref 12.4–15.4)
EOSINOPHIL # BLD: 0.1 K/UL (ref 0–0.6)
EOSINOPHIL NFR BLD: 5.5 %
HCT VFR BLD AUTO: 28.5 % (ref 36–48)
HGB BLD-MCNC: 10.1 G/DL (ref 12–16)
LYMPHOCYTES # BLD: 0.5 K/UL (ref 1–5.1)
LYMPHOCYTES NFR BLD: 27.6 %
MCH RBC QN AUTO: 34.2 PG (ref 26–34)
MCHC RBC AUTO-ENTMCNC: 35.4 G/DL (ref 31–36)
MCV RBC AUTO: 96.7 FL (ref 80–100)
MONOCYTES # BLD: 0.2 K/UL (ref 0–1.3)
MONOCYTES NFR BLD: 10.7 %
NEUTROPHILS # BLD: 1.1 K/UL (ref 1.7–7.7)
NEUTROPHILS NFR BLD: 55.6 %
PLATELET # BLD AUTO: 165 K/UL (ref 135–450)
PMV BLD AUTO: 7.1 FL (ref 5–10.5)
RBC # BLD AUTO: 2.95 M/UL (ref 4–5.2)
WBC # BLD AUTO: 1.9 K/UL (ref 4–11)

## 2023-10-05 PROCEDURE — 85025 COMPLETE CBC W/AUTO DIFF WBC: CPT

## 2023-10-05 PROCEDURE — 96374 THER/PROPH/DIAG INJ IV PUSH: CPT

## 2023-10-05 PROCEDURE — 2700000000 HC OXYGEN THERAPY PER DAY

## 2023-10-05 PROCEDURE — 38221 DX BONE MARROW BIOPSIES: CPT

## 2023-10-05 PROCEDURE — 94761 N-INVAS EAR/PLS OXIMETRY MLT: CPT

## 2023-10-05 PROCEDURE — 96376 TX/PRO/DX INJ SAME DRUG ADON: CPT

## 2023-10-05 PROCEDURE — 96375 TX/PRO/DX INJ NEW DRUG ADDON: CPT

## 2023-10-05 PROCEDURE — 99153 MOD SED SAME PHYS/QHP EA: CPT

## 2023-10-05 PROCEDURE — 99152 MOD SED SAME PHYS/QHP 5/>YRS: CPT

## 2023-10-05 PROCEDURE — 6360000002 HC RX W HCPCS: Performed by: INTERNAL MEDICINE

## 2023-10-05 RX ORDER — MIDAZOLAM HYDROCHLORIDE 1 MG/ML
1 INJECTION INTRAMUSCULAR; INTRAVENOUS PRN
Status: DISCONTINUED | OUTPATIENT
Start: 2023-10-05 | End: 2023-10-06 | Stop reason: HOSPADM

## 2023-10-05 RX ORDER — NALOXONE HYDROCHLORIDE 0.4 MG/ML
0.4 INJECTION, SOLUTION INTRAMUSCULAR; INTRAVENOUS; SUBCUTANEOUS PRN
Status: DISCONTINUED | OUTPATIENT
Start: 2023-10-05 | End: 2023-10-06 | Stop reason: HOSPADM

## 2023-10-05 RX ORDER — FLUMAZENIL 0.1 MG/ML
0.5 INJECTION INTRAVENOUS PRN
Status: DISCONTINUED | OUTPATIENT
Start: 2023-10-05 | End: 2023-10-06 | Stop reason: HOSPADM

## 2023-10-05 RX ORDER — FENTANYL CITRATE 50 UG/ML
25 INJECTION, SOLUTION INTRAMUSCULAR; INTRAVENOUS PRN
Status: DISCONTINUED | OUTPATIENT
Start: 2023-10-05 | End: 2023-10-06 | Stop reason: HOSPADM

## 2023-10-05 RX ADMIN — MIDAZOLAM HYDROCHLORIDE 2 MG: 1 INJECTION, SOLUTION INTRAMUSCULAR; INTRAVENOUS at 09:13

## 2023-10-05 RX ADMIN — FENTANYL CITRATE 25 MCG: 50 INJECTION, SOLUTION INTRAMUSCULAR; INTRAVENOUS at 09:08

## 2023-10-05 RX ADMIN — MIDAZOLAM HYDROCHLORIDE 2 MG: 1 INJECTION, SOLUTION INTRAMUSCULAR; INTRAVENOUS at 09:08

## 2023-10-05 NOTE — PROCEDURES
Bone Marrow Core & Aspirate with Moderate Sedation    Diagnosis: myeloma  Indication:  c/f relapse  Planned Sedation:  Fentanyl: 50mcg  Versed: 4mg  Ativan: 0  Pre-Procedure ASA Score: Class II - mild to moderate systemic disturbance, well-controlled  Airway Assessment Score: I (soft palate, uvula, fauces, tonsillar pillars visible)    Consent:   Consent was obtained from the patient by:   1. Explaining the risks associated with biopsy [bleeding and bruising] and moderate sedation [respiratory depression and sedation], as well as the benefits [an understanding of the current diagnosis to make treatment decisions], and alternatives to biopsy with sedation. 2. The patient voiced an understanding of the risks/benefits and the answers to their questions, then proceeded to sign and date the consent form. Assessment:  Patient was assessed by myself prior to initiation of sedation and procedure and is deemed medically fit to undergo the procedure. Recent H&P, current medications, and allergies are on the chart and reviewed. Time out performed. Procedure:  Patient placed in the prone position. The area was prepped with chloraprep. Sterile drapes were applied. A puncture was made with the provided scalpel, then using a Jamshidi needle a bone marrow aspirate was taken from the left posterior iliac crest.  Then using an 8 G 6\" needle a core biopsy was obtained. A sterile bandage was applied. The patient had no significant bleeding. The sample was sent for  clonoseq, histology, flow cytometry, and cytogenetics. And clonoseq    Patient tolerated well, no complications. Patient may be discharged from outpt oncology following procedure once Alvino Score is at least 8 or meets pre-procedure Alvino Score. Nursing to notify physician if pt doesn't meet criteria for discharge.     Florin Chavis MD  [facility]  [phone]

## 2023-10-05 NOTE — PROGRESS NOTES
ETCO2  Monitoring done for conscious sedation. Patient is on 2 liters/min via nasal cannula for procedure.     Baseline information:  HR: 73 BP: 128/68  RR: 14 LOC: alert  ETCO2: 35 SpO2: 100    5 minutes after sedation administered:  HR: 67 BP: 150/80  RR: 18 LOC: lethargic  ETCO2: 39 SpO2: 100    Post-Procedure:  HR: 64 BP: 143/83  RR: 16 LOC: alert  ETCO2: 41 SpO2: 100  well    Patient/caregiver was educated on the proper method of use:  Yes      Level of patient/caregiver understanding able to:   [x] Verbalize understanding   [] Demonstrate understanding       [] Teach back        [] Needs reinforcement       []  No available caregiver               []  Other:     Response to education:  Excellent

## 2023-10-06 ENCOUNTER — HOSPITAL ENCOUNTER (OUTPATIENT)
Age: 73
Setting detail: SPECIMEN
Discharge: HOME | End: 2023-10-06

## 2023-10-16 RX ORDER — AMLODIPINE BESYLATE 5 MG/1
TABLET ORAL
Qty: 30 TABLET | Refills: 5 | Status: SHIPPED | OUTPATIENT
Start: 2023-10-16

## 2023-10-16 NOTE — TELEPHONE ENCOUNTER
Future Appointments   Date Time Provider [facility] [address]   10/25/2023  8:00 AM MD OZZY Reid     LOV 7/25/2023

## 2023-10-17 ENCOUNTER — TELEPHONE (OUTPATIENT)
Dept: FAMILY MEDICINE CLINIC | Age: 73
End: 2023-10-17

## 2023-10-17 NOTE — TELEPHONE ENCOUNTER
Patient called and stated she is not going to have enough LORazepam (ATIVAN) 1 MG tablet until her next visit with Dr. Kylah Cole on 10/25/23. Patient stated she opened the pill bottle and spilled her medication into the furnace vent. She requested replacements to hold her over until her appointment. Please advise.        LOV: 7/25/2023      Future Appointments   Date Time Provider [address]   10/25/2023  8:00 AM MD OZZY Sosa

## 2023-10-18 NOTE — TELEPHONE ENCOUNTER
She thinks she will be okay to her appointment. Does not need refill  She was able to take 1/2 of the lorazepam 1/2 Hydroxyzine and an OTC melatonin and she slept great.     Future Appointments   Date Time Provider [address]   10/25/2023  8:00 AM MD OZZY Hooker

## 2023-10-23 DIAGNOSIS — F41.9 ANXIETY: Primary | ICD-10-CM

## 2023-10-23 RX ORDER — LORAZEPAM 1 MG/1
1 TABLET ORAL 2 TIMES DAILY
Qty: 4 TABLET | Refills: 0 | Status: SHIPPED | OUTPATIENT
Start: 2023-10-23 | End: 2023-10-25 | Stop reason: SDUPTHER

## 2023-10-23 NOTE — TELEPHONE ENCOUNTER
Patient called back. She is going to run out today. She is asking for enough medication until her appt Wed.   LORazepam (ATIVAN) 1 MG tablet

## 2023-10-25 ENCOUNTER — OFFICE VISIT (OUTPATIENT)
Dept: FAMILY MEDICINE CLINIC | Age: 73
End: 2023-10-25
Payer: MEDICARE

## 2023-10-25 VITALS
HEIGHT: 64 IN | BODY MASS INDEX: 24.59 KG/M2 | SYSTOLIC BLOOD PRESSURE: 136 MMHG | DIASTOLIC BLOOD PRESSURE: 74 MMHG | WEIGHT: 144 LBS | OXYGEN SATURATION: 99 % | RESPIRATION RATE: 14 BRPM | HEART RATE: 70 BPM | TEMPERATURE: 97 F

## 2023-10-25 DIAGNOSIS — F43.9 STRESS AT HOME: ICD-10-CM

## 2023-10-25 DIAGNOSIS — R10.13 EPIGASTRIC PAIN: Primary | ICD-10-CM

## 2023-10-25 DIAGNOSIS — C90.00 MULTIPLE MYELOMA, REMISSION STATUS UNSPECIFIED (HCC): ICD-10-CM

## 2023-10-25 DIAGNOSIS — D68.9 COAGULATION DEFECT (HCC): ICD-10-CM

## 2023-10-25 DIAGNOSIS — D70.9 NEUTROPENIA, UNSPECIFIED TYPE (HCC): ICD-10-CM

## 2023-10-25 DIAGNOSIS — F41.9 ANXIETY: ICD-10-CM

## 2023-10-25 DIAGNOSIS — M25.551 RIGHT HIP PAIN: ICD-10-CM

## 2023-10-25 PROBLEM — I50.22 CHRONIC SYSTOLIC (CONGESTIVE) HEART FAILURE (HCC): Status: RESOLVED | Noted: 2023-07-25 | Resolved: 2023-10-25

## 2023-10-25 PROCEDURE — 1123F ACP DISCUSS/DSCN MKR DOCD: CPT | Performed by: INTERNAL MEDICINE

## 2023-10-25 PROCEDURE — 3078F DIAST BP <80 MM HG: CPT | Performed by: INTERNAL MEDICINE

## 2023-10-25 PROCEDURE — G8484 FLU IMMUNIZE NO ADMIN: HCPCS | Performed by: INTERNAL MEDICINE

## 2023-10-25 PROCEDURE — 99214 OFFICE O/P EST MOD 30 MIN: CPT | Performed by: INTERNAL MEDICINE

## 2023-10-25 PROCEDURE — 1036F TOBACCO NON-USER: CPT | Performed by: INTERNAL MEDICINE

## 2023-10-25 PROCEDURE — G8427 DOCREV CUR MEDS BY ELIG CLIN: HCPCS | Performed by: INTERNAL MEDICINE

## 2023-10-25 PROCEDURE — G8420 CALC BMI NORM PARAMETERS: HCPCS | Performed by: INTERNAL MEDICINE

## 2023-10-25 PROCEDURE — 3074F SYST BP LT 130 MM HG: CPT | Performed by: INTERNAL MEDICINE

## 2023-10-25 PROCEDURE — G8399 PT W/DXA RESULTS DOCUMENT: HCPCS | Performed by: INTERNAL MEDICINE

## 2023-10-25 PROCEDURE — 1090F PRES/ABSN URINE INCON ASSESS: CPT | Performed by: INTERNAL MEDICINE

## 2023-10-25 PROCEDURE — 3017F COLORECTAL CA SCREEN DOC REV: CPT | Performed by: INTERNAL MEDICINE

## 2023-10-25 RX ORDER — MONTELUKAST SODIUM 10 MG/1
10 TABLET ORAL NIGHTLY
COMMUNITY

## 2023-10-25 RX ORDER — LORAZEPAM 1 MG/1
1 TABLET ORAL 2 TIMES DAILY
Qty: 60 TABLET | Refills: 2 | Status: SHIPPED | OUTPATIENT
Start: 2023-10-25 | End: 2023-10-27

## 2023-10-25 ASSESSMENT — ANXIETY QUESTIONNAIRES
IF YOU CHECKED OFF ANY PROBLEMS ON THIS QUESTIONNAIRE, HOW DIFFICULT HAVE THESE PROBLEMS MADE IT FOR YOU TO DO YOUR WORK, TAKE CARE OF THINGS AT HOME, OR GET ALONG WITH OTHER PEOPLE: SOMEWHAT DIFFICULT
6. BECOMING EASILY ANNOYED OR IRRITABLE: 0
GAD7 TOTAL SCORE: 6
7. FEELING AFRAID AS IF SOMETHING AWFUL MIGHT HAPPEN: 1
1. FEELING NERVOUS, ANXIOUS, OR ON EDGE: 1
2. NOT BEING ABLE TO STOP OR CONTROL WORRYING: 1
5. BEING SO RESTLESS THAT IT IS HARD TO SIT STILL: 1
4. TROUBLE RELAXING: 1
3. WORRYING TOO MUCH ABOUT DIFFERENT THINGS: 1

## 2023-10-25 ASSESSMENT — PATIENT HEALTH QUESTIONNAIRE - PHQ9
1. LITTLE INTEREST OR PLEASURE IN DOING THINGS: 0
7. TROUBLE CONCENTRATING ON THINGS, SUCH AS READING THE NEWSPAPER OR WATCHING TELEVISION: 0
SUM OF ALL RESPONSES TO PHQ QUESTIONS 1-9: 0
SUM OF ALL RESPONSES TO PHQ QUESTIONS 1-9: 0
3. TROUBLE FALLING OR STAYING ASLEEP: 0
10. IF YOU CHECKED OFF ANY PROBLEMS, HOW DIFFICULT HAVE THESE PROBLEMS MADE IT FOR YOU TO DO YOUR WORK, TAKE CARE OF THINGS AT HOME, OR GET ALONG WITH OTHER PEOPLE: 0
9. THOUGHTS THAT YOU WOULD BE BETTER OFF DEAD, OR OF HURTING YOURSELF: 0
SUM OF ALL RESPONSES TO PHQ QUESTIONS 1-9: 0
6. FEELING BAD ABOUT YOURSELF - OR THAT YOU ARE A FAILURE OR HAVE LET YOURSELF OR YOUR FAMILY DOWN: 0
SUM OF ALL RESPONSES TO PHQ QUESTIONS 1-9: 0
2. FEELING DOWN, DEPRESSED OR HOPELESS: 0
5. POOR APPETITE OR OVEREATING: 0
4. FEELING TIRED OR HAVING LITTLE ENERGY: 0
8. MOVING OR SPEAKING SO SLOWLY THAT OTHER PEOPLE COULD HAVE NOTICED. OR THE OPPOSITE, BEING SO FIGETY OR RESTLESS THAT YOU HAVE BEEN MOVING AROUND A LOT MORE THAN USUAL: 0
SUM OF ALL RESPONSES TO PHQ9 QUESTIONS 1 & 2: 0

## 2023-10-25 ASSESSMENT — COLUMBIA-SUICIDE SEVERITY RATING SCALE - C-SSRS
7. DID THIS OCCUR IN THE LAST THREE MONTHS: NO
5. HAVE YOU STARTED TO WORK OUT OR WORKED OUT THE DETAILS OF HOW TO KILL YOURSELF? DO YOU INTEND TO CARRY OUT THIS PLAN?: NO
3. HAVE YOU BEEN THINKING ABOUT HOW YOU MIGHT KILL YOURSELF?: NO
4. HAVE YOU HAD THESE THOUGHTS AND HAD SOME INTENTION OF ACTING ON THEM?: NO

## 2023-10-27 ENCOUNTER — HOSPITAL ENCOUNTER (OUTPATIENT)
Dept: ULTRASOUND IMAGING | Age: 73
Discharge: HOME | End: 2023-10-27
Payer: MEDICARE

## 2023-10-27 DIAGNOSIS — R10.13 EPIGASTRIC PAIN: ICD-10-CM

## 2023-10-27 PROCEDURE — 76700 US EXAM ABDOM COMPLETE: CPT

## 2023-10-31 ASSESSMENT — ENCOUNTER SYMPTOMS: COUGH: 0

## 2023-10-31 NOTE — PROGRESS NOTES
Melanie Morse (:  1950) is a 71 y.o. female,Established patient, here for evaluation of the following chief complaint(s):  Hypertension         ASSESSMENT/PLAN:   Diagnosis Orders   1. Epigastric pain  US ABDOMEN COMPLETE      2. Right hip pain        3. Neutropenia, unspecified type ([address])        4. Anxiety  LORazepam (ATIVAN) 1 MG tablet      5. Multiple myeloma, remission status unspecified ([address])        6. Stress at home         7. Coagulation defects    Follow up in 3 months. Current Outpatient Medications   Medication Sig Dispense Refill    CHOLESTYRAMINE PO Take by mouth      montelukast (SINGULAIR) 10 MG tablet Take 1 tablet by mouth nightly      amLODIPine (NORVASC) 5 MG tablet TAKE ONE TABLET BY MOUTH EVERY DAY 30 tablet 5    citalopram (CELEXA) 10 MG tablet TAKE ONE TABLET BY MOUTH DAILY 90 tablet 2    hydrOXYzine HCl (ATARAX) 10 MG tablet TAKE ONE TABLET BY MOUTH THREE TIMES DAILY AS NEEDED FOR anxiety      Cholecalciferol (VITAMIN D) 2000 UNITS CAPS capsule Take by mouth daily Indications: OTC       Multiple Vitamin (MULTIVITAMIN ADULT PO) Take by mouth (Patient not taking: Reported on 10/25/2023)      REVLIMID 10 MG chemo capsule  (Patient not taking: Reported on 10/25/2023)      loratadine (CLARITIN) 10 MG tablet  (Patient not taking: Reported on 10/25/2023)      Multiple Vitamins-Minerals (ICAPS AREDS 2 PO) Take by mouth       No current facility-administered medications for this visit. Subjective   SUBJECTIVE/OBJECTIVE:  HPI  CC- anxiety- has been more stressed, her multiple myeloma  is back  and will be going throigh more  chemotherapy. Her white count was 1.9 on 10/5/2023, she is feeling tired  She will be going through chemotherpy. She had right hip pain and had an MRI done which did not show any active lesion on her hip.    Narrative & Impression  EXAMINATION:  MRI OF THE RIGHT HIP WITH AND WITHOUT CONTRAST, 2023 9:47 am     TECHNIQUE:  Multiplanar multisequence MRI

## 2023-11-07 ENCOUNTER — TELEPHONE (OUTPATIENT)
Dept: SURGERY | Age: 73
End: 2023-11-07

## 2023-11-07 LAB
Lab: NORMAL
REPORT: NORMAL
THIS TEST SENT TO: NORMAL

## 2023-11-08 RX ORDER — LORAZEPAM 1 MG/1
1 TABLET ORAL EVERY 12 HOURS PRN
COMMUNITY

## 2023-11-08 RX ORDER — DEXAMETHASONE 4 MG/1
20 TABLET ORAL PRN
COMMUNITY
Start: 2023-10-19

## 2023-11-08 RX ORDER — ASPIRIN 81 MG/1
81 TABLET, CHEWABLE ORAL DAILY
COMMUNITY

## 2023-11-08 RX ORDER — ACYCLOVIR 400 MG/1
400 TABLET ORAL 2 TIMES DAILY
COMMUNITY
Start: 2023-10-19

## 2023-11-08 RX ORDER — POMALIDOMIDE 4 MG/1
4 CAPSULE ORAL SEE ADMIN INSTRUCTIONS
COMMUNITY
Start: 2023-10-19

## 2023-11-16 ENCOUNTER — ANESTHESIA EVENT (OUTPATIENT)
Dept: OPERATING ROOM | Age: 73
End: 2023-11-16
Payer: MEDICARE

## 2023-11-17 ENCOUNTER — APPOINTMENT (OUTPATIENT)
Dept: GENERAL RADIOLOGY | Age: 73
End: 2023-11-17
Attending: SURGERY
Payer: MEDICARE

## 2023-11-17 ENCOUNTER — HOSPITAL ENCOUNTER (OUTPATIENT)
Age: 73
Setting detail: OUTPATIENT SURGERY
Discharge: HOME | End: 2023-11-17
Attending: SURGERY | Admitting: SURGERY
Payer: MEDICARE

## 2023-11-17 ENCOUNTER — ANESTHESIA (OUTPATIENT)
Dept: OPERATING ROOM | Age: 73
End: 2023-11-17
Payer: MEDICARE

## 2023-11-17 VITALS
HEIGHT: 64 IN | TEMPERATURE: 97.1 F | RESPIRATION RATE: 16 BRPM | OXYGEN SATURATION: 100 % | HEART RATE: 57 BPM | WEIGHT: 143 LBS | SYSTOLIC BLOOD PRESSURE: 113 MMHG | DIASTOLIC BLOOD PRESSURE: 64 MMHG | BODY MASS INDEX: 24.41 KG/M2

## 2023-11-17 DIAGNOSIS — C90.02 MULTIPLE MYELOMA IN RELAPSE (HCC): Primary | ICD-10-CM

## 2023-11-17 PROCEDURE — 71045 X-RAY EXAM CHEST 1 VIEW: CPT

## 2023-11-17 PROCEDURE — 36561 INSERT TUNNELED CV CATH: CPT | Performed by: SURGERY

## 2023-11-17 PROCEDURE — A4217 STERILE WATER/SALINE, 500 ML: HCPCS | Performed by: SURGERY

## 2023-11-17 PROCEDURE — 77001 FLUOROGUIDE FOR VEIN DEVICE: CPT

## 2023-11-17 PROCEDURE — 3600000012 HC SURGERY LEVEL 2 ADDTL 15MIN: Performed by: SURGERY

## 2023-11-17 PROCEDURE — 2580000003 HC RX 258: Performed by: NURSE ANESTHETIST, CERTIFIED REGISTERED

## 2023-11-17 PROCEDURE — 2709999900 HC NON-CHARGEABLE SUPPLY: Performed by: SURGERY

## 2023-11-17 PROCEDURE — 3600000002 HC SURGERY LEVEL 2 BASE: Performed by: SURGERY

## 2023-11-17 PROCEDURE — 3700000001 HC ADD 15 MINUTES (ANESTHESIA): Performed by: SURGERY

## 2023-11-17 PROCEDURE — 2580000003 HC RX 258: Performed by: SURGERY

## 2023-11-17 PROCEDURE — 6360000002 HC RX W HCPCS: Performed by: SURGERY

## 2023-11-17 PROCEDURE — 6360000002 HC RX W HCPCS: Performed by: NURSE ANESTHETIST, CERTIFIED REGISTERED

## 2023-11-17 PROCEDURE — C1788 PORT, INDWELLING, IMP: HCPCS | Performed by: SURGERY

## 2023-11-17 PROCEDURE — 7100000010 HC PHASE II RECOVERY - FIRST 15 MIN: Performed by: SURGERY

## 2023-11-17 PROCEDURE — 7100000011 HC PHASE II RECOVERY - ADDTL 15 MIN: Performed by: SURGERY

## 2023-11-17 PROCEDURE — 3700000000 HC ANESTHESIA ATTENDED CARE: Performed by: SURGERY

## 2023-11-17 PROCEDURE — 77001 FLUOROGUIDE FOR VEIN DEVICE: CPT | Performed by: SURGERY

## 2023-11-17 PROCEDURE — 2500000003 HC RX 250 WO HCPCS: Performed by: SURGERY

## 2023-11-17 DEVICE — PORT INFUS SGL LUMN ATTCH POLYUR OPN END CATH 8FR POWERPRT: Type: IMPLANTABLE DEVICE | Site: CHEST  WALL | Status: FUNCTIONAL

## 2023-11-17 RX ORDER — SODIUM CHLORIDE 0.9 % (FLUSH) 0.9 %
5-40 SYRINGE (ML) INJECTION EVERY 12 HOURS SCHEDULED
Status: CANCELLED | OUTPATIENT
Start: 2023-11-17

## 2023-11-17 RX ORDER — SODIUM CHLORIDE 9 MG/ML
INJECTION, SOLUTION INTRAVENOUS PRN
Status: CANCELLED | OUTPATIENT
Start: 2023-11-17

## 2023-11-17 RX ORDER — HEPARIN 100 UNIT/ML
SYRINGE INTRAVENOUS PRN
Status: DISCONTINUED | OUTPATIENT
Start: 2023-11-17 | End: 2023-11-17 | Stop reason: ALTCHOICE

## 2023-11-17 RX ORDER — SODIUM CHLORIDE 0.9 % (FLUSH) 0.9 %
5-40 SYRINGE (ML) INJECTION PRN
Status: DISCONTINUED | OUTPATIENT
Start: 2023-11-17 | End: 2023-11-17 | Stop reason: HOSPADM

## 2023-11-17 RX ORDER — FENTANYL CITRATE 50 UG/ML
INJECTION, SOLUTION INTRAMUSCULAR; INTRAVENOUS PRN
Status: DISCONTINUED | OUTPATIENT
Start: 2023-11-17 | End: 2023-11-17 | Stop reason: SDUPTHER

## 2023-11-17 RX ORDER — LIDOCAINE HYDROCHLORIDE 10 MG/ML
1 INJECTION, SOLUTION EPIDURAL; INFILTRATION; INTRACAUDAL; PERINEURAL
Status: DISCONTINUED | OUTPATIENT
Start: 2023-11-17 | End: 2023-11-17 | Stop reason: HOSPADM

## 2023-11-17 RX ORDER — SODIUM CHLORIDE, SODIUM LACTATE, POTASSIUM CHLORIDE, CALCIUM CHLORIDE 600; 310; 30; 20 MG/100ML; MG/100ML; MG/100ML; MG/100ML
INJECTION, SOLUTION INTRAVENOUS CONTINUOUS PRN
Status: DISCONTINUED | OUTPATIENT
Start: 2023-11-17 | End: 2023-11-17 | Stop reason: SDUPTHER

## 2023-11-17 RX ORDER — OXYCODONE HYDROCHLORIDE 5 MG/1
10 TABLET ORAL PRN
Status: CANCELLED | OUTPATIENT
Start: 2023-11-17 | End: 2023-11-17

## 2023-11-17 RX ORDER — SODIUM CHLORIDE 9 MG/ML
INJECTION, SOLUTION INTRAVENOUS PRN
Status: DISCONTINUED | OUTPATIENT
Start: 2023-11-17 | End: 2023-11-17 | Stop reason: HOSPADM

## 2023-11-17 RX ORDER — SODIUM CHLORIDE, SODIUM LACTATE, POTASSIUM CHLORIDE, CALCIUM CHLORIDE 600; 310; 30; 20 MG/100ML; MG/100ML; MG/100ML; MG/100ML
INJECTION, SOLUTION INTRAVENOUS CONTINUOUS
Status: DISCONTINUED | OUTPATIENT
Start: 2023-11-17 | End: 2023-11-17 | Stop reason: HOSPADM

## 2023-11-17 RX ORDER — MEPERIDINE HYDROCHLORIDE 25 MG/ML
12.5 INJECTION INTRAMUSCULAR; INTRAVENOUS; SUBCUTANEOUS EVERY 5 MIN PRN
Status: CANCELLED | OUTPATIENT
Start: 2023-11-17

## 2023-11-17 RX ORDER — OXYCODONE HYDROCHLORIDE 5 MG/1
5 TABLET ORAL PRN
Status: CANCELLED | OUTPATIENT
Start: 2023-11-17 | End: 2023-11-17

## 2023-11-17 RX ORDER — HYDROCODONE BITARTRATE AND ACETAMINOPHEN 5; 325 MG/1; MG/1
1 TABLET ORAL EVERY 6 HOURS PRN
Qty: 12 TABLET | Refills: 0 | Status: SHIPPED | OUTPATIENT
Start: 2023-11-17 | End: 2023-11-20

## 2023-11-17 RX ORDER — SODIUM CHLORIDE 0.9 % (FLUSH) 0.9 %
5-40 SYRINGE (ML) INJECTION EVERY 12 HOURS SCHEDULED
Status: DISCONTINUED | OUTPATIENT
Start: 2023-11-17 | End: 2023-11-17 | Stop reason: HOSPADM

## 2023-11-17 RX ORDER — ONDANSETRON 2 MG/ML
4 INJECTION INTRAMUSCULAR; INTRAVENOUS
Status: CANCELLED | OUTPATIENT
Start: 2023-11-17 | End: 2023-11-18

## 2023-11-17 RX ORDER — SODIUM CHLORIDE 0.9 % (FLUSH) 0.9 %
5-40 SYRINGE (ML) INJECTION PRN
Status: CANCELLED | OUTPATIENT
Start: 2023-11-17

## 2023-11-17 RX ORDER — PROPOFOL 10 MG/ML
INJECTION, EMULSION INTRAVENOUS PRN
Status: DISCONTINUED | OUTPATIENT
Start: 2023-11-17 | End: 2023-11-17 | Stop reason: SDUPTHER

## 2023-11-17 RX ADMIN — CEFAZOLIN 2000 MG: 2 INJECTION, POWDER, FOR SOLUTION INTRAMUSCULAR; INTRAVENOUS at 09:41

## 2023-11-17 RX ADMIN — PROPOFOL 50 MG: 10 INJECTION, EMULSION INTRAVENOUS at 09:45

## 2023-11-17 RX ADMIN — SODIUM CHLORIDE, POTASSIUM CHLORIDE, SODIUM LACTATE AND CALCIUM CHLORIDE: 600; 310; 30; 20 INJECTION, SOLUTION INTRAVENOUS at 09:40

## 2023-11-17 RX ADMIN — FENTANYL CITRATE 50 MCG: 50 INJECTION INTRAMUSCULAR; INTRAVENOUS at 09:45

## 2023-11-17 RX ADMIN — PROPOFOL 50 MG: 10 INJECTION, EMULSION INTRAVENOUS at 09:49

## 2023-11-17 RX ADMIN — FENTANYL CITRATE 50 MCG: 50 INJECTION INTRAMUSCULAR; INTRAVENOUS at 09:55

## 2023-11-17 ASSESSMENT — PAIN SCALES - GENERAL
PAINLEVEL_OUTOF10: 3
PAINLEVEL_OUTOF10: 4
PAINLEVEL_OUTOF10: 2

## 2023-11-17 ASSESSMENT — PAIN - FUNCTIONAL ASSESSMENT
PAIN_FUNCTIONAL_ASSESSMENT: ACTIVITIES ARE NOT PREVENTED
PAIN_FUNCTIONAL_ASSESSMENT: 0-10

## 2023-11-17 ASSESSMENT — ENCOUNTER SYMPTOMS: SHORTNESS OF BREATH: 1

## 2023-11-17 ASSESSMENT — LIFESTYLE VARIABLES: SMOKING_STATUS: 0

## 2023-11-17 NOTE — H&P
I have reviewed the progress note of Dr. Ariella Alba serving as history and physical dated November/7/2023 (Scanned into media tab 11/7/23) and examined the patient and find no relevant changes. I have reviewed with the patient and/or family the risks, benefits, and alternatives to the procedure.

## 2023-11-17 NOTE — OP NOTE
[facility], [address]                                OPERATIVE REPORT    PATIENT NAME: Angel Garcia                   :        1950  MED REC NO:   [id number]                          ROOM:  ACCOUNT NO:   [de-identified]                           ADMIT DATE: 2023  PROVIDER:     Brissa Mathews MD    DATE OF PROCEDURE:  2023    PREOPERATIVE DIAGNOSIS:  Multiple myeloma. POSTOPERATIVE DIAGNOSIS:  Multiple myeloma. PROCEDURE:  Port-A-Cath placement with surgeon's use of fluoroscopy. ANESTHESIA:  Local with MAC. SURGEON:  Brissa Mathews MD.    ESTIMATED BLOOD LOSS:  Minimal.    INDICATIONS:  The patient is a 29-year-old woman with multiple myeloma. She needs long-term IV access for chemotherapy. OPERATIVE SUMMARY:  After preoperative evaluation, the patient was  brought in the operating suite and placed in a comfortable supine  position in the operating room table. Monitoring equipment was attached  and she was given intravenous sedation per Anesthesia. She was placed  in a Trendelenburg position, and her neck and shoulders were sterilely  prepped and draped. Her left subclavian area was anesthetized with  local anesthetic, and the subclavian vein was easily accessed on the  _____. A wire was passed and fluoro showed this to be in good position. A small skin incision was created around the wire, and a subcutaneous  pocket was created inferior to the incision. The sheath and dilator  were passed over the wire under fluoroscopic guidance, and the wire and  dilator were removed. The catheter was passed through the sheath, and  the sheath was peeled away. The tip of the catheter was positioned at  the atriocaval junction and cut at the skin level. It was attached to  the port using a locking clip, and the port was placed in the  subcutaneous pocket.   It was secured to the chest wall with

## 2023-11-17 NOTE — BRIEF OP NOTE
Brief Postoperative Note      Patient: Nathan Guzman  YOB: 1950  MRN: [id number]    Date of Procedure: 11/17/2023    Pre-Op Diagnosis Codes:     * Multiple myeloma, remission status unspecified ([address] ) [C90.00]    Post-Op Diagnosis: Same       Procedure(s):  PORT INSERTION    Surgeon(s):  Amaya Mathews MD    Assistant:  Surgical Assistant: Shira Mcclain    Anesthesia: Monitor Anesthesia Care    Estimated Blood Loss (mL): less than 50     Complications: None    Specimens:   * No specimens in log *    Implants:  * No implants in log *      Drains: * No LDAs found *    Findings: as above      Electronically signed by Ely Mcknight MD on 11/17/2023 at 9:59 AM

## 2023-11-17 NOTE — PROGRESS NOTES
.1.  Do not eat or drink anything after 12 midnight prior to surgery. This includes no water, chewing gum or mints. 2.  Take the following pills with a small sip of water on the morning of surgery 11/17/2023.  3.  Aspirin, Ibuprofen, Advil, Naproxen, Vitamin E and other Anti-inflammatory products should be stopped for 5 days before surgery or as directed by your physician. 4.  Check with your doctor regarding stopping Plavix, Coumadin, Lovenox, Fragmin or other blood thinners. 5.  Do not smoke and do not drink alcoholic beverages 24 hours prior to surgery. This includes NA Beer. 6.  You may brush your teeth and gargle the morning of surgery. DO NOT SWALLOW WATER.  7.  You MUST make arrangements for a responsible adult to take you home after your surgery. You will not be allowed to leave alone or drive yourself home. It is strongly suggested someone stay with you the first 24 hours. Your surgery will be cancelled if you do not have a ride home. 8.  A parent/legal guardian must accompany a child scheduled for surgery and plan to stay at the hospital until the child is discharged. Please do not bring other children with you. 9.  Please wear simple, loose fitting clothing to the hospital.  Carlos Ambriz not bring valuables ( money, credit cards, checkbooks, etc.)  Do not wear any makeup (including no eye makeup) or nail polish on your fingers or toes. 10.  Do not wear any jewelry or piercing on the day of surgery. All body piercing jewelry must be removed. 11.  If you have dentures, they will be removed before going to the OR; we will provide you a container. If you wear contact lenses or glasses, they will be removed; please bring a case for them. 12.  Please see your family doctor/pediatrician for a history & physical and/or concerning medications. Bring any test results/reports from your physician's office the day of surgery.     13.  Remember to bring Blood Bank Bracelet to the hospital on the day of
Left message for pt to be her eat 0745 for surgery on 11/17/23.
Phase II:  1. Patient is identified using name and the date of birth. 2.  The patient is free from signs and symptoms of chemical, electrical, laser, radiation, positioning, or transfer/transport injury. 3.  The patient receives appropriate medication(s), safely administered during the Perioperative period. 4.  The patient has wound/tissue perfusion consistent with or improved from baseline levels established preoperatively. 5.  The patient is at or returning to normothermia at the conclusion of the immediate postoperative period. 6.  The patient's fluid, electrolyte, and acid base balances are consistent with or improved from baseline levels established preoperatively. 7.  The patient's pulmonary function is consistent with or improved from baseline levels established preoperatively. 8.  The patient's cardiovascular status is consistent with or improved from baseline levels established preoperatively. 9.  The patient/caregiver demonstrates knowledge of nutritional management related to the operative or other invasive procedure. 10. The patient/caregiver demonstrates knowledge of medication, pain, and wound management. 11. The patient participates in the rehabilitation process as applicable. 12.  The patient/caregiver participates in decisions affection his or her Perioperative plan of care. 13.  The patient's care is consistent with the individualized Perioperative plan of care. 14.  The patient's right to privacy is maintained. 15. The patient is the recipient of competent and ethical care within legal standards of practice. 16.  The patient's value system, lifestyle, ethnicity, and culture are considered, respected, and incorporated in the Perioperative plan of care and understands special services available. 17.  The patient demonstrates and/or reports adequate pain control throughout the the Perioperative period. 18.   The patient's neurological status is consistent with or improved from
Pt discharged from Same Day in stable condition. IV removed, pt able to tolerate PO fluids, all belongings returned to the patient. Discharge instructions given to the patient and her friend, both verbalized understanding.   Received confirmation from Dr. Sonia Chacon that X-ray results are normal.
and Phase II process. 23.  Patient pain level is established preoperatively using age appropriate pain scale. 24.  The patient will move to fall risk upon sedation- during and through the recovery phase. Interventions- orient the patient to the environment, especially the location of the bathroom; provide treaded socks/non-skid footwear; demonstrate and teach back use of the nurse's call system; instruct the patient to call for help before getting out of bed; lock all movable equipment before transferring patient; keep bed in lowest position possible.  25.  Other:

## 2023-11-20 ENCOUNTER — HOSPITAL ENCOUNTER (OUTPATIENT)
Dept: PHYSICAL THERAPY | Age: 73
Setting detail: THERAPIES SERIES
Discharge: HOME | End: 2023-11-20
Payer: MEDICARE

## 2023-11-20 DIAGNOSIS — R53.1 GENERALIZED WEAKNESS: Primary | ICD-10-CM

## 2023-11-20 DIAGNOSIS — R26.89 IMBALANCE: ICD-10-CM

## 2023-11-20 PROCEDURE — 97112 NEUROMUSCULAR REEDUCATION: CPT

## 2023-11-20 PROCEDURE — 97162 PT EVAL MOD COMPLEX 30 MIN: CPT

## 2023-11-20 NOTE — FLOWSHEET NOTE
[address] [place] [address] [facility] [address] [place] [address] office: [phone] fax: [phone]      Physical Therapy: TREATMENT/PROGRESS NOTE   Patient: Shawn Palafox (89 y.o. female)   Treatment Date: 2023   :  1950 MRN: [id number]   Visit #: 1   Insurance Allowable Auth Needed   BMN []Yes    []No    Insurance: Payor: MEDICARE / Plan: MEDICARE PART A AND B / Product Type: *No Product type* /   Insurance ID: [id number] - (Medicare)  Secondary Insurance (if applicable): BCBS   Treatment Diagnosis:     ICD-10-CM    1. Generalized weakness  R53.1       2.  Imbalance  R26.89          Medical Diagnosis:    Multiple myeloma not having achieved remission [C90.00]  Other malaise [R53.81]   Referring Physician: Cy Pena MD  PCP: Porsha Ramsay MD                             Plan of care signed (Y/N):     Date of Patient follow up with Physician:      Progress Report/POC: EVAL today  POC update due: (10 visits /OR [address], whichever is less)  2023     Precautions/ Contra-indications:   Latex allergy:   No  Pacemaker:     No  Other:                    Active treatment for cancer  Surgical Date:       N/A    Preferred Language for Healthcare:   [x]English       []other:    SUBJECTIVE EXAMINATION     Patient Report/Comments: see eval     Test used Initial score  23   Pain Summary VAS 0/10    Functional questionnaire LEFS 0/80    Other:                OBJECTIVE EXAMINATION     Observation:     Test measurements: see eval    Exercises/Interventions:     Therapeutic Ex (41523)  resistance Sets/time Reps Notes/Cues/Progressions   SLR NV      Bridge NV      Clamshell NV                           Scap retraction NV      Biceps curl NV      Lat pull down NV      Shld ext NV      Manual Intervention ([phone])  TIME                                        NMR re-education (52193) resistance Sets/time Reps CUES NEEDED   Education
(2) able to transfer with verbal cueing and/or supervision   (1) needs one person to assist   (0) needs two people to assist or supervise to be safe   Score: 4    6. STANDING UNSUPPORTED WITH EYES CLOSED   INSTRUCTIONS: Please close your eyes and stand still for 10 seconds. (4) able to stand 10 seconds safely   (3) able to stand 10 seconds with supervision   (2) able to stand 3 seconds   (1) unable to keep eyes closed 3 seconds but stays steady   (0) needs help to keep from falling   Score: 3    7. STANDING UNSUPPORTED WITH FEET TOGETHER   INSTRUCTIONS: Place your feet together and stand without holding. (4) able to place feet together independently and stand 1 minute safely   (3) able to place feet together independently and stand for 1 minute with   supervision   (2) able to place feet together independently but unable to hold for 30 seconds   (1) needs help to attain position but able to stand 15 seconds feet together   (0) needs help to attain position and unable to hold for 15 seconds   Score: 3    8. REACHING FORWARD WITH OUTSTRETCHED ARM WHILE   STANDING   INSTRUCTIONS: Lift arm to 90 degrees. Stretch out your fingers and reach   forward as far as you can. (Examiner places a ruler at end of fingertips when   arm is at 90 degrees. Fingers should not touch the ruler while reaching   forward. The recorded measure is the distance forward that the finger reaches   while the subject is in the most forward lean position. When possible, ask   subject to use both arms when reaching to avoid rotation of the trunk.). (4) can reach forward confidently >25 cm (10 inches)   (3) can reach forward >12 cm safely (5 inches)   (2) can reach forward >5 cm safely (2 inches)   (1) reaches forward but needs supervision   (0) loses balance while trying/requires external support   Score: 4    9.  OBJECT FROM FLOOR FROM A STANDING POSITION   INSTRUCTIONS:  shoe/slipper which is placed in front of your feet.    (4)

## 2023-11-20 NOTE — PLAN OF CARE
Status: [] Progressing: [] Met: [] Not Met: [] Adjusted  Patient will increase core/lumbopelvic function to allow independence in all self-care activities. Status: [] Progressing: [] Met: [] Not Met: [] Adjusted      TREATMENT PLAN     Frequency/Duration: 1-2x/week for 6 weeks for the following treatment interventions:    Interventions:  [x] Therapeutic exercise including: strength training, ROM, including postural re-education. [x] Therapeutic activities including: functional mobility training and education. [x] NMR activation and proprioception, including postural re-education. [x] Gait Training for normalization of ambulation patterns and AD training. [x] Manual therapy as indicated to include: PROM, Gr I-IV mobilizations, and STM  [x] Modalities as needed that may include: NONE  [x] Patient education on joint protection, postural re-education, activity modification, progression of HEP.            Electronically Signed by Adolfo Nelson PT  Date: 11/20/2023

## 2023-11-27 ENCOUNTER — HOSPITAL ENCOUNTER (OUTPATIENT)
Dept: PHYSICAL THERAPY | Age: 73
Setting detail: THERAPIES SERIES
Discharge: HOME | End: 2023-11-27
Payer: MEDICARE

## 2023-11-27 PROCEDURE — 97110 THERAPEUTIC EXERCISES: CPT

## 2023-11-27 NOTE — FLOWSHEET NOTE
[address] [place] [address] [facility] [address] [place] [address] office: [phone] fax: [phone]      Physical Therapy: TREATMENT/PROGRESS NOTE   Patient: Fauzia Mosley (64 y.o. female)   Treatment Date: 2023   :  1950 MRN: [id number]   Visit #: 2   Insurance Allowable Auth Needed   BMN []Yes    [x]No    Insurance: Payor: MEDICARE / Plan: MEDICARE PART A AND B / Product Type: *No Product type* /   Insurance ID: [id number] - (Medicare)  Secondary Insurance (if applicable): BCBS   Treatment Diagnosis:   No diagnosis found. Medical Diagnosis:    Multiple myeloma not having achieved remission [C90.00]  Other malaise [R53.81]   Referring Physician: Brandy Peterson MD  PCP: Ryan Davis MD                             Plan of care signed (Y/N):     Date of Patient follow up with Physician:      Progress Report/POC: NO  POC update due: (10 visits [address], whichever is less)  2023     Precautions/ Contra-indications:   Latex allergy:   No  Pacemaker:     No  Other:                    Active treatment for cancer  Surgical Date:       N/A    Preferred Language for Healthcare:   [x]English       []other:    SUBJECTIVE EXAMINATION     Patient Report/Comments: Pt reports that her blood counts were too low to get her chemo again last week.       Test used Initial score  23   Pain Summary VAS 0/10 0/10   Functional questionnaire LEFS 0/80    Other:                OBJECTIVE EXAMINATION     Observation:     Test measurements:     Exercises/Interventions:     Therapeutic Ex (90502)  resistance Sets/time Reps Notes/Cues/Progressions   SLR  1 10x B HEP   Bridge  1 10x5\" HEP   Clamshell  1 10x5\" B HEP   Side-lying hip ABD  1 10x B HEP                 Scap retraction red 1 10x B HEP   Biceps curl red 1 10x B HEP   Shld ER red 1 10x B HEP   Shld ext red 1 10x B HEP                               Manual Intervention (65907)  TIME

## 2023-12-04 ENCOUNTER — HOSPITAL ENCOUNTER (OUTPATIENT)
Dept: PHYSICAL THERAPY | Age: 73
Setting detail: THERAPIES SERIES
Discharge: HOME | End: 2023-12-04
Payer: MEDICARE

## 2023-12-04 PROCEDURE — 97110 THERAPEUTIC EXERCISES: CPT

## 2023-12-04 NOTE — FLOWSHEET NOTE
[address] [place] [address] [facility] [address] office: [phone] fax: [phone]      Physical Therapy: TREATMENT/PROGRESS NOTE   Patient: Megha Cui (1 y.o. female)   Treatment Date: 2023   :  1950 MRN: [id number]   Visit #: 3   Insurance Allowable Auth Needed   BMN []Yes    [x]No    Insurance: Payor: MEDICARE / Plan: MEDICARE PART A AND B / Product Type: *No Product type* /   Insurance ID: [id number] - (Medicare)  Secondary Insurance (if applicable): BCBS   Treatment Diagnosis:   1. Generalized weakness  R53.1         2. Imbalance  R26.89        Medical Diagnosis:    Multiple myeloma not having achieved remission [C90.00]  Other malaise [R53.81]   Referring Physician: Angelique Miller MD  PCP: Za Ferrer MD                             Plan of care signed (Y/N):     Date of Patient follow up with Physician:      Progress Report/POC: NO  POC update due: (10 visits [address], whichever is less)  1/3/2024     Precautions/ Contra-indications:   Latex allergy:   No  Pacemaker:     No  Other:                    Active treatment for cancer  Surgical Date:       N/A    Preferred Language for Healthcare:   [x]English       []other:    SUBJECTIVE EXAMINATION     Patient Report/Comments: Pt reports that her blood counts were too low to get her chemo again last week. Also had her dog pull her R arm hard last night when letting him out, and her right side is a little sore today.       Test used Initial score  23   Pain Summary VAS 0/10 0/10   Functional questionnaire LEFS 0/80    Other:                OBJECTIVE EXAMINATION     Observation:     Test measurements:     Exercises/Interventions:     Therapeutic Ex (49582)  resistance Sets/time Reps Notes/Cues/Progressions   SLR  1 10x B HEP   Bridge  1 10x5\" HEP   Clamshell  1 10x5\" B HEP   Side-lying hip ABD  1 10x B HEP   rockerboard   2' each    3-way hip

## 2023-12-11 ENCOUNTER — HOSPITAL ENCOUNTER (OUTPATIENT)
Dept: PHYSICAL THERAPY | Age: 73
Setting detail: THERAPIES SERIES
Discharge: HOME | End: 2023-12-11
Payer: MEDICARE

## 2023-12-11 NOTE — FLOWSHEET NOTE
[facility]  [address] [facility], [address] [facility]                 Physical Therapy  Cancellation/No-show Note    Patient Name:  Lizzette Grant  :  1950   Date:  2023  Cancels to Date: 1  No-shows to Date: 0    For today's appointment patient:  [x]  Cancelled  []  Rescheduled appointment  []  No-show     Reason given by patient:  [x]  Patient ill  []  Conflicting appointment  []  No transportation    []  Conflict with work  []  No reason given  []  Other:     Comments:      Electronically signed by:  Shala Nye PT,

## 2023-12-13 DIAGNOSIS — F32.9 REACTIVE DEPRESSION: ICD-10-CM

## 2023-12-13 RX ORDER — CITALOPRAM HYDROBROMIDE 10 MG/1
TABLET ORAL
Qty: 90 TABLET | Refills: 2 | Status: SHIPPED | OUTPATIENT
Start: 2023-12-13

## 2023-12-13 NOTE — TELEPHONE ENCOUNTER
10/25/2023    Future Appointments   Date Time Provider [address]   12/18/2023  9:45 AM Bibiana Lacy, PT [facility] PT [facility]   1/25/2024  1:30 PM MD OZZY Day

## 2024-01-05 ENCOUNTER — COMMUNITY OUTREACH (OUTPATIENT)
Dept: FAMILY MEDICINE CLINIC | Age: 74
End: 2024-01-05

## 2024-01-08 ENCOUNTER — APPOINTMENT (OUTPATIENT)
Dept: PHYSICAL THERAPY | Age: 74
End: 2024-01-08
Payer: MEDICARE

## 2024-01-15 ENCOUNTER — HOSPITAL ENCOUNTER (OUTPATIENT)
Dept: PHYSICAL THERAPY | Age: 74
Setting detail: THERAPIES SERIES
Discharge: HOME | End: 2024-01-15
Payer: MEDICARE

## 2024-01-15 PROCEDURE — 97110 THERAPEUTIC EXERCISES: CPT

## 2024-01-15 NOTE — FLOWSHEET NOTE
function to allow independence in all self-care activities.                                                                                                                Status: [x] Progressing: [] Met: [] Not Met: [] Adjusted    Overall Progression Towards Functional goals/ Treatment Progress Update:  [] Patient is progressing as expected towards functional goals listed.    [x] Progression is slowed due to complexities/Impairments listed.  [] Progression has been slowed due to co-morbidities.  [] Plan just implemented, too soon (<30days) to assess goals progression   [] Goals require adjustment due to lack of progress  [] Patient is not progressing as expected and requires additional follow up with physician  [] Other:     CHARGE CAPTURE     PT CHARGE GRID   CPT Code (TIMED) minutes # CPT Code (UNTIMED) #     Therex (82135)  40 3  EVAL:MODERATE (10952 - Typically 30 minutes face-to-face)     Neuromusc. Re-ed (41113)    Re-Eval (93910)     Manual (03452)    Estim Unattended (37960)     Ther. Act (33251)    Mech. Traction (21751)     Gait (25910)    Dry Needle 1-2 muscle (89028)     Aquatic Therex (09764)    Dry Needle 3+ muscle (03150)     Iontophoresis (01325)    VASO (55493)     Ultrasound (31955)    Group Therapy (77132)     Estim Attended (36275)    Canalith Repositioning (92696)     Other:    Other:    Total Timed Code Tx Minutes 40 3       Total Treatment Minutes 40        Charge Justification:  (49370) THERAPEUTIC EXERCISE - Provided verbal/tactile cueing for activities related to strengthening, flexibility, endurance, ROM performed to prevent loss of range of motion, maintain or improve muscular strength or increase flexibility, following either an injury or surgery.   (76237) HOME EXERCISE PROGRAM - Reviewed/Progressed HEP activities related to strengthening, flexibility, endurance, ROM performed to prevent loss of range of motion, maintain or improve muscular strength or increase flexibility, following

## 2024-01-18 ENCOUNTER — TELEPHONE (OUTPATIENT)
Dept: FAMILY MEDICINE CLINIC | Age: 74
End: 2024-01-18

## 2024-01-18 DIAGNOSIS — F41.9 ANXIETY DISORDER, UNSPECIFIED: ICD-10-CM

## 2024-01-18 RX ORDER — LORAZEPAM 1 MG/1
TABLET ORAL
Qty: 60 TABLET | Refills: 2 | Status: SHIPPED | OUTPATIENT
Start: 2024-01-18 | End: 2024-02-17

## 2024-01-18 NOTE — TELEPHONE ENCOUNTER
Patient called  Refill  30 day  [facility] pharmacy on file in chart  Lorazepam 1 mg bid # 60    She  does not have enough to make it to her appt    10/25/2023 last ov  Future Appointments   Date Time Provider Department Center   1/25/2024  1:30 PM Mayra Mayen MD [place] [facility]       
Patient informed

## 2024-01-22 ENCOUNTER — APPOINTMENT (OUTPATIENT)
Dept: PHYSICAL THERAPY | Age: 74
End: 2024-01-22
Payer: MEDICARE

## 2024-01-25 ENCOUNTER — OFFICE VISIT (OUTPATIENT)
Dept: FAMILY MEDICINE CLINIC | Age: 74
End: 2024-01-25

## 2024-01-25 VITALS
WEIGHT: 145 LBS | SYSTOLIC BLOOD PRESSURE: 132 MMHG | HEART RATE: 78 BPM | OXYGEN SATURATION: 99 % | RESPIRATION RATE: 16 BRPM | DIASTOLIC BLOOD PRESSURE: 70 MMHG | BODY MASS INDEX: 24.89 KG/M2

## 2024-01-25 DIAGNOSIS — F13.99 SEDATIVE, HYPNOTIC OR ANXIOLYTIC USE, UNSPECIFIED WITH UNSPECIFIED SEDATIVE, HYPNOTIC OR ANXIOLYTIC-INDUCED DISORDER (HCC): ICD-10-CM

## 2024-01-25 DIAGNOSIS — D70.9 NEUTROPENIA, UNSPECIFIED TYPE (HCC): ICD-10-CM

## 2024-01-25 DIAGNOSIS — F39 MOOD DISORDER (HCC): ICD-10-CM

## 2024-01-25 DIAGNOSIS — I10 PRIMARY HYPERTENSION: ICD-10-CM

## 2024-01-25 DIAGNOSIS — C90.02 MULTIPLE MYELOMA IN RELAPSE (HCC): Primary | ICD-10-CM

## 2024-01-25 SDOH — ECONOMIC STABILITY: FOOD INSECURITY: WITHIN THE PAST 12 MONTHS, THE FOOD YOU BOUGHT JUST DIDN'T LAST AND YOU DIDN'T HAVE MONEY TO GET MORE.: NEVER TRUE

## 2024-01-25 SDOH — ECONOMIC STABILITY: FOOD INSECURITY: WITHIN THE PAST 12 MONTHS, YOU WORRIED THAT YOUR FOOD WOULD RUN OUT BEFORE YOU GOT MONEY TO BUY MORE.: NEVER TRUE

## 2024-01-25 SDOH — ECONOMIC STABILITY: INCOME INSECURITY: HOW HARD IS IT FOR YOU TO PAY FOR THE VERY BASICS LIKE FOOD, HOUSING, MEDICAL CARE, AND HEATING?: NOT VERY HARD

## 2024-01-25 ASSESSMENT — PATIENT HEALTH QUESTIONNAIRE - PHQ9
3. TROUBLE FALLING OR STAYING ASLEEP: 0
SUM OF ALL RESPONSES TO PHQ9 QUESTIONS 1 & 2: 0
1. LITTLE INTEREST OR PLEASURE IN DOING THINGS: 0
SUM OF ALL RESPONSES TO PHQ QUESTIONS 1-9: 0
8. MOVING OR SPEAKING SO SLOWLY THAT OTHER PEOPLE COULD HAVE NOTICED. OR THE OPPOSITE, BEING SO FIGETY OR RESTLESS THAT YOU HAVE BEEN MOVING AROUND A LOT MORE THAN USUAL: 0
SUM OF ALL RESPONSES TO PHQ QUESTIONS 1-9: 0
SUM OF ALL RESPONSES TO PHQ9 QUESTIONS 1 & 2: 0
5. POOR APPETITE OR OVEREATING: 0
9. THOUGHTS THAT YOU WOULD BE BETTER OFF DEAD, OR OF HURTING YOURSELF: 0
1. LITTLE INTEREST OR PLEASURE IN DOING THINGS: 0
SUM OF ALL RESPONSES TO PHQ QUESTIONS 1-9: 0
SUM OF ALL RESPONSES TO PHQ QUESTIONS 1-9: 0
10. IF YOU CHECKED OFF ANY PROBLEMS, HOW DIFFICULT HAVE THESE PROBLEMS MADE IT FOR YOU TO DO YOUR WORK, TAKE CARE OF THINGS AT HOME, OR GET ALONG WITH OTHER PEOPLE: 0
7. TROUBLE CONCENTRATING ON THINGS, SUCH AS READING THE NEWSPAPER OR WATCHING TELEVISION: 0
2. FEELING DOWN, DEPRESSED OR HOPELESS: 0
2. FEELING DOWN, DEPRESSED OR HOPELESS: 0
6. FEELING BAD ABOUT YOURSELF - OR THAT YOU ARE A FAILURE OR HAVE LET YOURSELF OR YOUR FAMILY DOWN: 0
SUM OF ALL RESPONSES TO PHQ QUESTIONS 1-9: 0
4. FEELING TIRED OR HAVING LITTLE ENERGY: 0
SUM OF ALL RESPONSES TO PHQ QUESTIONS 1-9: 0

## 2024-01-29 ENCOUNTER — APPOINTMENT (OUTPATIENT)
Dept: PHYSICAL THERAPY | Age: 74
End: 2024-01-29
Payer: MEDICARE

## 2024-01-29 ENCOUNTER — TELEPHONE (OUTPATIENT)
Dept: FAMILY MEDICINE CLINIC | Age: 74
End: 2024-01-29

## 2024-01-29 RX ORDER — OXYBUTYNIN CHLORIDE 5 MG/1
5 TABLET, EXTENDED RELEASE ORAL DAILY
Qty: 30 TABLET | Refills: 3 | Status: SHIPPED | OUTPATIENT
Start: 2024-01-29

## 2024-01-29 NOTE — TELEPHONE ENCOUNTER
Patient called to report Dr. Bartholomew was going to prescribe her a new medication for incontinence following her appointment on 01/25/24. She says this has not been sent to her pharmacy.

## 2024-04-15 NOTE — TELEPHONE ENCOUNTER
1/25/2024    Future Appointments   Date Time Provider Department Center   4/24/2024  3:00 PM Mayra Mayen MD [facility] [place] [facility]

## 2024-04-17 RX ORDER — AMLODIPINE BESYLATE 5 MG/1
5 TABLET ORAL DAILY
Qty: 30 TABLET | Refills: 5 | Status: SHIPPED | OUTPATIENT
Start: 2024-04-17

## 2024-04-24 ENCOUNTER — OFFICE VISIT (OUTPATIENT)
Dept: FAMILY MEDICINE CLINIC | Age: 74
End: 2024-04-24
Payer: MEDICARE

## 2024-04-24 VITALS
OXYGEN SATURATION: 99 % | HEIGHT: 64 IN | TEMPERATURE: 97.1 F | WEIGHT: 152.6 LBS | BODY MASS INDEX: 26.05 KG/M2 | RESPIRATION RATE: 16 BRPM | HEART RATE: 82 BPM | SYSTOLIC BLOOD PRESSURE: 136 MMHG | DIASTOLIC BLOOD PRESSURE: 72 MMHG

## 2024-04-24 DIAGNOSIS — Z00.00 ROUTINE GENERAL MEDICAL EXAMINATION AT A HEALTH CARE FACILITY: Primary | ICD-10-CM

## 2024-04-24 DIAGNOSIS — G89.3 PAIN DUE TO NEOPLASM: ICD-10-CM

## 2024-04-24 DIAGNOSIS — I10 PRIMARY HYPERTENSION: ICD-10-CM

## 2024-04-24 DIAGNOSIS — F41.9 ANXIETY: ICD-10-CM

## 2024-04-24 DIAGNOSIS — F13.99 SEDATIVE, HYPNOTIC OR ANXIOLYTIC USE, UNSPECIFIED WITH UNSPECIFIED SEDATIVE, HYPNOTIC OR ANXIOLYTIC-INDUCED DISORDER (HCC): ICD-10-CM

## 2024-04-24 DIAGNOSIS — Z86.19 HISTORY OF HEPATITIS C: ICD-10-CM

## 2024-04-24 DIAGNOSIS — F43.9 STRESS AT HOME: ICD-10-CM

## 2024-04-24 DIAGNOSIS — C90.02 MULTIPLE MYELOMA IN RELAPSE (HCC): ICD-10-CM

## 2024-04-24 DIAGNOSIS — Z00.00 MEDICARE ANNUAL WELLNESS VISIT, SUBSEQUENT: ICD-10-CM

## 2024-04-24 DIAGNOSIS — N39.3 STRESS INCONTINENCE: ICD-10-CM

## 2024-04-24 PROCEDURE — G0439 PPPS, SUBSEQ VISIT: HCPCS | Performed by: INTERNAL MEDICINE

## 2024-04-24 PROCEDURE — 3075F SYST BP GE 130 - 139MM HG: CPT | Performed by: INTERNAL MEDICINE

## 2024-04-24 PROCEDURE — 3078F DIAST BP <80 MM HG: CPT | Performed by: INTERNAL MEDICINE

## 2024-04-24 PROCEDURE — 1123F ACP DISCUSS/DSCN MKR DOCD: CPT | Performed by: INTERNAL MEDICINE

## 2024-04-24 PROCEDURE — 3017F COLORECTAL CA SCREEN DOC REV: CPT | Performed by: INTERNAL MEDICINE

## 2024-04-24 RX ORDER — HYDROXYZINE HYDROCHLORIDE 10 MG/1
TABLET, FILM COATED ORAL
Qty: 30 TABLET | Refills: 1 | Status: CANCELLED | OUTPATIENT
Start: 2024-04-24

## 2024-04-24 RX ORDER — HYDROXYZINE HYDROCHLORIDE 25 MG/1
25 TABLET, FILM COATED ORAL NIGHTLY
Qty: 30 TABLET | Refills: 3 | Status: SHIPPED | OUTPATIENT
Start: 2024-04-24 | End: 2024-05-24

## 2024-04-24 ASSESSMENT — PATIENT HEALTH QUESTIONNAIRE - PHQ9
SUM OF ALL RESPONSES TO PHQ QUESTIONS 1-9: 0
9. THOUGHTS THAT YOU WOULD BE BETTER OFF DEAD, OR OF HURTING YOURSELF: NOT AT ALL
1. LITTLE INTEREST OR PLEASURE IN DOING THINGS: NOT AT ALL
8. MOVING OR SPEAKING SO SLOWLY THAT OTHER PEOPLE COULD HAVE NOTICED. OR THE OPPOSITE, BEING SO FIGETY OR RESTLESS THAT YOU HAVE BEEN MOVING AROUND A LOT MORE THAN USUAL: NOT AT ALL
2. FEELING DOWN, DEPRESSED OR HOPELESS: NOT AT ALL
SUM OF ALL RESPONSES TO PHQ QUESTIONS 1-9: 3
10. IF YOU CHECKED OFF ANY PROBLEMS, HOW DIFFICULT HAVE THESE PROBLEMS MADE IT FOR YOU TO DO YOUR WORK, TAKE CARE OF THINGS AT HOME, OR GET ALONG WITH OTHER PEOPLE: NOT DIFFICULT AT ALL
4. FEELING TIRED OR HAVING LITTLE ENERGY: SEVERAL DAYS
3. TROUBLE FALLING OR STAYING ASLEEP: SEVERAL DAYS
SUM OF ALL RESPONSES TO PHQ QUESTIONS 1-9: 0
SUM OF ALL RESPONSES TO PHQ QUESTIONS 1-9: 3
SUM OF ALL RESPONSES TO PHQ QUESTIONS 1-9: 0
SUM OF ALL RESPONSES TO PHQ9 QUESTIONS 1 & 2: 0
5. POOR APPETITE OR OVEREATING: SEVERAL DAYS
SUM OF ALL RESPONSES TO PHQ QUESTIONS 1-9: 0
7. TROUBLE CONCENTRATING ON THINGS, SUCH AS READING THE NEWSPAPER OR WATCHING TELEVISION: NOT AT ALL
6. FEELING BAD ABOUT YOURSELF - OR THAT YOU ARE A FAILURE OR HAVE LET YOURSELF OR YOUR FAMILY DOWN: NOT AT ALL

## 2024-04-30 ENCOUNTER — OFFICE VISIT (OUTPATIENT)
Dept: FAMILY MEDICINE CLINIC | Age: 74
End: 2024-04-30

## 2024-04-30 VITALS
RESPIRATION RATE: 16 BRPM | TEMPERATURE: 97.1 F | WEIGHT: 151 LBS | SYSTOLIC BLOOD PRESSURE: 114 MMHG | HEART RATE: 69 BPM | BODY MASS INDEX: 25.6 KG/M2 | OXYGEN SATURATION: 99 % | DIASTOLIC BLOOD PRESSURE: 70 MMHG

## 2024-04-30 DIAGNOSIS — S39.012A STRAIN OF LUMBAR REGION, INITIAL ENCOUNTER: Primary | ICD-10-CM

## 2024-04-30 RX ORDER — MELOXICAM 15 MG/1
15 TABLET ORAL DAILY PRN
Qty: 10 TABLET | Refills: 0 | Status: SHIPPED | OUTPATIENT
Start: 2024-04-30 | End: 2024-05-10

## 2024-04-30 RX ORDER — BACLOFEN 10 MG/1
10 TABLET ORAL 2 TIMES DAILY PRN
Qty: 20 TABLET | Refills: 0 | Status: SHIPPED | OUTPATIENT
Start: 2024-04-30

## 2024-04-30 SDOH — ECONOMIC STABILITY: FOOD INSECURITY: WITHIN THE PAST 12 MONTHS, THE FOOD YOU BOUGHT JUST DIDN'T LAST AND YOU DIDN'T HAVE MONEY TO GET MORE.: NEVER TRUE

## 2024-04-30 SDOH — ECONOMIC STABILITY: FOOD INSECURITY: WITHIN THE PAST 12 MONTHS, YOU WORRIED THAT YOUR FOOD WOULD RUN OUT BEFORE YOU GOT MONEY TO BUY MORE.: NEVER TRUE

## 2024-04-30 SDOH — ECONOMIC STABILITY: INCOME INSECURITY: HOW HARD IS IT FOR YOU TO PAY FOR THE VERY BASICS LIKE FOOD, HOUSING, MEDICAL CARE, AND HEATING?: NOT HARD AT ALL

## 2024-04-30 ASSESSMENT — PATIENT HEALTH QUESTIONNAIRE - PHQ9
DEPRESSION UNABLE TO ASSESS: FUNCTIONAL CAPACITY MOTIVATION LIMITS ACCURACY
2. FEELING DOWN, DEPRESSED OR HOPELESS: NOT AT ALL
SUM OF ALL RESPONSES TO PHQ QUESTIONS 1-9: 0
SUM OF ALL RESPONSES TO PHQ9 QUESTIONS 1 & 2: 0
5. POOR APPETITE OR OVEREATING: NOT AT ALL
SUM OF ALL RESPONSES TO PHQ QUESTIONS 1-9: 0
SUM OF ALL RESPONSES TO PHQ QUESTIONS 1-9: 0
1. LITTLE INTEREST OR PLEASURE IN DOING THINGS: NOT AT ALL
4. FEELING TIRED OR HAVING LITTLE ENERGY: NOT AT ALL
SUM OF ALL RESPONSES TO PHQ QUESTIONS 1-9: 0
9. THOUGHTS THAT YOU WOULD BE BETTER OFF DEAD, OR OF HURTING YOURSELF: NOT AT ALL
7. TROUBLE CONCENTRATING ON THINGS, SUCH AS READING THE NEWSPAPER OR WATCHING TELEVISION: NOT AT ALL
8. MOVING OR SPEAKING SO SLOWLY THAT OTHER PEOPLE COULD HAVE NOTICED. OR THE OPPOSITE, BEING SO FIGETY OR RESTLESS THAT YOU HAVE BEEN MOVING AROUND A LOT MORE THAN USUAL: NOT AT ALL
3. TROUBLE FALLING OR STAYING ASLEEP: NOT AT ALL
10. IF YOU CHECKED OFF ANY PROBLEMS, HOW DIFFICULT HAVE THESE PROBLEMS MADE IT FOR YOU TO DO YOUR WORK, TAKE CARE OF THINGS AT HOME, OR GET ALONG WITH OTHER PEOPLE: NOT DIFFICULT AT ALL
6. FEELING BAD ABOUT YOURSELF - OR THAT YOU ARE A FAILURE OR HAVE LET YOURSELF OR YOUR FAMILY DOWN: NOT AT ALL

## 2024-04-30 ASSESSMENT — ENCOUNTER SYMPTOMS
COUGH: 0
BACK PAIN: 1
SHORTNESS OF BREATH: 0
NAUSEA: 0
DIARRHEA: 0
VOMITING: 0

## 2024-04-30 ASSESSMENT — ANXIETY QUESTIONNAIRES
1. FEELING NERVOUS, ANXIOUS, OR ON EDGE: NOT AT ALL
2. NOT BEING ABLE TO STOP OR CONTROL WORRYING: NOT AT ALL
3. WORRYING TOO MUCH ABOUT DIFFERENT THINGS: NOT AT ALL
GAD7 TOTAL SCORE: 0
6. BECOMING EASILY ANNOYED OR IRRITABLE: NOT AT ALL
7. FEELING AFRAID AS IF SOMETHING AWFUL MIGHT HAPPEN: NOT AT ALL
5. BEING SO RESTLESS THAT IT IS HARD TO SIT STILL: NOT AT ALL
4. TROUBLE RELAXING: NOT AT ALL
IF YOU CHECKED OFF ANY PROBLEMS ON THIS QUESTIONNAIRE, HOW DIFFICULT HAVE THESE PROBLEMS MADE IT FOR YOU TO DO YOUR WORK, TAKE CARE OF THINGS AT HOME, OR GET ALONG WITH OTHER PEOPLE: NOT DIFFICULT AT ALL

## 2024-04-30 NOTE — PROGRESS NOTES
2024     Chief Complaint   Patient presents with    Back Pain     Pulled something in her back yesterday        Catalina Leyva (:  1950) is a 73 y.o. female, here for evaluation of the following medical concerns:    HPI  Back pain:  X 1 day  Started yesterday after she was lifting up her husbands lift chair, felt pain in the low back immediately  Now pain is in the right low back   Pain does not radiate down the leg  Denies bowel or bladder dysfunction or saddle anesthesia   Pain constant, aching pain  Has had similar problem in the past and muscle relaxer did help    Review of Systems   Constitutional:  Negative for chills, fatigue and fever.   Respiratory:  Negative for cough and shortness of breath.    Cardiovascular:  Negative for chest pain and leg swelling.   Gastrointestinal:  Negative for diarrhea, nausea and vomiting.   Musculoskeletal:  Positive for back pain.   Neurological:  Negative for dizziness and headaches.   All other systems reviewed and are negative.      Prior to Visit Medications    Medication Sig Taking? Authorizing Provider   baclofen (LIORESAL) 10 MG tablet Take 1 tablet by mouth 2 times daily as needed (muscle spasms) Yes Sridevi Freeman APRN - CNP   meloxicam (MOBIC) 15 MG tablet Take 1 tablet by mouth daily as needed for Pain Yes Sridevi Freeman APRN - CNP   hydrOXYzine HCl (ATARAX) 25 MG tablet Take 1 tablet by mouth nightly Yes Mayra Mayen MD   amLODIPine (NORVASC) 5 MG tablet TAKE ONE TABLET BY MOUTH ONCE A DAY Yes Mayra Mayen MD   oxyBUTYnin (DITROPAN XL) 5 MG extended release tablet Take 1 tablet by mouth daily Yes Mayra Mayen MD   citalopram (CELEXA) 10 MG tablet TAKE ONE TABLET BY MOUTH DAILY Yes Mayra Mayen MD   acyclovir (ZOVIRAX) 400 MG tablet Take 1 tablet by mouth in the morning and at bedtime Yes Provider, MD Nina   dexamethasone (DECADRON) 4 MG tablet Take 5 tablets by mouth as needed Day 9,16,23 Yes Provider,

## 2024-04-30 NOTE — PATIENT INSTRUCTIONS
Start the muscle relaxer as needed for muscle spasms (Baclofen) can take twice daily, medication can make you sleepy  Take meloxicam daily year for back pain, this is a nonsteroidal anti-inflammatory medication avoid other NSAIDs over-the-counter while you are on this medication  Warm compresses to the low back  State moving and active but avoid strenuous activity  Call with any new or worsening symptoms or if symptoms do not improve over the next 2 to 4 weeks

## 2024-05-01 PROBLEM — D68.9 COAGULATION DEFECT (HCC): Status: RESOLVED | Noted: 2023-07-25 | Resolved: 2024-05-01

## 2024-05-01 NOTE — PROGRESS NOTES
Consulting Physician (Gastroenterology)     Reviewed and updated this visit:  Allergies  Meds

## 2024-05-01 NOTE — PATIENT INSTRUCTIONS

## 2024-05-15 ENCOUNTER — OFFICE VISIT (OUTPATIENT)
Dept: FAMILY MEDICINE CLINIC | Age: 74
End: 2024-05-15

## 2024-05-15 ENCOUNTER — HOSPITAL ENCOUNTER (OUTPATIENT)
Dept: GENERAL RADIOLOGY | Age: 74
Discharge: HOME | End: 2024-05-15
Payer: MEDICARE

## 2024-05-15 VITALS
BODY MASS INDEX: 25.6 KG/M2 | SYSTOLIC BLOOD PRESSURE: 105 MMHG | DIASTOLIC BLOOD PRESSURE: 72 MMHG | TEMPERATURE: 97.1 F | HEART RATE: 89 BPM | RESPIRATION RATE: 16 BRPM | OXYGEN SATURATION: 99 % | WEIGHT: 151 LBS

## 2024-05-15 DIAGNOSIS — S39.012A STRAIN OF LUMBAR REGION, INITIAL ENCOUNTER: Primary | ICD-10-CM

## 2024-05-15 DIAGNOSIS — C90.00 MYELOMA ASSOCIATED AMYLOIDOSIS (HCC): ICD-10-CM

## 2024-05-15 DIAGNOSIS — S39.012A STRAIN OF LUMBAR REGION, INITIAL ENCOUNTER: ICD-10-CM

## 2024-05-15 DIAGNOSIS — E85.9 MYELOMA ASSOCIATED AMYLOIDOSIS (HCC): ICD-10-CM

## 2024-05-15 PROCEDURE — 72100 X-RAY EXAM L-S SPINE 2/3 VWS: CPT

## 2024-05-15 RX ORDER — BACLOFEN 10 MG/1
10 TABLET ORAL 2 TIMES DAILY PRN
Qty: 60 TABLET | Refills: 0 | Status: SHIPPED | OUTPATIENT
Start: 2024-05-15

## 2024-05-15 RX ORDER — MELOXICAM 15 MG/1
15 TABLET ORAL DAILY PRN
Qty: 14 TABLET | Refills: 0 | Status: SHIPPED | OUTPATIENT
Start: 2024-05-15 | End: 2024-05-29

## 2024-05-15 SDOH — ECONOMIC STABILITY: FOOD INSECURITY: WITHIN THE PAST 12 MONTHS, YOU WORRIED THAT YOUR FOOD WOULD RUN OUT BEFORE YOU GOT MONEY TO BUY MORE.: NEVER TRUE

## 2024-05-15 SDOH — ECONOMIC STABILITY: FOOD INSECURITY: WITHIN THE PAST 12 MONTHS, THE FOOD YOU BOUGHT JUST DIDN'T LAST AND YOU DIDN'T HAVE MONEY TO GET MORE.: NEVER TRUE

## 2024-05-15 SDOH — ECONOMIC STABILITY: INCOME INSECURITY: HOW HARD IS IT FOR YOU TO PAY FOR THE VERY BASICS LIKE FOOD, HOUSING, MEDICAL CARE, AND HEATING?: NOT HARD AT ALL

## 2024-05-15 ASSESSMENT — ANXIETY QUESTIONNAIRES
6. BECOMING EASILY ANNOYED OR IRRITABLE: NOT AT ALL
4. TROUBLE RELAXING: NOT AT ALL
5. BEING SO RESTLESS THAT IT IS HARD TO SIT STILL: NOT AT ALL
GAD7 TOTAL SCORE: 0
3. WORRYING TOO MUCH ABOUT DIFFERENT THINGS: NOT AT ALL
2. NOT BEING ABLE TO STOP OR CONTROL WORRYING: NOT AT ALL
7. FEELING AFRAID AS IF SOMETHING AWFUL MIGHT HAPPEN: NOT AT ALL
IF YOU CHECKED OFF ANY PROBLEMS ON THIS QUESTIONNAIRE, HOW DIFFICULT HAVE THESE PROBLEMS MADE IT FOR YOU TO DO YOUR WORK, TAKE CARE OF THINGS AT HOME, OR GET ALONG WITH OTHER PEOPLE: NOT DIFFICULT AT ALL
1. FEELING NERVOUS, ANXIOUS, OR ON EDGE: NOT AT ALL

## 2024-05-15 ASSESSMENT — PATIENT HEALTH QUESTIONNAIRE - PHQ9
8. MOVING OR SPEAKING SO SLOWLY THAT OTHER PEOPLE COULD HAVE NOTICED. OR THE OPPOSITE, BEING SO FIGETY OR RESTLESS THAT YOU HAVE BEEN MOVING AROUND A LOT MORE THAN USUAL: NOT AT ALL
9. THOUGHTS THAT YOU WOULD BE BETTER OFF DEAD, OR OF HURTING YOURSELF: NOT AT ALL
6. FEELING BAD ABOUT YOURSELF - OR THAT YOU ARE A FAILURE OR HAVE LET YOURSELF OR YOUR FAMILY DOWN: NOT AT ALL
4. FEELING TIRED OR HAVING LITTLE ENERGY: NOT AT ALL
3. TROUBLE FALLING OR STAYING ASLEEP: NOT AT ALL
SUM OF ALL RESPONSES TO PHQ QUESTIONS 1-9: 0
7. TROUBLE CONCENTRATING ON THINGS, SUCH AS READING THE NEWSPAPER OR WATCHING TELEVISION: NOT AT ALL
1. LITTLE INTEREST OR PLEASURE IN DOING THINGS: NOT AT ALL
5. POOR APPETITE OR OVEREATING: NOT AT ALL
SUM OF ALL RESPONSES TO PHQ QUESTIONS 1-9: 0
SUM OF ALL RESPONSES TO PHQ QUESTIONS 1-9: 0
SUM OF ALL RESPONSES TO PHQ9 QUESTIONS 1 & 2: 0
2. FEELING DOWN, DEPRESSED OR HOPELESS: NOT AT ALL
DEPRESSION UNABLE TO ASSESS: FUNCTIONAL CAPACITY MOTIVATION LIMITS ACCURACY
10. IF YOU CHECKED OFF ANY PROBLEMS, HOW DIFFICULT HAVE THESE PROBLEMS MADE IT FOR YOU TO DO YOUR WORK, TAKE CARE OF THINGS AT HOME, OR GET ALONG WITH OTHER PEOPLE: NOT DIFFICULT AT ALL
SUM OF ALL RESPONSES TO PHQ QUESTIONS 1-9: 0

## 2024-05-15 ASSESSMENT — ENCOUNTER SYMPTOMS
SHORTNESS OF BREATH: 0
VOMITING: 0
DIARRHEA: 0
COUGH: 0
BACK PAIN: 1
NAUSEA: 0

## 2024-05-15 NOTE — PROGRESS NOTES
5/15/2024     Chief Complaint   Patient presents with    Back Pain     Shooting pain if she bends over the wrong way     Hip Pain       Catalina Leyva (:  1950) is a 73 y.o. female, here for evaluation of the following medical concerns:    HPI    Here for follow up on back pain. Improved since last visit. Pain is now only with certain movements such a bending forward at the waist to pick something off of the ground and leaning forward to tie shoes. Being careful for her movements to prevent pain. Mobic and baclofen helped.       Previous HPI 2024  Back pain:  X 1 day  Started yesterday after she was lifting up her husbands lift chair, felt pain in the low back immediately  Now pain is in the right low back   Pain does not radiate down the leg  Denies bowel or bladder dysfunction or saddle anesthesia   Pain constant, aching pain  Has had similar problem in the past and muscle relaxer did help    Review of Systems   Constitutional:  Negative for chills, fatigue and fever.   Respiratory:  Negative for cough and shortness of breath.    Cardiovascular:  Negative for chest pain and leg swelling.   Gastrointestinal:  Negative for diarrhea, nausea and vomiting.   Musculoskeletal:  Positive for back pain.   Neurological:  Negative for dizziness and headaches.   All other systems reviewed and are negative.      Prior to Visit Medications    Medication Sig Taking? Authorizing Provider   baclofen (LIORESAL) 10 MG tablet Take 1 tablet by mouth 2 times daily as needed (muscle spasms) Yes Sridevi Freeman APRN - CNP   meloxicam (MOBIC) 15 MG tablet Take 1 tablet by mouth daily as needed for Pain Yes Sridevi Freeman APRN - CNP   hydrOXYzine HCl (ATARAX) 25 MG tablet Take 1 tablet by mouth nightly Yes Mayra Mayen MD   amLODIPine (NORVASC) 5 MG tablet TAKE ONE TABLET BY MOUTH ONCE A DAY Yes Mayra Mayen MD   oxyBUTYnin (DITROPAN XL) 5 MG extended release tablet Take 1 tablet by mouth

## 2024-05-16 ENCOUNTER — TELEPHONE (OUTPATIENT)
Dept: CARDIOLOGY CLINIC | Age: 74
End: 2024-05-16

## 2024-06-08 DIAGNOSIS — F41.9 ANXIETY DISORDER, UNSPECIFIED: ICD-10-CM

## 2024-06-10 RX ORDER — LORAZEPAM 1 MG/1
TABLET ORAL
Qty: 60 TABLET | Refills: 0 | Status: SHIPPED | OUTPATIENT
Start: 2024-06-10 | End: 2024-07-10

## 2024-06-10 NOTE — TELEPHONE ENCOUNTER
Last ov 05/15/2024   Future Appointments   Date Time Provider Department Center   6/17/2024  8:30 AM Mayra Mayen MD [facility] [place] [facility]   6/24/2024  9:45 AM Rossana Bahena, PT [facility]

## 2024-06-17 ENCOUNTER — OFFICE VISIT (OUTPATIENT)
Dept: FAMILY MEDICINE CLINIC | Age: 74
End: 2024-06-17
Payer: MEDICARE

## 2024-06-17 VITALS
DIASTOLIC BLOOD PRESSURE: 80 MMHG | HEART RATE: 88 BPM | BODY MASS INDEX: 25.27 KG/M2 | WEIGHT: 148 LBS | TEMPERATURE: 97.1 F | SYSTOLIC BLOOD PRESSURE: 120 MMHG | HEIGHT: 64 IN | RESPIRATION RATE: 16 BRPM | OXYGEN SATURATION: 99 %

## 2024-06-17 DIAGNOSIS — F39 MOOD DISORDER (HCC): Primary | ICD-10-CM

## 2024-06-17 DIAGNOSIS — Z12.31 ENCOUNTER FOR SCREENING MAMMOGRAM FOR BREAST CANCER: ICD-10-CM

## 2024-06-17 PROCEDURE — 3079F DIAST BP 80-89 MM HG: CPT | Performed by: INTERNAL MEDICINE

## 2024-06-17 PROCEDURE — 3074F SYST BP LT 130 MM HG: CPT | Performed by: INTERNAL MEDICINE

## 2024-06-17 PROCEDURE — 3017F COLORECTAL CA SCREEN DOC REV: CPT | Performed by: INTERNAL MEDICINE

## 2024-06-17 PROCEDURE — 99213 OFFICE O/P EST LOW 20 MIN: CPT | Performed by: INTERNAL MEDICINE

## 2024-06-17 PROCEDURE — G8427 DOCREV CUR MEDS BY ELIG CLIN: HCPCS | Performed by: INTERNAL MEDICINE

## 2024-06-17 PROCEDURE — 1123F ACP DISCUSS/DSCN MKR DOCD: CPT | Performed by: INTERNAL MEDICINE

## 2024-06-17 PROCEDURE — 1090F PRES/ABSN URINE INCON ASSESS: CPT | Performed by: INTERNAL MEDICINE

## 2024-06-17 PROCEDURE — G8399 PT W/DXA RESULTS DOCUMENT: HCPCS | Performed by: INTERNAL MEDICINE

## 2024-06-17 PROCEDURE — G8417 CALC BMI ABV UP PARAM F/U: HCPCS | Performed by: INTERNAL MEDICINE

## 2024-06-17 PROCEDURE — 1036F TOBACCO NON-USER: CPT | Performed by: INTERNAL MEDICINE

## 2024-06-17 RX ORDER — LORAZEPAM 1 MG/1
1 TABLET ORAL 2 TIMES DAILY
Qty: 60 TABLET | Refills: 2 | Status: SHIPPED | OUTPATIENT
Start: 2024-06-17 | End: 2024-07-17

## 2024-06-17 ASSESSMENT — PATIENT HEALTH QUESTIONNAIRE - PHQ9
6. FEELING BAD ABOUT YOURSELF - OR THAT YOU ARE A FAILURE OR HAVE LET YOURSELF OR YOUR FAMILY DOWN: NOT AT ALL
10. IF YOU CHECKED OFF ANY PROBLEMS, HOW DIFFICULT HAVE THESE PROBLEMS MADE IT FOR YOU TO DO YOUR WORK, TAKE CARE OF THINGS AT HOME, OR GET ALONG WITH OTHER PEOPLE: NOT DIFFICULT AT ALL
5. POOR APPETITE OR OVEREATING: NOT AT ALL
3. TROUBLE FALLING OR STAYING ASLEEP: NOT AT ALL
SUM OF ALL RESPONSES TO PHQ QUESTIONS 1-9: 0
SUM OF ALL RESPONSES TO PHQ QUESTIONS 1-9: 0
SUM OF ALL RESPONSES TO PHQ9 QUESTIONS 1 & 2: 0
SUM OF ALL RESPONSES TO PHQ QUESTIONS 1-9: 0
SUM OF ALL RESPONSES TO PHQ QUESTIONS 1-9: 0
8. MOVING OR SPEAKING SO SLOWLY THAT OTHER PEOPLE COULD HAVE NOTICED. OR THE OPPOSITE, BEING SO FIGETY OR RESTLESS THAT YOU HAVE BEEN MOVING AROUND A LOT MORE THAN USUAL: NOT AT ALL
SUM OF ALL RESPONSES TO PHQ QUESTIONS 1-9: 0
1. LITTLE INTEREST OR PLEASURE IN DOING THINGS: NOT AT ALL
9. THOUGHTS THAT YOU WOULD BE BETTER OFF DEAD, OR OF HURTING YOURSELF: NOT AT ALL
SUM OF ALL RESPONSES TO PHQ QUESTIONS 1-9: 0
7. TROUBLE CONCENTRATING ON THINGS, SUCH AS READING THE NEWSPAPER OR WATCHING TELEVISION: NOT AT ALL
SUM OF ALL RESPONSES TO PHQ QUESTIONS 1-9: 0
SUM OF ALL RESPONSES TO PHQ QUESTIONS 1-9: 0
4. FEELING TIRED OR HAVING LITTLE ENERGY: NOT AT ALL
2. FEELING DOWN, DEPRESSED OR HOPELESS: NOT AT ALL

## 2024-06-19 NOTE — PROGRESS NOTES
Catalina Leyva (:  1950) is a 73 y.o. female,Established patient, here for evaluation of the following chief complaint(s):  Anxiety      Assessment & Plan   1. Encounter for screening mammogram for breast cancer  Screening mammoh gram    2. Mood disorder (HCC)  -takes meds    Return in about 3 months (around 2024) for hyperlipidemia.     Current Outpatient Medications   Medication Sig Dispense Refill   • LORazepam (ATIVAN) 1 MG tablet Take 1 tablet by mouth in the morning and 1 tablet in the evening. Do all this for 30 days. Max Daily Amount: 2 mg. 60 tablet 2   • amLODIPine (NORVASC) 5 MG tablet TAKE ONE TABLET BY MOUTH ONCE A DAY 30 tablet 5   • oxyBUTYnin (DITROPAN XL) 5 MG extended release tablet Take 1 tablet by mouth daily 30 tablet 3   • citalopram (CELEXA) 10 MG tablet TAKE ONE TABLET BY MOUTH DAILY 90 tablet 2   • acyclovir (ZOVIRAX) 400 MG tablet Take 1 tablet by mouth in the morning and at bedtime     • dexamethasone (DECADRON) 4 MG tablet Take 5 tablets by mouth as needed (DOSAGE CHANGES WITH CHEMO) Day 9,16,23     • aspirin 81 MG chewable tablet Take 1 tablet by mouth daily     • hydrOXYzine HCl (ATARAX) 10 MG tablet TAKE ONE TABLET BY MOUTH THREE TIMES DAILY AS NEEDED FOR anxiety     • Multiple Vitamins-Minerals (ICAPS AREDS 2 PO) Take by mouth     • Cholecalciferol (VITAMIN D) 2000 UNITS CAPS capsule Take by mouth daily Indications: OTC      • baclofen (LIORESAL) 10 MG tablet Take 1 tablet by mouth 2 times daily as needed (muscle spasms) (Patient not taking: Reported on 2024) 60 tablet 0   • meloxicam (MOBIC) 15 MG tablet Take 1 tablet by mouth daily as needed for Pain (Patient not taking: Reported on 2024) 14 tablet 0   • POMALYST 4 MG chemo capsule Take 1 capsule by mouth See Admin Instructions Day 1-21 then off 1 week     • CHOLESTYRAMINE PO Take by mouth (Patient not taking: Reported on 2024)       No current facility-administered medications for this visit.

## 2024-06-24 ENCOUNTER — HOSPITAL ENCOUNTER (OUTPATIENT)
Dept: PHYSICAL THERAPY | Age: 74
Setting detail: THERAPIES SERIES
Discharge: HOME | End: 2024-06-24
Payer: MEDICARE

## 2024-06-24 DIAGNOSIS — M25.552 BILATERAL HIP PAIN: Primary | ICD-10-CM

## 2024-06-24 DIAGNOSIS — M25.551 BILATERAL HIP PAIN: Primary | ICD-10-CM

## 2024-06-24 DIAGNOSIS — M54.50 ACUTE BILATERAL LOW BACK PAIN WITHOUT SCIATICA: ICD-10-CM

## 2024-06-24 PROCEDURE — 97110 THERAPEUTIC EXERCISES: CPT

## 2024-06-24 PROCEDURE — 97530 THERAPEUTIC ACTIVITIES: CPT

## 2024-06-24 PROCEDURE — 97161 PT EVAL LOW COMPLEX 20 MIN: CPT

## 2024-06-24 NOTE — PLAN OF CARE
and/or justification of therapy services:  The patient has functional impairments and/or activity limitations and would benefit from continued outpatient therapy services to address the deficits outlined in the patients goals    Return to Play: NA    Prognosis for POC: [x] Good [] Fair  [] Poor    Patient requires continued skilled intervention: [x] Yes  [] No      CHARGE CAPTURE     PT CHARGE GRID   CPT Code (TIMED) minutes # CPT Code (UNTIMED) #     Therex (90041)  15 1  EVAL:LOW (80553 - Typically 20 minutes face-to-face) 1    Neuromusc. Re-ed (62057)    Re-Eval (20151)     Manual (25872)    Estim Unattended (76327)     Ther. Act (29232) 10 1  Mech. Traction (84127)     Gait (54903)    Dry Needle 1-2 muscle (20560)     Aquatic Therex (65364)    Dry Needle 3+ muscle (20561)     Iontophoresis (11756)    VASO (27633)     Ultrasound (16163)    Group Therapy (64816)     Estim Attended (55944)    Canalith Repositioning (85819)     Other:    Other:    Total Timed Code Tx Minutes 25 2  20     Total Treatment Minutes 45        Charge Justification:  (66542) THERAPEUTIC EXERCISE - Provided verbal/tactile cueing for activities related to strengthening, flexibility, endurance, ROM performed to prevent loss of range of motion, maintain or improve muscular strength or increase flexibility, following either an injury or surgery.   (96284) HOME EXERCISE PROGRAM - Reviewed/Progressed HEP activities related to strengthening, flexibility, endurance, ROM performed to prevent loss of range of motion, maintain or improve muscular strength or increase flexibility, following either an injury or surgery.  (37952) THERAPEUTIC ACTIVITY - use of dynamic activities to improve functional performance. (Ex include squatting, ascending/descending stairs, walking, bending, lifting, catching, throwing, pushing, pulling, jumping.)  Direct, one on one contact, billed in 15-minute increments.    GOALS     Patient stated goal: relieve hip pain   []

## 2024-06-27 ENCOUNTER — HOSPITAL ENCOUNTER (OUTPATIENT)
Dept: PHYSICAL THERAPY | Age: 74
Setting detail: THERAPIES SERIES
Discharge: HOME | End: 2024-06-27
Payer: MEDICARE

## 2024-06-27 PROCEDURE — 97110 THERAPEUTIC EXERCISES: CPT

## 2024-06-27 NOTE — FLOWSHEET NOTE
[facility]- Outpatient Rehabilitation and Therapy  [facility] [place] [address] office: [phone] fax: [phone]       Physical Therapy: TREATMENT/PROGRESS NOTE   Patient: Catalina Leyva (73 y.o. female)   Examination Date: 2024   :  1950 MRN: [id number]   Visit #: 2   Insurance Allowable Auth Needed   BMN []Yes    [x]No    Insurance: Payor: MEDICARE / Plan: MEDICARE PART A AND B / Product Type: *No Product type* /   Insurance ID: [id number] - (Medicare)  Secondary Insurance (if applicable): BCBS   Treatment Diagnosis:     ICD-10-CM    1. Bilateral hip pain  M25.551     M25.552       2. Acute bilateral low back pain without sciatica  M54.50          Medical Diagnosis:  Strain of lumbar region, initial encounter [S39.012A]   Referring Physician: Sridevi Freeman,*  PCP: Mayra Mayen MD     Plan of care signed (Y/N): YES    Date of Patient follow up with Physician:      Progress Report/POC: NO PN 24  POC update due:                                             Precautions/ Contra-indications:           Latex allergy:  NO  Pacemaker:    NO  Contraindications for Manipulation: None  Date of Surgery: NA  Other: Multiple Myeloma , with chemo, had bone marrow transplant 4 years ago     Red Flags:  None    C-SSRS Triggered by Intake questionnaire:   Patient answered 'NO' to both behavioral questions on intake.  No further screening warranted    Preferred Language for Healthcare:   [x] English       [] other:    SUBJECTIVE EXAMINATION     Patient stated complaint: Walked last night at a good clip without elderly dog and got short of breath after        Test used Initial score  2024   Pain Summary VAS 7/10 5/10   Functional questionnaire Modified Oswestry 9  20%    Other:              Pain on eval :  Pain location: bilateral hips  Patient describes pain to be constant and aching  Pain decreases with: Heat and Medication Aspirin  Pain increases with:

## 2024-07-08 ENCOUNTER — APPOINTMENT (OUTPATIENT)
Dept: PHYSICAL THERAPY | Age: 74
End: 2024-07-08
Payer: MEDICARE

## 2024-07-12 ENCOUNTER — HOSPITAL ENCOUNTER (OUTPATIENT)
Dept: PHYSICAL THERAPY | Age: 74
Setting detail: THERAPIES SERIES
Discharge: HOME | End: 2024-07-12
Payer: MEDICARE

## 2024-07-12 PROCEDURE — 97110 THERAPEUTIC EXERCISES: CPT

## 2024-07-12 NOTE — DISCHARGE SUMMARY
or increase flexibility, following either an injury or surgery.    GOALS     Patient stated goal: relieve hip pain   [] Progressing: [] Met: [x] Not Met: [] Adjusted    Therapist goals for Patient:   Short Term Goals: To be achieved in: 2 weeks  1. Independent in HEP and progression per patient tolerance, in order to prevent re-injury.   [] Progressing: [x] Met: [] Not Met: [] Adjusted  2. Patient will have a decrease in pain to <2/10 to facilitate improvement in movement, function, and ADLs as indicated by Functional Deficits.  [] Progressing: [] Met: [x] Not Met: [] Adjusted    Long Term Goals: To be achieved in: 4-6 weeks  1. Disability index score of 10% or less for the Modified Oswestry to assist with reaching prior level of function with activities such as dressing without increased pain.  [] Progressing: [] Met: [x] Not Met: [] Adjusted  2. Patient will demonstrate increased Strength of bilateral hips to at least 5/5 throughout without pain to allow for proper functional mobility to enable patient to return to walking without increased pain.   [] Progressing: [] Met: [x] Not Met: [] Adjusted  3. Patient will return to squating without increased symptoms or restriction.   [] Progressing: [] Met: [x] Not Met: [] Adjusted  4. Up and down steps without increased pain.    [] Progressing: [] Met: [x] Not Met: [] Adjusted     Overall Progression Towards Functional goals/ Treatment Progress Update:  [] Patient is progressing as expected towards functional goals listed.    [] Progression is slowed due to complexities/Impairments listed.  [] Progression has been slowed due to co-morbidities.  [] Plan just implemented, too soon (<30days) to assess goals progression   [] Goals require adjustment due to lack of progress  [] Patient is not progressing as expected and requires additional follow up with physician  [x] Other: Patient electing discharge    TREATMENT PLAN     Frequency/Duration: 2x/week for 4-6 weeks for the

## 2024-07-15 ENCOUNTER — HOSPITAL ENCOUNTER (OUTPATIENT)
Dept: PHYSICAL THERAPY | Age: 74
Setting detail: THERAPIES SERIES
End: 2024-07-15
Payer: MEDICARE

## 2024-07-18 ENCOUNTER — APPOINTMENT (OUTPATIENT)
Dept: PHYSICAL THERAPY | Age: 74
End: 2024-07-18
Payer: MEDICARE

## 2024-09-09 ENCOUNTER — OFFICE VISIT (OUTPATIENT)
Dept: FAMILY MEDICINE CLINIC | Age: 74
End: 2024-09-09

## 2024-09-09 VITALS
TEMPERATURE: 97.3 F | SYSTOLIC BLOOD PRESSURE: 122 MMHG | DIASTOLIC BLOOD PRESSURE: 80 MMHG | OXYGEN SATURATION: 98 % | BODY MASS INDEX: 26.15 KG/M2 | RESPIRATION RATE: 16 BRPM | HEART RATE: 85 BPM | HEIGHT: 64 IN | WEIGHT: 153.2 LBS

## 2024-09-09 DIAGNOSIS — F39 MOOD DISORDER (HCC): Primary | ICD-10-CM

## 2024-09-09 DIAGNOSIS — I10 PRIMARY HYPERTENSION: ICD-10-CM

## 2024-09-09 DIAGNOSIS — Z12.31 ENCOUNTER FOR SCREENING MAMMOGRAM FOR BREAST CANCER: ICD-10-CM

## 2024-09-09 DIAGNOSIS — C90.00 MULTIPLE MYELOMA NOT HAVING ACHIEVED REMISSION (HCC): ICD-10-CM

## 2024-09-09 RX ORDER — LORAZEPAM 1 MG/1
TABLET ORAL
COMMUNITY
Start: 2024-09-03

## 2024-09-09 RX ORDER — PROCHLORPERAZINE MALEATE 10 MG
TABLET ORAL
COMMUNITY
Start: 2024-06-04

## 2024-09-09 RX ORDER — LIDOCAINE/PRILOCAINE 2.5 %-2.5%
CREAM (GRAM) TOPICAL
COMMUNITY
Start: 2024-07-09

## 2024-09-09 ASSESSMENT — PATIENT HEALTH QUESTIONNAIRE - PHQ9
6. FEELING BAD ABOUT YOURSELF - OR THAT YOU ARE A FAILURE OR HAVE LET YOURSELF OR YOUR FAMILY DOWN: NOT AT ALL
10. IF YOU CHECKED OFF ANY PROBLEMS, HOW DIFFICULT HAVE THESE PROBLEMS MADE IT FOR YOU TO DO YOUR WORK, TAKE CARE OF THINGS AT HOME, OR GET ALONG WITH OTHER PEOPLE: NOT DIFFICULT AT ALL
SUM OF ALL RESPONSES TO PHQ QUESTIONS 1-9: 0
4. FEELING TIRED OR HAVING LITTLE ENERGY: NOT AT ALL
2. FEELING DOWN, DEPRESSED OR HOPELESS: NOT AT ALL
1. LITTLE INTEREST OR PLEASURE IN DOING THINGS: NOT AT ALL
8. MOVING OR SPEAKING SO SLOWLY THAT OTHER PEOPLE COULD HAVE NOTICED. OR THE OPPOSITE, BEING SO FIGETY OR RESTLESS THAT YOU HAVE BEEN MOVING AROUND A LOT MORE THAN USUAL: NOT AT ALL
SUM OF ALL RESPONSES TO PHQ QUESTIONS 1-9: 0
9. THOUGHTS THAT YOU WOULD BE BETTER OFF DEAD, OR OF HURTING YOURSELF: NOT AT ALL
7. TROUBLE CONCENTRATING ON THINGS, SUCH AS READING THE NEWSPAPER OR WATCHING TELEVISION: NOT AT ALL
SUM OF ALL RESPONSES TO PHQ QUESTIONS 1-9: 0
2. FEELING DOWN, DEPRESSED OR HOPELESS: NOT AT ALL
SUM OF ALL RESPONSES TO PHQ9 QUESTIONS 1 & 2: 0
5. POOR APPETITE OR OVEREATING: NOT AT ALL
3. TROUBLE FALLING OR STAYING ASLEEP: NOT AT ALL
SUM OF ALL RESPONSES TO PHQ QUESTIONS 1-9: 0
SUM OF ALL RESPONSES TO PHQ9 QUESTIONS 1 & 2: 0
SUM OF ALL RESPONSES TO PHQ QUESTIONS 1-9: 0
1. LITTLE INTEREST OR PLEASURE IN DOING THINGS: NOT AT ALL

## 2024-09-30 ENCOUNTER — HOSPITAL ENCOUNTER (OUTPATIENT)
Dept: ONCOLOGY | Age: 74
Setting detail: INFUSION SERIES
Discharge: HOME | End: 2024-09-30
Payer: MEDICARE

## 2024-09-30 VITALS
OXYGEN SATURATION: 98 % | RESPIRATION RATE: 16 BRPM | SYSTOLIC BLOOD PRESSURE: 139 MMHG | TEMPERATURE: 97.9 F | DIASTOLIC BLOOD PRESSURE: 88 MMHG | HEART RATE: 90 BPM

## 2024-09-30 LAB
ALBUMIN SERPL-MCNC: 4.1 G/DL (ref 3.4–5)
ALBUMIN/GLOB SERPL: 2 {RATIO} (ref 1.1–2.2)
ALP SERPL-CCNC: 78 U/L (ref 40–129)
ALT SERPL-CCNC: 17 U/L (ref 10–40)
ANION GAP SERPL CALCULATED.3IONS-SCNC: 9 MMOL/L (ref 3–16)
AST SERPL-CCNC: 17 U/L (ref 15–37)
BASOPHILS # BLD: 0 K/UL (ref 0–0.2)
BASOPHILS NFR BLD: 0.1 %
BILIRUB SERPL-MCNC: 0.5 MG/DL (ref 0–1)
BUN SERPL-MCNC: 20 MG/DL (ref 7–20)
CALCIUM SERPL-MCNC: 8.8 MG/DL (ref 8.3–10.6)
CHLORIDE SERPL-SCNC: 101 MMOL/L (ref 99–110)
CO2 SERPL-SCNC: 24 MMOL/L (ref 21–32)
CREAT SERPL-MCNC: 0.8 MG/DL (ref 0.6–1.2)
DEPRECATED RDW RBC AUTO: 13.6 % (ref 12.4–15.4)
EOSINOPHIL # BLD: 0.1 K/UL (ref 0–0.6)
EOSINOPHIL NFR BLD: 2.8 %
GFR SERPLBLD CREATININE-BSD FMLA CKD-EPI: 78 ML/MIN/{1.73_M2}
GLUCOSE SERPL-MCNC: 98 MG/DL (ref 70–99)
HCT VFR BLD AUTO: 38.7 % (ref 36–48)
HGB BLD-MCNC: 12.8 G/DL (ref 12–16)
LYMPHOCYTES # BLD: 0.3 K/UL (ref 1–5.1)
LYMPHOCYTES NFR BLD: 6.2 %
MCH RBC QN AUTO: 32.7 PG (ref 26–34)
MCHC RBC AUTO-ENTMCNC: 32.9 G/DL (ref 31–36)
MCV RBC AUTO: 99.2 FL (ref 80–100)
MONOCYTES # BLD: 0.6 K/UL (ref 0–1.3)
MONOCYTES NFR BLD: 10.9 %
NEUTROPHILS # BLD: 4.3 K/UL (ref 1.7–7.7)
NEUTROPHILS NFR BLD: 80 %
PLATELET # BLD AUTO: 114 K/UL (ref 135–450)
PMV BLD AUTO: 8.6 FL (ref 5–10.5)
POTASSIUM SERPL-SCNC: 4.3 MMOL/L (ref 3.5–5.1)
PROT SERPL-MCNC: 6.2 G/DL (ref 6.4–8.2)
RBC # BLD AUTO: 3.91 M/UL (ref 4–5.2)
SODIUM SERPL-SCNC: 134 MMOL/L (ref 136–145)
WBC # BLD AUTO: 5.4 K/UL (ref 4–11)

## 2024-09-30 PROCEDURE — 80053 COMPREHEN METABOLIC PANEL: CPT

## 2024-09-30 PROCEDURE — 86480 TB TEST CELL IMMUN MEASURE: CPT

## 2024-09-30 PROCEDURE — 85025 COMPLETE CBC W/AUTO DIFF WBC: CPT

## 2024-10-04 LAB
GAMMA INTERFERON BACKGROUND BLD IA-ACNC: 0.16 IU/ML
M TB IFN-G BLD-IMP: NEGATIVE
M TB IFN-G CD4+ BCKGRND COR BLD-ACNC: 0 IU/ML
M TB IFN-G CD4+CD8+ BCKGRND COR BLD-ACNC: 0 IU/ML
MITOGEN IGNF BCKGRD COR BLD-ACNC: 9.84 IU/ML

## 2024-10-10 DIAGNOSIS — Z01.818 PRE-TRANSPLANT EVALUATION FOR STEM CELL TRANSPLANT: Primary | ICD-10-CM

## 2024-10-10 DIAGNOSIS — C90.02 MULTIPLE MYELOMA IN RELAPSE (HCC): ICD-10-CM

## 2024-10-16 DIAGNOSIS — F41.9 ANXIETY DISORDER, UNSPECIFIED: ICD-10-CM

## 2024-10-16 NOTE — TELEPHONE ENCOUNTER
9/9/2024    NO RECENT MED CONTRACT OR DRUG SCREEN     Future Appointments   Date Time Provider Department Center   10/24/2024  8:30 AM Mayra Mayen MD [facility] DEP   10/25/2024 10:00 AM Mark Solomon, PT [facility] OP PT [facility] HOD   10/25/2024 11:00 AM [facility] INFUSION 1 [facility] INF None   11/1/2024 12:30 PM SCHEDULE, [facility] PFT [facility] PFT [facility]   11/1/2024  1:30 PM Elida Brown [facility] INF None   11/14/2024  7:30 AM [facility] SPECIAL PROCEDURES 1 [facility] SP PROC [employer]   11/14/2024  3:00 PM [facility] SPECIAL PROCEDURES 1 [facility] SP PROC [facility] [employer]

## 2024-10-17 ENCOUNTER — TELEPHONE (OUTPATIENT)
Dept: FAMILY MEDICINE CLINIC | Age: 74
End: 2024-10-17

## 2024-10-17 RX ORDER — LORAZEPAM 1 MG/1
TABLET ORAL
Qty: 60 TABLET | Refills: 0 | Status: SHIPPED | OUTPATIENT
Start: 2024-10-17 | End: 2024-12-16

## 2024-10-17 NOTE — TELEPHONE ENCOUNTER
Patient called asking about refill.patient is coming in   Future Appointments   Date Time Provider Department Center   10/24/2024  8:30 AM Mayra Mayen MD [place] [facility]   10/25/2024 10:00 AM Mark Solomon, PT [facility] OP PT [facility] HOD   10/25/2024 11:00 [facility] None   11/1/2024 12:30 PM SCHEDULE, [facility] PFT [facility] PFT [facility]   11/1/2024  1:30 PM [facility]ida Brown [facility] None   11/14/2024  7:30 AM [facility] SPECIAL PROCEDURES 1 [facility] SP PROC [employer]   11/14/2024  3:00 PM [facility] SPECIAL PROCEDURES 1 [facility] SP PROC [employer]     And said never mind on sending it in forgot that she had an appointment.

## 2024-10-24 ENCOUNTER — OFFICE VISIT (OUTPATIENT)
Dept: FAMILY MEDICINE CLINIC | Age: 74
End: 2024-10-24

## 2024-10-24 VITALS
HEIGHT: 65 IN | SYSTOLIC BLOOD PRESSURE: 124 MMHG | HEART RATE: 88 BPM | DIASTOLIC BLOOD PRESSURE: 70 MMHG | BODY MASS INDEX: 26.49 KG/M2 | WEIGHT: 159 LBS | RESPIRATION RATE: 14 BRPM | TEMPERATURE: 97.4 F | OXYGEN SATURATION: 99 %

## 2024-10-24 DIAGNOSIS — R32 URINARY INCONTINENCE, UNSPECIFIED TYPE: ICD-10-CM

## 2024-10-24 DIAGNOSIS — I10 PRIMARY HYPERTENSION: ICD-10-CM

## 2024-10-24 DIAGNOSIS — C90.00 MULTIPLE MYELOMA NOT HAVING ACHIEVED REMISSION (HCC): Primary | ICD-10-CM

## 2024-10-24 DIAGNOSIS — D68.9 COAGULATION DEFECT (HCC): ICD-10-CM

## 2024-10-24 DIAGNOSIS — F39 MOOD DISORDER (HCC): ICD-10-CM

## 2024-10-24 PROBLEM — D69.6 THROMBOCYTOPENIA, UNSPECIFIED (HCC): Status: ACTIVE | Noted: 2024-10-24

## 2024-10-24 RX ORDER — LORAZEPAM 1 MG/1
1 TABLET ORAL 2 TIMES DAILY
Qty: 60 TABLET | Refills: 0 | Status: CANCELLED | OUTPATIENT
Start: 2024-10-24 | End: 2024-12-23

## 2024-10-24 RX ORDER — AMLODIPINE BESYLATE 5 MG/1
5 TABLET ORAL DAILY
Qty: 90 TABLET | Refills: 2 | Status: SHIPPED | OUTPATIENT
Start: 2024-10-24

## 2024-10-24 RX ORDER — OXYBUTYNIN CHLORIDE 5 MG/1
5 TABLET, EXTENDED RELEASE ORAL DAILY
Qty: 30 TABLET | Refills: 5 | Status: SHIPPED | OUTPATIENT
Start: 2024-10-24

## 2024-10-24 RX ORDER — CITALOPRAM HYDROBROMIDE 10 MG/1
10 TABLET ORAL DAILY
Qty: 90 TABLET | Refills: 3 | Status: SHIPPED | OUTPATIENT
Start: 2024-10-24

## 2024-10-25 ENCOUNTER — HOSPITAL ENCOUNTER (OUTPATIENT)
Dept: ONCOLOGY | Age: 74
Setting detail: INFUSION SERIES
Discharge: HOME | End: 2024-10-25
Payer: MEDICARE

## 2024-10-25 ENCOUNTER — HOSPITAL ENCOUNTER (OUTPATIENT)
Dept: PHYSICAL THERAPY | Age: 74
Setting detail: THERAPIES SERIES
Discharge: HOME | End: 2024-10-25
Attending: STUDENT IN AN ORGANIZED HEALTH CARE EDUCATION/TRAINING PROGRAM
Payer: MEDICARE

## 2024-10-25 VITALS
DIASTOLIC BLOOD PRESSURE: 76 MMHG | RESPIRATION RATE: 18 BRPM | OXYGEN SATURATION: 98 % | TEMPERATURE: 97.7 F | HEART RATE: 89 BPM | HEIGHT: 64 IN | WEIGHT: 159.17 LBS | BODY MASS INDEX: 27.17 KG/M2 | SYSTOLIC BLOOD PRESSURE: 152 MMHG

## 2024-10-25 DIAGNOSIS — R53.83 CHEMOTHERAPY-INDUCED FATIGUE: Primary | ICD-10-CM

## 2024-10-25 DIAGNOSIS — T45.1X5A CHEMOTHERAPY-INDUCED FATIGUE: Primary | ICD-10-CM

## 2024-10-25 DIAGNOSIS — R53.0 NEOPLASTIC (MALIGNANT) RELATED FATIGUE: ICD-10-CM

## 2024-10-25 LAB
ABO + RH BLD: NORMAL
ALBUMIN SERPL-MCNC: 3.7 G/DL (ref 3.4–5)
ALBUMIN/GLOB SERPL: 2.1 {RATIO} (ref 1.1–2.2)
ALP SERPL-CCNC: 63 U/L (ref 40–129)
ALT SERPL-CCNC: 14 U/L (ref 10–40)
AMPHETAMINES UR QL SCN>1000 NG/ML: ABNORMAL
ANION GAP SERPL CALCULATED.3IONS-SCNC: 9 MMOL/L (ref 3–16)
APTT BLD: 27.3 SEC (ref 22.1–36.4)
AST SERPL-CCNC: 14 U/L (ref 15–37)
BARBITURATES UR QL SCN>200 NG/ML: ABNORMAL
BASOPHILS # BLD: 0 K/UL (ref 0–0.2)
BASOPHILS NFR BLD: 0.1 %
BENZODIAZ UR QL SCN>200 NG/ML: POSITIVE
BILIRUB DIRECT SERPL-MCNC: 0.2 MG/DL (ref 0–0.3)
BILIRUB INDIRECT SERPL-MCNC: 0.3 MG/DL (ref 0–1)
BILIRUB SERPL-MCNC: 0.5 MG/DL (ref 0–1)
BILIRUB UR QL STRIP.AUTO: NEGATIVE
BLD GP AB SCN SERPL QL: NORMAL
BUN SERPL-MCNC: 19 MG/DL (ref 7–20)
CALCIUM SERPL-MCNC: 8.9 MG/DL (ref 8.3–10.6)
CANNABINOIDS UR QL SCN>50 NG/ML: ABNORMAL
CHLORIDE SERPL-SCNC: 107 MMOL/L (ref 99–110)
CLARITY UR: CLEAR
CO2 SERPL-SCNC: 25 MMOL/L (ref 21–32)
COCAINE UR QL SCN: ABNORMAL
COLOR UR: YELLOW
CREAT SERPL-MCNC: 0.6 MG/DL (ref 0.6–1.2)
CRP SERPL-MCNC: 17 MG/L (ref 0–5.1)
DEPRECATED RDW RBC AUTO: 14.3 % (ref 12.4–15.4)
DRUG SCREEN COMMENT UR-IMP: ABNORMAL
EOSINOPHIL # BLD: 0.2 K/UL (ref 0–0.6)
EOSINOPHIL NFR BLD: 3.9 %
FENTANYL SCREEN, URINE: ABNORMAL
FERRITIN SERPL IA-MCNC: 311 NG/ML (ref 15–150)
GFR SERPLBLD CREATININE-BSD FMLA CKD-EPI: >90 ML/MIN/{1.73_M2}
GLUCOSE SERPL-MCNC: 111 MG/DL (ref 70–99)
GLUCOSE UR STRIP.AUTO-MCNC: NEGATIVE MG/DL
HAV IGM SERPL QL IA: NORMAL
HBV SURFACE AB SERPL IA-ACNC: <3.5 MIU/ML
HCT VFR BLD AUTO: 34.9 % (ref 36–48)
HGB BLD-MCNC: 11.8 G/DL (ref 12–16)
HGB UR QL STRIP.AUTO: NEGATIVE
IGG SERPL-MCNC: 196 MG/DL (ref 700–1600)
INR PPP: 1.04 (ref 0.85–1.15)
KETONES UR STRIP.AUTO-MCNC: NEGATIVE MG/DL
LDH SERPL L TO P-CCNC: 207 U/L (ref 100–190)
LEUKOCYTE ESTERASE UR QL STRIP.AUTO: NEGATIVE
LYMPHOCYTES # BLD: 0.3 K/UL (ref 1–5.1)
LYMPHOCYTES NFR BLD: 4.3 %
MAGNESIUM SERPL-MCNC: 2.1 MG/DL (ref 1.8–2.4)
MCH RBC QN AUTO: 33.1 PG (ref 26–34)
MCHC RBC AUTO-ENTMCNC: 33.7 G/DL (ref 31–36)
MCV RBC AUTO: 98.2 FL (ref 80–100)
METHADONE UR QL SCN>300 NG/ML: ABNORMAL
MONOCYTES # BLD: 0.9 K/UL (ref 0–1.3)
MONOCYTES NFR BLD: 14.9 %
NEUTROPHILS # BLD: 4.7 K/UL (ref 1.7–7.7)
NEUTROPHILS NFR BLD: 76.8 %
NITRITE UR QL STRIP.AUTO: NEGATIVE
OPIATES UR QL SCN>300 NG/ML: ABNORMAL
OXYCODONE UR QL SCN: POSITIVE
PCP UR QL SCN>25 NG/ML: ABNORMAL
PH UR STRIP.AUTO: 6 [PH] (ref 5–8)
PH UR STRIP: 4.5 [PH]
PHOSPHATE SERPL-MCNC: 2.9 MG/DL (ref 2.5–4.9)
PLATELET # BLD AUTO: 145 K/UL (ref 135–450)
PMV BLD AUTO: 7.1 FL (ref 5–10.5)
POTASSIUM SERPL-SCNC: 4.2 MMOL/L (ref 3.5–5.1)
PROT SERPL-MCNC: 5.5 G/DL (ref 6.4–8.2)
PROT UR STRIP.AUTO-MCNC: NEGATIVE MG/DL
PROTHROMBIN TIME: 13.8 SEC (ref 11.9–14.9)
RBC # BLD AUTO: 3.56 M/UL (ref 4–5.2)
SODIUM SERPL-SCNC: 141 MMOL/L (ref 136–145)
SP GR UR STRIP.AUTO: 1.01 (ref 1–1.03)
SPECIMEN SOURCE: NORMAL
UA COMPLETE W REFLEX CULTURE PNL UR: NORMAL
UA DIPSTICK W REFLEX MICRO PNL UR: NORMAL
URATE SERPL-MCNC: 4.4 MG/DL (ref 2.6–6)
URN SPEC COLLECT METH UR: NORMAL
UROBILINOGEN UR STRIP-ACNC: 0.2 E.U./DL
WBC # BLD AUTO: 6.1 K/UL (ref 4–11)

## 2024-10-25 PROCEDURE — G0480 DRUG TEST DEF 1-7 CLASSES: HCPCS

## 2024-10-25 PROCEDURE — 82955 ASSAY OF G6PD ENZYME: CPT

## 2024-10-25 PROCEDURE — 83615 LACTATE (LD) (LDH) ENZYME: CPT

## 2024-10-25 PROCEDURE — 86850 RBC ANTIBODY SCREEN: CPT

## 2024-10-25 PROCEDURE — 86645 CMV ANTIBODY IGM: CPT

## 2024-10-25 PROCEDURE — 86778 TOXOPLASMA ANTIBODY IGM: CPT

## 2024-10-25 PROCEDURE — 97161 PT EVAL LOW COMPLEX 20 MIN: CPT | Performed by: PHYSICAL THERAPIST

## 2024-10-25 PROCEDURE — 86707 HEPATITIS BE ANTIBODY: CPT

## 2024-10-25 PROCEDURE — 36591 DRAW BLOOD OFF VENOUS DEVICE: CPT

## 2024-10-25 PROCEDURE — 86695 HERPES SIMPLEX TYPE 1 TEST: CPT

## 2024-10-25 PROCEDURE — 86787 VARICELLA-ZOSTER ANTIBODY: CPT

## 2024-10-25 PROCEDURE — 81003 URINALYSIS AUTO W/O SCOPE: CPT

## 2024-10-25 PROCEDURE — 80307 DRUG TEST PRSMV CHEM ANLYZR: CPT

## 2024-10-25 PROCEDURE — 86696 HERPES SIMPLEX TYPE 2 TEST: CPT

## 2024-10-25 PROCEDURE — 86777 TOXOPLASMA ANTIBODY: CPT

## 2024-10-25 PROCEDURE — 86900 BLOOD TYPING SEROLOGIC ABO: CPT

## 2024-10-25 PROCEDURE — 97110 THERAPEUTIC EXERCISES: CPT | Performed by: PHYSICAL THERAPIST

## 2024-10-25 PROCEDURE — 86706 HEP B SURFACE ANTIBODY: CPT

## 2024-10-25 PROCEDURE — 82728 ASSAY OF FERRITIN: CPT

## 2024-10-25 PROCEDURE — 86665 EPSTEIN-BARR CAPSID VCA: CPT

## 2024-10-25 PROCEDURE — 86709 HEPATITIS A IGM ANTIBODY: CPT

## 2024-10-25 PROCEDURE — 86901 BLOOD TYPING SEROLOGIC RH(D): CPT

## 2024-10-25 PROCEDURE — 85610 PROTHROMBIN TIME: CPT

## 2024-10-25 PROCEDURE — 86140 C-REACTIVE PROTEIN: CPT

## 2024-10-25 PROCEDURE — 84100 ASSAY OF PHOSPHORUS: CPT

## 2024-10-25 PROCEDURE — 80053 COMPREHEN METABOLIC PANEL: CPT

## 2024-10-25 PROCEDURE — 84550 ASSAY OF BLOOD/URIC ACID: CPT

## 2024-10-25 PROCEDURE — 85025 COMPLETE CBC W/AUTO DIFF WBC: CPT

## 2024-10-25 PROCEDURE — 83735 ASSAY OF MAGNESIUM: CPT

## 2024-10-25 PROCEDURE — 82248 BILIRUBIN DIRECT: CPT

## 2024-10-25 PROCEDURE — 82784 ASSAY IGA/IGD/IGG/IGM EACH: CPT

## 2024-10-25 PROCEDURE — 85730 THROMBOPLASTIN TIME PARTIAL: CPT

## 2024-10-25 ASSESSMENT — ENCOUNTER SYMPTOMS
COUGH: 0
DIARRHEA: 0

## 2024-10-25 NOTE — CARE COORDINATION
I met with patient and sister to provide education regarding potential CAR-T therapy. Discussed evaluation process, collection, manufacturing process, lymphodepleting chemotherapy, cell infusion, and recovery along with side effects and complications. Emphasized CRS/neurotoxicity signs and symptoms along with treatment options. Pt was informed of potential of seizures that will be managed with anti-seizure medication along with no driving for eight weeks. Risk for infection was also discussed with strategies to prevent infection. Patient was informed that continuous caregiver will be required for thirty days with daily visits in Outpatient Infusion if not admitted to the hospital. I also explained the potential for needing to be re-immunized or require IVIG infusions to treat hypogammaglobulinemia.  Provided patient with tentative calendar and discussed timeline of how care may be provided through outpatient Infusion Center, inpatient Cancer Care Center, and scheduled [facility] appointments. Also provided with our CARVYKTI handbook and business card.  Answered questions appropriately and instructed to call with further questions. Will follow up during next appointment.

## 2024-10-25 NOTE — PLAN OF CARE
[facility]- Outpatient Rehabilitation and Therapy [address], [address], [place] [address] office: [phone] fax: [phone]     Physical Therapy Initial Evaluation Certification      Dear Maribel Garriod DO,    We had the pleasure of evaluating the following patient for physical therapy services at [facility].  A summary of our findings can be found in the initial assessment below.  This includes our plan of care.  If you have any questions or concerns regarding these findings, please do not hesitate to contact me at the office phone number listed above.  Thank you for the referral.     Physician Signature:_______________________________Date:__________________  By signing above (or electronic signature), therapist’s plan is approved by physician       Physical Therapy: TREATMENT/PROGRESS NOTE   Patient: Catalina Leyva (73 y.o. female)   Examination Date: 10/25/2024   :  1950 MRN: [id number]   Visit #: 1   Insurance Allowable Auth Needed   Medical Necessity []Yes    [x]No    Insurance: Payor: MEDICARE / Plan: MEDICARE PART A AND B / Product Type: *No Product type* /   Insurance ID: [id number] - (Medicare)  Secondary Insurance (if applicable): BCBS   Treatment Diagnosis:     ICD-10-CM    1. Chemotherapy-induced fatigue  R53.83     T45.1X5A       2. Neoplastic (malignant) related fatigue  R53.0          Medical Diagnosis:  Multiple myeloma in relapse (HCC) [C90.02]  Pre-transplant evaluation for stem cell transplant [Z01.818]   Referring Physician: Maribel Garrido DO  PCP: Mayra Mayen MD       Plan of care signed (Y/N): N    Date of Patient follow up with Physician:      Plan of Care Report: EVAL today  POC update due: (10 visits /OR AUTH LIMITS, whichever is less)  2024        Medical History:  Comorbidities:  Cancer/Tumor  Hypertension  Anxiety  Relevant Medical History: BMT in                                          Precautions/

## 2024-10-25 NOTE — BH NOTE
Behavioral Health Note    Primary caregiver seen to complete caregiver portion of evaluation. Currently lives in [place], [place] and would not be present for patient's psychological evaluation next Friday (11.1.24). Caregiver appears to have adequate plan for time off work post-CAR-T. Recommend she reviews education and patient and caregiver solidify back-up caregiver plan. Caregiver's information below.     Identified primary caregiver: Rohit Haskins (sister)  Age: 65  Lives: [place], [place] (approximately 1 hour and 44 minutes from [facility]).   Employment: Yes, works as [employer] .   Plan for time off work: Requested [place] paperwork for 30 days of post CAR-T. Going to request more time depending on patient's recovery. Is unsure about back-up caregiver (could be number of people- uncle).   Any other major responsibilities (e.g., caregiver for parent or child): Denies  Barriers to staying with patient full-time: Denies  Barriers to driving (e.g., not driving at night, on highway, in rain): Denies  Willingness to be a caregiver: Yes  Significant mental health conditions that would affect care giving: Denies  Significant substance abuse that would affect care giving: Denies  Significant medical conditions that would affect care giving: Denies  Smoke tobacco in the home: Denies  Known cognitive impairment: Denies     CAREGIVER KNOWLEDGE REGARDING PROCEDURE:  Diagnosis: Good   Procedure: Adequate, was provided additional education and expressed understanding  Frequency of appointments/who should attend appointments: Good  Risks: Adequate, was provided additional education and expressed understanding   Side effects of chemotherapy: Excellent  Driving restrictions: Excellent   Caregiver duties: Excellent      CAREGIVER: RAPID ESTIMATE OF ADULT LITERACY IN MEDICINE  REALM-R: 5/8; REALM-SF Score: N/A   (0= <3rd grade reading level; 1-3= 4-6th grade reading level; 4-6= 7-8th grade reading level; >7= >9th grade reading

## 2024-10-25 NOTE — PSYCHOTHERAPY
confirmed or appears limited / tentative.  Functionality of Social Support System: 2) Good: A limited support system has already committed to and has had effective engagement in the patient's care. Or, they are not involved yet, but appear ready to help.  Appropriateness of Physical Living Space and Environment: 0) Excellent: Patient has excellent, long-term, permanent and adequate housing.   Social Support System Total Score: 6    Psychological Stability and Psychopathology   Presence of Psychopathology: 4) Moderate Psychopathology - Present or history of moderate psychopathology (e.g., depressive or anxiety disorder). Treatment, if needed, has been/was effective, good compliance. No SI/SA at present; although possible history SI/SA in past.  Assessment of Depression: 1) Mild Clinical Depression  Assessment of Anxiety: 1) Mild Clinical Anxiety  History of Organic Psychopathology or Neurocognitive Impairment: 0) None: No history of disease or treatment induced psychiatric problem.  Assessment of Current Cognitive Functionin) Cognitive Functioning Within Normal Limits; or MoCA / MMSE . 26.  Influence of Personality Traits vs. Disorder: 0) None: No history of significant personality disorder or psychopathology/traits.  Effect of Truthfulness vs. Deceptive Behavior in Presentation: 2) Patient has not volunteered some negative information, but truthfully answered on direct questioning.  Overall Risk for Psychopathology: 2) Mild: History of poor coping with current or previous medical challenges or psychosocial stressors. Only minimal, if any, psychiatric complications in response to illness, medical treatment or psychosocial stressors.   Psychological Stability and Psychopathology Total Score: 10    Lifestyle and Effect of Substance Use  Alcohol Use/Abuse/Dependence: 4) MODERATE ALCOHOL ABUSE: History of moderate alcohol abuse evidenced by excessive drinking and possible deleterious bodily or social effects. Patient

## 2024-10-26 LAB
EBV VCA IGG SER IA-ACNC: 112 U/ML (ref 0–21.9)
EBV VCA IGM SER IA-ACNC: <10 U/ML (ref 0–43.9)
HBV E AB SERPL QL IA: NEGATIVE
HERPES SIMPLEX VIRUS 1 IGG: NEGATIVE
HERPES SIMPLEX VIRUS 2 IGG: POSITIVE
T GONDII IGG SER-ACNC: <3 IU/ML
T GONDII IGM SER-ACNC: <3 AU/ML

## 2024-10-26 NOTE — PROGRESS NOTES
Cervical back: Normal range of motion.   Neurological:      Mental Status: She is alert. Mental status is at baseline.   Psychiatric:         Mood and Affect: Mood normal.         Behavior: Behavior normal.        Face to face with the patient discussing the diagnosis and importance of compliance with the treatment plan as well as documenting on the day of the visit.      An electronic signature was used to authenticate this note.    --Mayra Bartholomew MD

## 2024-10-27 LAB
G6PD RBC-CCNC: 16.4 U/G HB (ref 9.9–16.6)
VZV IGG SER IA-ACNC: 2.6 S/CO
VZV IGM SER IA-ACNC: 0.01 ISR

## 2024-10-30 LAB
HSV1 DNA CSF QL NAA+PROBE: NOT DETECTED
HSV2 DNA CSF QL NAA+PROBE: NOT DETECTED
SPECIMEN SOURCE: NORMAL

## 2024-10-31 LAB
ANABASINE UR-MCNC: <5 NG/ML
CMV IGM SERPL IA-ACNC: <8 AU/ML
COTININE UR-MCNC: <15 NG/ML
NICOTINE UR-MCNC: <15 NG/ML
TRANS-3-OH-COTININE UR-MCNC: <50 NG/ML

## 2024-10-31 RX ORDER — HYDROXYZINE HYDROCHLORIDE 25 MG/1
25 TABLET, FILM COATED ORAL NIGHTLY
Qty: 30 TABLET | Refills: 5 | Status: SHIPPED | OUTPATIENT
Start: 2024-10-31 | End: 2025-02-28

## 2024-11-01 ENCOUNTER — HOSPITAL ENCOUNTER (OUTPATIENT)
Dept: ONCOLOGY | Age: 74
Discharge: HOME | End: 2024-11-01

## 2024-11-01 ENCOUNTER — HOSPITAL ENCOUNTER (OUTPATIENT)
Dept: GENERAL RADIOLOGY | Age: 74
Discharge: HOME | End: 2024-11-01
Attending: STUDENT IN AN ORGANIZED HEALTH CARE EDUCATION/TRAINING PROGRAM
Payer: MEDICARE

## 2024-11-01 ENCOUNTER — HOSPITAL ENCOUNTER (OUTPATIENT)
Dept: PULMONOLOGY | Age: 74
Discharge: HOME | End: 2024-11-01
Attending: STUDENT IN AN ORGANIZED HEALTH CARE EDUCATION/TRAINING PROGRAM
Payer: MEDICARE

## 2024-11-01 ENCOUNTER — HOSPITAL ENCOUNTER (OUTPATIENT)
Dept: ONCOLOGY | Age: 74
Setting detail: INFUSION SERIES
Discharge: HOME | End: 2024-11-01

## 2024-11-01 DIAGNOSIS — Z01.818 PRE-TRANSPLANT EVALUATION FOR STEM CELL TRANSPLANT: ICD-10-CM

## 2024-11-01 DIAGNOSIS — C90.02 MULTIPLE MYELOMA IN RELAPSE (HCC): ICD-10-CM

## 2024-11-01 PROCEDURE — 71046 X-RAY EXAM CHEST 2 VIEWS: CPT

## 2024-11-01 PROCEDURE — 94664 DEMO&/EVAL PT USE INHALER: CPT

## 2024-11-01 PROCEDURE — 94761 N-INVAS EAR/PLS OXIMETRY MLT: CPT

## 2024-11-01 PROCEDURE — 94010 BREATHING CAPACITY TEST: CPT

## 2024-11-01 PROCEDURE — 94726 PLETHYSMOGRAPHY LUNG VOLUMES: CPT

## 2024-11-01 PROCEDURE — 94729 DIFFUSING CAPACITY: CPT

## 2024-11-08 ENCOUNTER — HOSPITAL ENCOUNTER (OUTPATIENT)
Dept: ONCOLOGY | Age: 74
Setting detail: INFUSION SERIES
Discharge: HOME | End: 2024-11-08

## 2024-11-08 ASSESSMENT — PATIENT HEALTH QUESTIONNAIRE - PHQ9
SUM OF ALL RESPONSES TO PHQ9 QUESTIONS 1 & 2: 1
7. TROUBLE CONCENTRATING ON THINGS, SUCH AS READING THE NEWSPAPER OR WATCHING TELEVISION: NOT AT ALL
9. THOUGHTS THAT YOU WOULD BE BETTER OFF DEAD, OR OF HURTING YOURSELF: NOT AT ALL
SUM OF ALL RESPONSES TO PHQ QUESTIONS 1-9: 3
4. FEELING TIRED OR HAVING LITTLE ENERGY: SEVERAL DAYS
6. FEELING BAD ABOUT YOURSELF - OR THAT YOU ARE A FAILURE OR HAVE LET YOURSELF OR YOUR FAMILY DOWN: NOT AT ALL
2. FEELING DOWN, DEPRESSED OR HOPELESS: SEVERAL DAYS
8. MOVING OR SPEAKING SO SLOWLY THAT OTHER PEOPLE COULD HAVE NOTICED. OR THE OPPOSITE, BEING SO FIGETY OR RESTLESS THAT YOU HAVE BEEN MOVING AROUND A LOT MORE THAN USUAL: NOT AT ALL
10. IF YOU CHECKED OFF ANY PROBLEMS, HOW DIFFICULT HAVE THESE PROBLEMS MADE IT FOR YOU TO DO YOUR WORK, TAKE CARE OF THINGS AT HOME, OR GET ALONG WITH OTHER PEOPLE: NOT DIFFICULT AT ALL
5. POOR APPETITE OR OVEREATING: SEVERAL DAYS
SUM OF ALL RESPONSES TO PHQ QUESTIONS 1-9: 3
SUM OF ALL RESPONSES TO PHQ QUESTIONS 1-9: 3
1. LITTLE INTEREST OR PLEASURE IN DOING THINGS: NOT AT ALL
3. TROUBLE FALLING OR STAYING ASLEEP: NOT AT ALL
SUM OF ALL RESPONSES TO PHQ QUESTIONS 1-9: 3

## 2024-11-08 ASSESSMENT — ANXIETY QUESTIONNAIRES
1. FEELING NERVOUS, ANXIOUS, OR ON EDGE: SEVERAL DAYS
7. FEELING AFRAID AS IF SOMETHING AWFUL MIGHT HAPPEN: NOT AT ALL
5. BEING SO RESTLESS THAT IT IS HARD TO SIT STILL: SEVERAL DAYS
3. WORRYING TOO MUCH ABOUT DIFFERENT THINGS: SEVERAL DAYS
6. BECOMING EASILY ANNOYED OR IRRITABLE: MORE THAN HALF THE DAYS
4. TROUBLE RELAXING: SEVERAL DAYS
2. NOT BEING ABLE TO STOP OR CONTROL WORRYING: SEVERAL DAYS
IF YOU CHECKED OFF ANY PROBLEMS ON THIS QUESTIONNAIRE, HOW DIFFICULT HAVE THESE PROBLEMS MADE IT FOR YOU TO DO YOUR WORK, TAKE CARE OF THINGS AT HOME, OR GET ALONG WITH OTHER PEOPLE: NOT DIFFICULT AT ALL
GAD7 TOTAL SCORE: 7

## 2024-11-11 ENCOUNTER — HOSPITAL ENCOUNTER (OUTPATIENT)
Dept: ONCOLOGY | Age: 74
Setting detail: INFUSION SERIES
Discharge: HOME | End: 2024-11-11
Payer: MEDICARE

## 2024-11-11 LAB
AMPHETAMINES UR QL SCN>1000 NG/ML: NORMAL
BARBITURATES UR QL SCN>200 NG/ML: NORMAL
BENZODIAZ UR QL SCN>200 NG/ML: NORMAL
CANNABINOIDS UR QL SCN>50 NG/ML: NORMAL
COCAINE UR QL SCN: NORMAL
DRUG SCREEN COMMENT UR-IMP: NORMAL
FENTANYL SCREEN, URINE: NORMAL
METHADONE UR QL SCN>300 NG/ML: NORMAL
OPIATES UR QL SCN>300 NG/ML: NORMAL
OXYCODONE UR QL SCN: NORMAL
PCP UR QL SCN>25 NG/ML: NORMAL
PH UR STRIP: 5 [PH]

## 2024-11-11 PROCEDURE — 80307 DRUG TEST PRSMV CHEM ANLYZR: CPT

## 2024-11-12 ENCOUNTER — OFFICE VISIT (OUTPATIENT)
Dept: PULMONOLOGY | Age: 74
End: 2024-11-12
Payer: MEDICARE

## 2024-11-12 VITALS
WEIGHT: 158 LBS | RESPIRATION RATE: 16 BRPM | BODY MASS INDEX: 26.98 KG/M2 | HEIGHT: 64 IN | HEART RATE: 78 BPM | OXYGEN SATURATION: 99 % | DIASTOLIC BLOOD PRESSURE: 80 MMHG | SYSTOLIC BLOOD PRESSURE: 114 MMHG

## 2024-11-12 DIAGNOSIS — C90.02 MULTIPLE MYELOMA IN RELAPSE (HCC): ICD-10-CM

## 2024-11-12 DIAGNOSIS — R94.2 ABNORMAL PFTS: ICD-10-CM

## 2024-11-12 DIAGNOSIS — Z01.811 PREOP PULMONARY/RESPIRATORY EXAM: ICD-10-CM

## 2024-11-12 DIAGNOSIS — R06.09 DOE (DYSPNEA ON EXERTION): Primary | ICD-10-CM

## 2024-11-12 PROCEDURE — 3074F SYST BP LT 130 MM HG: CPT | Performed by: INTERNAL MEDICINE

## 2024-11-12 PROCEDURE — G8399 PT W/DXA RESULTS DOCUMENT: HCPCS | Performed by: INTERNAL MEDICINE

## 2024-11-12 PROCEDURE — 3079F DIAST BP 80-89 MM HG: CPT | Performed by: INTERNAL MEDICINE

## 2024-11-12 PROCEDURE — 1159F MED LIST DOCD IN RCRD: CPT | Performed by: INTERNAL MEDICINE

## 2024-11-12 PROCEDURE — 1123F ACP DISCUSS/DSCN MKR DOCD: CPT | Performed by: INTERNAL MEDICINE

## 2024-11-12 PROCEDURE — 99204 OFFICE O/P NEW MOD 45 MIN: CPT | Performed by: INTERNAL MEDICINE

## 2024-11-12 PROCEDURE — G8482 FLU IMMUNIZE ORDER/ADMIN: HCPCS | Performed by: INTERNAL MEDICINE

## 2024-11-12 PROCEDURE — G8427 DOCREV CUR MEDS BY ELIG CLIN: HCPCS | Performed by: INTERNAL MEDICINE

## 2024-11-12 PROCEDURE — 3017F COLORECTAL CA SCREEN DOC REV: CPT | Performed by: INTERNAL MEDICINE

## 2024-11-12 PROCEDURE — 1036F TOBACCO NON-USER: CPT | Performed by: INTERNAL MEDICINE

## 2024-11-12 PROCEDURE — 1090F PRES/ABSN URINE INCON ASSESS: CPT | Performed by: INTERNAL MEDICINE

## 2024-11-12 PROCEDURE — G8417 CALC BMI ABV UP PARAM F/U: HCPCS | Performed by: INTERNAL MEDICINE

## 2024-11-12 NOTE — PROGRESS NOTES
[facility] Pulmonary and Critical Care    Outpatient Initial Note    Subjective:   CHIEF COMPLAINT / HPI:     The patient is 73 y.o. female who presents today for a new patient visit for pulmonary risk stratification for CAR-T therapy in setting of abnormal PFTs.  Catalina denies any history of chronic lung disease, has never seen a pulmonologist, and is not on any MDIs.     She does mention having some dyspnea on exertion when she walks her dog too fast or talks too much and feels that she runs out of air at the end of her sentence.  However she denies any dyspnea with walking up hills or steps.  She has no activity limitations and states she mows her own yard and cleans her own house without issue.  She denies any cough, wheezing, fevers, chills, anorexia, or weight loss.  She mention she gets mild true chest pressure about once a week and that this is related to stress and not exertion    Past Medical History:    Past Medical History:   Diagnosis Date    Anxiety     Chronic systolic (congestive) heart failure 7/25/2023    Dental crown present     Depression     Hepatitis C      states resolved     Hypertension 2/13/2023    Multiple myeloma not having achieved remission (HCC) 06/15/2021       Social History:      Retired - worked at [employer]  Tobacco exposure: 1970 - 2013 - 1/2 PPD    Family History:  No lung disease  CAD  Multiple myeloma    Current Medications:  Current Outpatient Medications on File Prior to Visit   Medication Sig Dispense Refill    hydrOXYzine HCl (ATARAX) 25 MG tablet TAKE 1 TABLET BY MOUTH nightly 30 tablet 5    amLODIPine (NORVASC) 5 MG tablet Take 1 tablet by mouth daily 90 tablet 2    citalopram (CELEXA) 10 MG tablet Take 1 tablet by mouth daily 90 tablet 3    oxyBUTYnin (DITROPAN XL) 5 MG extended release tablet Take 1 tablet by mouth daily 30 tablet 5    LORazepam (ATIVAN) 1 MG tablet take 1 tablet BY MOUTH in THE morning AND 1 tablet in THE evening FOR 30 DAYS. max

## 2024-11-13 ENCOUNTER — HOSPITAL ENCOUNTER (OUTPATIENT)
Dept: CT IMAGING | Age: 74
Discharge: HOME | End: 2024-11-13
Attending: INTERNAL MEDICINE
Payer: MEDICARE

## 2024-11-13 ENCOUNTER — TELEPHONE (OUTPATIENT)
Dept: CASE MANAGEMENT | Age: 74
End: 2024-11-13

## 2024-11-13 DIAGNOSIS — R94.2 ABNORMAL PFTS: ICD-10-CM

## 2024-11-13 DIAGNOSIS — R06.09 DOE (DYSPNEA ON EXERTION): ICD-10-CM

## 2024-11-13 PROCEDURE — 71250 CT THORAX DX C-: CPT

## 2024-11-13 NOTE — H&P
[facility] History and Physical        Attending Physician: No att. providers found    Primary Care: Mayra Mayen MD       Referring MD: Maribel Garrido DO  [address]  [address]  [place] [address]    Name: Catalina eLyva :  1950  MRN:  [id number]    Admission: 2024      Date: 2024    T cell collection    Dx: Multiple Myeloma    Indication: Anticipation of CAR-T cell transplant     Day + 1 of T-Cell Collection     History of Present Illness: Patient is a 73-year-old female with history of relapsed refractory multiple myeloma who was originally referred to [facility] to be evaluated for CAR-T.  She was diagnosed with multiple myeloma IgA kappa in 2020.  She also has history of hepatitis C and was treated with Harvoni in .  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  Patient was on Revlimid maintenance.  She then progressed and was placed on Eve Pomalyst and Dex.  Patient had recent progression in May 2024 and was switched to LESTER Kyprolis Dex per Dr. Devin Garrido's recommendations.  Patient is now progressing.  She then started workup for CAR-T cell therapy. She has most recently completed cycle 6 of Isatuximab / Carfilzomib / Dex and has achieved a VGPR.     She now presents to OP Infusion for T-Cell Collection. She will resume Bridging chemotherapy at [facility] on Tuesday, 24. Her lymphodepleting chemotherapy is scheduled to start 25 - 1/10/25, followed by admission and [facility] CAR-T Infusion on 24. She complains of thrush starting to come back in mouth, will order anti fungal lozenges. Otherwise, she feels well today and denies fever, chills, sweats, change in appetite, visual changes, headache, sinus congestion, sore throat, mouth pain, cough, increased shortness of breath, chest pain, abdominal pain, nausea, vomiting, diarrhea, constipation, dysuria, hematuria, lower extremity pain or swelling.

## 2024-11-13 NOTE — PRE-PROCEDURE INSTRUCTIONS
Attempted to call patient about procedure. No answer. Voicemail left. Told to be here at 0630 for procedure at 0730. NPO after midnight, but can take morning medication with sips of water, office should have told them when and if they should stop any blood thinners. To have a responsible adult be with patient take them home and stay with them afterwards, if they are not admitted to hospital afterwards. And if available bring current list of medications.   
hypoactive

## 2024-11-13 NOTE — TELEPHONE ENCOUNTER
2:33pm: Received request from Marilia, Transplant Coordinator to call pt for possible assistance for her .     Called and spoke with pt. She said she would call writer back as she was driving.     MILLIE Dos Santos   for [facility] ([facility])  [employer] Mobile: [phone]

## 2024-11-14 ENCOUNTER — HOSPITAL ENCOUNTER (OUTPATIENT)
Dept: ONCOLOGY | Age: 74
Setting detail: INFUSION SERIES
Discharge: HOME | End: 2024-11-14
Payer: MEDICARE

## 2024-11-14 ENCOUNTER — HOSPITAL ENCOUNTER (OUTPATIENT)
Dept: INTERVENTIONAL RADIOLOGY/VASCULAR | Age: 74
Discharge: HOME | End: 2024-11-16
Attending: STUDENT IN AN ORGANIZED HEALTH CARE EDUCATION/TRAINING PROGRAM
Payer: MEDICARE

## 2024-11-14 VITALS
HEART RATE: 80 BPM | OXYGEN SATURATION: 100 % | HEIGHT: 64 IN | DIASTOLIC BLOOD PRESSURE: 80 MMHG | BODY MASS INDEX: 27.14 KG/M2 | WEIGHT: 159 LBS | RESPIRATION RATE: 15 BRPM | TEMPERATURE: 97.5 F | SYSTOLIC BLOOD PRESSURE: 138 MMHG

## 2024-11-14 VITALS
OXYGEN SATURATION: 99 % | HEART RATE: 83 BPM | SYSTOLIC BLOOD PRESSURE: 142 MMHG | RESPIRATION RATE: 18 BRPM | DIASTOLIC BLOOD PRESSURE: 80 MMHG

## 2024-11-14 DIAGNOSIS — Z52.011 AUTOLOGOUS DONOR, STEM CELLS: Primary | ICD-10-CM

## 2024-11-14 DIAGNOSIS — C90.00 MULTIPLE MYELOMA NOT HAVING ACHIEVED REMISSION (HCC): ICD-10-CM

## 2024-11-14 DIAGNOSIS — C90.02 MULTIPLE MYELOMA IN RELAPSE (HCC): ICD-10-CM

## 2024-11-14 LAB
ALBUMIN SERPL-MCNC: 4.1 G/DL (ref 3.4–5)
ALBUMIN/GLOB SERPL: 2.2 {RATIO} (ref 1.1–2.2)
ALP SERPL-CCNC: 63 U/L (ref 40–129)
ALT SERPL-CCNC: 12 U/L (ref 10–40)
ANION GAP SERPL CALCULATED.3IONS-SCNC: 9 MMOL/L (ref 3–16)
AST SERPL-CCNC: 19 U/L (ref 15–37)
BASOPHILS # BLD: 0 K/UL (ref 0–0.2)
BASOPHILS # BLD: 0 K/UL (ref 0–0.2)
BASOPHILS NFR BLD: 0.1 %
BASOPHILS NFR BLD: 0.4 %
BILIRUB SERPL-MCNC: 0.5 MG/DL (ref 0–1)
BUN SERPL-MCNC: 17 MG/DL (ref 7–20)
CALCIUM SERPL-MCNC: 9.1 MG/DL (ref 8.3–10.6)
CHLORIDE SERPL-SCNC: 107 MMOL/L (ref 99–110)
CO2 SERPL-SCNC: 26 MMOL/L (ref 21–32)
CREAT SERPL-MCNC: 0.6 MG/DL (ref 0.6–1.2)
DEPRECATED RDW RBC AUTO: 13 % (ref 12.4–15.4)
DEPRECATED RDW RBC AUTO: 13.7 % (ref 12.4–15.4)
ECHO BSA: 1.8 M2
EOSINOPHIL # BLD: 0 K/UL (ref 0–0.6)
EOSINOPHIL # BLD: 0.1 K/UL (ref 0–0.6)
EOSINOPHIL NFR BLD: 1.2 %
EOSINOPHIL NFR BLD: 3.4 %
GFR SERPLBLD CREATININE-BSD FMLA CKD-EPI: >90 ML/MIN/{1.73_M2}
GLUCOSE SERPL-MCNC: 88 MG/DL (ref 70–99)
HCT VFR BLD AUTO: 33.4 % (ref 36–48)
HCT VFR BLD AUTO: 37.2 % (ref 36–48)
HGB BLD-MCNC: 11.4 G/DL (ref 12–16)
HGB BLD-MCNC: 12.6 G/DL (ref 12–16)
INR PPP: 1 (ref 0.85–1.15)
LYMPHOCYTES # BLD: 0.4 K/UL (ref 1–5.1)
LYMPHOCYTES # BLD: 0.7 K/UL (ref 1–5.1)
LYMPHOCYTES NFR BLD: 20.2 %
LYMPHOCYTES NFR BLD: 9 %
MCH RBC QN AUTO: 32.5 PG (ref 26–34)
MCH RBC QN AUTO: 32.6 PG (ref 26–34)
MCHC RBC AUTO-ENTMCNC: 33.9 G/DL (ref 31–36)
MCHC RBC AUTO-ENTMCNC: 34.1 G/DL (ref 31–36)
MCV RBC AUTO: 95.5 FL (ref 80–100)
MCV RBC AUTO: 95.8 FL (ref 80–100)
MONOCYTES # BLD: 0.3 K/UL (ref 0–1.3)
MONOCYTES # BLD: 0.5 K/UL (ref 0–1.3)
MONOCYTES NFR BLD: 14.3 %
MONOCYTES NFR BLD: 6.7 %
NEUTROPHILS # BLD: 2.1 K/UL (ref 1.7–7.7)
NEUTROPHILS # BLD: 3.4 K/UL (ref 1.7–7.7)
NEUTROPHILS NFR BLD: 61.7 %
NEUTROPHILS NFR BLD: 83 %
PLATELET # BLD AUTO: 134 K/UL (ref 135–450)
PLATELET # BLD AUTO: 222 K/UL (ref 135–450)
PMV BLD AUTO: 6.7 FL (ref 5–10.5)
PMV BLD AUTO: 7.2 FL (ref 5–10.5)
POTASSIUM SERPL-SCNC: 3.9 MMOL/L (ref 3.5–5.1)
PROT SERPL-MCNC: 6 G/DL (ref 6.4–8.2)
PROTHROMBIN TIME: 13.4 SEC (ref 11.9–14.9)
RBC # BLD AUTO: 3.5 M/UL (ref 4–5.2)
RBC # BLD AUTO: 3.89 M/UL (ref 4–5.2)
SODIUM SERPL-SCNC: 142 MMOL/L (ref 136–145)
WBC # BLD AUTO: 3.4 K/UL (ref 4–11)
WBC # BLD AUTO: 4.1 K/UL (ref 4–11)

## 2024-11-14 PROCEDURE — 2500000003 HC RX 250 WO HCPCS: Performed by: RADIOLOGY

## 2024-11-14 PROCEDURE — 7100000010 HC PHASE II RECOVERY - FIRST 15 MIN

## 2024-11-14 PROCEDURE — 7100000011 HC PHASE II RECOVERY - ADDTL 15 MIN

## 2024-11-14 PROCEDURE — C1769 GUIDE WIRE: HCPCS

## 2024-11-14 PROCEDURE — 36556 INSERT NON-TUNNEL CV CATH: CPT

## 2024-11-14 PROCEDURE — 85025 COMPLETE CBC W/AUTO DIFF WBC: CPT

## 2024-11-14 PROCEDURE — C1752 CATH,HEMODIALYSIS,SHORT-TERM: HCPCS

## 2024-11-14 PROCEDURE — 85610 PROTHROMBIN TIME: CPT

## 2024-11-14 PROCEDURE — 6360000002 HC RX W HCPCS: Performed by: RADIOLOGY

## 2024-11-14 PROCEDURE — 36592 COLLECT BLOOD FROM PICC: CPT

## 2024-11-14 PROCEDURE — 2709999900 HC NON-CHARGEABLE SUPPLY

## 2024-11-14 PROCEDURE — 80053 COMPREHEN METABOLIC PANEL: CPT

## 2024-11-14 PROCEDURE — 6370000000 HC RX 637 (ALT 250 FOR IP): Performed by: NURSE PRACTITIONER

## 2024-11-14 RX ORDER — LIDOCAINE HYDROCHLORIDE 10 MG/ML
INJECTION, SOLUTION EPIDURAL; INFILTRATION; INTRACAUDAL; PERINEURAL PRN
Status: COMPLETED | OUTPATIENT
Start: 2024-11-14 | End: 2024-11-14

## 2024-11-14 RX ORDER — PROCHLORPERAZINE EDISYLATE 5 MG/ML
10 INJECTION INTRAMUSCULAR; INTRAVENOUS EVERY 6 HOURS PRN
Status: DISCONTINUED | OUTPATIENT
Start: 2024-11-14 | End: 2024-11-15 | Stop reason: HOSPADM

## 2024-11-14 RX ORDER — CLOTRIMAZOLE 10 MG/1
10 LOZENGE ORAL
Qty: 50 TABLET | Refills: 0 | Status: SHIPPED | OUTPATIENT
Start: 2024-11-14 | End: 2024-11-24

## 2024-11-14 RX ORDER — LOPERAMIDE HYDROCHLORIDE 2 MG/1
2 CAPSULE ORAL PRN
Status: DISCONTINUED | OUTPATIENT
Start: 2024-11-14 | End: 2024-11-15 | Stop reason: HOSPADM

## 2024-11-14 RX ORDER — PROCHLORPERAZINE EDISYLATE 5 MG/ML
10 INJECTION INTRAMUSCULAR; INTRAVENOUS EVERY 6 HOURS PRN
Status: CANCELLED | OUTPATIENT
Start: 2024-11-14

## 2024-11-14 RX ORDER — CALCIUM GLUCONATE 20 MG/ML
2000 INJECTION, SOLUTION INTRAVENOUS ONCE
Status: CANCELLED | OUTPATIENT
Start: 2024-11-14 | End: 2024-11-14

## 2024-11-14 RX ORDER — PROCHLORPERAZINE MALEATE 10 MG
10 TABLET ORAL EVERY 6 HOURS PRN
Status: CANCELLED | OUTPATIENT
Start: 2024-11-14

## 2024-11-14 RX ORDER — TRAMADOL HYDROCHLORIDE 50 MG/1
50 TABLET ORAL
Status: COMPLETED | OUTPATIENT
Start: 2024-11-14 | End: 2024-11-14

## 2024-11-14 RX ORDER — PROCHLORPERAZINE MALEATE 10 MG
10 TABLET ORAL EVERY 6 HOURS PRN
Status: DISCONTINUED | OUTPATIENT
Start: 2024-11-14 | End: 2024-11-15 | Stop reason: HOSPADM

## 2024-11-14 RX ORDER — FENTANYL CITRATE 50 UG/ML
INJECTION, SOLUTION INTRAMUSCULAR; INTRAVENOUS PRN
Status: COMPLETED | OUTPATIENT
Start: 2024-11-14 | End: 2024-11-14

## 2024-11-14 RX ORDER — LOPERAMIDE HYDROCHLORIDE 2 MG/1
2 CAPSULE ORAL PRN
Status: CANCELLED | OUTPATIENT
Start: 2024-11-14

## 2024-11-14 RX ORDER — MIDAZOLAM HYDROCHLORIDE 1 MG/ML
INJECTION, SOLUTION INTRAMUSCULAR; INTRAVENOUS PRN
Status: COMPLETED | OUTPATIENT
Start: 2024-11-14 | End: 2024-11-14

## 2024-11-14 RX ORDER — CALCIUM GLUCONATE 20 MG/ML
2000 INJECTION, SOLUTION INTRAVENOUS ONCE
Status: DISCONTINUED | OUTPATIENT
Start: 2024-11-14 | End: 2024-11-15 | Stop reason: HOSPADM

## 2024-11-14 RX ADMIN — MIDAZOLAM HYDROCHLORIDE 1 MG: 2 INJECTION, SOLUTION INTRAMUSCULAR; INTRAVENOUS at 07:38

## 2024-11-14 RX ADMIN — FENTANYL CITRATE 50 MCG: 50 INJECTION, SOLUTION INTRAMUSCULAR; INTRAVENOUS at 07:39

## 2024-11-14 RX ADMIN — TRAMADOL HYDROCHLORIDE 50 MG: 50 TABLET ORAL at 13:04

## 2024-11-14 RX ADMIN — LIDOCAINE HYDROCHLORIDE 5 ML: 10 INJECTION, SOLUTION EPIDURAL; INFILTRATION; INTRACAUDAL; PERINEURAL at 07:39

## 2024-11-14 ASSESSMENT — PAIN SCALES - GENERAL: PAINLEVEL_OUTOF10: 7

## 2024-11-14 NOTE — PROGRESS NOTES
Pt arrived to OPO today for scheduled apheresis procedure. Labs drawn prior to Apheresis per Chloe WASHINGTON form [facility]. Line care, education & post lab draws all completed by Crissy WASHINGTON, Chloe.  Review [facility] documentation for details.  Discharged via wheelchair to IR.

## 2024-11-14 NOTE — PROCEDURES
PROCEDURE NOTE  Date: 11/14/2024   Name: Catalina Leyva  YOB: 1950    Procedures        IR Brief Postoperative Note    Catalina Leyva  YOB: 1950  [id number]    Pre-operative Diagnosis: access    Post-operative Diagnosis: Same    Procedure: temp dialysis catj    Anesthesia: mod    Surgeons/Assistants: alfreda    Estimated Blood Loss: Minimal    Complications: none    Specimens: were not obtained    See full procedure dictation to follow      Juwan Howell MD MD  11/14/2024

## 2024-11-14 NOTE — H&P
Patient:  Catalina Leyva   :   1950      Relevant clinical history, particularly as it involves the pending procedure, was reviewed and discussed.    The procedure including risks and benefits was discussed at length with the patient (or designated family member) and all questions were answered.  Informed consent to proceed with the procedure was given.    Vital signs were monitored and documented by the Radiology nurse.    Targeted physical examination  Heart : regular rate and rhythm  Lungs : clear, breathing easily  Condition : stable    [facility] nurses notes reviewed and agreed.    Past Medical History:        Diagnosis Date    Anxiety     Chronic systolic (congestive) heart failure 2023    Dental crown present     Depression     Hepatitis C      states resolved     Hypertension 2023    Multiple myeloma not having achieved remission (HCC) 06/15/2021       Past Surgical History:           Procedure Laterality Date    CATHETER INSERTION N/A 2021    TRIFUSION CATHETER INSERTION performed by Wander Lozano MD at [facility] OR    CATHETER REMOVAL N/A 8/3/2021    TRIFUSION CATHETER REMOVAL performed by Wander Lozano MD at [facility] OR    COLONOSCOPY      DILATION AND CURETTAGE OF UTERUS      NOSE SURGERY      PORT SURGERY Left 2023    PORT INSERTION performed by Jelani Mathews MD at [facility] [place] OR       Allergies:  Methylprednisolone    Medications:   Home Meds  Current Outpatient Medications on File Prior to Encounter   Medication Sig Dispense Refill    hydrOXYzine HCl (ATARAX) 25 MG tablet TAKE 1 TABLET BY MOUTH nightly 30 tablet 5    amLODIPine (NORVASC) 5 MG tablet Take 1 tablet by mouth daily 90 tablet 2    oxyBUTYnin (DITROPAN XL) 5 MG extended release tablet Take 1 tablet by mouth daily 30 tablet 5    LORazepam (ATIVAN) 1 MG tablet take 1 tablet BY MOUTH in THE morning AND 1 tablet in THE evening FOR 30 DAYS. max daily AMOUNT: 2mg 60 tablet 0    prochlorperazine (COMPAZINE) 10 MG

## 2024-11-14 NOTE — DISCHARGE INSTRUCTIONS
[facility]  Cardiovascular Special Procedures  General Discharge Instructions    PROCEDURE: Line Removal    _x___ You may be drowsy or lightheaded after receiving sedation. DO NOT operate a vehicle (automobile, bicycle, motorcycle, machinery, or power tools), make any important decisions or sign any important/legal documents, or drink alcoholic beverages for the next 24 hours  _x___ We strongly suggest that a responsible adult be with you for the next 24 hours for your protection and safety  ____ If the intravenous catheter site is painful, apply warm wet compresses on the site until the soreness is relieved and elevate the arm above the heart.  Call your physician if no improvement in 2 to 3 days    DIETARY INSTRUCTIONS:    ____ Drink extra fluids over the next 24 hours (If not contraindicated by illness or by physician order)  ____ Start with clear liquids and progress to normal diet as you feel like eating.  If you experience nausea or repeated episodes of vomiting, which persist beyond 12-24 hours, notify your doctor        _x___ Resume your previous diet    ACTIVITY INSTRUCTIONS:    ____ See other instructions  ____ No special instructions  ____ Rest for 24 hours    ____ Up as tolerated  _x___ Increase activity as tolerated    Wound/Dressing Instructions:  ____ See other instructions  ____ May shower, tomorrow  _x___ Remove bandage within 24 hours    MEDICATION INSTRUCTIONS:    ____ See Medication Reconciliation Sheet      SPECIAL INSTRUCTIONS:  Take bandage off in 24- 48 hours. Place band-aid on site until completely healed.   Showering is okay as long as site remains dry until healed, do not submerge under water until healed.   Do not lift anything over 10 pounds for 3-5 days while site is healing.                                                                                                                                                          Please make sure that you follow-up with your

## 2024-11-18 ENCOUNTER — TELEPHONE (OUTPATIENT)
Dept: FAMILY MEDICINE CLINIC | Age: 74
End: 2024-11-18

## 2024-11-18 DIAGNOSIS — F41.9 ANXIETY DISORDER, UNSPECIFIED: ICD-10-CM

## 2024-11-18 RX ORDER — LORAZEPAM 1 MG/1
1 TABLET ORAL 2 TIMES DAILY
Qty: 60 TABLET | Refills: 2 | Status: SHIPPED | OUTPATIENT
Start: 2024-11-18 | End: 2025-02-16

## 2024-11-18 NOTE — TELEPHONE ENCOUNTER
Patient called.   Refill  Lorazepam 1 mg bid    [place] [facility]   (Patient has over a week left but with Dr. Bartholomew out she called early)    10/24/2024 last ov  Future Appointments   Date Time Provider Department Center   1/8/2025  8:00 AM [facility] 2 [facility] None   1/9/2025  8:00 AM [facility] 2 [facility] None   1/10/2025  8:00 AM [facility] 2 [facility] None   3/4/2025  1:30 PM Mayra Mayen MD [facility]CC DEP

## 2024-11-21 ENCOUNTER — TELEPHONE (OUTPATIENT)
Dept: PULMONOLOGY | Age: 74
End: 2024-11-21

## 2024-11-21 NOTE — TELEPHONE ENCOUNTER
I saw Catalina recently for a pulmonary evaluation for restratification of post CAR-T therapy in the setting of abnormal PFTs.  She had an isolated mild reduction in diffusion capacity.  She had no activity limitations.  I ordered a high-resolution CT chest to rule out an early ILD or parenchymal disease that could cause this.  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  Recommendations for abdominal MRI with and without IV contrast were given by radiology.  I discussed this with patient's oncologist Dr. Maribel Garrido who graciously offered to take over management of this abnormality.     From a pulmonary perspective Catalina is okay to proceed with CAR-T therapy

## 2025-02-18 DIAGNOSIS — F41.9 ANXIETY DISORDER, UNSPECIFIED: ICD-10-CM

## 2025-02-18 NOTE — TELEPHONE ENCOUNTER
Future Appointments   Date Time Provider Department Center   3/4/2025  1:30 PM Mayra Mayen MD [facility] ECC DEP             LOV 10/24/2024

## 2025-02-19 RX ORDER — LORAZEPAM 1 MG/1
1 TABLET ORAL 2 TIMES DAILY
Qty: 60 TABLET | Refills: 0 | OUTPATIENT
Start: 2025-02-19 | End: 2025-03-21

## 2025-02-25 DIAGNOSIS — F41.9 ANXIETY DISORDER, UNSPECIFIED: ICD-10-CM

## 2025-02-25 RX ORDER — LORAZEPAM 1 MG/1
1 TABLET ORAL 2 TIMES DAILY
Qty: 60 TABLET | Refills: 0 | Status: SHIPPED | OUTPATIENT
Start: 2025-02-25 | End: 2025-03-27

## 2025-02-25 NOTE — TELEPHONE ENCOUNTER
Future Appointments   Date Time Provider Department Center   3/4/2025  1:30 PM Mayra Mayen MD [facility] ECC DEP     LOV 10/24/2024

## 2025-03-11 ENCOUNTER — OFFICE VISIT (OUTPATIENT)
Dept: FAMILY MEDICINE CLINIC | Age: 75
End: 2025-03-11

## 2025-03-11 VITALS
HEART RATE: 95 BPM | TEMPERATURE: 97.8 F | SYSTOLIC BLOOD PRESSURE: 118 MMHG | BODY MASS INDEX: 27.98 KG/M2 | OXYGEN SATURATION: 98 % | DIASTOLIC BLOOD PRESSURE: 80 MMHG | RESPIRATION RATE: 16 BRPM | WEIGHT: 163 LBS

## 2025-03-11 DIAGNOSIS — E85.9 MYELOMA ASSOCIATED AMYLOIDOSIS (HCC): ICD-10-CM

## 2025-03-11 DIAGNOSIS — R32 URINARY INCONTINENCE, UNSPECIFIED TYPE: ICD-10-CM

## 2025-03-11 DIAGNOSIS — F39 MOOD DISORDER: Primary | ICD-10-CM

## 2025-03-11 DIAGNOSIS — C90.02 MULTIPLE MYELOMA IN RELAPSE (HCC): ICD-10-CM

## 2025-03-11 DIAGNOSIS — C90.00 MYELOMA ASSOCIATED AMYLOIDOSIS (HCC): ICD-10-CM

## 2025-03-11 DIAGNOSIS — C90.00 MULTIPLE MYELOMA NOT HAVING ACHIEVED REMISSION (HCC): ICD-10-CM

## 2025-03-11 DIAGNOSIS — F13.99 SEDATIVE, HYPNOTIC OR ANXIOLYTIC USE, UNSPECIFIED WITH UNSPECIFIED SEDATIVE, HYPNOTIC OR ANXIOLYTIC-INDUCED DISORDER (HCC): ICD-10-CM

## 2025-03-11 DIAGNOSIS — I10 PRIMARY HYPERTENSION: ICD-10-CM

## 2025-03-11 RX ORDER — LORAZEPAM 1 MG/1
1 TABLET ORAL 2 TIMES DAILY
Qty: 60 TABLET | Refills: 2 | Status: SHIPPED | OUTPATIENT
Start: 2025-03-11 | End: 2025-04-10

## 2025-03-11 SDOH — ECONOMIC STABILITY: FOOD INSECURITY: WITHIN THE PAST 12 MONTHS, THE FOOD YOU BOUGHT JUST DIDN'T LAST AND YOU DIDN'T HAVE MONEY TO GET MORE.: NEVER TRUE

## 2025-03-11 SDOH — ECONOMIC STABILITY: FOOD INSECURITY: WITHIN THE PAST 12 MONTHS, YOU WORRIED THAT YOUR FOOD WOULD RUN OUT BEFORE YOU GOT MONEY TO BUY MORE.: NEVER TRUE

## 2025-03-11 ASSESSMENT — PATIENT HEALTH QUESTIONNAIRE - PHQ9
6. FEELING BAD ABOUT YOURSELF - OR THAT YOU ARE A FAILURE OR HAVE LET YOURSELF OR YOUR FAMILY DOWN: NOT AT ALL
SUM OF ALL RESPONSES TO PHQ QUESTIONS 1-9: 0
5. POOR APPETITE OR OVEREATING: NOT AT ALL
SUM OF ALL RESPONSES TO PHQ QUESTIONS 1-9: 0
3. TROUBLE FALLING OR STAYING ASLEEP: NOT AT ALL
SUM OF ALL RESPONSES TO PHQ QUESTIONS 1-9: 0
2. FEELING DOWN, DEPRESSED OR HOPELESS: NOT AT ALL
4. FEELING TIRED OR HAVING LITTLE ENERGY: NOT AT ALL
SUM OF ALL RESPONSES TO PHQ QUESTIONS 1-9: 0
9. THOUGHTS THAT YOU WOULD BE BETTER OFF DEAD, OR OF HURTING YOURSELF: NOT AT ALL
8. MOVING OR SPEAKING SO SLOWLY THAT OTHER PEOPLE COULD HAVE NOTICED. OR THE OPPOSITE, BEING SO FIGETY OR RESTLESS THAT YOU HAVE BEEN MOVING AROUND A LOT MORE THAN USUAL: NOT AT ALL
7. TROUBLE CONCENTRATING ON THINGS, SUCH AS READING THE NEWSPAPER OR WATCHING TELEVISION: NOT AT ALL
1. LITTLE INTEREST OR PLEASURE IN DOING THINGS: NOT AT ALL
10. IF YOU CHECKED OFF ANY PROBLEMS, HOW DIFFICULT HAVE THESE PROBLEMS MADE IT FOR YOU TO DO YOUR WORK, TAKE CARE OF THINGS AT HOME, OR GET ALONG WITH OTHER PEOPLE: NOT DIFFICULT AT ALL

## 2025-03-11 ASSESSMENT — ENCOUNTER SYMPTOMS
COUGH: 0
DIARRHEA: 0

## 2025-03-11 NOTE — PROGRESS NOTES
25    Catalina Leyva (: 1950) is a 74 y.o. female, here for evaluation of the following medical concerns:    HPI;  Hypertension:  Home blood pressure monitoring: No.  She is adherent to a low sodium diet. Patient denies chest pain and shortness of breath.  Antihypertensive medication side effects: no medication side effects noted.  Use of agents associated with hypertension: none.                                        Sodium (mmol/L)   Date Value   2024 142    BUN (mg/dL)   Date Value   2024 17    Glucose (mg/dL)   Date Value   2024 88      Potassium (mmol/L)   Date Value   2024 3.9     Potassium reflex Magnesium (mmol/L)   Date Value   2021 4.2    Creatinine (mg/dL)   Date Value   2024 0.6         Has multiple myeloma  not achieving remission, in relapse, myeloma with amyloidosis per oncology, getting ready for stem cell .     Anxiety- undergoing too much stress with own personal medical issue as well as taking care of  's medical  problems. Takes citalopram for depression    Oxybutynin controls her bladder incontinence.       Review of Systems   Constitutional:  Positive for fatigue. Negative for activity change, appetite change, chills, fever and unexpected weight change.   HENT:  Negative for congestion.    Respiratory:  Negative for cough.    Cardiovascular:  Negative for chest pain.   Gastrointestinal:  Negative for diarrhea.   Genitourinary: Negative.    Musculoskeletal:  Positive for arthralgias.            Neurological:  Negative for dizziness. Seizures: .  Hematological:  Negative for adenopathy.   Psychiatric/Behavioral:  Positive for sleep disturbance. The patient is nervous/anxious.      Current Outpatient Medications   Medication Sig Dispense Refill    LORazepam (ATIVAN) 1 MG tablet Take 1 tablet by mouth in the morning and 1 tablet in the evening. Do all this for 30 days. Max Daily Amount: 2 mg. 60 tablet 2    amLODIPine (NORVASC) 5 MG

## 2025-03-19 ENCOUNTER — HOSPITAL ENCOUNTER (OUTPATIENT)
Dept: ONCOLOGY | Age: 75
Setting detail: INFUSION SERIES
Discharge: HOME | End: 2025-03-19
Payer: MEDICARE

## 2025-03-19 VITALS
RESPIRATION RATE: 18 BRPM | OXYGEN SATURATION: 97 % | DIASTOLIC BLOOD PRESSURE: 74 MMHG | SYSTOLIC BLOOD PRESSURE: 113 MMHG | HEIGHT: 65 IN | WEIGHT: 163.8 LBS | HEART RATE: 76 BPM | TEMPERATURE: 97.8 F | BODY MASS INDEX: 27.29 KG/M2

## 2025-03-19 DIAGNOSIS — C90.02 MULTIPLE MYELOMA IN RELAPSE (HCC): Primary | ICD-10-CM

## 2025-03-19 LAB
ALBUMIN SERPL-MCNC: 4 G/DL (ref 3.4–5)
ALP SERPL-CCNC: 59 U/L (ref 40–129)
ALT SERPL-CCNC: 17 U/L (ref 10–40)
ANION GAP SERPL CALCULATED.3IONS-SCNC: 8 MMOL/L (ref 3–16)
AST SERPL-CCNC: 22 U/L (ref 15–37)
BASOPHILS # BLD: 0 K/UL (ref 0–0.2)
BASOPHILS NFR BLD: 0.3 %
BILIRUB DIRECT SERPL-MCNC: 0.2 MG/DL (ref 0–0.3)
BILIRUB INDIRECT SERPL-MCNC: 0.3 MG/DL (ref 0–1)
BILIRUB SERPL-MCNC: 0.5 MG/DL (ref 0–1)
BUN SERPL-MCNC: 19 MG/DL (ref 7–20)
CALCIUM SERPL-MCNC: 9.2 MG/DL (ref 8.3–10.6)
CHLORIDE SERPL-SCNC: 105 MMOL/L (ref 99–110)
CO2 SERPL-SCNC: 26 MMOL/L (ref 21–32)
CREAT SERPL-MCNC: 0.7 MG/DL (ref 0.6–1.2)
DEPRECATED RDW RBC AUTO: 17.9 % (ref 12.4–15.4)
EOSINOPHIL # BLD: 0 K/UL (ref 0–0.6)
EOSINOPHIL NFR BLD: 0.9 %
GFR SERPLBLD CREATININE-BSD FMLA CKD-EPI: >90 ML/MIN/{1.73_M2}
GLUCOSE SERPL-MCNC: 82 MG/DL (ref 70–99)
HCT VFR BLD AUTO: 38.2 % (ref 36–48)
HGB BLD-MCNC: 12.8 G/DL (ref 12–16)
LDH SERPL L TO P-CCNC: 156 U/L (ref 100–190)
LYMPHOCYTES # BLD: 0.4 K/UL (ref 1–5.1)
LYMPHOCYTES NFR BLD: 13.9 %
MAGNESIUM SERPL-MCNC: 2.18 MG/DL (ref 1.8–2.4)
MCH RBC QN AUTO: 28.5 PG (ref 26–34)
MCHC RBC AUTO-ENTMCNC: 33.4 G/DL (ref 31–36)
MCV RBC AUTO: 85.2 FL (ref 80–100)
MONOCYTES # BLD: 0.6 K/UL (ref 0–1.3)
MONOCYTES NFR BLD: 22.8 %
NEUTROPHILS # BLD: 1.7 K/UL (ref 1.7–7.7)
NEUTROPHILS NFR BLD: 62.1 %
PHOSPHATE SERPL-MCNC: 3.6 MG/DL (ref 2.5–4.9)
PLATELET # BLD AUTO: 176 K/UL (ref 135–450)
PMV BLD AUTO: 7.7 FL (ref 5–10.5)
POTASSIUM SERPL-SCNC: 4.4 MMOL/L (ref 3.5–5.1)
PROT SERPL-MCNC: 6.1 G/DL (ref 6.4–8.2)
RBC # BLD AUTO: 4.48 M/UL (ref 4–5.2)
SODIUM SERPL-SCNC: 139 MMOL/L (ref 136–145)
URATE SERPL-MCNC: 4.1 MG/DL (ref 2.6–6)
WBC # BLD AUTO: 2.8 K/UL (ref 4–11)

## 2025-03-19 PROCEDURE — 96417 CHEMO IV INFUS EACH ADDL SEQ: CPT

## 2025-03-19 PROCEDURE — 6370000000 HC RX 637 (ALT 250 FOR IP): Performed by: STUDENT IN AN ORGANIZED HEALTH CARE EDUCATION/TRAINING PROGRAM

## 2025-03-19 PROCEDURE — 84550 ASSAY OF BLOOD/URIC ACID: CPT

## 2025-03-19 PROCEDURE — 83615 LACTATE (LD) (LDH) ENZYME: CPT

## 2025-03-19 PROCEDURE — 36591 DRAW BLOOD OFF VENOUS DEVICE: CPT

## 2025-03-19 PROCEDURE — 80069 RENAL FUNCTION PANEL: CPT

## 2025-03-19 PROCEDURE — 96413 CHEMO IV INFUSION 1 HR: CPT

## 2025-03-19 PROCEDURE — 85025 COMPLETE CBC W/AUTO DIFF WBC: CPT

## 2025-03-19 PROCEDURE — 80076 HEPATIC FUNCTION PANEL: CPT

## 2025-03-19 PROCEDURE — 2580000003 HC RX 258: Performed by: STUDENT IN AN ORGANIZED HEALTH CARE EDUCATION/TRAINING PROGRAM

## 2025-03-19 PROCEDURE — 83735 ASSAY OF MAGNESIUM: CPT

## 2025-03-19 PROCEDURE — 6360000002 HC RX W HCPCS: Performed by: STUDENT IN AN ORGANIZED HEALTH CARE EDUCATION/TRAINING PROGRAM

## 2025-03-19 PROCEDURE — 96361 HYDRATE IV INFUSION ADD-ON: CPT

## 2025-03-19 PROCEDURE — 96360 HYDRATION IV INFUSION INIT: CPT

## 2025-03-19 RX ORDER — PROCHLORPERAZINE MALEATE 10 MG
5 TABLET ORAL EVERY 6 HOURS PRN
Status: DISCONTINUED | OUTPATIENT
Start: 2025-03-19 | End: 2025-03-20 | Stop reason: HOSPADM

## 2025-03-19 RX ORDER — 0.9 % SODIUM CHLORIDE 0.9 %
1000 INTRAVENOUS SOLUTION INTRAVENOUS ONCE
Status: CANCELLED | OUTPATIENT
Start: 2025-03-20

## 2025-03-19 RX ORDER — ALLOPURINOL 300 MG/1
300 TABLET ORAL ONCE
Status: CANCELLED | OUTPATIENT
Start: 2025-03-20 | End: 2025-03-20

## 2025-03-19 RX ORDER — PROCHLORPERAZINE EDISYLATE 5 MG/ML
5 INJECTION INTRAMUSCULAR; INTRAVENOUS EVERY 6 HOURS PRN
Status: CANCELLED | OUTPATIENT
Start: 2025-03-20

## 2025-03-19 RX ORDER — ONDANSETRON 4 MG/1
16 TABLET, FILM COATED ORAL ONCE
Status: COMPLETED | OUTPATIENT
Start: 2025-03-19 | End: 2025-03-19

## 2025-03-19 RX ORDER — ALLOPURINOL 300 MG/1
300 TABLET ORAL ONCE
Status: COMPLETED | OUTPATIENT
Start: 2025-03-19 | End: 2025-03-19

## 2025-03-19 RX ORDER — ACYCLOVIR 400 MG/1
TABLET ORAL
COMMUNITY
Start: 2025-03-18

## 2025-03-19 RX ORDER — 0.9 % SODIUM CHLORIDE 0.9 %
1000 INTRAVENOUS SOLUTION INTRAVENOUS ONCE
Status: COMPLETED | OUTPATIENT
Start: 2025-03-19 | End: 2025-03-19

## 2025-03-19 RX ORDER — PROCHLORPERAZINE EDISYLATE 5 MG/ML
5 INJECTION INTRAMUSCULAR; INTRAVENOUS EVERY 6 HOURS PRN
Status: DISCONTINUED | OUTPATIENT
Start: 2025-03-19 | End: 2025-03-20 | Stop reason: HOSPADM

## 2025-03-19 RX ORDER — PROCHLORPERAZINE MALEATE 10 MG
5 TABLET ORAL EVERY 6 HOURS PRN
Status: CANCELLED | OUTPATIENT
Start: 2025-03-20

## 2025-03-19 RX ORDER — ONDANSETRON 4 MG/1
16 TABLET, FILM COATED ORAL ONCE
Status: CANCELLED | OUTPATIENT
Start: 2025-03-20

## 2025-03-19 RX ADMIN — CYCLOPHOSPHAMIDE 540 MG: 1 INJECTION, POWDER, FOR SOLUTION INTRAVENOUS; ORAL at 11:46

## 2025-03-19 RX ADMIN — SODIUM CHLORIDE 1000 ML: 0.9 INJECTION, SOLUTION INTRAVENOUS at 09:17

## 2025-03-19 RX ADMIN — ONDANSETRON HYDROCHLORIDE 16 MG: 4 TABLET, FILM COATED ORAL at 09:42

## 2025-03-19 RX ADMIN — ALLOPURINOL 300 MG: 300 TABLET ORAL at 09:43

## 2025-03-19 RX ADMIN — FLUDARABINE PHOSPHATE 55 MG: 25 INJECTION, SOLUTION INTRAVENOUS at 10:55

## 2025-03-19 NOTE — PROGRESS NOTES
Original chemotherapy orders reviewed and acknowledged. Appropriateness of chemotherapy treatment regimen Fludara and Cytoxan for diagnosis of Multiple Myeloma was verified.  Patient educated on chemotherapy regimen.  Acknowledgement of informed consent for chemotherapy obtained.      Estimated body surface area is 1.84 meters squared as calculated from the following:    Height as of this encounter: 1.64 m (5' 4.57\").    Weight as of this encounter: 74.3 kg (163 lb 12.8 oz). verified.  Appropriate dosing calculations of chemotherapy based on above height, weight, and BSA verified.        Administration: Chemotherapy drug Fludara and Cytoxan independently verified with Maria D RN prior to administration.  Acknowledgement of informed consent for chemotherapy administration verified.  Original order, appropriateness of regimen, drug supplied, height, weight, BSA, dose calculations, expiration dates/times, drug appearance, and two patient identifiers were verified by both RNs.  Drug checked for vesicant/irritant status and for risk of hypersensitivity.  Most recent laboratory values and allergies, were reviewed.  Positive, brisk blood return via CVC was confirmed prior to administration. Chest x-ray for correct line placement reviewed. Bernarda Jameson RN and Maria D WASHINGTON verified correct rate of chemotherapy and maintenance IV fluids.  Patient was educated on chemotherapy regimen prior to administration including indication for treatment related to disease & side effects of chemotherapy drug.  Patient verbalizes understanding of all instructions.      Completion of Chemotherapy: Monitoring during infusion done per policy, see Flowsheets.  Blood return verified before, during, and after infusion per policy; no signs of extravasation.  Pt tolerated chemotherapy well and without incident.  Chemotherapy infusion end time on the MAR.

## 2025-03-19 NOTE — H&P
[facility] History and Physical        Attending Physician: No att. providers found    Primary Care: Mayra Mayen MD       Referring MD: Maribel Garrido DO  [address]  [address]  [place] [address]    Name: Catalina Leyva :  1950  MRN:  [id number]    Admission: 3/19/2025      Date: 3/19/2025    Reason for Visit: Lymphodepleting chemotherapy prior to CAR-T    History of Present Illness: Catalina Leyva is a 73-year-old female with history of relapsed refractory multiple myeloma who was originally referred to [facility] to be evaluated for CAR-T.  She was diagnosed with multiple myeloma IgA kappa in 2020.  She also has history of hepatitis C and was treated with Harvoni in .  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  Patient was on Revlimid maintenance.  She then progressed and was placed on Eve Pomalyst and Dex.  Patient had recent progression in May 2024 and was switched to LESTER Kyprolis Dex. She subsequently had progression and started workup for CAR-T cell therapy. She has most recently completed cycle 6 of Isatuximab / Carfilzomib / Dex and has achieved a VGPR.      She collected her car-t cells 24 and then resumed bridging chemotherapy at [facility] on Tuesday, 24. Her lymphodepleting chemotherapy was scheduled to start 25 - 1/10/25, followed by admission and Carvykti CAR-T Infusion on 24. Unfortunately the car-t process was delayed due to issues with caregiver availability, which has since been resolved.    She now presents to OP Infusion to receive lymphodepleting chemotherapy for 3 days (3/19/25 - 3/21/25) followed by admission and Carvykti CAR-T Infusion on 3/24/25.  She feels well today and denies fever, chills, sweats, change in appetite, visual changes, headache, sinus congestion, sore throat, mouth pain, cough, increased shortness of breath, chest pain, abdominal pain, nausea, vomiting, diarrhea,

## 2025-03-20 ENCOUNTER — HOSPITAL ENCOUNTER (OUTPATIENT)
Dept: ONCOLOGY | Age: 75
Setting detail: INFUSION SERIES
Discharge: HOME | End: 2025-03-20
Payer: MEDICARE

## 2025-03-20 VITALS
BODY MASS INDEX: 27.92 KG/M2 | RESPIRATION RATE: 18 BRPM | DIASTOLIC BLOOD PRESSURE: 78 MMHG | OXYGEN SATURATION: 99 % | WEIGHT: 165.57 LBS | HEART RATE: 77 BPM | TEMPERATURE: 97.8 F | SYSTOLIC BLOOD PRESSURE: 131 MMHG

## 2025-03-20 DIAGNOSIS — C90.02 MULTIPLE MYELOMA IN RELAPSE (HCC): Primary | ICD-10-CM

## 2025-03-20 LAB
ALBUMIN SERPL-MCNC: 3.9 G/DL (ref 3.4–5)
ALP SERPL-CCNC: 62 U/L (ref 40–129)
ALT SERPL-CCNC: 16 U/L (ref 10–40)
ANION GAP SERPL CALCULATED.3IONS-SCNC: 8 MMOL/L (ref 3–16)
AST SERPL-CCNC: 23 U/L (ref 15–37)
BASOPHILS # BLD: 0 K/UL (ref 0–0.2)
BASOPHILS NFR BLD: 0.2 %
BILIRUB DIRECT SERPL-MCNC: 0.2 MG/DL (ref 0–0.3)
BILIRUB INDIRECT SERPL-MCNC: 0.2 MG/DL (ref 0–1)
BILIRUB SERPL-MCNC: 0.4 MG/DL (ref 0–1)
BUN SERPL-MCNC: 12 MG/DL (ref 7–20)
CALCIUM SERPL-MCNC: 9 MG/DL (ref 8.3–10.6)
CHLORIDE SERPL-SCNC: 107 MMOL/L (ref 99–110)
CO2 SERPL-SCNC: 26 MMOL/L (ref 21–32)
CREAT SERPL-MCNC: 0.7 MG/DL (ref 0.6–1.2)
DEPRECATED RDW RBC AUTO: 17.7 % (ref 12.4–15.4)
EOSINOPHIL # BLD: 0 K/UL (ref 0–0.6)
EOSINOPHIL NFR BLD: 0.9 %
GFR SERPLBLD CREATININE-BSD FMLA CKD-EPI: >90 ML/MIN/{1.73_M2}
GLUCOSE SERPL-MCNC: 97 MG/DL (ref 70–99)
HCT VFR BLD AUTO: 37.2 % (ref 36–48)
HGB BLD-MCNC: 12.4 G/DL (ref 12–16)
LDH SERPL L TO P-CCNC: 156 U/L (ref 100–190)
LYMPHOCYTES # BLD: 0.2 K/UL (ref 1–5.1)
LYMPHOCYTES NFR BLD: 10.6 %
MAGNESIUM SERPL-MCNC: 2.02 MG/DL (ref 1.8–2.4)
MCH RBC QN AUTO: 28.5 PG (ref 26–34)
MCHC RBC AUTO-ENTMCNC: 33.4 G/DL (ref 31–36)
MCV RBC AUTO: 85.2 FL (ref 80–100)
MONOCYTES # BLD: 0.4 K/UL (ref 0–1.3)
MONOCYTES NFR BLD: 16.1 %
NEUTROPHILS # BLD: 1.7 K/UL (ref 1.7–7.7)
NEUTROPHILS NFR BLD: 72.2 %
PHOSPHATE SERPL-MCNC: 3.5 MG/DL (ref 2.5–4.9)
PLATELET # BLD AUTO: 163 K/UL (ref 135–450)
PMV BLD AUTO: 7.5 FL (ref 5–10.5)
POTASSIUM SERPL-SCNC: 4.4 MMOL/L (ref 3.5–5.1)
PROT SERPL-MCNC: 6 G/DL (ref 6.4–8.2)
RBC # BLD AUTO: 4.36 M/UL (ref 4–5.2)
SODIUM SERPL-SCNC: 141 MMOL/L (ref 136–145)
URATE SERPL-MCNC: 3.2 MG/DL (ref 2.6–6)
WBC # BLD AUTO: 2.3 K/UL (ref 4–11)

## 2025-03-20 PROCEDURE — 85025 COMPLETE CBC W/AUTO DIFF WBC: CPT

## 2025-03-20 PROCEDURE — 80069 RENAL FUNCTION PANEL: CPT

## 2025-03-20 PROCEDURE — 83735 ASSAY OF MAGNESIUM: CPT

## 2025-03-20 PROCEDURE — 6370000000 HC RX 637 (ALT 250 FOR IP): Performed by: STUDENT IN AN ORGANIZED HEALTH CARE EDUCATION/TRAINING PROGRAM

## 2025-03-20 PROCEDURE — 84550 ASSAY OF BLOOD/URIC ACID: CPT

## 2025-03-20 PROCEDURE — 2580000003 HC RX 258: Performed by: STUDENT IN AN ORGANIZED HEALTH CARE EDUCATION/TRAINING PROGRAM

## 2025-03-20 PROCEDURE — 36591 DRAW BLOOD OFF VENOUS DEVICE: CPT

## 2025-03-20 PROCEDURE — 6360000002 HC RX W HCPCS: Performed by: STUDENT IN AN ORGANIZED HEALTH CARE EDUCATION/TRAINING PROGRAM

## 2025-03-20 PROCEDURE — 96413 CHEMO IV INFUSION 1 HR: CPT

## 2025-03-20 PROCEDURE — 96374 THER/PROPH/DIAG INJ IV PUSH: CPT

## 2025-03-20 PROCEDURE — 80076 HEPATIC FUNCTION PANEL: CPT

## 2025-03-20 PROCEDURE — 96361 HYDRATE IV INFUSION ADD-ON: CPT

## 2025-03-20 PROCEDURE — 96360 HYDRATION IV INFUSION INIT: CPT

## 2025-03-20 PROCEDURE — 96415 CHEMO IV INFUSION ADDL HR: CPT

## 2025-03-20 PROCEDURE — 83615 LACTATE (LD) (LDH) ENZYME: CPT

## 2025-03-20 RX ORDER — ALLOPURINOL 300 MG/1
300 TABLET ORAL ONCE
Status: CANCELLED | OUTPATIENT
Start: 2025-03-21 | End: 2025-03-21

## 2025-03-20 RX ORDER — ALLOPURINOL 300 MG/1
300 TABLET ORAL ONCE
Status: COMPLETED | OUTPATIENT
Start: 2025-03-20 | End: 2025-03-20

## 2025-03-20 RX ORDER — PROCHLORPERAZINE MALEATE 10 MG
5 TABLET ORAL EVERY 6 HOURS PRN
Status: CANCELLED | OUTPATIENT
Start: 2025-03-21

## 2025-03-20 RX ORDER — PROCHLORPERAZINE EDISYLATE 5 MG/ML
5 INJECTION INTRAMUSCULAR; INTRAVENOUS EVERY 6 HOURS PRN
Status: CANCELLED | OUTPATIENT
Start: 2025-03-21

## 2025-03-20 RX ORDER — ONDANSETRON 4 MG/1
16 TABLET, FILM COATED ORAL ONCE
Status: CANCELLED | OUTPATIENT
Start: 2025-03-21

## 2025-03-20 RX ORDER — 0.9 % SODIUM CHLORIDE 0.9 %
1000 INTRAVENOUS SOLUTION INTRAVENOUS ONCE
Status: COMPLETED | OUTPATIENT
Start: 2025-03-20 | End: 2025-03-20

## 2025-03-20 RX ORDER — PROCHLORPERAZINE EDISYLATE 5 MG/ML
5 INJECTION INTRAMUSCULAR; INTRAVENOUS EVERY 6 HOURS PRN
Status: DISCONTINUED | OUTPATIENT
Start: 2025-03-20 | End: 2025-03-21 | Stop reason: HOSPADM

## 2025-03-20 RX ORDER — ONDANSETRON 4 MG/1
16 TABLET, FILM COATED ORAL ONCE
Status: COMPLETED | OUTPATIENT
Start: 2025-03-20 | End: 2025-03-20

## 2025-03-20 RX ORDER — LEVOFLOXACIN 500 MG/1
500 TABLET, FILM COATED ORAL DAILY
Qty: 30 TABLET | Refills: 2 | Status: SHIPPED | OUTPATIENT
Start: 2025-03-20

## 2025-03-20 RX ORDER — 0.9 % SODIUM CHLORIDE 0.9 %
1000 INTRAVENOUS SOLUTION INTRAVENOUS ONCE
Status: CANCELLED | OUTPATIENT
Start: 2025-03-21

## 2025-03-20 RX ORDER — PROCHLORPERAZINE MALEATE 10 MG
5 TABLET ORAL EVERY 6 HOURS PRN
Status: DISCONTINUED | OUTPATIENT
Start: 2025-03-20 | End: 2025-03-21 | Stop reason: HOSPADM

## 2025-03-20 RX ORDER — LEVETIRACETAM 500 MG/1
500 TABLET ORAL 2 TIMES DAILY
Qty: 60 TABLET | Refills: 3
Start: 2025-03-24

## 2025-03-20 RX ORDER — FLUCONAZOLE 200 MG/1
200 TABLET ORAL DAILY
Qty: 30 TABLET | Refills: 2 | Status: SHIPPED | OUTPATIENT
Start: 2025-03-20

## 2025-03-20 RX ORDER — ALLOPURINOL 300 MG/1
300 TABLET ORAL DAILY
Qty: 30 TABLET | Refills: 2 | Status: SHIPPED | OUTPATIENT
Start: 2025-03-22

## 2025-03-20 RX ADMIN — PROCHLORPERAZINE EDISYLATE 5 MG: 5 INJECTION INTRAMUSCULAR; INTRAVENOUS at 11:10

## 2025-03-20 RX ADMIN — SODIUM CHLORIDE 1000 ML: 0.9 INJECTION, SOLUTION INTRAVENOUS at 08:15

## 2025-03-20 RX ADMIN — ALLOPURINOL 300 MG: 300 TABLET ORAL at 09:19

## 2025-03-20 RX ADMIN — ONDANSETRON HYDROCHLORIDE 16 MG: 4 TABLET, FILM COATED ORAL at 09:18

## 2025-03-20 RX ADMIN — CYCLOPHOSPHAMIDE 540 MG: 500 INJECTION, POWDER, FOR SOLUTION INTRAVENOUS; ORAL at 10:47

## 2025-03-20 RX ADMIN — FLUDARABINE PHOSPHATE 55 MG: 25 INJECTION, SOLUTION INTRAVENOUS at 09:59

## 2025-03-20 NOTE — H&P
[facility] History and Physical        Attending Physician: Maribel Garrido DO    Primary Care: Mayra Mayen MD       Referring MD: No referring provider defined for this encounter.    Name: Catalina Leyva :  1950  MRN:  [id number]    Admission: (Not on file)      Date: 3/20/2025    Reason for Visit: Lymphodepleting chemotherapy prior to CAR-T    History of Present Illness: Catalina Leyva is a 73-year-old female with history of relapsed refractory multiple myeloma who was originally referred to [facility] to be evaluated for CAR-T.  She was diagnosed with multiple myeloma IgA kappa in 2020.  She also has history of hepatitis C and was treated with Harvoni in .  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  Patient was on Revlimid maintenance.  She then progressed and was placed on Eve Pomalyst and Dex.  Patient had recent progression in May 2024 and was switched to LESTER Kyprolis Dex. She subsequently had progression and started workup for CAR-T cell therapy. She has most recently completed cycle 6 of Isatuximab / Carfilzomib / Dex and has achieved a VGPR.      She collected her car-t cells 24 and then resumed bridging chemotherapy at [facility] on Tuesday, 24. Her lymphodepleting chemotherapy was scheduled to start 25 - 1/10/25, followed by admission and Carvykti CAR-T Infusion on 24. Unfortunately the car-t process was delayed due to issues with caregiver availability, which has since been resolved.    She received lymphodepleting chemotherapy for 3 days (3/19/25 - 3/21/25) and is now being admitted for Carvykti CAR-T Infusion today, 3/24/25.  She feels well today and denies fever, chills, sweats, change in appetite, visual changes, headache, sinus congestion, sore throat, mouth pain, cough, increased shortness of breath, chest pain, abdominal pain, nausea, vomiting, diarrhea, constipation, dysuria, hematuria, lower extremity pain

## 2025-03-21 ENCOUNTER — HOSPITAL ENCOUNTER (OUTPATIENT)
Dept: ONCOLOGY | Age: 75
Setting detail: INFUSION SERIES
Discharge: HOME | End: 2025-03-21
Payer: MEDICARE

## 2025-03-21 VITALS
SYSTOLIC BLOOD PRESSURE: 114 MMHG | WEIGHT: 163.8 LBS | HEART RATE: 85 BPM | BODY MASS INDEX: 27.62 KG/M2 | OXYGEN SATURATION: 99 % | RESPIRATION RATE: 18 BRPM | DIASTOLIC BLOOD PRESSURE: 68 MMHG | TEMPERATURE: 98.1 F

## 2025-03-21 DIAGNOSIS — C90.02 MULTIPLE MYELOMA IN RELAPSE (HCC): Primary | ICD-10-CM

## 2025-03-21 LAB
ALBUMIN SERPL-MCNC: 4 G/DL (ref 3.4–5)
ALP SERPL-CCNC: 69 U/L (ref 40–129)
ALT SERPL-CCNC: 17 U/L (ref 10–40)
ANION GAP SERPL CALCULATED.3IONS-SCNC: 9 MMOL/L (ref 3–16)
AST SERPL-CCNC: 22 U/L (ref 15–37)
BASOPHILS # BLD: 0 K/UL (ref 0–0.2)
BASOPHILS NFR BLD: 0.2 %
BILIRUB DIRECT SERPL-MCNC: 0.3 MG/DL (ref 0–0.3)
BILIRUB INDIRECT SERPL-MCNC: 0.3 MG/DL (ref 0–1)
BILIRUB SERPL-MCNC: 0.6 MG/DL (ref 0–1)
BUN SERPL-MCNC: 11 MG/DL (ref 7–20)
CALCIUM SERPL-MCNC: 9.3 MG/DL (ref 8.3–10.6)
CHLORIDE SERPL-SCNC: 107 MMOL/L (ref 99–110)
CO2 SERPL-SCNC: 26 MMOL/L (ref 21–32)
CREAT SERPL-MCNC: 0.7 MG/DL (ref 0.6–1.2)
DEPRECATED RDW RBC AUTO: 18.2 % (ref 12.4–15.4)
EOSINOPHIL # BLD: 0 K/UL (ref 0–0.6)
EOSINOPHIL NFR BLD: 1.5 %
GFR SERPLBLD CREATININE-BSD FMLA CKD-EPI: >90 ML/MIN/{1.73_M2}
GLUCOSE SERPL-MCNC: 110 MG/DL (ref 70–99)
HCT VFR BLD AUTO: 38.7 % (ref 36–48)
HGB BLD-MCNC: 12.8 G/DL (ref 12–16)
LDH SERPL L TO P-CCNC: 159 U/L (ref 100–190)
LYMPHOCYTES # BLD: 0 K/UL (ref 1–5.1)
LYMPHOCYTES NFR BLD: 1.8 %
MAGNESIUM SERPL-MCNC: 1.96 MG/DL (ref 1.8–2.4)
MCH RBC QN AUTO: 28.2 PG (ref 26–34)
MCHC RBC AUTO-ENTMCNC: 33.1 G/DL (ref 31–36)
MCV RBC AUTO: 85.2 FL (ref 80–100)
MONOCYTES # BLD: 0.2 K/UL (ref 0–1.3)
MONOCYTES NFR BLD: 8.6 %
NEUTROPHILS # BLD: 2.1 K/UL (ref 1.7–7.7)
NEUTROPHILS NFR BLD: 87.9 %
PHOSPHATE SERPL-MCNC: 3.5 MG/DL (ref 2.5–4.9)
PLATELET # BLD AUTO: 155 K/UL (ref 135–450)
PMV BLD AUTO: 7 FL (ref 5–10.5)
POTASSIUM SERPL-SCNC: 4.1 MMOL/L (ref 3.5–5.1)
PROT SERPL-MCNC: 6.2 G/DL (ref 6.4–8.2)
RBC # BLD AUTO: 4.55 M/UL (ref 4–5.2)
SODIUM SERPL-SCNC: 142 MMOL/L (ref 136–145)
URATE SERPL-MCNC: 2.8 MG/DL (ref 2.6–6)
WBC # BLD AUTO: 2.4 K/UL (ref 4–11)

## 2025-03-21 PROCEDURE — 36591 DRAW BLOOD OFF VENOUS DEVICE: CPT

## 2025-03-21 PROCEDURE — 83735 ASSAY OF MAGNESIUM: CPT

## 2025-03-21 PROCEDURE — 84550 ASSAY OF BLOOD/URIC ACID: CPT

## 2025-03-21 PROCEDURE — 96413 CHEMO IV INFUSION 1 HR: CPT

## 2025-03-21 PROCEDURE — 6370000000 HC RX 637 (ALT 250 FOR IP): Performed by: STUDENT IN AN ORGANIZED HEALTH CARE EDUCATION/TRAINING PROGRAM

## 2025-03-21 PROCEDURE — 96361 HYDRATE IV INFUSION ADD-ON: CPT

## 2025-03-21 PROCEDURE — 80076 HEPATIC FUNCTION PANEL: CPT

## 2025-03-21 PROCEDURE — 96417 CHEMO IV INFUS EACH ADDL SEQ: CPT

## 2025-03-21 PROCEDURE — 2580000003 HC RX 258: Performed by: STUDENT IN AN ORGANIZED HEALTH CARE EDUCATION/TRAINING PROGRAM

## 2025-03-21 PROCEDURE — 83615 LACTATE (LD) (LDH) ENZYME: CPT

## 2025-03-21 PROCEDURE — 80069 RENAL FUNCTION PANEL: CPT

## 2025-03-21 PROCEDURE — 6360000002 HC RX W HCPCS: Performed by: STUDENT IN AN ORGANIZED HEALTH CARE EDUCATION/TRAINING PROGRAM

## 2025-03-21 PROCEDURE — 96360 HYDRATION IV INFUSION INIT: CPT

## 2025-03-21 PROCEDURE — 85025 COMPLETE CBC W/AUTO DIFF WBC: CPT

## 2025-03-21 RX ORDER — HYDROXYZINE HYDROCHLORIDE 25 MG/1
25 TABLET, FILM COATED ORAL NIGHTLY
COMMUNITY

## 2025-03-21 RX ORDER — ALLOPURINOL 300 MG/1
300 TABLET ORAL ONCE
Status: CANCELLED | OUTPATIENT
Start: 2025-03-21 | End: 2025-03-21

## 2025-03-21 RX ORDER — ONDANSETRON 4 MG/1
16 TABLET, FILM COATED ORAL ONCE
Status: CANCELLED | OUTPATIENT
Start: 2025-03-21

## 2025-03-21 RX ORDER — ALLOPURINOL 300 MG/1
300 TABLET ORAL ONCE
Status: COMPLETED | OUTPATIENT
Start: 2025-03-21 | End: 2025-03-21

## 2025-03-21 RX ORDER — PROCHLORPERAZINE MALEATE 10 MG
5 TABLET ORAL EVERY 6 HOURS PRN
Status: CANCELLED | OUTPATIENT
Start: 2025-03-21

## 2025-03-21 RX ORDER — PROCHLORPERAZINE MALEATE 10 MG
5 TABLET ORAL EVERY 6 HOURS PRN
Status: DISCONTINUED | OUTPATIENT
Start: 2025-03-21 | End: 2025-03-22 | Stop reason: HOSPADM

## 2025-03-21 RX ORDER — PROCHLORPERAZINE EDISYLATE 5 MG/ML
5 INJECTION INTRAMUSCULAR; INTRAVENOUS EVERY 6 HOURS PRN
Status: DISCONTINUED | OUTPATIENT
Start: 2025-03-21 | End: 2025-03-22 | Stop reason: HOSPADM

## 2025-03-21 RX ORDER — 0.9 % SODIUM CHLORIDE 0.9 %
1000 INTRAVENOUS SOLUTION INTRAVENOUS ONCE
Status: COMPLETED | OUTPATIENT
Start: 2025-03-21 | End: 2025-03-21

## 2025-03-21 RX ORDER — 0.9 % SODIUM CHLORIDE 0.9 %
1000 INTRAVENOUS SOLUTION INTRAVENOUS ONCE
Status: CANCELLED | OUTPATIENT
Start: 2025-03-21

## 2025-03-21 RX ORDER — PROCHLORPERAZINE EDISYLATE 5 MG/ML
5 INJECTION INTRAMUSCULAR; INTRAVENOUS EVERY 6 HOURS PRN
Status: CANCELLED | OUTPATIENT
Start: 2025-03-21

## 2025-03-21 RX ORDER — ONDANSETRON 4 MG/1
16 TABLET, FILM COATED ORAL ONCE
Status: COMPLETED | OUTPATIENT
Start: 2025-03-21 | End: 2025-03-21

## 2025-03-21 RX ADMIN — ONDANSETRON HYDROCHLORIDE 16 MG: 4 TABLET, FILM COATED ORAL at 09:47

## 2025-03-21 RX ADMIN — SODIUM CHLORIDE 1000 ML: 0.9 INJECTION, SOLUTION INTRAVENOUS at 08:23

## 2025-03-21 RX ADMIN — FLUDARABINE PHOSPHATE 55 MG: 25 INJECTION, SOLUTION INTRAVENOUS at 10:24

## 2025-03-21 RX ADMIN — CYCLOPHOSPHAMIDE 540 MG: 1 INJECTION, POWDER, FOR SOLUTION INTRAVENOUS; ORAL at 11:01

## 2025-03-21 RX ADMIN — ALLOPURINOL 300 MG: 300 TABLET ORAL at 09:47

## 2025-03-21 NOTE — PROGRESS NOTES
BCC Progress Note      3/21/2025    Catalina Leyva    :  1950    MRN:  [id number]    Referring MD: Maribel Garrido DO  [address]  [facility] [address]  [place] [address]      Subjective:  No new complaints.  [place] chemotx well.     ECOG PS:  (2) Ambulatory and capable of self care, unable to carry out work activity, up and about > 50% or waking hours    KPS: 80% Normal activity with effort; some signs or symptoms of disease    Isolation:  None     Medications    Scheduled Meds:   sodium chloride  1,000 mL IntraVENous Once    ondansetron  16 mg Oral Once    fludarabine (FLUDARA) 55 mg in sodium chloride 0.9 % 100 mL chemo IVPB  55 mg IntraVENous Once    cycloPHOSphamide (CYTOXAN) 540 mg in sodium chloride 0.9 % 100 mL chemo infusion  540 mg IntraVENous Once    allopurinol  300 mg Oral Once     Continuous Infusions:  PRN Meds:.prochlorperazine, prochlorperazine      ROS:  As noted above, otherwise remainder of 10-point ROS negative      Physical Exam:     Vital Signs:  /72   Pulse 89   Temp 98.1 °F (36.7 °C)   Resp 18   Wt 74.3 kg (163 lb 12.8 oz)   SpO2 98%   BMI 27.62 kg/m²     Weight:    Wt Readings from Last 3 Encounters:   25 74.3 kg (163 lb 12.8 oz)   25 75.1 kg (165 lb 9.1 oz)   25 74.3 kg (163 lb 12.8 oz)       General: Awake, alert and oriented.  HEENT: normocephalic, alopecia, PERRL, no scleral erythema or icterus, Oral mucosa moist and intact, throat clear  NECK: supple   BACK: Straight   SKIN: warm dry and intact without lesions rashes or masses  CHEST: CTA bilaterally without use of accessory muscles  CV: Normal S1 S2, RRR, no MRG  ABD: NT ND normoactive BS  EXTREMITIES: without edema, denies calf tenderness  NEURO: CN II - XII grossly intact  CATHETER: Left chest wall PAC: CDI    Laboratory Data:  CBC:   Recent Labs     25  0830 25  0815 25  0830   WBC 2.8* 2.3* 2.4*   HGB 12.8 12.4 12.8   HCT 38.2 37.2 38.7   MCV 85.2 85.2 85.2    163

## 2025-03-21 NOTE — PROGRESS NOTES
Estimated body surface area is 1.84 meters squared as calculated from the following:    Height as of 3/19/25: 1.64 m (5' 4.57\").    Weight as of this encounter: 74.3 kg (163 lb 12.8 oz). verified.  Appropriate dosing calculations of chemotherapy based on above height, weight, and BSA verified.        Administration: Chemotherapy drug Fludarabine and Cytoxan independently verified with Maria D Boyd RN prior to administration.  Acknowledgement of informed consent for chemotherapy administration verified.  Original order, appropriateness of regimen, drug supplied, height, weight, BSA, dose calculations, expiration dates/times, drug appearance, and two patient identifiers were verified by both RNs.  Drug checked for vesicant/irritant status and for risk of hypersensitivity.  Most recent laboratory values and allergies, were reviewed.  Positive, brisk blood return via CVC was confirmed prior to administration. Chest x-ray for correct line placement reviewed. Le Tsang RN and Maria D Boyd RN verified correct rate of chemotherapy and maintenance IV fluids.  Patient was educated on chemotherapy regimen prior to administration including indication for treatment related to disease & side effects of chemotherapy drug.  Patient verbalizes understanding of all instructions.      Completion of Chemotherapy: Monitoring during infusion done per policy, see Flowsheets.  Blood return verified before, during, and after infusion per policy; no signs of extravasation.  Pt tolerated chemotherapy well and without incident.  Chemotherapy infusion end time on the MAR.        Pt became nauseated during cytoxan IV compazine administered. See eMAR.

## 2025-03-24 ENCOUNTER — HOSPITAL ENCOUNTER (INPATIENT)
Age: 75
LOS: 2 days | Discharge: HOME | DRG: 018 | End: 2025-03-26
Attending: STUDENT IN AN ORGANIZED HEALTH CARE EDUCATION/TRAINING PROGRAM | Admitting: STUDENT IN AN ORGANIZED HEALTH CARE EDUCATION/TRAINING PROGRAM
Payer: MEDICARE

## 2025-03-24 ENCOUNTER — APPOINTMENT (OUTPATIENT)
Dept: GENERAL RADIOLOGY | Age: 75
DRG: 018 | End: 2025-03-24
Attending: STUDENT IN AN ORGANIZED HEALTH CARE EDUCATION/TRAINING PROGRAM
Payer: MEDICARE

## 2025-03-24 PROBLEM — C90.00 MULTIPLE MYELOMA NOT HAVING ACHIEVED REMISSION (HCC): Status: ACTIVE | Noted: 2025-03-24

## 2025-03-24 PROBLEM — C90.00 MULTIPLE MYELOMA, REMISSION STATUS UNSPECIFIED (HCC): Status: ACTIVE | Noted: 2025-03-24

## 2025-03-24 LAB
ALBUMIN SERPL-MCNC: 4.1 G/DL (ref 3.4–5)
ALP SERPL-CCNC: 70 U/L (ref 40–129)
ALT SERPL-CCNC: 16 U/L (ref 10–40)
ANION GAP SERPL CALCULATED.3IONS-SCNC: 8 MMOL/L (ref 3–16)
APTT BLD: 30.3 SEC (ref 22.1–36.4)
AST SERPL-CCNC: 25 U/L (ref 15–37)
BASOPHILS # BLD: 0 K/UL (ref 0–0.2)
BASOPHILS NFR BLD: 1.7 %
BILIRUB DIRECT SERPL-MCNC: 0.2 MG/DL (ref 0–0.3)
BILIRUB INDIRECT SERPL-MCNC: 0.3 MG/DL (ref 0–1)
BILIRUB SERPL-MCNC: 0.5 MG/DL (ref 0–1)
BILIRUB UR QL STRIP.AUTO: NEGATIVE
BUN SERPL-MCNC: 14 MG/DL (ref 7–20)
CALCIUM SERPL-MCNC: 9.5 MG/DL (ref 8.3–10.6)
CHLORIDE SERPL-SCNC: 106 MMOL/L (ref 99–110)
CLARITY UR: CLEAR
CO2 SERPL-SCNC: 27 MMOL/L (ref 21–32)
COLOR UR: YELLOW
CREAT SERPL-MCNC: 0.6 MG/DL (ref 0.6–1.2)
CRP SERPL-MCNC: 5 MG/L (ref 0–5.1)
DEPRECATED RDW RBC AUTO: 18 % (ref 12.4–15.4)
EOSINOPHIL # BLD: 0 K/UL (ref 0–0.6)
EOSINOPHIL NFR BLD: 1.2 %
FERRITIN SERPL IA-MCNC: 66.7 NG/ML (ref 15–150)
GFR SERPLBLD CREATININE-BSD FMLA CKD-EPI: >90 ML/MIN/{1.73_M2}
GLUCOSE SERPL-MCNC: 98 MG/DL (ref 70–99)
GLUCOSE UR STRIP.AUTO-MCNC: NEGATIVE MG/DL
HCT VFR BLD AUTO: 33.2 % (ref 36–48)
HGB BLD-MCNC: 11.2 G/DL (ref 12–16)
HGB UR QL STRIP.AUTO: NEGATIVE
INR PPP: 1.01 (ref 0.85–1.15)
KETONES UR STRIP.AUTO-MCNC: NEGATIVE MG/DL
LDH SERPL L TO P-CCNC: 179 U/L (ref 100–190)
LEUKOCYTE ESTERASE UR QL STRIP.AUTO: NEGATIVE
LYMPHOCYTES # BLD: 0 K/UL (ref 1–5.1)
LYMPHOCYTES NFR BLD: 0.5 %
MAGNESIUM SERPL-MCNC: 1.99 MG/DL (ref 1.8–2.4)
MCH RBC QN AUTO: 28.5 PG (ref 26–34)
MCHC RBC AUTO-ENTMCNC: 33.7 G/DL (ref 31–36)
MCV RBC AUTO: 84.7 FL (ref 80–100)
MONOCYTES # BLD: 0.1 K/UL (ref 0–1.3)
MONOCYTES NFR BLD: 4.2 %
NEUTROPHILS # BLD: 1.9 K/UL (ref 1.7–7.7)
NEUTROPHILS NFR BLD: 92.4 %
NITRITE UR QL STRIP.AUTO: NEGATIVE
PH UR STRIP.AUTO: 6 [PH] (ref 5–8)
PHOSPHATE SERPL-MCNC: 3.9 MG/DL (ref 2.5–4.9)
PLATELET # BLD AUTO: 126 K/UL (ref 135–450)
PMV BLD AUTO: 7.2 FL (ref 5–10.5)
POTASSIUM SERPL-SCNC: 4.6 MMOL/L (ref 3.5–5.1)
PROT SERPL-MCNC: 6.4 G/DL (ref 6.4–8.2)
PROT UR STRIP.AUTO-MCNC: NEGATIVE MG/DL
PROTHROMBIN TIME: 13.5 SEC (ref 11.9–14.9)
RBC # BLD AUTO: 3.92 M/UL (ref 4–5.2)
SODIUM SERPL-SCNC: 141 MMOL/L (ref 136–145)
SP GR UR STRIP.AUTO: <=1.005 (ref 1–1.03)
UA DIPSTICK W REFLEX MICRO PNL UR: NORMAL
URATE SERPL-MCNC: 2.1 MG/DL (ref 2.6–6)
URN SPEC COLLECT METH UR: NORMAL
UROBILINOGEN UR STRIP-ACNC: 0.2 E.U./DL
WBC # BLD AUTO: 2 K/UL (ref 4–11)

## 2025-03-24 PROCEDURE — 82728 ASSAY OF FERRITIN: CPT

## 2025-03-24 PROCEDURE — 83615 LACTATE (LD) (LDH) ENZYME: CPT

## 2025-03-24 PROCEDURE — 2500000003 HC RX 250 WO HCPCS

## 2025-03-24 PROCEDURE — 6360000002 HC RX W HCPCS

## 2025-03-24 PROCEDURE — 2580000003 HC RX 258

## 2025-03-24 PROCEDURE — 85610 PROTHROMBIN TIME: CPT

## 2025-03-24 PROCEDURE — 71046 X-RAY EXAM CHEST 2 VIEWS: CPT

## 2025-03-24 PROCEDURE — 84550 ASSAY OF BLOOD/URIC ACID: CPT

## 2025-03-24 PROCEDURE — 36591 DRAW BLOOD OFF VENOUS DEVICE: CPT

## 2025-03-24 PROCEDURE — 81003 URINALYSIS AUTO W/O SCOPE: CPT

## 2025-03-24 PROCEDURE — 86140 C-REACTIVE PROTEIN: CPT

## 2025-03-24 PROCEDURE — 80076 HEPATIC FUNCTION PANEL: CPT

## 2025-03-24 PROCEDURE — 2060000000 HC ICU INTERMEDIATE R&B

## 2025-03-24 PROCEDURE — 83735 ASSAY OF MAGNESIUM: CPT

## 2025-03-24 PROCEDURE — 6370000000 HC RX 637 (ALT 250 FOR IP)

## 2025-03-24 PROCEDURE — 85730 THROMBOPLASTIN TIME PARTIAL: CPT

## 2025-03-24 PROCEDURE — 84100 ASSAY OF PHOSPHORUS: CPT

## 2025-03-24 PROCEDURE — 85025 COMPLETE CBC W/AUTO DIFF WBC: CPT

## 2025-03-24 PROCEDURE — 8910000000 HC RX FDA APPROVED CELL THERAPY: Performed by: STUDENT IN AN ORGANIZED HEALTH CARE EDUCATION/TRAINING PROGRAM

## 2025-03-24 PROCEDURE — 93005 ELECTROCARDIOGRAM TRACING: CPT

## 2025-03-24 PROCEDURE — 80048 BASIC METABOLIC PNL TOTAL CA: CPT

## 2025-03-24 PROCEDURE — XW033A7 INTRODUCTION OF CILTACABTAGENE AUTOLEUCEL INTO PERIPHERAL VEIN, PERCUTANEOUS APPROACH, NEW TECHNOLOGY GROUP 7: ICD-10-PCS | Performed by: INTERNAL MEDICINE

## 2025-03-24 RX ORDER — ACYCLOVIR 400 MG/1
400 TABLET ORAL 2 TIMES DAILY
Status: DISCONTINUED | OUTPATIENT
Start: 2025-03-24 | End: 2025-03-26 | Stop reason: HOSPADM

## 2025-03-24 RX ORDER — ALLOPURINOL 300 MG/1
300 TABLET ORAL DAILY
Status: DISCONTINUED | OUTPATIENT
Start: 2025-03-25 | End: 2025-03-26 | Stop reason: HOSPADM

## 2025-03-24 RX ORDER — EPINEPHRINE 1 MG/ML
0.3 INJECTION, SOLUTION INTRAMUSCULAR; SUBCUTANEOUS
Status: DISPENSED | OUTPATIENT
Start: 2025-03-24 | End: 2025-03-25

## 2025-03-24 RX ORDER — 0.9 % SODIUM CHLORIDE 0.9 %
1000 INTRAVENOUS SOLUTION INTRAVENOUS ONCE
Status: COMPLETED | OUTPATIENT
Start: 2025-03-24 | End: 2025-03-24

## 2025-03-24 RX ORDER — SODIUM CHLORIDE 9 MG/ML
INJECTION, SOLUTION INTRAVENOUS CONTINUOUS PRN
Status: DISCONTINUED | OUTPATIENT
Start: 2025-03-24 | End: 2025-03-26 | Stop reason: HOSPADM

## 2025-03-24 RX ORDER — SODIUM CHLORIDE 0.9 % (FLUSH) 0.9 %
5-40 SYRINGE (ML) INJECTION PRN
Status: DISCONTINUED | OUTPATIENT
Start: 2025-03-24 | End: 2025-03-26 | Stop reason: HOSPADM

## 2025-03-24 RX ORDER — MAGNESIUM SULFATE IN WATER 40 MG/ML
4000 INJECTION, SOLUTION INTRAVENOUS PRN
Status: DISCONTINUED | OUTPATIENT
Start: 2025-03-24 | End: 2025-03-26 | Stop reason: HOSPADM

## 2025-03-24 RX ORDER — AMLODIPINE BESYLATE 5 MG/1
5 TABLET ORAL DAILY
Status: DISCONTINUED | OUTPATIENT
Start: 2025-03-25 | End: 2025-03-26 | Stop reason: HOSPADM

## 2025-03-24 RX ORDER — ENOXAPARIN SODIUM 100 MG/ML
40 INJECTION SUBCUTANEOUS EVERY EVENING
Status: DISCONTINUED | OUTPATIENT
Start: 2025-03-24 | End: 2025-03-26 | Stop reason: HOSPADM

## 2025-03-24 RX ORDER — MULTIVITAMIN WITH IRON
1 TABLET ORAL 2 TIMES DAILY
Status: DISCONTINUED | OUTPATIENT
Start: 2025-03-24 | End: 2025-03-26 | Stop reason: HOSPADM

## 2025-03-24 RX ORDER — DIPHENHYDRAMINE HYDROCHLORIDE 50 MG/ML
25 INJECTION, SOLUTION INTRAMUSCULAR; INTRAVENOUS PRN
Status: DISPENSED | OUTPATIENT
Start: 2025-03-24 | End: 2025-03-25

## 2025-03-24 RX ORDER — SODIUM CHLORIDE 9 MG/ML
INJECTION, SOLUTION INTRAVENOUS PRN
Status: DISCONTINUED | OUTPATIENT
Start: 2025-03-24 | End: 2025-03-26 | Stop reason: HOSPADM

## 2025-03-24 RX ORDER — HYDROXYZINE HYDROCHLORIDE 10 MG/1
25 TABLET, FILM COATED ORAL NIGHTLY
Status: DISCONTINUED | OUTPATIENT
Start: 2025-03-24 | End: 2025-03-26 | Stop reason: HOSPADM

## 2025-03-24 RX ORDER — DIPHENHYDRAMINE HCL 25 MG
25 TABLET ORAL ONCE
Status: COMPLETED | OUTPATIENT
Start: 2025-03-24 | End: 2025-03-24

## 2025-03-24 RX ORDER — VALACYCLOVIR HYDROCHLORIDE 500 MG/1
500 TABLET, FILM COATED ORAL 2 TIMES DAILY
Status: DISCONTINUED | OUTPATIENT
Start: 2025-03-24 | End: 2025-03-24

## 2025-03-24 RX ORDER — VIT A/VIT C/VIT E/ZINC/COPPER 4296-226
CAPSULE ORAL 2 TIMES DAILY
COMMUNITY

## 2025-03-24 RX ORDER — OXYBUTYNIN CHLORIDE 5 MG/1
5 TABLET, EXTENDED RELEASE ORAL DAILY
Status: DISCONTINUED | OUTPATIENT
Start: 2025-03-25 | End: 2025-03-26 | Stop reason: HOSPADM

## 2025-03-24 RX ORDER — LORAZEPAM 1 MG/1
1 TABLET ORAL 2 TIMES DAILY
Status: DISCONTINUED | OUTPATIENT
Start: 2025-03-24 | End: 2025-03-26 | Stop reason: HOSPADM

## 2025-03-24 RX ORDER — CITALOPRAM HYDROBROMIDE 20 MG/1
10 TABLET ORAL DAILY
Status: DISCONTINUED | OUTPATIENT
Start: 2025-03-25 | End: 2025-03-26 | Stop reason: HOSPADM

## 2025-03-24 RX ORDER — VITAMIN B COMPLEX
2000 TABLET ORAL DAILY
Status: DISCONTINUED | OUTPATIENT
Start: 2025-03-25 | End: 2025-03-26 | Stop reason: HOSPADM

## 2025-03-24 RX ORDER — ACETAMINOPHEN 325 MG/1
650 TABLET ORAL ONCE
Status: COMPLETED | OUTPATIENT
Start: 2025-03-24 | End: 2025-03-24

## 2025-03-24 RX ORDER — SODIUM CHLORIDE 0.9 % (FLUSH) 0.9 %
5-40 SYRINGE (ML) INJECTION EVERY 12 HOURS SCHEDULED
Status: DISCONTINUED | OUTPATIENT
Start: 2025-03-24 | End: 2025-03-26 | Stop reason: HOSPADM

## 2025-03-24 RX ORDER — LEVETIRACETAM 500 MG/1
500 TABLET ORAL 2 TIMES DAILY
Status: DISCONTINUED | OUTPATIENT
Start: 2025-03-24 | End: 2025-03-26 | Stop reason: HOSPADM

## 2025-03-24 RX ORDER — PROCHLORPERAZINE MALEATE 10 MG
5 TABLET ORAL EVERY 6 HOURS PRN
Status: DISCONTINUED | OUTPATIENT
Start: 2025-03-24 | End: 2025-03-26 | Stop reason: HOSPADM

## 2025-03-24 RX ORDER — POTASSIUM CHLORIDE 29.8 MG/ML
20 INJECTION INTRAVENOUS PRN
Status: DISCONTINUED | OUTPATIENT
Start: 2025-03-24 | End: 2025-03-26 | Stop reason: HOSPADM

## 2025-03-24 RX ADMIN — HYDROXYZINE HYDROCHLORIDE 25 MG: 10 TABLET, FILM COATED ORAL at 20:32

## 2025-03-24 RX ADMIN — CILTACABTAGENE AUTOLEUCEL 1 EACH: 100000000 INJECTION, SUSPENSION INTRAVENOUS at 12:19

## 2025-03-24 RX ADMIN — THERA TABS 1 TABLET: TAB at 20:33

## 2025-03-24 RX ADMIN — PROCHLORPERAZINE MALEATE 5 MG: 10 TABLET ORAL at 12:53

## 2025-03-24 RX ADMIN — SODIUM CHLORIDE 1000 ML: 0.9 INJECTION, SOLUTION INTRAVENOUS at 09:36

## 2025-03-24 RX ADMIN — ACETAMINOPHEN 650 MG: 325 TABLET ORAL at 11:34

## 2025-03-24 RX ADMIN — DIPHENHYDRAMINE HYDROCHLORIDE 25 MG: 25 TABLET ORAL at 11:34

## 2025-03-24 RX ADMIN — LEVETIRACETAM 500 MG: 500 TABLET, FILM COATED ORAL at 20:33

## 2025-03-24 RX ADMIN — ACYCLOVIR 400 MG: 400 TABLET ORAL at 20:33

## 2025-03-24 RX ADMIN — LORAZEPAM 1 MG: 1 TABLET ORAL at 17:25

## 2025-03-24 RX ADMIN — SODIUM CHLORIDE, PRESERVATIVE FREE 10 ML: 5 INJECTION INTRAVENOUS at 09:30

## 2025-03-24 RX ADMIN — ENOXAPARIN SODIUM 40 MG: 100 INJECTION SUBCUTANEOUS at 17:24

## 2025-03-24 NOTE — PROGRESS NOTES
Availability of two 600 mg doses of Tocilizumab was verified prior to patient CAR-T infusion by this pharmacist.    Jonah Edouard 3/24/2025 9:26 AM

## 2025-03-24 NOTE — CARE COORDINATION
Case Management Assessment  Initial Evaluation    Date/Time of Evaluation: 3/24/2025 1:13 PM  Assessment Completed by: MILLIE Dos Santos   for [facility] ([facility])  [employer] Mobile: [phone]    If patient is discharged prior to next notation, then this note serves as note for discharge by case management.    Patient Name: Catalina Leyva                   YOB: 1950  Diagnosis: Multiple myeloma [C90.00]  Multiple myeloma, remission status unspecified (HCC) [C90.00]  Multiple myeloma not having achieved remission (HCC) [C90.00]                   Date / Time: 3/24/2025  8:02 AM    Patient Admission Status: Inpatient   Readmission Risk (Low < 19, Mod (19-27), High > 27): Readmission Risk Score: 10.9    Current PCP: Mayra Mayen MD  PCP verified by CM? Yes    Chart Reviewed: Yes      History Provided by: Patient  Patient Orientation: Alert and Oriented    Patient Cognition: Alert    Hospitalization in the last 30 days (Readmission):  No    If yes, Readmission Assessment in CM Navigator will be completed.    Advance Directives:      Code Status: Full Code   Patient's Primary Decision Maker is: Named in Scanned ACP Document (pt confirmed advance directives on file are correct; updated contacts to reflect [facility] POA)    Primary Decision Maker: Dr chase pena - Other Relative - [phone]    Secondary Decision Maker: CHASE KISER - Brother/Sister - [phone]    Discharge Planning:    Patient lives with: Spouse/Significant Other Type of Home: House  Primary Care Giver: Self  Patient Support Systems include: Spouse/Significant Other, Family Members   Current Financial resources: Medicare, Other (Comment) ([place] [employer])  Current community resources: None  Current services prior to admission: Durable Medical Equipment, Other (Comment) ([facility])            Current DME: [employer] (She doesn't use the Dme)            Type of Home Care services:

## 2025-03-24 NOTE — ONCOLOGY
Carvykti CART card given to patient with instructions for it's use.   S/SX of CRS, neurotoxicity, Parkinsonism & Cranial nerve palsies reiterated to patient.   Patient aware patient will be on anti-seizure medication for 8 weeks and can not drive or operate machinery x 8 weeks.   Discussed daily Outpatient Infusion Department daily visits up to 30 days upon discharge from [facility].   All questions answered to patient/caregiver satisfaction.  Caregiver not present for this information.

## 2025-03-24 NOTE — PSYCHOTHERAPY
Psychology Follow-up Consultation    Patient Name: Catalina Leyva  MRN: [id number]  Admission Date: 3/24/2025  Today's date: 3/24/2025    Reason for Consult: routine behavioral health follow-up for CAR-T      HPI:   Catalina Leyva is a 74 y.o.   female with multiple myeloma and PMHx of autologous stem cell transplant (2021), and mitral valve disease who initially presented on 3/24/2025 for CAR-T. (Day 0: 3/24/2025)    Pre-CAR-T Psychological Evaluation recommendations:  Primary caregiver had limited knowledge regarding CAR-T and should review education materials.   - Solidify back-up caregiver plan and notify transplant coordinators once completed.   - Patient's drug screen on 10/25/2024 was positive for benzodiazepines and oxycodone. Patient is prescribed lorazepam 1mg twice daily per patient report and chart review. Patient reports she took two of her 's oxycodone one night when she experienced a headache. Transplant coordinator opted to retest patient during visit on 11/8/2024, but patient requested to be screened on another day as she reported taking a third oxycodone on 11/4/2024. Will discuss with treatment team.   - Referral to social work for palliative care for  during CAR-T procedure.    Interval History:    Patient reports she is doing okay emotionally. She states prior to admission she was \"a nervous wreck.\" She elaborated that making sure her finances, , and other responsibilities were taken care of prior to admission left her more relieved. She stated her biggest fear is experiencing neurotoxicity and not having her affairs in order.     Current psychiatric treatment: Celexa (10mg; daily; outpatient); Ativan (1mg; 2 times daily; outpatient); hydroyxzine (25mg; nightly; outpatient)   Other relevant medications: Keppra    History obtained 11/1/2024 during pre-CAR-T psychological evaluation:    PSYCHIATRIC HISTORY:  Current mental health treatment: Currently taking

## 2025-03-24 NOTE — PROGRESS NOTES
4 Eyes Skin Assessment     NAME:  aCtalina Leyva  YOB: 1950  MEDICAL RECORD NUMBER:  [id number]    The patient is being assessed for  Admission    I agree that at least one RN has performed a thorough Head to Toe Skin Assessment on the patient. ALL assessment sites listed below have been assessed.      Areas assessed by both nurses:    Head, Face, Ears, Shoulders, Back, Chest, Arms, Elbows, Hands, Sacrum. Buttock, Coccyx, Ischium, Legs. Feet and Heels, and Under Medical Devices         Does the Patient have a Wound? No noted wound(s)       Leroy Prevention initiated by RN: No  Wound Care Orders initiated by RN: No    Pressure Injury (Stage 3,4, Unstageable, DTI, NWPT, and Complex wounds) if present, place Wound referral order by RN under : No    New Ostomies, if present place, Ostomy referral order under : No     Nurse 1 eSignature: Electronically signed by Elizabeth Logan RN on 3/24/25 at 6:06 PM EDT    **SHARE this note so that the co-signing nurse can place an eSignature**    Nurse 2 eSignature: Electronically signed by LUIS ANGEL CH RN on 3/24/25 at 6:08 PM EDT

## 2025-03-24 NOTE — PROCEDURES
CAR-T Infusion Summary       Catalina Leyva                              3/24/2025                           Start time:1226 Completion time: 1342      Product Type:  Carvykti    DIN: [id number]    Batch Number: [id number] Volume: 70 mL      Catheter lumen used for infusion: Single lumen Port             Positive Blood Return: Yes    Premeds Given: Tylenol, benadryl    Adverse Reaction(s) and treatment: Baseline vital signs obtained prior to infusion and monitoring completed throughout per [facility] protocol - see flowsheets. All IVFs turned down to KVO during CAR-T Infusion. CAR-T product verified with 2nd RN Kath Lion RN prior to infusion using 2 patient identifiers. Infused via pump with rate controlled by Elizabeth Logan RN per [facility] protocols.    Emergency medications epinephrine and benadryl available as needed. Emergency medical equipment available as needed including telemetry monitoring throughout infusion, supplemental O2, suction equipment, and crash cart. RN at bedside throughout infusion.        Elizabeth Logan RN

## 2025-03-25 LAB
ANION GAP SERPL CALCULATED.3IONS-SCNC: 8 MMOL/L (ref 3–16)
BASOPHILS # BLD: 0 K/UL (ref 0–0.2)
BASOPHILS NFR BLD: 0.3 %
BUN SERPL-MCNC: 12 MG/DL (ref 7–20)
CALCIUM SERPL-MCNC: 9.2 MG/DL (ref 8.3–10.6)
CHLORIDE SERPL-SCNC: 108 MMOL/L (ref 99–110)
CO2 SERPL-SCNC: 28 MMOL/L (ref 21–32)
CREAT SERPL-MCNC: 0.6 MG/DL (ref 0.6–1.2)
CRP SERPL-MCNC: 3.8 MG/L (ref 0–5.1)
DEPRECATED RDW RBC AUTO: 17.8 % (ref 12.4–15.4)
EOSINOPHIL # BLD: 0 K/UL (ref 0–0.6)
EOSINOPHIL NFR BLD: 2.4 %
FERRITIN SERPL IA-MCNC: 70 NG/ML (ref 15–150)
GFR SERPLBLD CREATININE-BSD FMLA CKD-EPI: >90 ML/MIN/{1.73_M2}
GLUCOSE SERPL-MCNC: 79 MG/DL (ref 70–99)
HCT VFR BLD AUTO: 37.4 % (ref 36–48)
HGB BLD-MCNC: 12.4 G/DL (ref 12–16)
LYMPHOCYTES # BLD: 0 K/UL (ref 1–5.1)
LYMPHOCYTES NFR BLD: 0.7 %
MAGNESIUM SERPL-MCNC: 2.06 MG/DL (ref 1.8–2.4)
MCH RBC QN AUTO: 28.1 PG (ref 26–34)
MCHC RBC AUTO-ENTMCNC: 33.2 G/DL (ref 31–36)
MCV RBC AUTO: 84.6 FL (ref 80–100)
MONOCYTES # BLD: 0.1 K/UL (ref 0–1.3)
MONOCYTES NFR BLD: 5.8 %
NEUTROPHILS # BLD: 1.2 K/UL (ref 1.7–7.7)
NEUTROPHILS NFR BLD: 90.8 %
PHOSPHATE SERPL-MCNC: 3.6 MG/DL (ref 2.5–4.9)
PLATELET # BLD AUTO: 137 K/UL (ref 135–450)
PMV BLD AUTO: 7.1 FL (ref 5–10.5)
POTASSIUM SERPL-SCNC: 3.9 MMOL/L (ref 3.5–5.1)
RBC # BLD AUTO: 4.42 M/UL (ref 4–5.2)
SODIUM SERPL-SCNC: 144 MMOL/L (ref 136–145)
WBC # BLD AUTO: 1.3 K/UL (ref 4–11)

## 2025-03-25 PROCEDURE — 84100 ASSAY OF PHOSPHORUS: CPT

## 2025-03-25 PROCEDURE — 2060000000 HC ICU INTERMEDIATE R&B

## 2025-03-25 PROCEDURE — 6370000000 HC RX 637 (ALT 250 FOR IP)

## 2025-03-25 PROCEDURE — 80048 BASIC METABOLIC PNL TOTAL CA: CPT

## 2025-03-25 PROCEDURE — 36591 DRAW BLOOD OFF VENOUS DEVICE: CPT

## 2025-03-25 PROCEDURE — 6360000002 HC RX W HCPCS

## 2025-03-25 PROCEDURE — 85025 COMPLETE CBC W/AUTO DIFF WBC: CPT

## 2025-03-25 PROCEDURE — 82728 ASSAY OF FERRITIN: CPT

## 2025-03-25 PROCEDURE — 86140 C-REACTIVE PROTEIN: CPT

## 2025-03-25 PROCEDURE — 2500000003 HC RX 250 WO HCPCS

## 2025-03-25 PROCEDURE — 83735 ASSAY OF MAGNESIUM: CPT

## 2025-03-25 RX ORDER — LEVOFLOXACIN 500 MG/1
500 TABLET, FILM COATED ORAL EVERY 24 HOURS
Status: DISCONTINUED | OUTPATIENT
Start: 2025-03-25 | End: 2025-03-26 | Stop reason: HOSPADM

## 2025-03-25 RX ORDER — FLUCONAZOLE 200 MG/1
200 TABLET ORAL DAILY
Status: DISCONTINUED | OUTPATIENT
Start: 2025-03-25 | End: 2025-03-26 | Stop reason: HOSPADM

## 2025-03-25 RX ADMIN — FLUCONAZOLE 200 MG: 200 TABLET ORAL at 12:09

## 2025-03-25 RX ADMIN — LEVETIRACETAM 500 MG: 500 TABLET, FILM COATED ORAL at 20:55

## 2025-03-25 RX ADMIN — THERA TABS 1 TABLET: TAB at 20:55

## 2025-03-25 RX ADMIN — LORAZEPAM 1 MG: 1 TABLET ORAL at 08:33

## 2025-03-25 RX ADMIN — ACYCLOVIR 400 MG: 400 TABLET ORAL at 08:33

## 2025-03-25 RX ADMIN — CITALOPRAM HYDROBROMIDE 10 MG: 20 TABLET ORAL at 08:33

## 2025-03-25 RX ADMIN — ALLOPURINOL 300 MG: 300 TABLET ORAL at 08:33

## 2025-03-25 RX ADMIN — OXYBUTYNIN CHLORIDE 5 MG: 5 TABLET, EXTENDED RELEASE ORAL at 08:33

## 2025-03-25 RX ADMIN — SODIUM CHLORIDE, PRESERVATIVE FREE 10 ML: 5 INJECTION INTRAVENOUS at 08:34

## 2025-03-25 RX ADMIN — ENOXAPARIN SODIUM 40 MG: 100 INJECTION SUBCUTANEOUS at 17:20

## 2025-03-25 RX ADMIN — Medication 2000 UNITS: at 08:33

## 2025-03-25 RX ADMIN — LORAZEPAM 1 MG: 1 TABLET ORAL at 17:20

## 2025-03-25 RX ADMIN — HYDROXYZINE HYDROCHLORIDE 25 MG: 10 TABLET, FILM COATED ORAL at 20:55

## 2025-03-25 RX ADMIN — ACYCLOVIR 400 MG: 400 TABLET ORAL at 20:55

## 2025-03-25 RX ADMIN — THERA TABS 1 TABLET: TAB at 08:34

## 2025-03-25 RX ADMIN — LEVETIRACETAM 500 MG: 500 TABLET, FILM COATED ORAL at 08:34

## 2025-03-25 RX ADMIN — LEVOFLOXACIN 500 MG: 500 TABLET, FILM COATED ORAL at 20:55

## 2025-03-25 RX ADMIN — AMLODIPINE BESYLATE 5 MG: 5 TABLET ORAL at 08:34

## 2025-03-25 NOTE — PROGRESS NOTES
BCC Progress Note    3/25/2025     Catalian Leyva    MRN: [id number]    : 1950    Referring MD: Maribel Garrido,   [address]  [facility] [address]  [place] [address]      Interval History:  - Doing well- tolerated car-t infusion well   - No s/sx of CRS or neurotoxicity    ECOG PS:  (1) Restricted in physically strenuous activity, ambulatory and able to do work of light nature    KPS: 90% Able to carry on normal activity; minor signs or symptoms of disease    Isolation: None    Medications    Scheduled Meds:   sodium chloride flush  5-40 mL IntraVENous 2 times per day    Saline Mouthwash  15 mL Swish & Spit 4x Daily AC & HS    enoxaparin  40 mg SubCUTAneous QPM    levETIRAcetam  500 mg Oral BID    acyclovir  400 mg Oral BID    allopurinol  300 mg Oral Daily    amLODIPine  5 mg Oral Daily    Vitamin D  2,000 Units Oral Daily    citalopram  10 mg Oral Daily    hydrOXYzine HCl  25 mg Oral Nightly    LORazepam  1 mg Oral BID    multivitamin  1 tablet Oral BID    oxyBUTYnin  5 mg Oral Daily     Continuous Infusions:   sodium chloride      sodium chloride      sodium chloride       PRN Meds:.sodium chloride, sodium chloride flush, sodium chloride, potassium chloride, magnesium sulfate, Saline Mouthwash, ALTEplase (CATHFLO) 2 mg in sterile water 2 mL injection, diphenhydrAMINE, EPINEPHrine, sodium chloride, sodium chloride, prochlorperazine    ROS:  As noted above, otherwise remainder of 10-point ROS negative    Physical Exam:     I&O:    Intake/Output Summary (Last 24 hours) at 3/25/2025 0858  Last data filed at 3/25/2025 0836  Gross per 24 hour   Intake 2558.6 ml   Output 3350 ml   Net -791.4 ml       Vital Signs:  /73   Pulse 88   Temp 97.4 °F (36.3 °C) (Oral)   Resp 16   Ht 1.598 m (5' 2.91\") Comment: measured  Wt 73.4 kg (161 lb 12.8 oz)   SpO2 100%   BMI 28.74 kg/m²     Weight:    Wt Readings from Last 3 Encounters:   25 73.4 kg (161 lb 12.8 oz)   25 74.3 kg (163 lb 12.8 oz)

## 2025-03-25 NOTE — CARE COORDINATION
Red folder with discharge instructions given to patient. Instructed patient to please read over material and write down any questions she may have. Patient is aware that Karo will go over material tomorrow and answer any questions.

## 2025-03-25 NOTE — CARE COORDINATION
Spoke with pt at bedside with Dalila WASHINGTON present. Discussed concerns regarding transportation and caregiver. Pt states she has transportation to/from appt's and her sister Greg is her caregiver as she has taken 30 days off. Pt said that the barrier for Friday is that her  is coming back home from respite and she would need to leave by 1245pm. She said she can have a backup plan with her neighbor to get him tucked in at home if she is not back home yet. Dalila said she will discuss with outpatient then update pt.     Essie VELOZ, MILLIE   for [facility] ([facility])  [employer] Mobile: [phone]    Addendum at 3:48pm: Received notification from staff that pt states that there are now no issues with needing to leave by a certain time on Friday from outpatient infusion. Dalila updated

## 2025-03-25 NOTE — PLAN OF CARE
Problem: Chronic Conditions and Co-morbidities  Goal: Patient's chronic conditions and co-morbidity symptoms are monitored and maintained or improved  Outcome: Progressing  Flowsheets (Taken 3/25/2025 1843)  Care Plan - Patient's Chronic Conditions and Co-Morbidity Symptoms are Monitored and Maintained or Improved: Monitor and assess patient's chronic conditions and comorbid symptoms for stability, deterioration, or improvement     Problem: Safety - Adult  Goal: Free from fall injury  Outcome: Progressing  Flowsheets (Taken 3/25/2025 1843)  Free From Fall Injury: Instruct family/caregiver on patient safety     Problem: ABCDS Injury Assessment  Goal: Absence of physical injury  Outcome: Progressing  Flowsheets (Taken 3/25/2025 1843)  Absence of Physical Injury: Implement safety measures based on patient assessment

## 2025-03-25 NOTE — PLAN OF CARE
Problem: Chronic Conditions and Co-morbidities  Goal: Patient's chronic conditions and co-morbidity symptoms are monitored and maintained or improved  Outcome: Progressing  Flowsheets (Taken 3/25/2025 0403)  Care Plan - Patient's Chronic Conditions and Co-Morbidity Symptoms are Monitored and Maintained or Improved: Monitor and assess patient's chronic conditions and comorbid symptoms for stability, deterioration, or improvement

## 2025-03-26 ENCOUNTER — HOSPITAL ENCOUNTER (OUTPATIENT)
Dept: ONCOLOGY | Age: 75
Setting detail: INFUSION SERIES
Discharge: HOME | End: 2025-03-26

## 2025-03-26 VITALS
HEART RATE: 93 BPM | BODY MASS INDEX: 28.74 KG/M2 | HEIGHT: 63 IN | OXYGEN SATURATION: 99 % | WEIGHT: 162.2 LBS | DIASTOLIC BLOOD PRESSURE: 80 MMHG | RESPIRATION RATE: 18 BRPM | TEMPERATURE: 97.6 F | SYSTOLIC BLOOD PRESSURE: 139 MMHG

## 2025-03-26 LAB
ALBUMIN SERPL-MCNC: 4 G/DL (ref 3.4–5)
ALP SERPL-CCNC: 65 U/L (ref 40–129)
ALT SERPL-CCNC: 14 U/L (ref 10–40)
ANION GAP SERPL CALCULATED.3IONS-SCNC: 8 MMOL/L (ref 3–16)
AST SERPL-CCNC: 20 U/L (ref 15–37)
BASOPHILS # BLD: 0 K/UL (ref 0–0.2)
BASOPHILS NFR BLD: 0.4 %
BILIRUB DIRECT SERPL-MCNC: 0.2 MG/DL (ref 0–0.3)
BILIRUB INDIRECT SERPL-MCNC: 0.3 MG/DL (ref 0–1)
BILIRUB SERPL-MCNC: 0.5 MG/DL (ref 0–1)
BUN SERPL-MCNC: 13 MG/DL (ref 7–20)
CALCIUM SERPL-MCNC: 9.4 MG/DL (ref 8.3–10.6)
CHLORIDE SERPL-SCNC: 106 MMOL/L (ref 99–110)
CO2 SERPL-SCNC: 28 MMOL/L (ref 21–32)
CREAT SERPL-MCNC: 0.6 MG/DL (ref 0.6–1.2)
CRP SERPL-MCNC: 4.7 MG/L (ref 0–5.1)
DEPRECATED RDW RBC AUTO: 17.7 % (ref 12.4–15.4)
EOSINOPHIL # BLD: 0 K/UL (ref 0–0.6)
EOSINOPHIL NFR BLD: 3.2 %
FERRITIN SERPL IA-MCNC: 81.6 NG/ML (ref 15–150)
GFR SERPLBLD CREATININE-BSD FMLA CKD-EPI: >90 ML/MIN/{1.73_M2}
GLUCOSE SERPL-MCNC: 82 MG/DL (ref 70–99)
HCT VFR BLD AUTO: 36.8 % (ref 36–48)
HGB BLD-MCNC: 12.2 G/DL (ref 12–16)
LDH SERPL L TO P-CCNC: 160 U/L (ref 100–190)
LYMPHOCYTES # BLD: 0 K/UL (ref 1–5.1)
LYMPHOCYTES NFR BLD: 2.3 %
MAGNESIUM SERPL-MCNC: 1.97 MG/DL (ref 1.8–2.4)
MCH RBC QN AUTO: 28.4 PG (ref 26–34)
MCHC RBC AUTO-ENTMCNC: 33.2 G/DL (ref 31–36)
MCV RBC AUTO: 85.5 FL (ref 80–100)
MONOCYTES # BLD: 0.1 K/UL (ref 0–1.3)
MONOCYTES NFR BLD: 5 %
NEUTROPHILS # BLD: 1 K/UL (ref 1.7–7.7)
NEUTROPHILS NFR BLD: 89.1 %
PHOSPHATE SERPL-MCNC: 3.5 MG/DL (ref 2.5–4.9)
PLATELET # BLD AUTO: 129 K/UL (ref 135–450)
PMV BLD AUTO: 7.4 FL (ref 5–10.5)
POTASSIUM SERPL-SCNC: 4 MMOL/L (ref 3.5–5.1)
PROT SERPL-MCNC: 6 G/DL (ref 6.4–8.2)
RBC # BLD AUTO: 4.3 M/UL (ref 4–5.2)
SODIUM SERPL-SCNC: 142 MMOL/L (ref 136–145)
URATE SERPL-MCNC: 1.9 MG/DL (ref 2.6–6)
WBC # BLD AUTO: 1.2 K/UL (ref 4–11)

## 2025-03-26 PROCEDURE — 80048 BASIC METABOLIC PNL TOTAL CA: CPT

## 2025-03-26 PROCEDURE — 85025 COMPLETE CBC W/AUTO DIFF WBC: CPT

## 2025-03-26 PROCEDURE — 36591 DRAW BLOOD OFF VENOUS DEVICE: CPT

## 2025-03-26 PROCEDURE — 6370000000 HC RX 637 (ALT 250 FOR IP)

## 2025-03-26 PROCEDURE — 80076 HEPATIC FUNCTION PANEL: CPT

## 2025-03-26 PROCEDURE — 82728 ASSAY OF FERRITIN: CPT

## 2025-03-26 PROCEDURE — 83735 ASSAY OF MAGNESIUM: CPT

## 2025-03-26 PROCEDURE — 86140 C-REACTIVE PROTEIN: CPT

## 2025-03-26 PROCEDURE — 84550 ASSAY OF BLOOD/URIC ACID: CPT

## 2025-03-26 PROCEDURE — 2500000003 HC RX 250 WO HCPCS

## 2025-03-26 PROCEDURE — 84100 ASSAY OF PHOSPHORUS: CPT

## 2025-03-26 PROCEDURE — 83615 LACTATE (LD) (LDH) ENZYME: CPT

## 2025-03-26 RX ADMIN — THERA TABS 1 TABLET: TAB at 08:23

## 2025-03-26 RX ADMIN — LORAZEPAM 1 MG: 1 TABLET ORAL at 08:23

## 2025-03-26 RX ADMIN — Medication 2000 UNITS: at 08:22

## 2025-03-26 RX ADMIN — ALLOPURINOL 300 MG: 300 TABLET ORAL at 08:23

## 2025-03-26 RX ADMIN — FLUCONAZOLE 200 MG: 200 TABLET ORAL at 08:23

## 2025-03-26 RX ADMIN — OXYBUTYNIN CHLORIDE 5 MG: 5 TABLET, EXTENDED RELEASE ORAL at 08:22

## 2025-03-26 RX ADMIN — LEVETIRACETAM 500 MG: 500 TABLET, FILM COATED ORAL at 08:23

## 2025-03-26 RX ADMIN — CITALOPRAM HYDROBROMIDE 10 MG: 20 TABLET ORAL at 08:23

## 2025-03-26 RX ADMIN — SODIUM CHLORIDE, PRESERVATIVE FREE 10 ML: 5 INJECTION INTRAVENOUS at 08:23

## 2025-03-26 RX ADMIN — ACYCLOVIR 400 MG: 400 TABLET ORAL at 08:24

## 2025-03-26 RX ADMIN — AMLODIPINE BESYLATE 5 MG: 5 TABLET ORAL at 08:23

## 2025-03-26 NOTE — DISCHARGE SUMMARY
[facility] Discharge Summary             Attending Physician: Maribel Garrido DO    Referring MD: Maribel Garrido DO  [address]  [address]  [place] [id number]    Name: Catalina Leyva :  1950  MRN:  [id number]    Admission: 3/24/2025   Discharge:   3/26/25    Date: 3/26/2025    Diagnosis on admit: IgA Kappa Multiple Myeloma    Medications:      Medication List        START taking these medications      fluconazole 200 MG tablet  Commonly known as: DIFLUCAN  Take 1 tablet by mouth daily Do not start until instructed to by provider.     levoFLOXacin 500 MG tablet  Commonly known as: LEVAQUIN  Take 1 tablet by mouth daily Do not start until instructed to by provider.            CONTINUE taking these medications      acyclovir 400 MG tablet  Commonly known as: ZOVIRAX     allopurinol 300 MG tablet  Commonly known as: ZYLOPRIM  Take 1 tablet by mouth daily     amLODIPine 5 MG tablet  Commonly known as: NORVASC  Take 1 tablet by mouth daily     citalopram 10 MG tablet  Commonly known as: CELEXA  Take 1 tablet by mouth daily     hydrOXYzine HCl 25 MG tablet  Commonly known as: ATARAX     levETIRAcetam 500 MG tablet  Commonly known as: Keppra  Take 1 tablet by mouth 2 times daily Will start 3/24/25, prior to CAR-T Infusion.     LORazepam 1 MG tablet  Commonly known as: ATIVAN  Take 1 tablet by mouth in the morning and 1 tablet in the evening. Do all this for 30 days. Max Daily Amount: 2 mg.     oxyBUTYnin 5 MG extended release tablet  Commonly known as: Ditropan XL  Take 1 tablet by mouth daily     PreserVision AREDS Caps     prochlorperazine 10 MG tablet  Commonly known as: COMPAZINE     vitamin D 50 MCG ( UT) Caps capsule  Commonly known as: CHOLECALCIFEROL            STOP taking these medications      FLORAJEN ACIDOPHILUS PO     lidocaine-prilocaine 2.5-2.5 % cream  Commonly known as: EMLA     PROBIOTIC PO              Reason for Admission: Carvykti CAR-T infusion    Hospital Course: Catalina Leyva is a

## 2025-03-26 NOTE — CARE COORDINATION
Case Management Assessment            Discharge Note                    Date / Time of Note: 3/26/2025 11:04 AM                  Discharge Note Completed by: MILLIE Dos Santos   for [facility] ([facility])  [employer]: [phone]    Patient Name: Catalina Leyva   YOB: 1950  Diagnosis: Multiple myeloma [C90.00]  Multiple myeloma, remission status unspecified (HCC) [C90.00]  Multiple myeloma not having achieved remission (HCC) [C90.00]   Date / Time: 3/24/2025  8:02 AM    Current PCP: Mayra Mayen MD  Clinic patient: No    Hospitalization in the last 30 days: No     Advance Directives:  Code Status: Full Code  [place] DNR form completed and on chart: Not Indicated    Financial:  Payor: [employer] / Plan: [employer] / Product Type: *No Product type* /      Pharmacy:    [facility] [place] [address] -  [phone] -  [phone]  [address] [place] [id number]  Phone: [phone] Fax: [phone]      Assistance purchasing medications?: Potential Assistance Purchasing Medications: No  Assistance provided by Case Management: None at this time    Does patient want to participate in local refill/ meds to beds program?:      Meds To Beds General Rules:  1. Can ONLY be done Monday- Friday between 8:30am-5pm  2. Prescription(s) must be in pharmacy by 3pm to be filled same day  3.Copy of patient's insurance/ prescription drug card and patient face sheet must be sent along with the prescription(s)  4. Cost of Rx cannot be added to hospital bill. If financial assistance is needed, please contact unit  or ;  or  CANNOT provide pharmacy voucher for patients co-pays  5. Patients can then  the prescription on their way out of the hospital at discharge, or pharmacy can deliver to the bedside if staff is

## 2025-03-26 NOTE — DISCHARGE INSTRUCTIONS
[facility] CARVYKTI CAR-T Therapy Discharge Instructions    Call for Questions/Concerns:  [phone] [facility] office  The phone number listed above is available 24 hrs/7 days per week    If you experience issues reaching the On-Call physician in a timely manner? Please call the BCC unit and speak to the Charge RN- [phone]    [facility] is open M-F 8am-4:30pm; Sat-Sun/Holidays 8am-1pm    Symptoms to Report Immediately:    Fever of 100.4 or greater or chills and shaking present or when TempTraq alerts you that you have a fever.r  Signs or symptoms of CRS: racing heart, hypoxia (Shortness of breath) low blood pressure, achy, joint pain, muscle stiffness  Any signs of Neurotoxicity: Confusion, Subtle changes in mental status, dizziness, lightheadedness, Headache  Vomiting without relief after use of anti-nausea medication  Severe abdominal cramping or muscle/joint pain  Diarrhea: More than 3 loose, watery bowel movements in a 24 hour period  Unusual or excessive bleeding from your mouth, nose, rectum, bladder or vagina  Sudden onset of shortness of breath or chest pain  Signs/symptoms of infection: redness, warmth, swelling-particularly to central line site    Report to Physician's office within 24 hours:    Pain not relieved by pain medication  Change in urination-odor, cloudiness, frequency, or pain with urination  Flu-like symptoms  Skin changes-rash, hives, redness or peeling of skin    Additional Instructions:    Avoid people with colds, flu-like symptoms, or any sign of infection  Drink plenty of fluids-attempt to consume 2-3 liters ( ounces) of fluids/24 hour period  Continue low microbial diet until instructed by physician to resume normal diet  Bring all of your medications with you to your doctor's appointments  Bring your current medicine list to each hospital and office visit  NO DRIVING until cleared to do so by provider          3/26/2025 10:52 AM  Karo Anderson        If you

## 2025-03-26 NOTE — PSYCHOTHERAPY
Psychology Follow-up Consultation    Patient Name: Catalina Leyva  MRN: [id number]  Admission Date: 3/24/2025  Today's date: 3/26/2025    Reason for Consult: routine behavioral health follow-up for CAR-T      HPI:   Catalina Leyva is a 74 y.o.   female with multiple myeloma and PMHx of autologous stem cell transplant (2021), and mitral valve disease who initially presented on 3/24/2025 for CAR-T. (Day 0: 3/24/2025)    Pre-CAR-T Psychological Evaluation recommendations:  Primary caregiver had limited knowledge regarding CAR-T and should review education materials.   - Solidify back-up caregiver plan and notify transplant coordinators once completed.   - Patient's drug screen on 10/25/2024 was positive for benzodiazepines and oxycodone. Patient is prescribed lorazepam 1mg twice daily per patient report and chart review. Patient reports she took two of her 's oxycodone one night when she experienced a headache. Transplant coordinator opted to retest patient during visit on 11/8/2024, but patient requested to be screened on another day as she reported taking a third oxycodone on 11/4/2024. Will discuss with treatment team.   - Referral to social work for palliative care for  during CAR-T procedure.    Interval History:    Plan for home today. Overall reports doing well. States continued stress regarding her  and having to send him to a respite facility if she needs readmitted. She explains that he did not do well adjusting to be in the respite facility. Patient states that she has a plan for neighbors to check on him during her daily appointments. Discussed importance of not compromising her care due to concerns about her  having to go to respite care. Discussed how it may be tempting to not come to the hospital in case of an emergency but that she needs to prioritize herself.    Current psychiatric treatment: Celexa (10mg; daily); Ativan (1mg; 2 times daily; outpatient);

## 2025-03-26 NOTE — PLAN OF CARE
Problem: Chronic Conditions and Co-morbidities  Goal: Patient's chronic conditions and co-morbidity symptoms are monitored and maintained or improved  3/26/2025 0247 by Kelli Rico RN  Outcome: Progressing     Problem: Safety - Adult  Goal: Free from fall injury  3/26/2025 0247 by Kelli Rico RN  Outcome: Progressing     Problem: ABCDS Injury Assessment  Goal: Absence of physical injury  3/26/2025 0247 by Kelli Rico, RN  Outcome: Progressing

## 2025-03-26 NOTE — CARE COORDINATION
Reviewed discharge instructions with patient and   sister Rohit .  Reviewed discharge medications including dosing, schedule, indication, and adverse reactions.  Reviewed which medications were already taken today and next dosage due for each medication.      Reviewed signs and symptoms that prompt a call to the physician and appropriate phone numbers. Reviewed s/s of CRS and neurotox following Carvykti Infusion. Purple ER card given to the patient with explanations of its use.  Reviewed follow up appointments that have been made iin Outpatient Oncology.  Low microbial diet, activity restrictions, and increased risk of infection were reviewed.     Patient is being discharged with IV access d/t need for ongoing therapy:      Type:  PAC deaccessed                           Reviewed use of TempTraq that will be provided to patient on discharge in outpatient infusion.  Provided Red discharge folder outlining instructions for daily visit.     Patient verbalized understanding of all instructions and questions were answered to her. satisfaction.  Signed discharge instructions were given to the patient and a copy placed in the paper-lite chart.  Patient discharged to home per self with sister Rohit. .      Karo Anderson

## 2025-03-26 NOTE — PROGRESS NOTES
Patient here for temp traq placement for ongoing temperature monitoring. This RN helped download the kam on both patient and her caregiver's phone. Patch placed in right armpit. Utilized the teachback method to gauge understanding. Patient and caregiver report no further questions and will be back for short stay visit tomorrow.

## 2025-03-27 ENCOUNTER — HOSPITAL ENCOUNTER (OUTPATIENT)
Dept: ONCOLOGY | Age: 75
Setting detail: INFUSION SERIES
Discharge: HOME | End: 2025-03-27
Payer: MEDICARE

## 2025-03-27 ENCOUNTER — CARE COORDINATION (OUTPATIENT)
Dept: CASE MANAGEMENT | Age: 75
End: 2025-03-27

## 2025-03-27 ENCOUNTER — TELEPHONE (OUTPATIENT)
Dept: FAMILY MEDICINE CLINIC | Age: 75
End: 2025-03-27

## 2025-03-27 VITALS
DIASTOLIC BLOOD PRESSURE: 81 MMHG | WEIGHT: 163.58 LBS | BODY MASS INDEX: 29.06 KG/M2 | SYSTOLIC BLOOD PRESSURE: 125 MMHG | RESPIRATION RATE: 16 BRPM | HEART RATE: 79 BPM | TEMPERATURE: 97.7 F | OXYGEN SATURATION: 99 %

## 2025-03-27 LAB
ANION GAP SERPL CALCULATED.3IONS-SCNC: 8 MMOL/L (ref 3–16)
BASOPHILS # BLD: 0 K/UL (ref 0–0.2)
BASOPHILS NFR BLD: 0 %
BUN SERPL-MCNC: 11 MG/DL (ref 7–20)
CALCIUM SERPL-MCNC: 9.2 MG/DL (ref 8.3–10.6)
CHLORIDE SERPL-SCNC: 102 MMOL/L (ref 99–110)
CO2 SERPL-SCNC: 26 MMOL/L (ref 21–32)
CREAT SERPL-MCNC: 0.6 MG/DL (ref 0.6–1.2)
CRP SERPL-MCNC: 4.2 MG/L (ref 0–5.1)
DEPRECATED RDW RBC AUTO: 17.5 % (ref 12.4–15.4)
EKG ATRIAL RATE: 73 BPM
EKG DIAGNOSIS: NORMAL
EKG P AXIS: 72 DEGREES
EKG P-R INTERVAL: 148 MS
EKG Q-T INTERVAL: 418 MS
EKG QRS DURATION: 86 MS
EKG QTC CALCULATION (BAZETT): 460 MS
EKG R AXIS: -13 DEGREES
EKG T AXIS: 44 DEGREES
EKG VENTRICULAR RATE: 73 BPM
EOSINOPHIL # BLD: 0 K/UL (ref 0–0.6)
EOSINOPHIL NFR BLD: 2.8 %
FERRITIN SERPL IA-MCNC: 71.7 NG/ML (ref 15–150)
FIBRINOGEN PPP-MCNC: 377 MG/DL (ref 227–534)
GFR SERPLBLD CREATININE-BSD FMLA CKD-EPI: >90 ML/MIN/{1.73_M2}
GLUCOSE SERPL-MCNC: 86 MG/DL (ref 70–99)
HCT VFR BLD AUTO: 36.8 % (ref 36–48)
HGB BLD-MCNC: 12.3 G/DL (ref 12–16)
INR PPP: 1.04 (ref 0.85–1.15)
LYMPHOCYTES # BLD: 0 K/UL (ref 1–5.1)
LYMPHOCYTES NFR BLD: 2.8 %
MCH RBC QN AUTO: 28.2 PG (ref 26–34)
MCHC RBC AUTO-ENTMCNC: 33.4 G/DL (ref 31–36)
MCV RBC AUTO: 84.3 FL (ref 80–100)
MONOCYTES # BLD: 0.1 K/UL (ref 0–1.3)
MONOCYTES NFR BLD: 8.1 %
NEUTROPHILS # BLD: 1.3 K/UL (ref 1.7–7.7)
NEUTROPHILS NFR BLD: 86.3 %
PHOSPHATE SERPL-MCNC: 3 MG/DL (ref 2.5–4.9)
PLATELET # BLD AUTO: 125 K/UL (ref 135–450)
PMV BLD AUTO: 7.4 FL (ref 5–10.5)
POTASSIUM SERPL-SCNC: 4.3 MMOL/L (ref 3.5–5.1)
PROTHROMBIN TIME: 13.8 SEC (ref 11.9–14.9)
RBC # BLD AUTO: 4.37 M/UL (ref 4–5.2)
SODIUM SERPL-SCNC: 136 MMOL/L (ref 136–145)
WBC # BLD AUTO: 1.5 K/UL (ref 4–11)

## 2025-03-27 PROCEDURE — 82728 ASSAY OF FERRITIN: CPT

## 2025-03-27 PROCEDURE — 84100 ASSAY OF PHOSPHORUS: CPT

## 2025-03-27 PROCEDURE — 80048 BASIC METABOLIC PNL TOTAL CA: CPT

## 2025-03-27 PROCEDURE — 85384 FIBRINOGEN ACTIVITY: CPT

## 2025-03-27 PROCEDURE — 36591 DRAW BLOOD OFF VENOUS DEVICE: CPT

## 2025-03-27 PROCEDURE — 86140 C-REACTIVE PROTEIN: CPT

## 2025-03-27 PROCEDURE — 85025 COMPLETE CBC W/AUTO DIFF WBC: CPT

## 2025-03-27 PROCEDURE — 85610 PROTHROMBIN TIME: CPT

## 2025-03-27 RX ORDER — POTASSIUM CHLORIDE 29.8 MG/ML
80 INJECTION INTRAVENOUS PRN
Status: CANCELLED | OUTPATIENT
Start: 2025-03-27

## 2025-03-27 RX ORDER — CHLORPROMAZINE HYDROCHLORIDE 25 MG/1
12.5 TABLET, FILM COATED ORAL 3 TIMES DAILY PRN
Qty: 15 TABLET | Refills: 0 | Status: SHIPPED | OUTPATIENT
Start: 2025-03-27 | End: 2025-03-27 | Stop reason: HOSPADM

## 2025-03-27 RX ORDER — OXYCODONE HYDROCHLORIDE 5 MG/1
10 TABLET ORAL EVERY 4 HOURS PRN
Status: CANCELLED | OUTPATIENT
Start: 2025-03-27

## 2025-03-27 RX ORDER — PROCHLORPERAZINE EDISYLATE 5 MG/ML
10 INJECTION INTRAMUSCULAR; INTRAVENOUS EVERY 6 HOURS PRN
Status: CANCELLED | OUTPATIENT
Start: 2025-03-27

## 2025-03-27 RX ORDER — OXYCODONE HYDROCHLORIDE 5 MG/1
5 TABLET ORAL EVERY 4 HOURS PRN
Status: CANCELLED | OUTPATIENT
Start: 2025-03-27

## 2025-03-27 RX ORDER — ONDANSETRON 2 MG/ML
8 INJECTION INTRAMUSCULAR; INTRAVENOUS EVERY 8 HOURS PRN
Status: CANCELLED | OUTPATIENT
Start: 2025-03-27

## 2025-03-27 RX ORDER — ONDANSETRON 4 MG/1
8 TABLET, FILM COATED ORAL EVERY 8 HOURS PRN
Status: CANCELLED | OUTPATIENT
Start: 2025-03-27

## 2025-03-27 RX ORDER — POTASSIUM CHLORIDE 1500 MG/1
40 TABLET, EXTENDED RELEASE ORAL PRN
Status: CANCELLED | OUTPATIENT
Start: 2025-03-27

## 2025-03-27 RX ORDER — HEPARIN 100 UNIT/ML
500 SYRINGE INTRAVENOUS PRN
Status: CANCELLED | OUTPATIENT
Start: 2025-03-27

## 2025-03-27 RX ORDER — SODIUM CHLORIDE 9 MG/ML
INJECTION, SOLUTION INTRAVENOUS CONTINUOUS PRN
Status: CANCELLED | OUTPATIENT
Start: 2025-03-27

## 2025-03-27 RX ORDER — 0.9 % SODIUM CHLORIDE 0.9 %
1000 INTRAVENOUS SOLUTION INTRAVENOUS ONCE
Status: CANCELLED | OUTPATIENT
Start: 2025-03-27 | End: 2025-03-27

## 2025-03-27 RX ORDER — PROCHLORPERAZINE MALEATE 10 MG
10 TABLET ORAL EVERY 6 HOURS PRN
Status: CANCELLED | OUTPATIENT
Start: 2025-03-27

## 2025-03-27 RX ORDER — MAGNESIUM SULFATE IN WATER 40 MG/ML
4000 INJECTION, SOLUTION INTRAVENOUS PRN
Status: CANCELLED | OUTPATIENT
Start: 2025-03-27

## 2025-03-27 NOTE — PROGRESS NOTES
Short Stay Communication Note  Catalina Leyva  Diagnosis:  Primary MD:  Treatment: Melphalan Fludarabine/Cytoxan  CAR-T Administration Date: 3/24/25  Day +3 of Carvykti   Pt seen in outpatient infusion today. Labs drawn and reviewed.   CBC:   Recent Labs     03/25/25  0405 03/26/25  0325 03/27/25  0851   WBC 1.3* 1.2* 1.5*   HGB 12.4 12.2 12.3   HCT 37.4 36.8 36.8   MCV 84.6 85.5 84.3    129* 125*     BMP/Mag:  Recent Labs     03/25/25  0405 03/26/25  0325 03/27/25  0851    142 136   K 3.9 4.0 4.3    106 102   CO2 28 28 26   PHOS 3.6 3.5 3.0   BUN 12 13 11   CREATININE 0.6 0.6 0.6   MG 2.06 1.97  --      Standing parameters for replacement for this patient:   1 unit of pack red blood cells for a hemoglobin < or equal to 7  1 pack of platelets for a platelet count less than or equal to 10  40 MeQ of Potassium administered for a potassium level less than or equal to 3.4  4g of Magnesium Sulfate for a magnesum level less than or equal to 1.4  No transfusions required for the above lab values.    Urinalysis last done: 3/24/25 Urinalysis next due: 3/31/25    Chest X-Ray last done: 3/24/25 Chest X-Ray next due: 3/31/25    Symptoms addressed and reported to care team this date:   Treatments this date:    TempTraq sensor bent and not sensing. New temptraq placed today (3/27) and monitoring well. RN Educated patient to utilize secondary thermometer if temptraq not working.     Reviewed medication schedule with pt and caregiver. Both able to verbalize all medications and schedule. Pt to be seen again tomorrow. Patient and caregiver verbalized understanding of discharge instructions including when and how to call the doctor and when to report  to the ER. Discharged ambulatory to home.   
 3    Continue to monitor Absolute Lymphocyte Count.  If ALC is >3 on Day 7-14 s/p Carvykti, give Dexamethasone 10 mg PO daily x 3 days         2. CRS / Neuro: At risk post-infusion  CRS Grade: None  - Monitor CRP and Ferrtin closely      Lab Results   Component Value Date    CRP 4.2 03/27/2025    CRP 4.7 03/26/2025    CRP 3.8 03/25/2025     Lab Results   Component Value Date    FERRITIN 71.7 03/27/2025    FERRITIN 81.6 03/26/2025    FERRITIN 70.0 03/25/2025       ICANS Grade: None  ICE Score:     - Neuro checks w/ CARTOX 10-point assessment Q4hrs  - Start Keppra on 3/24/2025       3.  ID:     History:   Clostridium Difficile   -Cdiff (1/1/25): positive s/p Dificid   History Hepatitis C s/p Harvoni 2016  - USN negative 10/2023  Hypogammaglobulinemia  - IgG (2/4/25): 339 - IVIG on 2/18/25       PLAN:  - Valtrex, Diflucan, and Levaquin ppx      Post CAR- T monitoring / maintenance:  - IgG & CD4 level monthly starting on day + 30  - Check disease titers on day + 30 or when WBC recovered. Re-vaccinate vs booster based on titer (assure titers are drawn prior to administering immunoglobulins)  - Start PCP ppx on day + 30 - stop when CD4 > 250  - Cont Valtrex for 1 year post CAR-T     4. Heme:   Neutropenia and thrombocytopenia r/t recent chemo  - Transfuse for Hgb < 7 and Platelets < 10K  - No transfusion today        5. Metabolic:    Electrolytes are WNL and renal fxn stable.    Risk for TLS:  No evidence  - Cont allopurinol daily           6. GI / Nutrition:    Nutrition:  Appetite and oral intake is adequate  - low microbial diet   - Follow closely with dietician     7.  Social:  - Patient has h/o ETOH abuse (quit 2010) and tobacco use (quit 1980)  - Patient cares for  with Geovani Body Dementia.  States this is a stressor.  Social work for palliative care consulted  - Patient has h/o challenges with mood and anxiety.  Takes Celexa 10 mg, Lorazepam 1 mg BID, and Hydroxizine 25 mg Q HS  - Primary Caregiver:   Rohit

## 2025-03-28 ENCOUNTER — HOSPITAL ENCOUNTER (OUTPATIENT)
Dept: ONCOLOGY | Age: 75
Setting detail: INFUSION SERIES
Discharge: HOME | End: 2025-03-28
Payer: MEDICARE

## 2025-03-28 ENCOUNTER — CARE COORDINATION (OUTPATIENT)
Dept: CASE MANAGEMENT | Age: 75
End: 2025-03-28

## 2025-03-28 VITALS
TEMPERATURE: 98.1 F | RESPIRATION RATE: 16 BRPM | DIASTOLIC BLOOD PRESSURE: 69 MMHG | WEIGHT: 164.46 LBS | BODY MASS INDEX: 29.21 KG/M2 | SYSTOLIC BLOOD PRESSURE: 123 MMHG | HEART RATE: 87 BPM

## 2025-03-28 DIAGNOSIS — C90.02 MULTIPLE MYELOMA IN RELAPSE (HCC): Primary | ICD-10-CM

## 2025-03-28 LAB
ALBUMIN SERPL-MCNC: 4 G/DL (ref 3.4–5)
ALP SERPL-CCNC: 73 U/L (ref 40–129)
ALT SERPL-CCNC: 18 U/L (ref 10–40)
ANION GAP SERPL CALCULATED.3IONS-SCNC: 8 MMOL/L (ref 3–16)
AST SERPL-CCNC: 24 U/L (ref 15–37)
BASOPHILS # BLD: 0 K/UL (ref 0–0.2)
BASOPHILS NFR BLD: 0.8 %
BILIRUB DIRECT SERPL-MCNC: <0.1 MG/DL (ref 0–0.3)
BILIRUB INDIRECT SERPL-MCNC: 0.2 MG/DL (ref 0–1)
BILIRUB SERPL-MCNC: 0.3 MG/DL (ref 0–1)
BUN SERPL-MCNC: 16 MG/DL (ref 7–20)
CALCIUM SERPL-MCNC: 9.3 MG/DL (ref 8.3–10.6)
CHLORIDE SERPL-SCNC: 106 MMOL/L (ref 99–110)
CO2 SERPL-SCNC: 26 MMOL/L (ref 21–32)
CREAT SERPL-MCNC: 0.7 MG/DL (ref 0.6–1.2)
DEPRECATED RDW RBC AUTO: 17.7 % (ref 12.4–15.4)
EOSINOPHIL # BLD: 0 K/UL (ref 0–0.6)
EOSINOPHIL NFR BLD: 2.9 %
GFR SERPLBLD CREATININE-BSD FMLA CKD-EPI: >90 ML/MIN/{1.73_M2}
GLUCOSE SERPL-MCNC: 101 MG/DL (ref 70–99)
HCT VFR BLD AUTO: 36.4 % (ref 36–48)
HGB BLD-MCNC: 12 G/DL (ref 12–16)
LDH SERPL L TO P-CCNC: 170 U/L (ref 100–190)
LYMPHOCYTES # BLD: 0.1 K/UL (ref 1–5.1)
LYMPHOCYTES NFR BLD: 6 %
MAGNESIUM SERPL-MCNC: 1.91 MG/DL (ref 1.8–2.4)
MCH RBC QN AUTO: 28.2 PG (ref 26–34)
MCHC RBC AUTO-ENTMCNC: 33.1 G/DL (ref 31–36)
MCV RBC AUTO: 85.2 FL (ref 80–100)
MONOCYTES # BLD: 0.1 K/UL (ref 0–1.3)
MONOCYTES NFR BLD: 10.4 %
NEUTROPHILS # BLD: 1 K/UL (ref 1.7–7.7)
NEUTROPHILS NFR BLD: 79.9 %
PHOSPHATE SERPL-MCNC: 3.5 MG/DL (ref 2.5–4.9)
PLATELET # BLD AUTO: 126 K/UL (ref 135–450)
PMV BLD AUTO: 7 FL (ref 5–10.5)
POTASSIUM SERPL-SCNC: 4.4 MMOL/L (ref 3.5–5.1)
PROT SERPL-MCNC: 6.1 G/DL (ref 6.4–8.2)
RBC # BLD AUTO: 4.27 M/UL (ref 4–5.2)
SODIUM SERPL-SCNC: 140 MMOL/L (ref 136–145)
URATE SERPL-MCNC: 1.9 MG/DL (ref 2.6–6)
WBC # BLD AUTO: 1.3 K/UL (ref 4–11)

## 2025-03-28 PROCEDURE — 83615 LACTATE (LD) (LDH) ENZYME: CPT

## 2025-03-28 PROCEDURE — 84100 ASSAY OF PHOSPHORUS: CPT

## 2025-03-28 PROCEDURE — 80048 BASIC METABOLIC PNL TOTAL CA: CPT

## 2025-03-28 PROCEDURE — 80076 HEPATIC FUNCTION PANEL: CPT

## 2025-03-28 PROCEDURE — 84550 ASSAY OF BLOOD/URIC ACID: CPT

## 2025-03-28 PROCEDURE — 83735 ASSAY OF MAGNESIUM: CPT

## 2025-03-28 PROCEDURE — 36591 DRAW BLOOD OFF VENOUS DEVICE: CPT

## 2025-03-28 PROCEDURE — 85025 COMPLETE CBC W/AUTO DIFF WBC: CPT

## 2025-03-28 RX ORDER — POTASSIUM CHLORIDE 1500 MG/1
40 TABLET, EXTENDED RELEASE ORAL PRN
Status: DISCONTINUED | OUTPATIENT
Start: 2025-03-28 | End: 2025-03-29 | Stop reason: HOSPADM

## 2025-03-28 RX ORDER — 0.9 % SODIUM CHLORIDE 0.9 %
1000 INTRAVENOUS SOLUTION INTRAVENOUS ONCE
Status: CANCELLED | OUTPATIENT
Start: 2025-03-29 | End: 2025-03-29

## 2025-03-28 RX ORDER — OXYCODONE HYDROCHLORIDE 5 MG/1
5 TABLET ORAL EVERY 4 HOURS PRN
Status: CANCELLED | OUTPATIENT
Start: 2025-03-29

## 2025-03-28 RX ORDER — SODIUM CHLORIDE 9 MG/ML
INJECTION, SOLUTION INTRAVENOUS CONTINUOUS PRN
Status: DISCONTINUED | OUTPATIENT
Start: 2025-03-28 | End: 2025-03-29 | Stop reason: HOSPADM

## 2025-03-28 RX ORDER — POTASSIUM CHLORIDE 1500 MG/1
40 TABLET, EXTENDED RELEASE ORAL PRN
Status: CANCELLED | OUTPATIENT
Start: 2025-03-29

## 2025-03-28 RX ORDER — MAGNESIUM SULFATE IN WATER 40 MG/ML
4000 INJECTION, SOLUTION INTRAVENOUS PRN
Status: CANCELLED | OUTPATIENT
Start: 2025-03-29

## 2025-03-28 RX ORDER — MAGNESIUM SULFATE IN WATER 40 MG/ML
4000 INJECTION, SOLUTION INTRAVENOUS PRN
Status: DISCONTINUED | OUTPATIENT
Start: 2025-03-28 | End: 2025-03-29 | Stop reason: HOSPADM

## 2025-03-28 RX ORDER — ONDANSETRON 2 MG/ML
8 INJECTION INTRAMUSCULAR; INTRAVENOUS EVERY 8 HOURS PRN
Status: CANCELLED | OUTPATIENT
Start: 2025-03-29

## 2025-03-28 RX ORDER — OXYCODONE HYDROCHLORIDE 5 MG/1
10 TABLET ORAL EVERY 4 HOURS PRN
Status: CANCELLED | OUTPATIENT
Start: 2025-03-29

## 2025-03-28 RX ORDER — SODIUM CHLORIDE 9 MG/ML
INJECTION, SOLUTION INTRAVENOUS CONTINUOUS PRN
Status: CANCELLED | OUTPATIENT
Start: 2025-03-29

## 2025-03-28 RX ORDER — POTASSIUM CHLORIDE 29.8 MG/ML
80 INJECTION INTRAVENOUS PRN
Status: DISCONTINUED | OUTPATIENT
Start: 2025-03-28 | End: 2025-03-29 | Stop reason: HOSPADM

## 2025-03-28 RX ORDER — 0.9 % SODIUM CHLORIDE 0.9 %
1000 INTRAVENOUS SOLUTION INTRAVENOUS ONCE
Status: DISCONTINUED | OUTPATIENT
Start: 2025-03-28 | End: 2025-03-29 | Stop reason: HOSPADM

## 2025-03-28 RX ORDER — PROCHLORPERAZINE EDISYLATE 5 MG/ML
10 INJECTION INTRAMUSCULAR; INTRAVENOUS EVERY 6 HOURS PRN
Status: CANCELLED | OUTPATIENT
Start: 2025-03-29

## 2025-03-28 RX ORDER — PROCHLORPERAZINE MALEATE 10 MG
10 TABLET ORAL EVERY 6 HOURS PRN
Status: CANCELLED | OUTPATIENT
Start: 2025-03-29

## 2025-03-28 RX ORDER — POTASSIUM CHLORIDE 29.8 MG/ML
80 INJECTION INTRAVENOUS PRN
Status: CANCELLED | OUTPATIENT
Start: 2025-03-29

## 2025-03-28 RX ORDER — HEPARIN 100 UNIT/ML
500 SYRINGE INTRAVENOUS PRN
Status: CANCELLED | OUTPATIENT
Start: 2025-03-29

## 2025-03-28 RX ORDER — ONDANSETRON 4 MG/1
8 TABLET, FILM COATED ORAL EVERY 8 HOURS PRN
Status: CANCELLED | OUTPATIENT
Start: 2025-03-29

## 2025-03-28 NOTE — PROGRESS NOTES
Short Stay Communication Note  Catalina Leyva  Diagnosis:  Primary MD:  Treatment: Melphalan Fludarabine/Cytoxan  CAR-T Administration Date: 3/24/25  Day +4 of Carvykti   Pt seen in outpatient infusion today. Labs drawn and reviewed.   CBC:   Recent Labs     03/26/25  0325 03/27/25  0851 03/28/25  0815   WBC 1.2* 1.5* 1.3*   HGB 12.2 12.3 12.0   HCT 36.8 36.8 36.4   MCV 85.5 84.3 85.2   * 125* 126*     BMP/Mag:  Recent Labs     03/26/25  0325 03/27/25  0851 03/28/25  0815    136 140   K 4.0 4.3 4.4    102 106   CO2 28 26 26   PHOS 3.5 3.0 3.5   BUN 13 11 16   CREATININE 0.6 0.6 0.7   MG 1.97  --  1.91     Standing parameters for replacement for this patient:   1 unit of pack red blood cells for a hemoglobin < or equal to 7  1 pack of platelets for a platelet count less than or equal to 10  40 MeQ of Potassium administered for a potassium level less than or equal to 3.4  4g of Magnesium Sulfate for a magnesum level less than or equal to 1.4  No transfusions required for the above lab values.    Urinalysis last done: 3/24/25 Urinalysis next due: 3/31/25    Chest X-Ray last done: 3/24/25 Chest X-Ray next due: 3/31/25    Symptoms addressed and reported to care team this date:   Treatments this date:    TempTraq working, patient with questions why it is working on sister's phone but not hers. RN Educated patient to utilize secondary thermometer if temptraq not working.     Reviewed medication schedule with pt and caregiver. Both able to verbalize all medications and schedule. Pt to be seen again tomorrow. Patient and caregiver verbalized understanding of discharge instructions including when and how to call the doctor and when to report  to the ER. Discharged ambulatory to home.

## 2025-03-28 NOTE — PROGRESS NOTES
BCC Progress Note      3/28/2025    Catalina Leyva    :  1950    MRN:  [id number]    Referring MD: Maribel Garrido,   [address]  [address]  [place] [address]      Interval History:  - Doing well   - No s/sx of CRS/neurotoxicity      ECOG PS:  (1) Restricted in physically strenuous activity, ambulatory and able to do work of light nature    KPS: 90% Able to carry on normal activity; minor signs or symptoms of disease    Isolation:  None     Medications    Scheduled Meds:   sodium chloride  1,000 mL IntraVENous Once    ALTEplase (CATHFLO) 2 mg in sterile water 2 mL injection  2 mg IntraCATHeter Once     Continuous Infusions:   sodium chloride       PRN Meds:.sodium chloride, potassium chloride, potassium chloride, magnesium sulfate, magnesium hydroxide      ROS:  As noted above, otherwise remainder of 10-point ROS negative      Physical Exam:     Vital Signs:  /69   Pulse 87   Temp 98.1 °F (36.7 °C) (Oral)   Resp 16   Wt 74.6 kg (164 lb 7.4 oz)   BMI 29.21 kg/m²     Weight:    Wt Readings from Last 3 Encounters:   25 74.6 kg (164 lb 7.4 oz)   25 74.2 kg (163 lb 9.3 oz)   25 73.6 kg (162 lb 3.2 oz)       General: Awake, alert and oriented.  HEENT: normocephalic, alopecia, PERRL, no scleral erythema or icterus, Oral mucosa moist and intact, throat clear  NECK: supple   BACK: Straight   SKIN: warm dry and intact without lesions rashes or masses  CHEST: CTA bilaterally without use of accessory muscles  CV: Normal S1 S2, RRR, no MRG  ABD: NT ND normoactive BS  EXTREMITIES: without edema, denies calf tenderness  NEURO: CN II - XII grossly intact  CATHETER: PAC    Laboratory Data:  CBC:   Recent Labs     25  0325 25  0851 25  0815   WBC 1.2* 1.5* 1.3*   HGB 12.2 12.3 12.0   HCT 36.8 36.8 36.4   MCV 85.5 84.3 85.2   * 125* 126*     BMP/Mag:  Recent Labs     25  0325 25  0851 25  0815    136 140   K 4.0 4.3 4.4    102

## 2025-03-28 NOTE — CARE COORDINATION
Care Transitions Note    Initial Call - Call within 2 business days of discharge: Yes    Attempted to reach patient for transitions of care follow up. Unable to reach patient.    Outreach Attempts:   Unable to leave message.     Patient: Catalina Leyva    Patient : 1950   MRN: [id number]    Reason for Admission: multiple myeloma   Discharge Date: 3/26/25  RURS: Readmission Risk Score: 11.4    Last Discharge Facility       Date Complaint Diagnosis Description Type Department Provider    3/24/25   Admission (Discharged) Maribel Baxter, DO            Was this an external facility discharge? No    Follow Up Appointment:   Patient does not have a follow up appointment scheduled at time of call.  [facility] - msg pcp pool   Future Appointments         Provider Specialty Dept Phone    3/29/2025 8:00 AM [facility] Oncology [phone]    3/30/2025 8:00 AM [facility] On[facility] [phone]    3/31/2025 8:00 AM [facility] Oncology [phone]    3/31/2025 9:00 AM (Arrive by 8:30 AM) [facility] Radiology [phone]    2025 8:00 AM [facility] On[facility] [phone]    2025 8:00 AM [facility] On[facility] [phone]    4/3/2025  8:00 AM [facility] Oncology [phone]    2025 8:00 AM [facility] On[facility] [phone]    2025 8:00 AM [facility] On[facility] [phone]    2025 8:00 AM [facility] On[facility] [phone]    2025 8:00 AM [facility] [phone]    2025 8:00 AM [facility] On[facility] [phone]    2025 8:00 AM [facility] [phone]    4/10/2025 8:00 AM [facility] On[facility] [phone]    2025 8:00 AM [facility] On[facility] [phone]    2025 8:00 AM Mayra Mayen MD [facility] [phone]            No further follow-up call indicated - CT episode closed - CTN signed off pt.      Mine Patel RN

## 2025-03-29 ENCOUNTER — HOSPITAL ENCOUNTER (OUTPATIENT)
Dept: ONCOLOGY | Age: 75
Setting detail: INFUSION SERIES
Discharge: HOME | End: 2025-03-29
Payer: MEDICARE

## 2025-03-29 VITALS
DIASTOLIC BLOOD PRESSURE: 70 MMHG | SYSTOLIC BLOOD PRESSURE: 109 MMHG | HEART RATE: 89 BPM | TEMPERATURE: 97.8 F | BODY MASS INDEX: 29.29 KG/M2 | OXYGEN SATURATION: 98 % | WEIGHT: 164.9 LBS | RESPIRATION RATE: 16 BRPM

## 2025-03-29 DIAGNOSIS — C90.02 MULTIPLE MYELOMA IN RELAPSE (HCC): Primary | ICD-10-CM

## 2025-03-29 LAB
ANION GAP SERPL CALCULATED.3IONS-SCNC: 9 MMOL/L (ref 3–16)
ANISOCYTOSIS BLD QL SMEAR: ABNORMAL
BASOPHILS # BLD: 0 K/UL (ref 0–0.2)
BASOPHILS NFR BLD: 1 %
BUN SERPL-MCNC: 15 MG/DL (ref 7–20)
CALCIUM SERPL-MCNC: 9.3 MG/DL (ref 8.3–10.6)
CHLORIDE SERPL-SCNC: 106 MMOL/L (ref 99–110)
CO2 SERPL-SCNC: 27 MMOL/L (ref 21–32)
CREAT SERPL-MCNC: 0.7 MG/DL (ref 0.6–1.2)
DEPRECATED RDW RBC AUTO: 17.8 % (ref 12.4–15.4)
EOSINOPHIL # BLD: 0 K/UL (ref 0–0.6)
EOSINOPHIL NFR BLD: 2 %
GFR SERPLBLD CREATININE-BSD FMLA CKD-EPI: >90 ML/MIN/{1.73_M2}
GLUCOSE SERPL-MCNC: 99 MG/DL (ref 70–99)
HCT VFR BLD AUTO: 35.9 % (ref 36–48)
HGB BLD-MCNC: 12.2 G/DL (ref 12–16)
LYMPHOCYTES # BLD: 0.2 K/UL (ref 1–5.1)
LYMPHOCYTES NFR BLD: 8 %
MCH RBC QN AUTO: 28.4 PG (ref 26–34)
MCHC RBC AUTO-ENTMCNC: 34 G/DL (ref 31–36)
MCV RBC AUTO: 83.4 FL (ref 80–100)
MICROCYTES BLD QL SMEAR: ABNORMAL
MONOCYTES # BLD: 0.1 K/UL (ref 0–1.3)
MONOCYTES NFR BLD: 11 %
NEUTROPHILS # BLD: 0.9 K/UL (ref 1.7–7.7)
NEUTROPHILS NFR BLD: 72 %
NRBC BLD-RTO: 1 /100 WBC
OVALOCYTES BLD QL SMEAR: ABNORMAL
PHOSPHATE SERPL-MCNC: 4 MG/DL (ref 2.5–4.9)
PLATELET # BLD AUTO: 130 K/UL (ref 135–450)
PMV BLD AUTO: 6.8 FL (ref 5–10.5)
POTASSIUM SERPL-SCNC: 4.7 MMOL/L (ref 3.5–5.1)
RBC # BLD AUTO: 4.31 M/UL (ref 4–5.2)
SCHISTOCYTES BLD QL SMEAR: ABNORMAL
SODIUM SERPL-SCNC: 142 MMOL/L (ref 136–145)
STOMATOCYTES BLD QL SMEAR: ABNORMAL
VARIANT LYMPHS NFR BLD MANUAL: 6 % (ref 0–6)
WBC # BLD AUTO: 1.3 K/UL (ref 4–11)

## 2025-03-29 PROCEDURE — 36591 DRAW BLOOD OFF VENOUS DEVICE: CPT

## 2025-03-29 PROCEDURE — 80048 BASIC METABOLIC PNL TOTAL CA: CPT

## 2025-03-29 PROCEDURE — 85025 COMPLETE CBC W/AUTO DIFF WBC: CPT

## 2025-03-29 PROCEDURE — 84100 ASSAY OF PHOSPHORUS: CPT

## 2025-03-29 RX ORDER — HEPARIN 100 UNIT/ML
500 SYRINGE INTRAVENOUS PRN
OUTPATIENT
Start: 2025-03-30

## 2025-03-29 RX ORDER — PROCHLORPERAZINE MALEATE 10 MG
10 TABLET ORAL EVERY 6 HOURS PRN
Status: CANCELLED | OUTPATIENT
Start: 2025-03-30

## 2025-03-29 RX ORDER — POTASSIUM CHLORIDE 1500 MG/1
40 TABLET, EXTENDED RELEASE ORAL PRN
OUTPATIENT
Start: 2025-03-30

## 2025-03-29 RX ORDER — MAGNESIUM SULFATE IN WATER 40 MG/ML
4000 INJECTION, SOLUTION INTRAVENOUS PRN
Status: DISCONTINUED | OUTPATIENT
Start: 2025-03-29 | End: 2025-03-30 | Stop reason: HOSPADM

## 2025-03-29 RX ORDER — MAGNESIUM SULFATE IN WATER 40 MG/ML
4000 INJECTION, SOLUTION INTRAVENOUS PRN
OUTPATIENT
Start: 2025-03-30

## 2025-03-29 RX ORDER — SODIUM CHLORIDE 9 MG/ML
INJECTION, SOLUTION INTRAVENOUS CONTINUOUS PRN
Status: DISCONTINUED | OUTPATIENT
Start: 2025-03-29 | End: 2025-03-30 | Stop reason: HOSPADM

## 2025-03-29 RX ORDER — 0.9 % SODIUM CHLORIDE 0.9 %
1000 INTRAVENOUS SOLUTION INTRAVENOUS ONCE
Status: DISCONTINUED | OUTPATIENT
Start: 2025-03-29 | End: 2025-03-30 | Stop reason: HOSPADM

## 2025-03-29 RX ORDER — ONDANSETRON 4 MG/1
8 TABLET, FILM COATED ORAL EVERY 8 HOURS PRN
Status: CANCELLED | OUTPATIENT
Start: 2025-03-30

## 2025-03-29 RX ORDER — ONDANSETRON 2 MG/ML
8 INJECTION INTRAMUSCULAR; INTRAVENOUS EVERY 8 HOURS PRN
Status: CANCELLED | OUTPATIENT
Start: 2025-03-30

## 2025-03-29 RX ORDER — OXYCODONE HYDROCHLORIDE 5 MG/1
5 TABLET ORAL EVERY 4 HOURS PRN
Status: CANCELLED | OUTPATIENT
Start: 2025-03-30

## 2025-03-29 RX ORDER — POTASSIUM CHLORIDE 1500 MG/1
40 TABLET, EXTENDED RELEASE ORAL PRN
Status: DISCONTINUED | OUTPATIENT
Start: 2025-03-29 | End: 2025-03-30 | Stop reason: HOSPADM

## 2025-03-29 RX ORDER — SODIUM CHLORIDE 9 MG/ML
INJECTION, SOLUTION INTRAVENOUS CONTINUOUS PRN
OUTPATIENT
Start: 2025-03-30

## 2025-03-29 RX ORDER — OXYCODONE HYDROCHLORIDE 5 MG/1
10 TABLET ORAL EVERY 4 HOURS PRN
Status: CANCELLED | OUTPATIENT
Start: 2025-03-30

## 2025-03-29 RX ORDER — POTASSIUM CHLORIDE 29.8 MG/ML
20 INJECTION INTRAVENOUS PRN
Status: DISCONTINUED | OUTPATIENT
Start: 2025-03-29 | End: 2025-03-30 | Stop reason: HOSPADM

## 2025-03-29 RX ORDER — PROCHLORPERAZINE EDISYLATE 5 MG/ML
10 INJECTION INTRAMUSCULAR; INTRAVENOUS EVERY 6 HOURS PRN
Status: CANCELLED | OUTPATIENT
Start: 2025-03-30

## 2025-03-29 RX ORDER — POTASSIUM CHLORIDE 29.8 MG/ML
80 INJECTION INTRAVENOUS PRN
OUTPATIENT
Start: 2025-03-30

## 2025-03-29 RX ORDER — 0.9 % SODIUM CHLORIDE 0.9 %
1000 INTRAVENOUS SOLUTION INTRAVENOUS ONCE
OUTPATIENT
Start: 2025-03-30 | End: 2025-03-30

## 2025-03-29 NOTE — PROGRESS NOTES
Short Stay Communication Note  Catalina Leyva  Diagnosis:  Primary MD:  Treatment: Melphalan Fludarabine/Cytoxan  CAR-T Administration Date: 3/24/25  Day +5 of Carvykti   Pt seen in outpatient infusion today. Labs drawn and reviewed.   CBC:   Recent Labs     03/27/25  0851 03/28/25  0815 03/29/25  0820   WBC 1.5* 1.3* 1.3*   HGB 12.3 12.0 12.2   HCT 36.8 36.4 35.9*   MCV 84.3 85.2 83.4   * 126* 130*     BMP/Mag:  Recent Labs     03/27/25  0851 03/28/25  0815 03/29/25  0820    140 142   K 4.3 4.4 4.7    106 106   CO2 26 26 27   PHOS 3.0 3.5 4.0   BUN 11 16 15   CREATININE 0.6 0.7 0.7   MG  --  1.91  --      Standing parameters for replacement for this patient:   1 unit of pack red blood cells for a hemoglobin < or equal to 7  1 pack of platelets for a platelet count less than or equal to 10  40 MeQ of Potassium administered for a potassium level less than or equal to 3.4  4g of Magnesium Sulfate for a magnesum level less than or equal to 1.4  No transfusions required for the above lab values.    Urinalysis last done: 3/24/25 Urinalysis next due: 3/31/25    Chest X-Ray last done: 3/24/25 Chest X-Ray next due: 3/31/25    Symptoms addressed and reported to care team this date: diarrhea x1, patient sent home with Cdiff sample kit to complete if patient has loose stool.   Treatments this date:    Patient continuously educated on signs and symptoms of CRS. RN Educated patient to utilize secondary thermometer if temptraq not working.     Reviewed medication schedule with pt and caregiver. Both able to verbalize all medications and schedule. Pt to be seen again tomorrow. Patient and caregiver verbalized understanding of discharge instructions including when and how to call the doctor and when to report  to the ER. Discharged ambulatory to home.

## 2025-03-29 NOTE — PROGRESS NOTES
BCC Progress Note      3/29/2025    Catalina Leyva    :  1950    MRN:  [id number]    Referring MD: Maribel Garrido DO  [address]  [address]  [place] [address]      Interval History: ***     ECOG PS:  (1) Restricted in physically strenuous activity, ambulatory and able to do work of light nature    KPS: 90% Able to carry on normal activity; minor signs or symptoms of disease    Isolation:  None     Medications    Scheduled Meds:   sodium chloride  1,000 mL IntraVENous Once    ALTEplase (CATHFLO) 2 mg in sterile water 2 mL injection  2 mg IntraCATHeter Once     Continuous Infusions:   sodium chloride       PRN Meds:.sodium chloride, potassium chloride, potassium chloride, magnesium sulfate, magnesium hydroxide      ROS:  As noted above, otherwise remainder of 10-point ROS negative      Physical Exam:     Vital Signs:  /70   Pulse 89   Temp 97.8 °F (36.6 °C) (Oral)   Resp 16   Wt 74.8 kg (164 lb 14.5 oz)   SpO2 98%   BMI 29.29 kg/m²     Weight:    Wt Readings from Last 3 Encounters:   25 74.8 kg (164 lb 14.5 oz)   25 74.6 kg (164 lb 7.4 oz)   25 74.2 kg (163 lb 9.3 oz)       General: Awake, alert and oriented.  HEENT: normocephalic, alopecia, PERRL, no scleral erythema or icterus, Oral mucosa moist and intact, throat clear  NECK: supple   BACK: Straight   SKIN: warm dry and intact without lesions rashes or masses  CHEST: CTA bilaterally without use of accessory muscles  CV: Normal S1 S2, RRR, no MRG  ABD: NT ND normoactive BS  EXTREMITIES: without edema, denies calf tenderness  NEURO: CN II - XII grossly intact  CATHETER: PAC    Laboratory Data:  CBC:   Recent Labs     25  0851 25  0815 25  0820   WBC 1.5* 1.3* 1.3*   HGB 12.3 12.0 12.2   HCT 36.8 36.4 35.9*   MCV 84.3 85.2 83.4   * 126* 130*     BMP/Mag:  Recent Labs     25  0851 25  0815 25  0820    140 142   K 4.3 4.4 4.7    106 106   CO2 26 26 27   PHOS 3.0

## 2025-03-30 ENCOUNTER — HOSPITAL ENCOUNTER (OUTPATIENT)
Dept: ONCOLOGY | Age: 75
Setting detail: INFUSION SERIES
Discharge: HOME | End: 2025-03-30
Payer: MEDICARE

## 2025-03-30 VITALS
HEART RATE: 79 BPM | SYSTOLIC BLOOD PRESSURE: 123 MMHG | TEMPERATURE: 98.2 F | WEIGHT: 163.36 LBS | OXYGEN SATURATION: 96 % | RESPIRATION RATE: 18 BRPM | BODY MASS INDEX: 29.02 KG/M2 | DIASTOLIC BLOOD PRESSURE: 72 MMHG

## 2025-03-30 DIAGNOSIS — C90.02 MULTIPLE MYELOMA IN RELAPSE (HCC): Primary | ICD-10-CM

## 2025-03-30 LAB
ANION GAP SERPL CALCULATED.3IONS-SCNC: 9 MMOL/L (ref 3–16)
ANISOCYTOSIS BLD QL SMEAR: ABNORMAL
BASOPHILS # BLD: 0 K/UL (ref 0–0.2)
BASOPHILS NFR BLD: 0 %
BUN SERPL-MCNC: 16 MG/DL (ref 7–20)
CALCIUM SERPL-MCNC: 9.3 MG/DL (ref 8.3–10.6)
CHLORIDE SERPL-SCNC: 102 MMOL/L (ref 99–110)
CO2 SERPL-SCNC: 26 MMOL/L (ref 21–32)
CREAT SERPL-MCNC: 0.6 MG/DL (ref 0.6–1.2)
CRP SERPL-MCNC: 7.6 MG/L (ref 0–5.1)
DEPRECATED RDW RBC AUTO: 17.4 % (ref 12.4–15.4)
EOSINOPHIL # BLD: 0 K/UL (ref 0–0.6)
EOSINOPHIL NFR BLD: 1 %
FERRITIN SERPL IA-MCNC: 47.3 NG/ML (ref 15–150)
FIBRINOGEN PPP-MCNC: 402 MG/DL (ref 227–534)
GFR SERPLBLD CREATININE-BSD FMLA CKD-EPI: >90 ML/MIN/{1.73_M2}
GLUCOSE SERPL-MCNC: 82 MG/DL (ref 70–99)
HCT VFR BLD AUTO: 34.9 % (ref 36–48)
HGB BLD-MCNC: 11.9 G/DL (ref 12–16)
LYMPHOCYTES # BLD: 0.2 K/UL (ref 1–5.1)
LYMPHOCYTES NFR BLD: 23 %
MCH RBC QN AUTO: 28.4 PG (ref 26–34)
MCHC RBC AUTO-ENTMCNC: 34.3 G/DL (ref 31–36)
MCV RBC AUTO: 82.8 FL (ref 80–100)
MICROCYTES BLD QL SMEAR: ABNORMAL
MONOCYTES # BLD: 0.2 K/UL (ref 0–1.3)
MONOCYTES NFR BLD: 26 %
NEUTROPHILS # BLD: 0.4 K/UL (ref 1.7–7.7)
NEUTROPHILS NFR BLD: 46 %
OVALOCYTES BLD QL SMEAR: ABNORMAL
PHOSPHATE SERPL-MCNC: 4.2 MG/DL (ref 2.5–4.9)
PLATELET # BLD AUTO: 133 K/UL (ref 135–450)
PMV BLD AUTO: 6.8 FL (ref 5–10.5)
POTASSIUM SERPL-SCNC: 4.5 MMOL/L (ref 3.5–5.1)
RBC # BLD AUTO: 4.21 M/UL (ref 4–5.2)
SCHISTOCYTES BLD QL SMEAR: ABNORMAL
SODIUM SERPL-SCNC: 137 MMOL/L (ref 136–145)
VARIANT LYMPHS NFR BLD MANUAL: 4 % (ref 0–6)
WBC # BLD AUTO: 0.9 K/UL (ref 4–11)

## 2025-03-30 PROCEDURE — 85025 COMPLETE CBC W/AUTO DIFF WBC: CPT

## 2025-03-30 PROCEDURE — 86140 C-REACTIVE PROTEIN: CPT

## 2025-03-30 PROCEDURE — 80048 BASIC METABOLIC PNL TOTAL CA: CPT

## 2025-03-30 PROCEDURE — 85384 FIBRINOGEN ACTIVITY: CPT

## 2025-03-30 PROCEDURE — 82728 ASSAY OF FERRITIN: CPT

## 2025-03-30 PROCEDURE — 84100 ASSAY OF PHOSPHORUS: CPT

## 2025-03-30 PROCEDURE — 36591 DRAW BLOOD OFF VENOUS DEVICE: CPT

## 2025-03-30 RX ORDER — PROCHLORPERAZINE EDISYLATE 5 MG/ML
10 INJECTION INTRAMUSCULAR; INTRAVENOUS EVERY 6 HOURS PRN
Status: DISCONTINUED | OUTPATIENT
Start: 2025-03-30 | End: 2025-03-31 | Stop reason: HOSPADM

## 2025-03-30 RX ORDER — SODIUM CHLORIDE 9 MG/ML
INJECTION, SOLUTION INTRAVENOUS CONTINUOUS PRN
Status: CANCELLED | OUTPATIENT
Start: 2025-03-31

## 2025-03-30 RX ORDER — POTASSIUM CHLORIDE 29.8 MG/ML
80 INJECTION INTRAVENOUS PRN
Status: CANCELLED | OUTPATIENT
Start: 2025-03-31

## 2025-03-30 RX ORDER — 0.9 % SODIUM CHLORIDE 0.9 %
1000 INTRAVENOUS SOLUTION INTRAVENOUS ONCE
Status: CANCELLED | OUTPATIENT
Start: 2025-03-31 | End: 2025-03-31

## 2025-03-30 RX ORDER — ONDANSETRON 4 MG/1
8 TABLET, FILM COATED ORAL EVERY 8 HOURS PRN
Status: CANCELLED | OUTPATIENT
Start: 2025-03-31

## 2025-03-30 RX ORDER — POTASSIUM CHLORIDE 1500 MG/1
40 TABLET, EXTENDED RELEASE ORAL PRN
Status: CANCELLED | OUTPATIENT
Start: 2025-03-31

## 2025-03-30 RX ORDER — HEPARIN 100 UNIT/ML
500 SYRINGE INTRAVENOUS PRN
Status: CANCELLED | OUTPATIENT
Start: 2025-03-31

## 2025-03-30 RX ORDER — OXYCODONE HYDROCHLORIDE 5 MG/1
10 TABLET ORAL EVERY 4 HOURS PRN
Status: CANCELLED | OUTPATIENT
Start: 2025-03-31

## 2025-03-30 RX ORDER — MAGNESIUM SULFATE IN WATER 40 MG/ML
4000 INJECTION, SOLUTION INTRAVENOUS PRN
Status: CANCELLED | OUTPATIENT
Start: 2025-03-31

## 2025-03-30 RX ORDER — PROCHLORPERAZINE MALEATE 10 MG
10 TABLET ORAL EVERY 6 HOURS PRN
Status: DISCONTINUED | OUTPATIENT
Start: 2025-03-30 | End: 2025-03-31 | Stop reason: HOSPADM

## 2025-03-30 RX ORDER — PROCHLORPERAZINE EDISYLATE 5 MG/ML
10 INJECTION INTRAMUSCULAR; INTRAVENOUS EVERY 6 HOURS PRN
Status: CANCELLED | OUTPATIENT
Start: 2025-03-31

## 2025-03-30 RX ORDER — ONDANSETRON 2 MG/ML
8 INJECTION INTRAMUSCULAR; INTRAVENOUS EVERY 8 HOURS PRN
Status: DISCONTINUED | OUTPATIENT
Start: 2025-03-30 | End: 2025-03-31 | Stop reason: HOSPADM

## 2025-03-30 RX ORDER — PROCHLORPERAZINE MALEATE 10 MG
10 TABLET ORAL EVERY 6 HOURS PRN
Status: CANCELLED | OUTPATIENT
Start: 2025-03-31

## 2025-03-30 RX ORDER — OXYCODONE HYDROCHLORIDE 5 MG/1
10 TABLET ORAL EVERY 4 HOURS PRN
Status: DISCONTINUED | OUTPATIENT
Start: 2025-03-30 | End: 2025-03-31 | Stop reason: HOSPADM

## 2025-03-30 RX ORDER — ONDANSETRON 2 MG/ML
8 INJECTION INTRAMUSCULAR; INTRAVENOUS EVERY 8 HOURS PRN
Status: CANCELLED | OUTPATIENT
Start: 2025-03-31

## 2025-03-30 RX ORDER — OXYCODONE HYDROCHLORIDE 5 MG/1
5 TABLET ORAL EVERY 4 HOURS PRN
Status: CANCELLED | OUTPATIENT
Start: 2025-03-31

## 2025-03-30 RX ORDER — ONDANSETRON 4 MG/1
8 TABLET, FILM COATED ORAL EVERY 8 HOURS PRN
Status: DISCONTINUED | OUTPATIENT
Start: 2025-03-30 | End: 2025-03-31 | Stop reason: HOSPADM

## 2025-03-30 RX ORDER — OXYCODONE HYDROCHLORIDE 5 MG/1
5 TABLET ORAL EVERY 4 HOURS PRN
Status: DISCONTINUED | OUTPATIENT
Start: 2025-03-30 | End: 2025-03-31 | Stop reason: HOSPADM

## 2025-03-31 ENCOUNTER — HOSPITAL ENCOUNTER (OUTPATIENT)
Dept: GENERAL RADIOLOGY | Age: 75
Discharge: HOME | End: 2025-03-31
Payer: MEDICARE

## 2025-03-31 ENCOUNTER — HOSPITAL ENCOUNTER (OUTPATIENT)
Dept: ONCOLOGY | Age: 75
Setting detail: INFUSION SERIES
Discharge: HOME | End: 2025-03-31
Payer: MEDICARE

## 2025-03-31 VITALS
TEMPERATURE: 97.3 F | DIASTOLIC BLOOD PRESSURE: 74 MMHG | SYSTOLIC BLOOD PRESSURE: 117 MMHG | HEART RATE: 82 BPM | OXYGEN SATURATION: 97 % | RESPIRATION RATE: 16 BRPM

## 2025-03-31 DIAGNOSIS — C90.02 MULTIPLE MYELOMA IN RELAPSE (HCC): Primary | ICD-10-CM

## 2025-03-31 LAB
ALBUMIN SERPL-MCNC: 4.1 G/DL (ref 3.4–5)
ALP SERPL-CCNC: 72 U/L (ref 40–129)
ALT SERPL-CCNC: 16 U/L (ref 10–40)
ANION GAP SERPL CALCULATED.3IONS-SCNC: 8 MMOL/L (ref 3–16)
AST SERPL-CCNC: 20 U/L (ref 15–37)
BASOPHILS # BLD: 0 K/UL (ref 0–0.2)
BASOPHILS NFR BLD: 0.2 %
BILIRUB DIRECT SERPL-MCNC: 0.2 MG/DL (ref 0–0.3)
BILIRUB INDIRECT SERPL-MCNC: 0.2 MG/DL (ref 0–1)
BILIRUB SERPL-MCNC: 0.4 MG/DL (ref 0–1)
BILIRUB UR QL STRIP.AUTO: NEGATIVE
BUN SERPL-MCNC: 14 MG/DL (ref 7–20)
CALCIUM SERPL-MCNC: 9.2 MG/DL (ref 8.3–10.6)
CHLORIDE SERPL-SCNC: 106 MMOL/L (ref 99–110)
CLARITY UR: CLEAR
CO2 SERPL-SCNC: 25 MMOL/L (ref 21–32)
COLOR UR: YELLOW
CREAT SERPL-MCNC: 0.6 MG/DL (ref 0.6–1.2)
CRP SERPL-MCNC: 4.8 MG/L (ref 0–5.1)
DEPRECATED RDW RBC AUTO: 17.8 % (ref 12.4–15.4)
EOSINOPHIL # BLD: 0 K/UL (ref 0–0.6)
EOSINOPHIL NFR BLD: 4.8 %
FERRITIN SERPL IA-MCNC: 46.1 NG/ML (ref 15–150)
FIBRINOGEN PPP-MCNC: 300 MG/DL (ref 227–534)
GFR SERPLBLD CREATININE-BSD FMLA CKD-EPI: >90 ML/MIN/{1.73_M2}
GLUCOSE SERPL-MCNC: 91 MG/DL (ref 70–99)
GLUCOSE UR STRIP.AUTO-MCNC: NEGATIVE MG/DL
HCT VFR BLD AUTO: 36.7 % (ref 36–48)
HGB BLD-MCNC: 12.4 G/DL (ref 12–16)
HGB UR QL STRIP.AUTO: NEGATIVE
INR PPP: 1 (ref 0.85–1.15)
KETONES UR STRIP.AUTO-MCNC: NEGATIVE MG/DL
LDH SERPL L TO P-CCNC: 162 U/L (ref 100–190)
LEUKOCYTE ESTERASE UR QL STRIP.AUTO: NEGATIVE
LYMPHOCYTES # BLD: 0.2 K/UL (ref 1–5.1)
LYMPHOCYTES NFR BLD: 18 %
MAGNESIUM SERPL-MCNC: 2.02 MG/DL (ref 1.8–2.4)
MCH RBC QN AUTO: 28.8 PG (ref 26–34)
MCHC RBC AUTO-ENTMCNC: 33.7 G/DL (ref 31–36)
MCV RBC AUTO: 85.6 FL (ref 80–100)
MONOCYTES # BLD: 0.3 K/UL (ref 0–1.3)
MONOCYTES NFR BLD: 32.6 %
NEUTROPHILS # BLD: 0.5 K/UL (ref 1.7–7.7)
NEUTROPHILS NFR BLD: 44.4 %
NITRITE UR QL STRIP.AUTO: NEGATIVE
PH UR STRIP.AUTO: 6 [PH] (ref 5–8)
PHOSPHATE SERPL-MCNC: 3.4 MG/DL (ref 2.5–4.9)
PLATELET # BLD AUTO: 135 K/UL (ref 135–450)
PMV BLD AUTO: 7 FL (ref 5–10.5)
POTASSIUM SERPL-SCNC: 4.2 MMOL/L (ref 3.5–5.1)
PROT SERPL-MCNC: 6.3 G/DL (ref 6.4–8.2)
PROT UR STRIP.AUTO-MCNC: NEGATIVE MG/DL
PROTHROMBIN TIME: 13.4 SEC (ref 11.9–14.9)
RBC # BLD AUTO: 4.29 M/UL (ref 4–5.2)
SODIUM SERPL-SCNC: 139 MMOL/L (ref 136–145)
SP GR UR STRIP.AUTO: <=1.005 (ref 1–1.03)
UA DIPSTICK W REFLEX MICRO PNL UR: NORMAL
URATE SERPL-MCNC: 2.2 MG/DL (ref 2.6–6)
URN SPEC COLLECT METH UR: NORMAL
UROBILINOGEN UR STRIP-ACNC: 0.2 E.U./DL
WBC # BLD AUTO: 1 K/UL (ref 4–11)

## 2025-03-31 PROCEDURE — 81003 URINALYSIS AUTO W/O SCOPE: CPT

## 2025-03-31 PROCEDURE — 83615 LACTATE (LD) (LDH) ENZYME: CPT

## 2025-03-31 PROCEDURE — 85610 PROTHROMBIN TIME: CPT

## 2025-03-31 PROCEDURE — 86140 C-REACTIVE PROTEIN: CPT

## 2025-03-31 PROCEDURE — 84100 ASSAY OF PHOSPHORUS: CPT

## 2025-03-31 PROCEDURE — 85384 FIBRINOGEN ACTIVITY: CPT

## 2025-03-31 PROCEDURE — 71045 X-RAY EXAM CHEST 1 VIEW: CPT

## 2025-03-31 PROCEDURE — 82728 ASSAY OF FERRITIN: CPT

## 2025-03-31 PROCEDURE — 83735 ASSAY OF MAGNESIUM: CPT

## 2025-03-31 PROCEDURE — 80076 HEPATIC FUNCTION PANEL: CPT

## 2025-03-31 PROCEDURE — 84550 ASSAY OF BLOOD/URIC ACID: CPT

## 2025-03-31 PROCEDURE — 80048 BASIC METABOLIC PNL TOTAL CA: CPT

## 2025-03-31 PROCEDURE — 85025 COMPLETE CBC W/AUTO DIFF WBC: CPT

## 2025-03-31 PROCEDURE — 36591 DRAW BLOOD OFF VENOUS DEVICE: CPT

## 2025-03-31 RX ORDER — POTASSIUM CHLORIDE 29.8 MG/ML
80 INJECTION INTRAVENOUS PRN
Status: CANCELLED | OUTPATIENT
Start: 2025-04-01

## 2025-03-31 RX ORDER — PROCHLORPERAZINE MALEATE 10 MG
10 TABLET ORAL EVERY 6 HOURS PRN
Status: CANCELLED | OUTPATIENT
Start: 2025-04-01

## 2025-03-31 RX ORDER — 0.9 % SODIUM CHLORIDE 0.9 %
1000 INTRAVENOUS SOLUTION INTRAVENOUS ONCE
Status: DISCONTINUED | OUTPATIENT
Start: 2025-03-31 | End: 2025-04-01 | Stop reason: HOSPADM

## 2025-03-31 RX ORDER — SODIUM CHLORIDE 9 MG/ML
INJECTION, SOLUTION INTRAVENOUS CONTINUOUS PRN
Status: CANCELLED | OUTPATIENT
Start: 2025-04-01

## 2025-03-31 RX ORDER — MAGNESIUM SULFATE IN WATER 40 MG/ML
4000 INJECTION, SOLUTION INTRAVENOUS PRN
Status: CANCELLED | OUTPATIENT
Start: 2025-04-01

## 2025-03-31 RX ORDER — 0.9 % SODIUM CHLORIDE 0.9 %
1000 INTRAVENOUS SOLUTION INTRAVENOUS ONCE
Status: CANCELLED | OUTPATIENT
Start: 2025-04-01 | End: 2025-04-01

## 2025-03-31 RX ORDER — HEPARIN 100 UNIT/ML
500 SYRINGE INTRAVENOUS PRN
Status: CANCELLED | OUTPATIENT
Start: 2025-04-01

## 2025-03-31 RX ORDER — OXYCODONE HYDROCHLORIDE 5 MG/1
10 TABLET ORAL EVERY 4 HOURS PRN
Status: CANCELLED | OUTPATIENT
Start: 2025-04-01

## 2025-03-31 RX ORDER — ONDANSETRON 2 MG/ML
8 INJECTION INTRAMUSCULAR; INTRAVENOUS EVERY 8 HOURS PRN
Status: CANCELLED | OUTPATIENT
Start: 2025-04-01

## 2025-03-31 RX ORDER — OXYCODONE HYDROCHLORIDE 5 MG/1
5 TABLET ORAL EVERY 4 HOURS PRN
Status: CANCELLED | OUTPATIENT
Start: 2025-04-01

## 2025-03-31 RX ORDER — POTASSIUM CHLORIDE 1500 MG/1
40 TABLET, EXTENDED RELEASE ORAL PRN
Status: CANCELLED | OUTPATIENT
Start: 2025-04-01

## 2025-03-31 RX ORDER — PROCHLORPERAZINE EDISYLATE 5 MG/ML
10 INJECTION INTRAMUSCULAR; INTRAVENOUS EVERY 6 HOURS PRN
Status: CANCELLED | OUTPATIENT
Start: 2025-04-01

## 2025-03-31 RX ORDER — ONDANSETRON 4 MG/1
8 TABLET, FILM COATED ORAL EVERY 8 HOURS PRN
Status: CANCELLED | OUTPATIENT
Start: 2025-04-01

## 2025-03-31 NOTE — CARE COORDINATION
30 + Post CAR-Tfollow up appointment with [facility] scheduled for May 7 @ 12:30.    This visit will provide follow up with Dr. Bell, Pharmacy, SW, Dietitian and RN educator.

## 2025-03-31 NOTE — PROGRESS NOTES
BCC Progress Note      3/31/2025    Catalina Leyva    :  1950    MRN:  [id number]    Referring MD: Maribel Garrido,   [address]  [address]  [place] [address]      Interval History:   - Doing very well, no s/sx of CRS/ICANS  - Eating and drinking well, no complaints     ECOG PS:  (1) Restricted in physically strenuous activity, ambulatory and able to do work of light nature    KPS: 90% Able to carry on normal activity; minor signs or symptoms of disease    Isolation:  None     Medications    Scheduled Meds:   sodium chloride  1,000 mL IntraVENous Once       Continuous Infusions:      PRN Meds:.      ROS:  As noted above, otherwise remainder of 10-point ROS negative      Physical Exam:     Vital Signs:  /74   Pulse 82   Temp 97.3 °F (36.3 °C) (Oral)   Resp 16   SpO2 97%     Weight:    Wt Readings from Last 3 Encounters:   25 74.1 kg (163 lb 5.8 oz)   25 74.6 kg (164 lb 7.4 oz)   25 74.6 kg (164 lb 7.4 oz)       General: Awake, alert and oriented.  HEENT: normocephalic, alopecia, PERRL, no scleral erythema or icterus, Oral mucosa moist and intact, throat clear  NECK: supple   BACK: Straight   SKIN: warm dry and intact without lesions rashes or masses  CHEST: CTA bilaterally without use of accessory muscles  CV: Normal S1 S2, RRR, no MRG  ABD: NT ND normoactive BS  EXTREMITIES: without edema, denies calf tenderness  NEURO: CN II - XII grossly intact  CATHETER: PAC    Laboratory Data:  CBC:   Recent Labs     25  0820 25  0836 25  0825   WBC 1.3* 0.9* 1.0*   HGB 12.2 11.9* 12.4   HCT 35.9* 34.9* 36.7   MCV 83.4 82.8 85.6   * 133* 135     BMP/Mag:  Recent Labs     25  0820 25  0836 25  0825    137 139   K 4.7 4.5 4.2    102 106   CO2 27 26 25   PHOS 4.0 4.2 3.4   BUN 15 16 14   CREATININE 0.7 0.6 0.6   MG  --   --  2.02     LIVP:   Recent Labs     25  0825   AST 20   ALT 16   BILIDIR 0.2   BILITOT 0.4

## 2025-03-31 NOTE — PROGRESS NOTES
Short Stay Communication Note  Catalina Leyva  Diagnosis:  Primary MD:  Treatment: Melphalan Fludarabine/Cytoxan  CAR-T Administration Date: 3/24/25  Day +7of Carvykti   Pt seen in outpatient infusion today. Labs drawn and reviewed.   CBC:   Recent Labs     03/29/25  0820 03/30/25  0836 03/31/25  0825   WBC 1.3* 0.9* 1.0*   HGB 12.2 11.9* 12.4   HCT 35.9* 34.9* 36.7   MCV 83.4 82.8 85.6   * 133* 135     BMP/Mag:  Recent Labs     03/29/25  0820 03/30/25  0836 03/31/25  0825    137 139   K 4.7 4.5 4.2    102 106   CO2 27 26 25   PHOS 4.0 4.2 3.4   BUN 15 16 14   CREATININE 0.7 0.6 0.6   MG  --   --  2.02     Standing parameters for replacement for this patient:   1 unit of pack red blood cells for a hemoglobin < or equal to 7  1 pack of platelets for a platelet count less than or equal to 10  40 MeQ of Potassium administered for a potassium level less than or equal to 3.4  4g of Magnesium Sulfate for a magnesum level less than or equal to 1.4  No transfusions required for the above lab values.    Urinalysis last done: 3/31/25 Urinalysis next due: 4/7/25    Chest X-Ray last done: 3/31/25 Chest X-Ray next due: 4/7/25    Symptoms addressed and reported to care team this date:none    Treatments this date:none    Patient continuously educated on signs and symptoms of CRS. Educated patient to utilize secondary thermometer if temptraq not working.     Reviewed medication schedule with pt and caregiver. Both able to verbalize all medications and schedule. Pt to be seen again tomorrow. Patient and caregiver verbalized understanding of discharge instructions including when and how to call the doctor and when to report  to the ER. Discharged ambulatory to home.

## 2025-04-01 ENCOUNTER — HOSPITAL ENCOUNTER (OUTPATIENT)
Dept: ONCOLOGY | Age: 75
Setting detail: INFUSION SERIES
Discharge: HOME | End: 2025-04-01
Payer: MEDICARE

## 2025-04-01 VITALS
OXYGEN SATURATION: 100 % | SYSTOLIC BLOOD PRESSURE: 130 MMHG | DIASTOLIC BLOOD PRESSURE: 76 MMHG | RESPIRATION RATE: 16 BRPM | TEMPERATURE: 97.9 F | WEIGHT: 164.24 LBS | BODY MASS INDEX: 29.17 KG/M2 | HEART RATE: 83 BPM

## 2025-04-01 DIAGNOSIS — C90.02 MULTIPLE MYELOMA IN RELAPSE (HCC): Primary | ICD-10-CM

## 2025-04-01 LAB
ANION GAP SERPL CALCULATED.3IONS-SCNC: 10 MMOL/L (ref 3–16)
BASOPHILS # BLD: 0 K/UL (ref 0–0.2)
BASOPHILS NFR BLD: 0 %
BUN SERPL-MCNC: 14 MG/DL (ref 7–20)
CALCIUM SERPL-MCNC: 9.2 MG/DL (ref 8.3–10.6)
CHLORIDE SERPL-SCNC: 106 MMOL/L (ref 99–110)
CO2 SERPL-SCNC: 24 MMOL/L (ref 21–32)
CREAT SERPL-MCNC: 0.6 MG/DL (ref 0.6–1.2)
DEPRECATED RDW RBC AUTO: 18.2 % (ref 12.4–15.4)
EOSINOPHIL # BLD: 0 K/UL (ref 0–0.6)
EOSINOPHIL NFR BLD: 4 %
FERRITIN SERPL IA-MCNC: 44.9 NG/ML (ref 15–150)
FIBRINOGEN PPP-MCNC: 369 MG/DL (ref 227–534)
GFR SERPLBLD CREATININE-BSD FMLA CKD-EPI: >90 ML/MIN/{1.73_M2}
GLUCOSE SERPL-MCNC: 88 MG/DL (ref 70–99)
HCT VFR BLD AUTO: 36.4 % (ref 36–48)
HGB BLD-MCNC: 12.5 G/DL (ref 12–16)
LYMPHOCYTES # BLD: 0.2 K/UL (ref 1–5.1)
LYMPHOCYTES NFR BLD: 18 %
MCH RBC QN AUTO: 28.7 PG (ref 26–34)
MCHC RBC AUTO-ENTMCNC: 34.4 G/DL (ref 31–36)
MCV RBC AUTO: 83.4 FL (ref 80–100)
MONOCYTES # BLD: 0.4 K/UL (ref 0–1.3)
MONOCYTES NFR BLD: 30 %
NEUTROPHILS # BLD: 0.6 K/UL (ref 1.7–7.7)
NEUTROPHILS NFR BLD: 48 %
PHOSPHATE SERPL-MCNC: 3.5 MG/DL (ref 2.5–4.9)
PLATELET # BLD AUTO: 133 K/UL (ref 135–450)
PLATELET BLD QL SMEAR: ADEQUATE
PMV BLD AUTO: 6.7 FL (ref 5–10.5)
POTASSIUM SERPL-SCNC: 4.4 MMOL/L (ref 3.5–5.1)
RBC # BLD AUTO: 4.37 M/UL (ref 4–5.2)
RBC MORPH BLD: NORMAL
SLIDE REVIEW: ABNORMAL
SODIUM SERPL-SCNC: 140 MMOL/L (ref 136–145)
WBC # BLD AUTO: 1.2 K/UL (ref 4–11)

## 2025-04-01 PROCEDURE — 85025 COMPLETE CBC W/AUTO DIFF WBC: CPT

## 2025-04-01 PROCEDURE — 36591 DRAW BLOOD OFF VENOUS DEVICE: CPT

## 2025-04-01 PROCEDURE — 82728 ASSAY OF FERRITIN: CPT

## 2025-04-01 PROCEDURE — 80048 BASIC METABOLIC PNL TOTAL CA: CPT

## 2025-04-01 PROCEDURE — 85384 FIBRINOGEN ACTIVITY: CPT

## 2025-04-01 PROCEDURE — 84100 ASSAY OF PHOSPHORUS: CPT

## 2025-04-01 RX ORDER — POTASSIUM CHLORIDE 29.8 MG/ML
80 INJECTION INTRAVENOUS PRN
Status: DISCONTINUED | OUTPATIENT
Start: 2025-04-01 | End: 2025-04-02 | Stop reason: HOSPADM

## 2025-04-01 RX ORDER — POTASSIUM CHLORIDE 29.8 MG/ML
80 INJECTION INTRAVENOUS PRN
Status: CANCELLED | OUTPATIENT
Start: 2025-04-02

## 2025-04-01 RX ORDER — ONDANSETRON 2 MG/ML
8 INJECTION INTRAMUSCULAR; INTRAVENOUS EVERY 8 HOURS PRN
Status: DISCONTINUED | OUTPATIENT
Start: 2025-04-01 | End: 2025-04-02 | Stop reason: HOSPADM

## 2025-04-01 RX ORDER — PROCHLORPERAZINE EDISYLATE 5 MG/ML
10 INJECTION INTRAMUSCULAR; INTRAVENOUS EVERY 6 HOURS PRN
Status: CANCELLED | OUTPATIENT
Start: 2025-04-02

## 2025-04-01 RX ORDER — PROCHLORPERAZINE MALEATE 10 MG
10 TABLET ORAL EVERY 6 HOURS PRN
Status: CANCELLED | OUTPATIENT
Start: 2025-04-02

## 2025-04-01 RX ORDER — PROCHLORPERAZINE MALEATE 10 MG
10 TABLET ORAL EVERY 6 HOURS PRN
Status: DISCONTINUED | OUTPATIENT
Start: 2025-04-01 | End: 2025-04-02 | Stop reason: HOSPADM

## 2025-04-01 RX ORDER — OXYCODONE HYDROCHLORIDE 5 MG/1
10 TABLET ORAL EVERY 4 HOURS PRN
Status: DISCONTINUED | OUTPATIENT
Start: 2025-04-01 | End: 2025-04-02 | Stop reason: HOSPADM

## 2025-04-01 RX ORDER — ONDANSETRON 2 MG/ML
8 INJECTION INTRAMUSCULAR; INTRAVENOUS EVERY 8 HOURS PRN
Status: CANCELLED | OUTPATIENT
Start: 2025-04-02

## 2025-04-01 RX ORDER — MAGNESIUM SULFATE IN WATER 40 MG/ML
4000 INJECTION, SOLUTION INTRAVENOUS PRN
Status: CANCELLED | OUTPATIENT
Start: 2025-04-02

## 2025-04-01 RX ORDER — OXYCODONE HYDROCHLORIDE 5 MG/1
5 TABLET ORAL EVERY 4 HOURS PRN
Status: CANCELLED | OUTPATIENT
Start: 2025-04-02

## 2025-04-01 RX ORDER — PROCHLORPERAZINE EDISYLATE 5 MG/ML
10 INJECTION INTRAMUSCULAR; INTRAVENOUS EVERY 6 HOURS PRN
Status: DISCONTINUED | OUTPATIENT
Start: 2025-04-01 | End: 2025-04-02 | Stop reason: HOSPADM

## 2025-04-01 RX ORDER — MAGNESIUM SULFATE IN WATER 40 MG/ML
4000 INJECTION, SOLUTION INTRAVENOUS PRN
Status: DISCONTINUED | OUTPATIENT
Start: 2025-04-01 | End: 2025-04-02 | Stop reason: HOSPADM

## 2025-04-01 RX ORDER — OXYCODONE HYDROCHLORIDE 5 MG/1
10 TABLET ORAL EVERY 4 HOURS PRN
Status: CANCELLED | OUTPATIENT
Start: 2025-04-02

## 2025-04-01 RX ORDER — POTASSIUM CHLORIDE 1500 MG/1
40 TABLET, EXTENDED RELEASE ORAL PRN
Status: CANCELLED | OUTPATIENT
Start: 2025-04-02

## 2025-04-01 RX ORDER — SODIUM CHLORIDE 9 MG/ML
INJECTION, SOLUTION INTRAVENOUS CONTINUOUS PRN
Status: CANCELLED | OUTPATIENT
Start: 2025-04-02

## 2025-04-01 RX ORDER — ONDANSETRON 4 MG/1
8 TABLET, FILM COATED ORAL EVERY 8 HOURS PRN
Status: DISCONTINUED | OUTPATIENT
Start: 2025-04-01 | End: 2025-04-02 | Stop reason: HOSPADM

## 2025-04-01 RX ORDER — ONDANSETRON 4 MG/1
8 TABLET, FILM COATED ORAL EVERY 8 HOURS PRN
Status: CANCELLED | OUTPATIENT
Start: 2025-04-02

## 2025-04-01 RX ORDER — POTASSIUM CHLORIDE 1500 MG/1
40 TABLET, EXTENDED RELEASE ORAL PRN
Status: DISCONTINUED | OUTPATIENT
Start: 2025-04-01 | End: 2025-04-02 | Stop reason: HOSPADM

## 2025-04-01 RX ORDER — SODIUM CHLORIDE 9 MG/ML
INJECTION, SOLUTION INTRAVENOUS CONTINUOUS PRN
Status: DISCONTINUED | OUTPATIENT
Start: 2025-04-01 | End: 2025-04-02 | Stop reason: HOSPADM

## 2025-04-01 RX ORDER — HEPARIN 100 UNIT/ML
500 SYRINGE INTRAVENOUS PRN
Status: CANCELLED | OUTPATIENT
Start: 2025-04-02

## 2025-04-01 RX ORDER — OXYCODONE HYDROCHLORIDE 5 MG/1
5 TABLET ORAL EVERY 4 HOURS PRN
Status: DISCONTINUED | OUTPATIENT
Start: 2025-04-01 | End: 2025-04-02 | Stop reason: HOSPADM

## 2025-04-01 NOTE — PROGRESS NOTES
Short Stay Communication Note  Catalina Leyva  Diagnosis:  Primary MD:  Treatment: Melphalan Fludarabine/Cytoxan  CAR-T Administration Date: 3/24/25  Day +8 of Carvykti   Pt seen in outpatient infusion today. Labs drawn and reviewed.   CBC:   Recent Labs     03/30/25  0836 03/31/25  0825 04/01/25  0818   WBC 0.9* 1.0* 1.2*   HGB 11.9* 12.4 12.5   HCT 34.9* 36.7 36.4   MCV 82.8 85.6 83.4   * 135 133*     BMP/Mag:  Recent Labs     03/30/25  0836 03/31/25  0825 04/01/25  0818    139 140   K 4.5 4.2 4.4    106 106   CO2 26 25 24   PHOS 4.2 3.4 3.5   BUN 16 14 14   CREATININE 0.6 0.6 0.6   MG  --  2.02  --      Standing parameters for replacement for this patient:   1 unit of pack red blood cells for a hemoglobin < or equal to 7  1 pack of platelets for a platelet count less than or equal to 10  40 MeQ of Potassium administered for a potassium level less than or equal to 3.4  4g of Magnesium Sulfate for a magnesum level less than or equal to 1.4  No transfusions required for the above lab values.    Urinalysis last done: 3/31/25 Urinalysis next due: 4/7/25    Chest X-Ray last done: 3/31/25 Chest X-Ray next due: 4/7/25    Symptoms addressed and reported to care team this date:none    Treatments this date:none    Patient continuously educated on signs and symptoms of CRS. Educated patient to utilize secondary thermometer if temptraq not working.     Reviewed medication schedule with pt and caregiver. Both able to verbalize all medications and schedule. Pt to be seen again tomorrow. Patient and caregiver verbalized understanding of discharge instructions including when and how to call the doctor and when to report  to the ER. Discharged ambulatory to home.

## 2025-04-01 NOTE — PROGRESS NOTES
BCC Progress Note      2025    Catalina Leyva    :  1950    MRN:  [id number]    Referring MD: Maribel Garrido,   [address]  [address]  [place] [address]      Interval History:   - Doing very well, no s/sx of CRS/ICANS  - Eating and drinking well, no complaints     ECOG PS:  (1) Restricted in physically strenuous activity, ambulatory and able to do work of light nature    KPS: 90% Able to carry on normal activity; minor signs or symptoms of disease    Isolation:  None     Medications    Scheduled Meds:   ALTEplase (CATHFLO) 2 mg in sterile water 2 mL injection  2 mg IntraCATHeter Once       Continuous Infusions:   sodium chloride         PRN Meds:.sodium chloride, potassium chloride, potassium chloride, magnesium sulfate, magnesium hydroxide, oxyCODONE, oxyCODONE, prochlorperazine, prochlorperazine, ondansetron, ondansetron      ROS:  As noted above, otherwise remainder of 10-point ROS negative      Physical Exam:     Vital Signs:  /76   Pulse 83   Temp 97.9 °F (36.6 °C) (Oral)   Resp 16   Wt 74.5 kg (164 lb 3.9 oz)   SpO2 100%   BMI 29.17 kg/m²     Weight:    Wt Readings from Last 3 Encounters:   25 74.5 kg (164 lb 3.9 oz)   25 74.1 kg (163 lb 5.8 oz)   25 74.6 kg (164 lb 7.4 oz)       General: Awake, alert and oriented.  HEENT: normocephalic, alopecia, PERRL, no scleral erythema or icterus, Oral mucosa moist and intact, throat clear  NECK: supple   BACK: Straight   SKIN: warm dry and intact without lesions rashes or masses  CHEST: CTA bilaterally without use of accessory muscles  CV: Normal S1 S2, RRR, no MRG  ABD: NT ND normoactive BS  EXTREMITIES: without edema, denies calf tenderness  NEURO: CN II - XII grossly intact  CATHETER: PAC    Laboratory Data:  CBC:   Recent Labs     25  0836 25  0825 25  0818   WBC 0.9* 1.0* 1.2*   HGB 11.9* 12.4 12.5   HCT 34.9* 36.7 36.4   MCV 82.8 85.6 83.4   * 135 133*     BMP/Mag:  Recent Labs

## 2025-04-01 NOTE — PROGRESS NOTES
BCC Progress Note      3/31/2025    Catalina Leyva    :  1950    MRN:  [id number]    Referring MD: Maribel Garrido DO  [address]  [address]  [place] [address]      Interval History: doing well, no fevers. Has some diarrhea     ECOG PS:  (1) Restricted in physically strenuous activity, ambulatory and able to do work of light nature    KPS: 90% Able to carry on normal activity; minor signs or symptoms of disease    Isolation:  None     Medications    Scheduled Meds:      Continuous Infusions:      PRN Meds:.      ROS:  As noted above, otherwise remainder of 10-point ROS negative      Physical Exam:     Vital Signs:  /72   Pulse 79   Temp 98.2 °F (36.8 °C) (Oral)   Resp 18   Wt 74.1 kg (163 lb 5.8 oz)   SpO2 96%   BMI 29.02 kg/m²     Weight:    Wt Readings from Last 3 Encounters:   25 74.1 kg (163 lb 5.8 oz)   25 74.6 kg (164 lb 7.4 oz)   25 74.6 kg (164 lb 7.4 oz)       General: Awake, alert and oriented.  HEENT: normocephalic, alopecia, PERRL, no scleral erythema or icterus, Oral mucosa moist and intact, throat clear  NECK: supple   BACK: Straight   SKIN: warm dry and intact without lesions rashes or masses  CHEST: CTA bilaterally without use of accessory muscles  CV: Normal S1 S2, RRR, no MRG  ABD: NT ND normoactive BS  EXTREMITIES: without edema, denies calf tenderness  NEURO: CN II - XII grossly intact  CATHETER: PAC    Laboratory Data:  CBC:   Recent Labs     25  0820 25  0836 25  0825   WBC 1.3* 0.9* 1.0*   HGB 12.2 11.9* 12.4   HCT 35.9* 34.9* 36.7   MCV 83.4 82.8 85.6   * 133* 135     BMP/Mag:  Recent Labs     25  0820 25  0836 25  0825    137 139   K 4.7 4.5 4.2    102 106   CO2 27 26 25   PHOS 4.0 4.2 3.4   BUN 15 16 14   CREATININE 0.7 0.6 0.6   MG  --   --  2.02     LIVP:   Recent Labs     25  0825   AST 20   ALT 16   BILIDIR 0.2   BILITOT 0.4   ALKPHOS 72     Coags:   Recent Labs     
      Short Stay Communication Note  Catalina Leyva  Diagnosis:  Primary MD:  Treatment: Melphalan Fludarabine/Cytoxan  CAR-T Administration Date: 3/24/25  Day +6 of Carvykti   Pt seen in outpatient infusion today. Labs drawn and reviewed.   CBC:   Recent Labs     03/28/25  0815 03/29/25  0820 03/30/25  0836   WBC 1.3* 1.3* 0.9*   HGB 12.0 12.2 11.9*   HCT 36.4 35.9* 34.9*   MCV 85.2 83.4 82.8   * 130* 133*     BMP/Mag:  Recent Labs     03/28/25  0815 03/29/25  0820 03/30/25  0836    142 137   K 4.4 4.7 4.5    106 102   CO2 26 27 26   PHOS 3.5 4.0 4.2   BUN 16 15 16   CREATININE 0.7 0.7 0.6   MG 1.91  --   --      Standing parameters for replacement for this patient:   1 unit of pack red blood cells for a hemoglobin < or equal to 7  1 pack of platelets for a platelet count less than or equal to 10  40 MeQ of Potassium administered for a potassium level less than or equal to 3.4  4g of Magnesium Sulfate for a magnesum level less than or equal to 1.4  No transfusions required for the above lab values.    Urinalysis last done: 3/24/25 Urinalysis next due: 3/31/25    Chest X-Ray last done: 3/24/25 Chest X-Ray next due: 3/31/25    Symptoms addressed and reported to care team this date:    Treatments this date:    Patient continuously educated on signs and symptoms of CRS. New temptraq placed today. RN Educated patient to utilize secondary thermometer if temptraq not working.     Reviewed medication schedule with pt and caregiver. Both able to verbalize all medications and schedule. Pt to be seen again tomorrow. Patient and caregiver verbalized understanding of discharge instructions including when and how to call the doctor and when to report  to the ER. Discharged ambulatory to home.   
03/28/25  0815   AST 24   ALT 18   BILIDIR <0.1   BILITOT 0.3   ALKPHOS 73     Coags:   No results for input(s): \"PROTIME\", \"INR\", \"APTT\" in the last 72 hours.    Uric Acid No results for input(s): \"LABURIC\" in the last 72 hours.  Lab Results   Component Value Date    CRP 4.2 03/27/2025    CRP 4.7 03/26/2025    CRP 3.8 03/25/2025     Lab Results   Component Value Date    FERRITIN 71.7 03/27/2025    FERRITIN 81.6 03/26/2025    FERRITIN 70.0 03/25/2025         PROBLEM LIST:          Multiple myeloma   Agranulocytosis due to cancer chemotherapy  HTN  Hepatitis C ( History of per pt. She was treated w/ Harvoni)  Hypogammaglobulinemia   Mitral valve prolapse   History of C.Diff       TREATMENT:            RVD-Lite x 8 cycles (4/30/21)  Melphalan 200 mg / Auto SCT (7/1/21)  Rev maintenance  Eve/pom/dex x 6 cycles  C1D1: 10/31/23  C2D1: 12/20/23  C3D1: 1/24/24  C4D1: 2/28/24  C5D1: 4/10/24  C6D1: 5/8/24  5.   Penelope Kyprolis and Dex x 6 cycles  C1D1: 6/4/24  C2D1: 7/2/24  C3D1: 8/6/24  C4D1: 8/27/24  C5D1: 9/24/24  C6D1: 10/22/24  6. Ninlaro/cyclophosphamide/Dex               C1D1: 11/11/24              C2D1: 12/10/25              C3D1: 1/7/25              C4D1: 2/4/25  7.    Lymphodepleting Chemotherapy:  Fludarabine and cyclophosphamide   Disease Status at time of Infusion:  VGPR  CAR-T Infusion Date: 3/24/25  CAR-T Product:  Carvykti   Batch ID Number: [id number]    ASSESSMENT AND PLAN:            1. IgA Kappa Multiple myeloma ISS 3, 1q321:  Currently in VGPR   - Diagnosed in 12/2020    - PET 1/2/25: no evidence of active disease, stable lytic lesions within the calvarium with no significant uptake   - MMP 11/11/24:  M-spike 0.1.  , LLC 1.6, K/L ratio 67.8.  IgA WNL  - MMP 2/4/2025:  No M-spike.  KLC 31.7, LLC <1.5, K/L ratio >21.13.  IgA  WNL  - MMP 3/4/24:  No M-spike.  KLC 25, LLC <1.5, K/L ratio >17.  IgA WNL     PLAN: Carvykti   - Continue to monitor Absolute Lymphocyte Count.  If ALC is >3 on Day 7-14 s/p

## 2025-04-02 ENCOUNTER — HOSPITAL ENCOUNTER (OUTPATIENT)
Dept: ONCOLOGY | Age: 75
Setting detail: INFUSION SERIES
Discharge: HOME | End: 2025-04-02
Payer: MEDICARE

## 2025-04-02 ENCOUNTER — HOSPITAL ENCOUNTER (INPATIENT)
Age: 75
LOS: 3 days | Discharge: HOME | End: 2025-04-05
Attending: STUDENT IN AN ORGANIZED HEALTH CARE EDUCATION/TRAINING PROGRAM | Admitting: STUDENT IN AN ORGANIZED HEALTH CARE EDUCATION/TRAINING PROGRAM
Payer: MEDICARE

## 2025-04-02 ENCOUNTER — HOSPITAL ENCOUNTER (OUTPATIENT)
Dept: GENERAL RADIOLOGY | Age: 75
Discharge: HOME | End: 2025-04-02
Payer: MEDICARE

## 2025-04-02 VITALS
BODY MASS INDEX: 29.1 KG/M2 | TEMPERATURE: 98.2 F | SYSTOLIC BLOOD PRESSURE: 110 MMHG | HEART RATE: 92 BPM | OXYGEN SATURATION: 98 % | WEIGHT: 163.8 LBS | DIASTOLIC BLOOD PRESSURE: 69 MMHG | RESPIRATION RATE: 20 BRPM

## 2025-04-02 VITALS
HEART RATE: 83 BPM | RESPIRATION RATE: 18 BRPM | OXYGEN SATURATION: 98 % | SYSTOLIC BLOOD PRESSURE: 109 MMHG | DIASTOLIC BLOOD PRESSURE: 70 MMHG | TEMPERATURE: 100 F

## 2025-04-02 DIAGNOSIS — C90.02 MULTIPLE MYELOMA IN RELAPSE (HCC): Primary | ICD-10-CM

## 2025-04-02 PROBLEM — R50.81 NEUTROPENIC FEVER: Status: ACTIVE | Noted: 2025-04-02

## 2025-04-02 PROBLEM — D70.9 NEUTROPENIC FEVER: Status: ACTIVE | Noted: 2025-04-02

## 2025-04-02 LAB
ALBUMIN SERPL-MCNC: 3.9 G/DL (ref 3.4–5)
ALP SERPL-CCNC: 68 U/L (ref 40–129)
ALT SERPL-CCNC: 12 U/L (ref 10–40)
ANION GAP SERPL CALCULATED.3IONS-SCNC: 10 MMOL/L (ref 3–16)
AST SERPL-CCNC: 16 U/L (ref 15–37)
BASOPHILS # BLD: 0 K/UL (ref 0–0.2)
BASOPHILS NFR BLD: 0 %
BILIRUB DIRECT SERPL-MCNC: 0.2 MG/DL (ref 0–0.3)
BILIRUB INDIRECT SERPL-MCNC: 0.3 MG/DL (ref 0–1)
BILIRUB SERPL-MCNC: 0.5 MG/DL (ref 0–1)
BILIRUB UR QL STRIP.AUTO: NEGATIVE
BUN SERPL-MCNC: 14 MG/DL (ref 7–20)
CALCIUM SERPL-MCNC: 9 MG/DL (ref 8.3–10.6)
CHLORIDE SERPL-SCNC: 103 MMOL/L (ref 99–110)
CLARITY UR: CLEAR
CO2 SERPL-SCNC: 24 MMOL/L (ref 21–32)
COLOR UR: YELLOW
CREAT SERPL-MCNC: 0.6 MG/DL (ref 0.6–1.2)
CRP SERPL-MCNC: 15 MG/L (ref 0–5.1)
DEPRECATED RDW RBC AUTO: 18 % (ref 12.4–15.4)
EOSINOPHIL # BLD: 0.1 K/UL (ref 0–0.6)
EOSINOPHIL NFR BLD: 5 %
FERRITIN SERPL IA-MCNC: 54.4 NG/ML (ref 15–150)
FIBRINOGEN PPP-MCNC: 158 MG/DL (ref 227–534)
GFR SERPLBLD CREATININE-BSD FMLA CKD-EPI: >90 ML/MIN/{1.73_M2}
GLUCOSE SERPL-MCNC: 108 MG/DL (ref 70–99)
GLUCOSE UR STRIP.AUTO-MCNC: NEGATIVE MG/DL
HCT VFR BLD AUTO: 35.8 % (ref 36–48)
HGB BLD-MCNC: 12.3 G/DL (ref 12–16)
HGB UR QL STRIP.AUTO: NEGATIVE
KETONES UR STRIP.AUTO-MCNC: NEGATIVE MG/DL
LACTATE BLDV-SCNC: 1.1 MMOL/L (ref 0.4–2)
LDH SERPL L TO P-CCNC: 143 U/L (ref 100–190)
LEUKOCYTE ESTERASE UR QL STRIP.AUTO: NEGATIVE
LYMPHOCYTES # BLD: 0.1 K/UL (ref 1–5.1)
LYMPHOCYTES NFR BLD: 9 %
MAGNESIUM SERPL-MCNC: 1.77 MG/DL (ref 1.8–2.4)
MCH RBC QN AUTO: 28.6 PG (ref 26–34)
MCHC RBC AUTO-ENTMCNC: 34.3 G/DL (ref 31–36)
MCV RBC AUTO: 83.2 FL (ref 80–100)
MONOCYTES # BLD: 0.2 K/UL (ref 0–1.3)
MONOCYTES NFR BLD: 15 %
NEUTROPHILS # BLD: 1 K/UL (ref 1.7–7.7)
NEUTROPHILS NFR BLD: 69 %
NEUTS BAND NFR BLD MANUAL: 2 % (ref 0–7)
NITRITE UR QL STRIP.AUTO: NEGATIVE
PH UR STRIP.AUTO: 6 [PH] (ref 5–8)
PHOSPHATE SERPL-MCNC: 3.7 MG/DL (ref 2.5–4.9)
PLATELET # BLD AUTO: 131 K/UL (ref 135–450)
PLATELET BLD QL SMEAR: ABNORMAL
PMV BLD AUTO: 7.3 FL (ref 5–10.5)
POTASSIUM SERPL-SCNC: 4 MMOL/L (ref 3.5–5.1)
PROT SERPL-MCNC: 6.1 G/DL (ref 6.4–8.2)
PROT UR STRIP.AUTO-MCNC: NEGATIVE MG/DL
RBC # BLD AUTO: 4.3 M/UL (ref 4–5.2)
RBC MORPH BLD: NORMAL
SLIDE REVIEW: ABNORMAL
SODIUM SERPL-SCNC: 137 MMOL/L (ref 136–145)
SP GR UR STRIP.AUTO: 1.02 (ref 1–1.03)
UA DIPSTICK W REFLEX MICRO PNL UR: NORMAL
URATE SERPL-MCNC: 2.5 MG/DL (ref 2.6–6)
URN SPEC COLLECT METH UR: NORMAL
UROBILINOGEN UR STRIP-ACNC: 0.2 E.U./DL
WBC # BLD AUTO: 1.4 K/UL (ref 4–11)

## 2025-04-02 PROCEDURE — 83605 ASSAY OF LACTIC ACID: CPT

## 2025-04-02 PROCEDURE — 2060000000 HC ICU INTERMEDIATE R&B

## 2025-04-02 PROCEDURE — 87103 BLOOD FUNGUS CULTURE: CPT

## 2025-04-02 PROCEDURE — 6360000002 HC RX W HCPCS: Performed by: NURSE PRACTITIONER

## 2025-04-02 PROCEDURE — 87252 VIRUS INOCULATION TISSUE: CPT

## 2025-04-02 PROCEDURE — 6370000000 HC RX 637 (ALT 250 FOR IP): Performed by: NURSE PRACTITIONER

## 2025-04-02 PROCEDURE — 36591 DRAW BLOOD OFF VENOUS DEVICE: CPT

## 2025-04-02 PROCEDURE — 93005 ELECTROCARDIOGRAM TRACING: CPT | Performed by: NURSE PRACTITIONER

## 2025-04-02 PROCEDURE — 81003 URINALYSIS AUTO W/O SCOPE: CPT

## 2025-04-02 PROCEDURE — 85025 COMPLETE CBC W/AUTO DIFF WBC: CPT

## 2025-04-02 PROCEDURE — 87253 VIRUS INOCULATE TISSUE ADDL: CPT

## 2025-04-02 PROCEDURE — 83735 ASSAY OF MAGNESIUM: CPT

## 2025-04-02 PROCEDURE — 87040 BLOOD CULTURE FOR BACTERIA: CPT

## 2025-04-02 PROCEDURE — 96365 THER/PROPH/DIAG IV INF INIT: CPT

## 2025-04-02 PROCEDURE — 82728 ASSAY OF FERRITIN: CPT

## 2025-04-02 PROCEDURE — 83615 LACTATE (LD) (LDH) ENZYME: CPT

## 2025-04-02 PROCEDURE — 80076 HEPATIC FUNCTION PANEL: CPT

## 2025-04-02 PROCEDURE — 85384 FIBRINOGEN ACTIVITY: CPT

## 2025-04-02 PROCEDURE — 84550 ASSAY OF BLOOD/URIC ACID: CPT

## 2025-04-02 PROCEDURE — 2580000003 HC RX 258: Performed by: NURSE PRACTITIONER

## 2025-04-02 PROCEDURE — 87086 URINE CULTURE/COLONY COUNT: CPT

## 2025-04-02 PROCEDURE — 80048 BASIC METABOLIC PNL TOTAL CA: CPT

## 2025-04-02 PROCEDURE — 84100 ASSAY OF PHOSPHORUS: CPT

## 2025-04-02 PROCEDURE — 87070 CULTURE OTHR SPECIMN AEROBIC: CPT

## 2025-04-02 PROCEDURE — 87102 FUNGUS ISOLATION CULTURE: CPT

## 2025-04-02 PROCEDURE — 71045 X-RAY EXAM CHEST 1 VIEW: CPT

## 2025-04-02 PROCEDURE — 86140 C-REACTIVE PROTEIN: CPT

## 2025-04-02 PROCEDURE — 94150 VITAL CAPACITY TEST: CPT

## 2025-04-02 PROCEDURE — 87205 SMEAR GRAM STAIN: CPT

## 2025-04-02 RX ORDER — MAGNESIUM SULFATE IN WATER 40 MG/ML
4000 INJECTION, SOLUTION INTRAVENOUS PRN
OUTPATIENT
Start: 2025-04-03

## 2025-04-02 RX ORDER — FLUCONAZOLE 200 MG/1
200 TABLET ORAL DAILY
Status: DISCONTINUED | OUTPATIENT
Start: 2025-04-03 | End: 2025-04-05 | Stop reason: HOSPADM

## 2025-04-02 RX ORDER — ONDANSETRON 4 MG/1
8 TABLET, FILM COATED ORAL EVERY 8 HOURS PRN
OUTPATIENT
Start: 2025-04-03

## 2025-04-02 RX ORDER — POTASSIUM CHLORIDE 29.8 MG/ML
80 INJECTION INTRAVENOUS PRN
Status: DISCONTINUED | OUTPATIENT
Start: 2025-04-02 | End: 2025-04-03 | Stop reason: HOSPADM

## 2025-04-02 RX ORDER — PROCHLORPERAZINE MALEATE 10 MG
10 TABLET ORAL EVERY 6 HOURS PRN
OUTPATIENT
Start: 2025-04-03

## 2025-04-02 RX ORDER — PROCHLORPERAZINE EDISYLATE 5 MG/ML
10 INJECTION INTRAMUSCULAR; INTRAVENOUS EVERY 6 HOURS PRN
Status: DISCONTINUED | OUTPATIENT
Start: 2025-04-02 | End: 2025-04-05 | Stop reason: HOSPADM

## 2025-04-02 RX ORDER — ONDANSETRON 4 MG/1
8 TABLET, FILM COATED ORAL EVERY 8 HOURS PRN
Status: DISCONTINUED | OUTPATIENT
Start: 2025-04-02 | End: 2025-04-03 | Stop reason: HOSPADM

## 2025-04-02 RX ORDER — LORAZEPAM 1 MG/1
1 TABLET ORAL 2 TIMES DAILY
Status: DISCONTINUED | OUTPATIENT
Start: 2025-04-02 | End: 2025-04-05 | Stop reason: HOSPADM

## 2025-04-02 RX ORDER — OXYCODONE HYDROCHLORIDE 5 MG/1
10 TABLET ORAL EVERY 4 HOURS PRN
OUTPATIENT
Start: 2025-04-03

## 2025-04-02 RX ORDER — OXYCODONE HYDROCHLORIDE 5 MG/1
5 TABLET ORAL EVERY 4 HOURS PRN
OUTPATIENT
Start: 2025-04-03

## 2025-04-02 RX ORDER — ENOXAPARIN SODIUM 100 MG/ML
40 INJECTION SUBCUTANEOUS EVERY EVENING
Status: DISCONTINUED | OUTPATIENT
Start: 2025-04-02 | End: 2025-04-05 | Stop reason: HOSPADM

## 2025-04-02 RX ORDER — SODIUM CHLORIDE 0.9 % (FLUSH) 0.9 %
10 SYRINGE (ML) INJECTION PRN
Status: DISCONTINUED | OUTPATIENT
Start: 2025-04-02 | End: 2025-04-05 | Stop reason: HOSPADM

## 2025-04-02 RX ORDER — PANTOPRAZOLE SODIUM 40 MG/1
40 TABLET, DELAYED RELEASE ORAL
Status: DISCONTINUED | OUTPATIENT
Start: 2025-04-02 | End: 2025-04-05 | Stop reason: HOSPADM

## 2025-04-02 RX ORDER — MAGNESIUM SULFATE IN WATER 40 MG/ML
4000 INJECTION, SOLUTION INTRAVENOUS PRN
Status: DISCONTINUED | OUTPATIENT
Start: 2025-04-02 | End: 2025-04-03 | Stop reason: HOSPADM

## 2025-04-02 RX ORDER — SODIUM CHLORIDE 9 MG/ML
INJECTION, SOLUTION INTRAVENOUS PRN
Status: DISCONTINUED | OUTPATIENT
Start: 2025-04-02 | End: 2025-04-05 | Stop reason: HOSPADM

## 2025-04-02 RX ORDER — PROCHLORPERAZINE MALEATE 10 MG
5 TABLET ORAL EVERY 6 HOURS PRN
Status: DISCONTINUED | OUTPATIENT
Start: 2025-04-02 | End: 2025-04-05 | Stop reason: HOSPADM

## 2025-04-02 RX ORDER — ONDANSETRON 2 MG/ML
8 INJECTION INTRAMUSCULAR; INTRAVENOUS EVERY 8 HOURS PRN
OUTPATIENT
Start: 2025-04-03

## 2025-04-02 RX ORDER — OXYCODONE HYDROCHLORIDE 5 MG/1
10 TABLET ORAL EVERY 4 HOURS PRN
Status: DISCONTINUED | OUTPATIENT
Start: 2025-04-02 | End: 2025-04-03 | Stop reason: HOSPADM

## 2025-04-02 RX ORDER — ONDANSETRON 2 MG/ML
8 INJECTION INTRAMUSCULAR; INTRAVENOUS EVERY 8 HOURS PRN
Status: DISCONTINUED | OUTPATIENT
Start: 2025-04-02 | End: 2025-04-03 | Stop reason: HOSPADM

## 2025-04-02 RX ORDER — SODIUM CHLORIDE 9 MG/ML
INJECTION, SOLUTION INTRAVENOUS CONTINUOUS PRN
Status: DISCONTINUED | OUTPATIENT
Start: 2025-04-02 | End: 2025-04-03 | Stop reason: HOSPADM

## 2025-04-02 RX ORDER — CITALOPRAM HYDROBROMIDE 20 MG/1
10 TABLET ORAL DAILY
Status: DISCONTINUED | OUTPATIENT
Start: 2025-04-03 | End: 2025-04-05 | Stop reason: HOSPADM

## 2025-04-02 RX ORDER — PROCHLORPERAZINE MALEATE 10 MG
10 TABLET ORAL EVERY 6 HOURS PRN
Status: DISCONTINUED | OUTPATIENT
Start: 2025-04-02 | End: 2025-04-03 | Stop reason: HOSPADM

## 2025-04-02 RX ORDER — ACYCLOVIR 400 MG/1
400 TABLET ORAL 2 TIMES DAILY
Status: DISCONTINUED | OUTPATIENT
Start: 2025-04-02 | End: 2025-04-05 | Stop reason: HOSPADM

## 2025-04-02 RX ORDER — AMLODIPINE BESYLATE 5 MG/1
5 TABLET ORAL DAILY
Status: DISCONTINUED | OUTPATIENT
Start: 2025-04-03 | End: 2025-04-05 | Stop reason: HOSPADM

## 2025-04-02 RX ORDER — HYDROXYZINE HYDROCHLORIDE 10 MG/1
25 TABLET, FILM COATED ORAL NIGHTLY
Status: DISCONTINUED | OUTPATIENT
Start: 2025-04-02 | End: 2025-04-05 | Stop reason: HOSPADM

## 2025-04-02 RX ORDER — HEPARIN 100 UNIT/ML
500 SYRINGE INTRAVENOUS PRN
OUTPATIENT
Start: 2025-04-03

## 2025-04-02 RX ORDER — PROCHLORPERAZINE EDISYLATE 5 MG/ML
10 INJECTION INTRAMUSCULAR; INTRAVENOUS EVERY 6 HOURS PRN
OUTPATIENT
Start: 2025-04-03

## 2025-04-02 RX ORDER — SODIUM CHLORIDE 9 MG/ML
INJECTION, SOLUTION INTRAVENOUS CONTINUOUS
Status: DISCONTINUED | OUTPATIENT
Start: 2025-04-02 | End: 2025-04-05 | Stop reason: HOSPADM

## 2025-04-02 RX ORDER — PROCHLORPERAZINE EDISYLATE 5 MG/ML
10 INJECTION INTRAMUSCULAR; INTRAVENOUS EVERY 6 HOURS PRN
Status: DISCONTINUED | OUTPATIENT
Start: 2025-04-02 | End: 2025-04-03 | Stop reason: HOSPADM

## 2025-04-02 RX ORDER — OXYCODONE HYDROCHLORIDE 5 MG/1
5 TABLET ORAL EVERY 4 HOURS PRN
Status: DISCONTINUED | OUTPATIENT
Start: 2025-04-02 | End: 2025-04-03 | Stop reason: HOSPADM

## 2025-04-02 RX ORDER — SODIUM CHLORIDE 9 MG/ML
INJECTION, SOLUTION INTRAVENOUS CONTINUOUS PRN
OUTPATIENT
Start: 2025-04-03

## 2025-04-02 RX ORDER — HEPARIN 100 UNIT/ML
500 SYRINGE INTRAVENOUS PRN
Status: DISCONTINUED | OUTPATIENT
Start: 2025-04-02 | End: 2025-04-03 | Stop reason: HOSPADM

## 2025-04-02 RX ORDER — MAGNESIUM SULFATE IN WATER 40 MG/ML
4000 INJECTION, SOLUTION INTRAVENOUS PRN
Status: DISCONTINUED | OUTPATIENT
Start: 2025-04-02 | End: 2025-04-05 | Stop reason: HOSPADM

## 2025-04-02 RX ORDER — CALCIUM CARBONATE 500 MG/1
500 TABLET, CHEWABLE ORAL 3 TIMES DAILY PRN
Status: DISCONTINUED | OUTPATIENT
Start: 2025-04-02 | End: 2025-04-05 | Stop reason: HOSPADM

## 2025-04-02 RX ORDER — OXYBUTYNIN CHLORIDE 5 MG/1
5 TABLET, EXTENDED RELEASE ORAL DAILY
Status: DISCONTINUED | OUTPATIENT
Start: 2025-04-03 | End: 2025-04-05 | Stop reason: HOSPADM

## 2025-04-02 RX ORDER — LEVETIRACETAM 500 MG/1
500 TABLET ORAL 2 TIMES DAILY
Status: DISCONTINUED | OUTPATIENT
Start: 2025-04-02 | End: 2025-04-05 | Stop reason: HOSPADM

## 2025-04-02 RX ORDER — POTASSIUM CHLORIDE 29.8 MG/ML
20 INJECTION INTRAVENOUS PRN
Status: DISCONTINUED | OUTPATIENT
Start: 2025-04-02 | End: 2025-04-05 | Stop reason: HOSPADM

## 2025-04-02 RX ORDER — SODIUM CHLORIDE 0.9 % (FLUSH) 0.9 %
10 SYRINGE (ML) INJECTION EVERY 12 HOURS SCHEDULED
Status: DISCONTINUED | OUTPATIENT
Start: 2025-04-02 | End: 2025-04-05 | Stop reason: HOSPADM

## 2025-04-02 RX ORDER — POTASSIUM CHLORIDE 1500 MG/1
40 TABLET, EXTENDED RELEASE ORAL PRN
OUTPATIENT
Start: 2025-04-03

## 2025-04-02 RX ORDER — POTASSIUM CHLORIDE 1500 MG/1
40 TABLET, EXTENDED RELEASE ORAL PRN
Status: DISCONTINUED | OUTPATIENT
Start: 2025-04-02 | End: 2025-04-03 | Stop reason: HOSPADM

## 2025-04-02 RX ORDER — ACETAMINOPHEN 325 MG/1
650 TABLET ORAL EVERY 4 HOURS PRN
Status: DISCONTINUED | OUTPATIENT
Start: 2025-04-02 | End: 2025-04-05 | Stop reason: HOSPADM

## 2025-04-02 RX ORDER — POTASSIUM CHLORIDE 29.8 MG/ML
80 INJECTION INTRAVENOUS PRN
OUTPATIENT
Start: 2025-04-03

## 2025-04-02 RX ADMIN — LORAZEPAM 1 MG: 1 TABLET ORAL at 17:38

## 2025-04-02 RX ADMIN — PANTOPRAZOLE SODIUM 40 MG: 40 TABLET, DELAYED RELEASE ORAL at 21:09

## 2025-04-02 RX ADMIN — HYDROXYZINE HYDROCHLORIDE 25 MG: 10 TABLET, FILM COATED ORAL at 19:45

## 2025-04-02 RX ADMIN — PROCHLORPERAZINE MALEATE 5 MG: 10 TABLET ORAL at 21:08

## 2025-04-02 RX ADMIN — CEFEPIME 2000 MG: 2 INJECTION, POWDER, FOR SOLUTION INTRAVENOUS at 22:09

## 2025-04-02 RX ADMIN — ACYCLOVIR 400 MG: 400 TABLET ORAL at 19:46

## 2025-04-02 RX ADMIN — CEFEPIME 2000 MG: 2 INJECTION, POWDER, FOR SOLUTION INTRAVENOUS at 14:07

## 2025-04-02 RX ADMIN — ACETAMINOPHEN 650 MG: 325 TABLET ORAL at 16:29

## 2025-04-02 RX ADMIN — LEVETIRACETAM 500 MG: 500 TABLET, FILM COATED ORAL at 19:46

## 2025-04-02 RX ADMIN — SODIUM CHLORIDE: 0.9 INJECTION, SOLUTION INTRAVENOUS at 17:40

## 2025-04-02 RX ADMIN — ACETAMINOPHEN 650 MG: 325 TABLET ORAL at 22:07

## 2025-04-02 ASSESSMENT — PAIN SCALES - GENERAL: PAINLEVEL_OUTOF10: 0

## 2025-04-02 NOTE — PROGRESS NOTES
Pt seen at [facility] today for Neutropenic fever workup. Temp Traq alerted pt at home when temp reached 100.4.    1300 cultures drawn from port per this RN. Throat swabs, CXR, urine samples also collected.  1400 Phlebotomist gladys second cultures, antibx started immediately after.    Pt now being admitted to [facility] inpt unit.  Report given to PADMINI Sanchez.  Pt transported via wheelchair to unit and oriented to room.  Family updated.

## 2025-04-02 NOTE — PROGRESS NOTES
Short Stay Communication Note  Catalina Leyva  Diagnosis: Multiple Myeloma  Primary MD: Maribel Garrido MD  Treatment: Fludarabine/Cytoxan  CAR-T Administration Date: 3/24/25  Day +9 of Carvykti   Pt seen in outpatient infusion today. Labs drawn and reviewed.   CBC:   Recent Labs     03/31/25 0825 04/01/25  0818 04/02/25  0845   WBC 1.0* 1.2* 1.4*   HGB 12.4 12.5 12.3   HCT 36.7 36.4 35.8*   MCV 85.6 83.4 83.2    133* 131*     BMP/Mag:  Recent Labs     03/31/25 0825 04/01/25  0818 04/02/25  0845    140 137   K 4.2 4.4 4.0    106 103   CO2 25 24 24   PHOS 3.4 3.5 3.7   BUN 14 14 14   CREATININE 0.6 0.6 0.6   MG 2.02  --  1.77*     Standing parameters for replacement for this patient:   1 unit of pack red blood cells for a hemoglobin < or equal to 7  1 pack of platelets for a platelet count less than or equal to 10  40 MeQ of Potassium administered for a potassium level less than or equal to 3.4  4g of Magnesium Sulfate for a magnesum level less than or equal to 1.4  No transfusions required for the above lab values.    Urinalysis last done: 3/31/25 Urinalysis next due: 4/7/25    Chest X-Ray last done: 3/31/25 Chest X-Ray next due: 4/7/25    Symptoms addressed and reported to care team this date: increased fatigue    Treatments this date: none    TempTraq reached 100.1 this AM, but decreased while in outpt infusion. Pt educated to continually monitor and call for any signs of CRS.    Reviewed medication schedule with pt and caregiver. Both able to verbalize all medications and schedule. Pt to be seen again tomorrow. Patient and caregiver verbalized understanding of discharge instructions including when and how to call the doctor and when to report  to the ER. Discharged ambulatory to home.

## 2025-04-02 NOTE — H&P
[facility] History and Physical        Attending Physician: Maribel Garrido DO    Primary Care: Mayra Mayen MD       Referring MD: Maribel Garrido DO  [address]   [address]  [place] [address]    Name: Catalina Leyva :  1950  MRN:  [id number]    Admission: (Not on file)      Date: 2025    Reason for Admission: Neutropenic Fever S/p Carvykti CAR-T Day + 9    History of Present Illness: Catalina Leyva is a 74-year-old female with history of relapsed refractory multiple myeloma. She received lymphodepleting chemotherapy for 3 days (3/19/25 - 3/21/25) in outpatient infusion and was admitted for Carvykti CAR-T Infusion on 3/24/25. She tolerated her infusion well and has not had any s/sx of CRS. She was then discharged on 3/26/25 and continued to follow up daily in the outpatient infusion clinic for labs and provider evaluation.  She continued to have Temp Track placed to monitor temperature.     She then called this afternoon with complaints of NF after [facility] appt this morning. She was instructed to come back to [facility] for NF workup and prior to admission to [facility]. Upon arrival to [facility], she was pan cultured and Cefepime was started. She will then be admitted to [facility] for NF / CRS workup and management. She complains of fever and chills but denies sweats, change in appetite, visual changes, headache, sinus congestion, sore throat, mouth pain, cough, increased shortness of breath, chest pain, abdominal pain, nausea, vomiting, diarrhea, constipation, dysuria, hematuria, lower extremity pain or swelling.      Past Surgical History:   Procedure Laterality Date    CATHETER INSERTION N/A 2021    TRIFUSION CATHETER INSERTION performed by Wander Lozano MD at [facility] OR    CATHETER REMOVAL N/A 8/3/2021    TRIFUSION CATHETER REMOVAL performed by Wander Lozano MD at [facility] OR    COLONOSCOPY      DILATION AND CURETTAGE OF UTERUS      IR NONTUNNELED VASCULAR CATHETER > 5 YEARS  2024     133* 131*     BMP/Mag:  Recent Labs     03/31/25  0825 04/01/25  0818 04/02/25  0845    140 137   K 4.2 4.4 4.0    106 103   CO2 25 24 24   PHOS 3.4 3.5 3.7   BUN 14 14 14   CREATININE 0.6 0.6 0.6   MG 2.02  --  1.77*     LIVP:   Recent Labs     03/31/25  0825 04/02/25  0845   AST 20 16   ALT 16 12   BILIDIR 0.2 0.2   BILITOT 0.4 0.5   ALKPHOS 72 68     Coags:   Recent Labs     03/31/25 0825   PROTIME 13.4   INR 1.00     Uric Acid No results for input(s): \"LABURIC\" in the last 72 hours.  Lab Results   Component Value Date    CRP 15.0 (H) 04/02/2025    CRP 4.8 03/31/2025    CRP 7.6 (H) 03/30/2025     Lab Results   Component Value Date    FERRITIN 54.4 04/02/2025    FERRITIN 44.9 04/01/2025    FERRITIN 46.1 03/31/2025       PROBLEM LIST:          Multiple myeloma   Agranulocytosis due to cancer chemotherapy  HTN  Hepatitis C ( History of per pt. She was treated w/ Harvoni)  Hypogammaglobulinemia   Mitral valve prolapse   History of C.Diff       TREATMENT:            RVD-Lite x 8 cycles (4/30/21)  Melphalan 200 mg / Auto SCT (7/1/21)  Rev maintenance  Eve/pom/dex x 6 cycles  C1D1: 10/31/23  C2D1: 12/20/23  C3D1: 1/24/24  C4D1: 2/28/24  C5D1: 4/10/24  C6D1: 5/8/24  5.   Penelope Kyprolis and Dex x 6 cycles  C1D1: 6/4/24  C2D1: 7/2/24  C3D1: 8/6/24  C4D1: 8/27/24  C5D1: 9/24/24  C6D1: 10/22/24  6. Ninlaro/cyclophosphamide/Dex               C1D1: 11/11/24              C2D1: 12/10/25              C3D1: 1/7/25              C4D1: 2/4/25  7.    Lymphodepleting Chemotherapy:  Fludarabine and cyclophosphamide   Disease Status at time of Infusion:  VGPR  CAR-T Infusion Date: 3/24/25  CAR-T Product:  Carvykti   Batch ID Number: [id number]     ASSESSMENT AND PLAN:            1. IgA Kappa Multiple myeloma ISS 3, 1q321:  Currently in VGPR   - Diagnosed in 12/2020    - PET 1/2/25: no evidence of active disease, stable lytic lesions within the calvarium with no significant uptake   - MMP 11/11/24:  M-spike 0.1.

## 2025-04-02 NOTE — PLAN OF CARE
Problem: Chronic Conditions and Co-morbidities  Goal: Patient's chronic conditions and co-morbidity symptoms are monitored and maintained or improved  Outcome: Progressing  Flowsheets (Taken 4/2/2025 1751)  Care Plan - Patient's Chronic Conditions and Co-Morbidity Symptoms are Monitored and Maintained or Improved:   Monitor and assess patient's chronic conditions and comorbid symptoms for stability, deterioration, or improvement   Collaborate with multidisciplinary team to address chronic and comorbid conditions and prevent exacerbation or deterioration   Update acute care plan with appropriate goals if chronic or comorbid symptoms are exacerbated and prevent overall improvement and discharge     Problem: Safety - Adult  Goal: Free from fall injury  Outcome: Progressing  Flowsheets (Taken 4/2/2025 1751)  Free From Fall Injury: Instruct family/caregiver on patient safety  Note: Orthostatic vital signs obtained at start of shift - see flowsheet for details.  Pt does not meet criteria for orthostasis.  Pt is a Med fall risk. See Bermudez Fall Score and ABCDS Injury Risk assessments.   - Screening for Orthostasis AND not a [place] Risk per BERMUDEZ/ABCDS: Pt bed is in low position, side rails up, call light and belongings are in reach.  Fall risk light is on outside pts room.  Pt encouraged to call for assistance as needed. Will continue with hourly rounds for PO intake, pain needs, toileting and repositioning as needed.      Problem: ABCDS Injury Assessment  Goal: Absence of physical injury  Outcome: Progressing  Flowsheets (Taken 4/2/2025 1751)  Absence of Physical Injury: Implement safety measures based on patient assessment

## 2025-04-02 NOTE — PROGRESS NOTES
4 Eyes Admission Assessment     I agree as the admission nurse that 2 RN's have performed a thorough Head to Toe Skin Assessment on the patient. ALL assessment sites listed below have been assessed on admission.       Areas assessed by both nurses:   [x]   Head, Face, and Ears   [x]   Shoulders, Back, and Chest  [x]   Arms, Elbows, and Hands   [x]   Coccyx, Sacrum, and Ischium  [x]   Legs, Feet, and Heels        Does the Patient have Skin Breakdown?  No         Leroy Prevention initiated:  No   Wound Care Orders initiated:  No      [facility] nurse consulted for Pressure Injury (Stage 3,4, Unstageable, DTI, NWPT, and Complex wounds) or Leroy score 18 or lower:  No      Nurse 1 eSignature: Electronically signed by Laura Wise RN on 4/2/25 at 7:20 PM EDT    **SHARE this note so that the co-signing nurse is able to place an eSignature**    Nurse 2 eSignature: Electronically signed by Sherita Nicolas RN on 4/2/25 at 7:21 PM EDT

## 2025-04-02 NOTE — PROGRESS NOTES
BCC Progress Note      2025    Catalina Leyva    :  1950    MRN:  [id number]    Referring MD: Maribel Garrido,   [address]  [address]  [place] [address]      Interval History:   - Doing very well, no s/sx of CRS/ICANS  - Eating and drinking well, no complaints     ECOG PS:  (1) Restricted in physically strenuous activity, ambulatory and able to do work of light nature    KPS: 90% Able to carry on normal activity; minor signs or symptoms of disease    Isolation:  None     Medications    Scheduled Meds:   ALTEplase (CATHFLO) 2 mg in sterile water 2 mL injection  2 mg IntraCATHeter Once       Continuous Infusions:   sodium chloride         PRN Meds:.sodium chloride, potassium chloride, potassium chloride, magnesium sulfate, magnesium hydroxide, oxyCODONE, oxyCODONE, prochlorperazine, prochlorperazine, ondansetron, ondansetron, heparin flush      ROS:  As noted above, otherwise remainder of 10-point ROS negative      Physical Exam:     Vital Signs:  /69   Pulse 92   Temp 98.2 °F (36.8 °C) (Oral)   Resp 20   Wt 74.3 kg (163 lb 12.8 oz)   SpO2 98%   BMI 29.10 kg/m²     Weight:    Wt Readings from Last 3 Encounters:   25 74.3 kg (163 lb 12.8 oz)   25 74.5 kg (164 lb 3.9 oz)   25 74.1 kg (163 lb 5.8 oz)       General: Awake, alert and oriented.  HEENT: normocephalic, alopecia, PERRL, no scleral erythema or icterus, Oral mucosa moist and intact, throat clear  NECK: supple   BACK: Straight   SKIN: warm dry and intact without lesions rashes or masses  CHEST: CTA bilaterally without use of accessory muscles  CV: Normal S1 S2, RRR, no MRG  ABD: NT ND normoactive BS  EXTREMITIES: without edema, denies calf tenderness  NEURO: CN II - XII grossly intact  CATHETER: PAC    Laboratory Data:  CBC:   Recent Labs     25  0825 25  0818   WBC 1.0* 1.2*   HGB 12.4 12.5   HCT 36.7 36.4   MCV 85.6 83.4    133*     BMP/Mag:  Recent Labs     25  0859

## 2025-04-03 ENCOUNTER — HOSPITAL ENCOUNTER (OUTPATIENT)
Dept: ONCOLOGY | Age: 75
Setting detail: INFUSION SERIES
Discharge: HOME | End: 2025-04-03

## 2025-04-03 LAB
ALBUMIN SERPL-MCNC: 3.8 G/DL (ref 3.4–5)
ALP SERPL-CCNC: 63 U/L (ref 40–129)
ALT SERPL-CCNC: 12 U/L (ref 10–40)
ANION GAP SERPL CALCULATED.3IONS-SCNC: 9 MMOL/L (ref 3–16)
APTT BLD: 35.7 SEC (ref 22.1–36.4)
AST SERPL-CCNC: 17 U/L (ref 15–37)
BACTERIA UR CULT: NORMAL
BASOPHILS # BLD: 0 K/UL (ref 0–0.2)
BASOPHILS NFR BLD: 0.9 %
BILIRUB DIRECT SERPL-MCNC: 0.2 MG/DL (ref 0–0.3)
BILIRUB INDIRECT SERPL-MCNC: 0.2 MG/DL (ref 0–1)
BILIRUB SERPL-MCNC: 0.4 MG/DL (ref 0–1)
BUN SERPL-MCNC: 13 MG/DL (ref 7–20)
CALCIUM SERPL-MCNC: 8.8 MG/DL (ref 8.3–10.6)
CHLORIDE SERPL-SCNC: 106 MMOL/L (ref 99–110)
CO2 SERPL-SCNC: 23 MMOL/L (ref 21–32)
CREAT SERPL-MCNC: 0.6 MG/DL (ref 0.6–1.2)
CRP SERPL-MCNC: 66.9 MG/L (ref 0–5.1)
D-DIMER QUANTITATIVE: 0.92 UG/ML FEU (ref 0–0.6)
DEPRECATED RDW RBC AUTO: 18.5 % (ref 12.4–15.4)
EKG ATRIAL RATE: 91 BPM
EKG DIAGNOSIS: NORMAL
EKG P AXIS: 76 DEGREES
EKG P-R INTERVAL: 140 MS
EKG Q-T INTERVAL: 380 MS
EKG QRS DURATION: 86 MS
EKG QTC CALCULATION (BAZETT): 467 MS
EKG R AXIS: -81 DEGREES
EKG T AXIS: 45 DEGREES
EKG VENTRICULAR RATE: 91 BPM
EOSINOPHIL # BLD: 0.1 K/UL (ref 0–0.6)
EOSINOPHIL NFR BLD: 7.1 %
FERRITIN SERPL IA-MCNC: 89.6 NG/ML (ref 15–150)
FIBRINOGEN PPP-MCNC: 450 MG/DL (ref 227–534)
GFR SERPLBLD CREATININE-BSD FMLA CKD-EPI: >90 ML/MIN/{1.73_M2}
GLUCOSE SERPL-MCNC: 109 MG/DL (ref 70–99)
HCT VFR BLD AUTO: 35.4 % (ref 36–48)
HGB BLD-MCNC: 12.3 G/DL (ref 12–16)
INR PPP: 1.23 (ref 0.85–1.15)
LDH SERPL L TO P-CCNC: 145 U/L (ref 100–190)
LOEFFLER MB STN SPEC: NORMAL
LOEFFLER MB STN SPEC: NORMAL
LYMPHOCYTES # BLD: 0.2 K/UL (ref 1–5.1)
LYMPHOCYTES NFR BLD: 12.1 %
MAGNESIUM SERPL-MCNC: 1.86 MG/DL (ref 1.8–2.4)
MCH RBC QN AUTO: 28.9 PG (ref 26–34)
MCHC RBC AUTO-ENTMCNC: 34.6 G/DL (ref 31–36)
MCV RBC AUTO: 83.3 FL (ref 80–100)
MONOCYTES # BLD: 0.3 K/UL (ref 0–1.3)
MONOCYTES NFR BLD: 18.1 %
NEUTROPHILS # BLD: 0.9 K/UL (ref 1.7–7.7)
NEUTROPHILS NFR BLD: 61.8 %
PHOSPHATE SERPL-MCNC: 3.2 MG/DL (ref 2.5–4.9)
PLATELET # BLD AUTO: 111 K/UL (ref 135–450)
PMV BLD AUTO: 7.1 FL (ref 5–10.5)
POTASSIUM SERPL-SCNC: 4.1 MMOL/L (ref 3.5–5.1)
PROT SERPL-MCNC: 5.9 G/DL (ref 6.4–8.2)
PROTHROMBIN TIME: 15.7 SEC (ref 11.9–14.9)
RBC # BLD AUTO: 4.25 M/UL (ref 4–5.2)
SODIUM SERPL-SCNC: 138 MMOL/L (ref 136–145)
URATE SERPL-MCNC: 2.2 MG/DL (ref 2.6–6)
WBC # BLD AUTO: 1.4 K/UL (ref 4–11)

## 2025-04-03 PROCEDURE — 2060000000 HC ICU INTERMEDIATE R&B

## 2025-04-03 PROCEDURE — 82728 ASSAY OF FERRITIN: CPT

## 2025-04-03 PROCEDURE — 86140 C-REACTIVE PROTEIN: CPT

## 2025-04-03 PROCEDURE — 6370000000 HC RX 637 (ALT 250 FOR IP): Performed by: NURSE PRACTITIONER

## 2025-04-03 PROCEDURE — 85730 THROMBOPLASTIN TIME PARTIAL: CPT

## 2025-04-03 PROCEDURE — 85025 COMPLETE CBC W/AUTO DIFF WBC: CPT

## 2025-04-03 PROCEDURE — 6360000002 HC RX W HCPCS: Performed by: NURSE PRACTITIONER

## 2025-04-03 PROCEDURE — 80048 BASIC METABOLIC PNL TOTAL CA: CPT

## 2025-04-03 PROCEDURE — 2580000003 HC RX 258: Performed by: NURSE PRACTITIONER

## 2025-04-03 PROCEDURE — 83615 LACTATE (LD) (LDH) ENZYME: CPT

## 2025-04-03 PROCEDURE — 80076 HEPATIC FUNCTION PANEL: CPT

## 2025-04-03 PROCEDURE — 93010 ELECTROCARDIOGRAM REPORT: CPT | Performed by: INTERNAL MEDICINE

## 2025-04-03 PROCEDURE — 83735 ASSAY OF MAGNESIUM: CPT

## 2025-04-03 PROCEDURE — 2500000003 HC RX 250 WO HCPCS: Performed by: NURSE PRACTITIONER

## 2025-04-03 PROCEDURE — 85379 FIBRIN DEGRADATION QUANT: CPT

## 2025-04-03 PROCEDURE — 85384 FIBRINOGEN ACTIVITY: CPT

## 2025-04-03 PROCEDURE — 85610 PROTHROMBIN TIME: CPT

## 2025-04-03 PROCEDURE — 84100 ASSAY OF PHOSPHORUS: CPT

## 2025-04-03 PROCEDURE — 84550 ASSAY OF BLOOD/URIC ACID: CPT

## 2025-04-03 RX ADMIN — OXYBUTYNIN CHLORIDE 5 MG: 5 TABLET, EXTENDED RELEASE ORAL at 08:19

## 2025-04-03 RX ADMIN — CEFEPIME 2000 MG: 2 INJECTION, POWDER, FOR SOLUTION INTRAVENOUS at 06:10

## 2025-04-03 RX ADMIN — ACYCLOVIR 400 MG: 400 TABLET ORAL at 08:19

## 2025-04-03 RX ADMIN — LEVETIRACETAM 500 MG: 500 TABLET, FILM COATED ORAL at 08:19

## 2025-04-03 RX ADMIN — ACYCLOVIR 400 MG: 400 TABLET ORAL at 19:56

## 2025-04-03 RX ADMIN — SODIUM CHLORIDE: 0.9 INJECTION, SOLUTION INTRAVENOUS at 06:59

## 2025-04-03 RX ADMIN — SODIUM CHLORIDE, PRESERVATIVE FREE 10 ML: 5 INJECTION INTRAVENOUS at 08:21

## 2025-04-03 RX ADMIN — LORAZEPAM 1 MG: 1 TABLET ORAL at 17:34

## 2025-04-03 RX ADMIN — SODIUM CHLORIDE, PRESERVATIVE FREE 10 ML: 5 INJECTION INTRAVENOUS at 19:57

## 2025-04-03 RX ADMIN — LORAZEPAM 1 MG: 1 TABLET ORAL at 08:19

## 2025-04-03 RX ADMIN — CITALOPRAM HYDROBROMIDE 10 MG: 20 TABLET ORAL at 08:19

## 2025-04-03 RX ADMIN — HYDROXYZINE HYDROCHLORIDE 25 MG: 10 TABLET, FILM COATED ORAL at 19:56

## 2025-04-03 RX ADMIN — SODIUM CHLORIDE 15 ML: 0.9 IRRIGANT IRRIGATION at 19:58

## 2025-04-03 RX ADMIN — CEFEPIME 2000 MG: 2 INJECTION, POWDER, FOR SOLUTION INTRAVENOUS at 22:27

## 2025-04-03 RX ADMIN — SODIUM CHLORIDE: 0.9 INJECTION, SOLUTION INTRAVENOUS at 20:09

## 2025-04-03 RX ADMIN — CEFEPIME 2000 MG: 2 INJECTION, POWDER, FOR SOLUTION INTRAVENOUS at 14:24

## 2025-04-03 RX ADMIN — FLUCONAZOLE 200 MG: 200 TABLET ORAL at 08:19

## 2025-04-03 RX ADMIN — AMLODIPINE BESYLATE 5 MG: 5 TABLET ORAL at 08:19

## 2025-04-03 RX ADMIN — PANTOPRAZOLE SODIUM 40 MG: 40 TABLET, DELAYED RELEASE ORAL at 06:05

## 2025-04-03 RX ADMIN — LEVETIRACETAM 500 MG: 500 TABLET, FILM COATED ORAL at 19:56

## 2025-04-03 NOTE — PSYCHOTHERAPY
Psychology Follow-up Consultation    Patient Name: Catalina Leyva  MRN: [id number]  Admission Date: 4/2/2025  Today's date: 4/3/2025    Reason for Consult: readmission to hospital after CART    HPI:   Catalina Leyva is a 74 y.o.   female with multiple myeloma and PMHx of autologous stem cell transplant (2021) and mitral valve disease, who initially presented on 4/2/2025 for NF/ CRS workup and management. (Day 0: 3/24/2025)    Pre-CAR-T Psychological Evaluation recommendations:  Primary caregiver had limited knowledge regarding CAR-T and should review education materials.   - Solidify back-up caregiver plan and notify transplant coordinators once completed.   - Patient's drug screen on 10/25/2024 was positive for benzodiazepines and oxycodone. Patient is prescribed lorazepam 1mg twice daily per patient report and chart review. Patient reports she took two of her 's oxycodone one night when she experienced a headache. Transplant coordinator opted to retest patient during visit on 11/8/2024, but patient requested to be screened on another day as she reported taking a third oxycodone on 11/4/2024. Will discuss with treatment team.   - Referral to social work for palliative care for  during CAR-T procedure.     Interval History:    Spoke with patient and caregiver (Rohit) to reflect on readmission to hospital and monitoring for CRS. Patient stated she was doing \"fine\" and did not have any behavioral health needs today. She elaborated her  remains at the respite care location and she speaks with his aid regularly. She elaborated she misses her dog and would like to be home.     Current psychiatric treatment: Celexa (10mg; daily; outpatient); Ativan (1mg; 2 times daily; outpatient); hydroyxzine (25mg; nightly; outpatient)   Other relevant medications: Keppra     History obtained 11/1/025 during pre-CAR-T psychological evaluation:    PSYCHIATRIC HISTORY:  Current mental health treatment:  (congestive) heart failure 7/25/2023    Dental crown present     Depression     Hepatitis C      states resolved     Hypertension 2/13/2023    Multiple myeloma not having achieved remission (HCC) 06/15/2021       Allergies   Allergen Reactions    Corticosteroids Other (See Comments)     All corticosteroids should be avoided from day -5 to day +90 due to CAR T infusion. No steroids unless confirmed with BCC MD       Methylprednisolone Other (See Comments)     Severe body pain       Current Medications Ordered:  Medications    Scheduled Meds:   sodium chloride flush  10 mL IntraVENous 2 times per day    Saline Mouthwash  15 mL Swish & Spit 4x Daily AC & HS    cefepime  2,000 mg IntraVENous Q8H    acyclovir  400 mg Oral BID    amLODIPine  5 mg Oral Daily    citalopram  10 mg Oral Daily    fluconazole  200 mg Oral Daily    hydrOXYzine HCl  25 mg Oral Nightly    levETIRAcetam  500 mg Oral BID    LORazepam  1 mg Oral BID    oxyBUTYnin  5 mg Oral Daily    enoxaparin  40 mg SubCUTAneous QPM    pantoprazole  40 mg Oral QAM AC     Continuous Infusions:   sodium chloride      sodium chloride 75 mL/hr at 04/03/25 0659     PRN Meds:.sodium chloride flush, sodium chloride, potassium chloride, magnesium sulfate, magnesium hydroxide, ALTEplase (CATHFLO) 2 mg in sterile water 2 mL injection, acetaminophen, prochlorperazine **OR** prochlorperazine, calcium carbonate    PE:    BP (!) 110/59   Pulse 91   Temp 98.2 °F (36.8 °C) (Oral)   Resp 16   Ht 1.622 m (5' 3.86\") Comment: actual, measured  Wt 74.3 kg (163 lb 12.8 oz)   SpO2 97%   BMI 28.24 kg/m²       Mental status: Appearance: good hygiene , Attitude: cooperative and friendly, Consciousness: alert, Orientation: oriented to person, place, time, general circumstance, Memory: recent and remote memory intact, Attention/Concentration: intact during session, Psychomotor Activity:normal, Eye Contact: normal, Speech: normal rate and volume, well-articulated, Mood: euthymic,  readmission to the hospital. She seems well-adjusted to others carrying for her  while she is recovering from CART.     Recommendations:    Psychology continue to follow.     Interventions during this admission: Supportive listening, Assessment of current needs    Elida Brown M.A.   Psychology Trainee     Prior to discussion the writer informed the patient that they are under the supervision of clinical psychologist Dr. Sharonda Bell. Patient expressed understanding and was agreeable to meet.

## 2025-04-03 NOTE — PROGRESS NOTES
BCC Progress Note    4/3/2025     Catalina Leyva    MRN: [id number]    : 1950    Referring MD: Maribel Garrido DO  [address]  [address]  [place] [address]      Interval History:  - t max 100.4 overnight  - no s/sx ICANS  - CRP and ferritin trending up    ECOG PS:  (1) Restricted in physically strenuous activity, ambulatory and able to do work of light nature    KPS: 90% Able to carry on normal activity; minor signs or symptoms of disease    Isolation: None    Medications    Scheduled Meds:   sodium chloride flush  10 mL IntraVENous 2 times per day    Saline Mouthwash  15 mL Swish & Spit 4x Daily AC & HS    cefepime  2,000 mg IntraVENous Q8H    acyclovir  400 mg Oral BID    amLODIPine  5 mg Oral Daily    citalopram  10 mg Oral Daily    fluconazole  200 mg Oral Daily    hydrOXYzine HCl  25 mg Oral Nightly    levETIRAcetam  500 mg Oral BID    LORazepam  1 mg Oral BID    oxyBUTYnin  5 mg Oral Daily    enoxaparin  40 mg SubCUTAneous QPM    pantoprazole  40 mg Oral QAM AC     Continuous Infusions:   sodium chloride      sodium chloride 75 mL/hr at 25 0659     PRN Meds:.sodium chloride flush, sodium chloride, potassium chloride, magnesium sulfate, magnesium hydroxide, ALTEplase (CATHFLO) 2 mg in sterile water 2 mL injection, acetaminophen, prochlorperazine **OR** prochlorperazine, calcium carbonate    ROS:  As noted above, otherwise remainder of 10-point ROS negative    Physical Exam:     I&O:    Intake/Output Summary (Last 24 hours) at 4/3/2025 0828  Last data filed at 4/3/2025 0612  Gross per 24 hour   Intake --   Output 1950 ml   Net -1950 ml       Vital Signs:  /85   Pulse 89   Temp 98.8 °F (37.1 °C) (Oral)   Resp 24   Ht 1.622 m (5' 3.86\") Comment: actual, measured  Wt 74.3 kg (163 lb 12.8 oz)   SpO2 100%   BMI 28.24 kg/m²     Weight:    Wt Readings from Last 3 Encounters:   25 74.3 kg (163 lb 12.8 oz)   25 74.3 kg (163 lb 12.8 oz)   25 74.5 kg (164 lb 3.9

## 2025-04-03 NOTE — CONSENT
Informed Consent for Blood Component Transfusion Note    I have discussed with the patient the rationale for blood component transfusion; its benefits in treating or preventing fatigue, organ damage, or death; and its risk which includes mild transfusion reactions, rare risk of blood borne infection, or more serious but rare reactions. I have discussed the alternatives to transfusion, including the risk and consequences of not receiving transfusion. The patient had an opportunity to ask questions and had agreed to proceed with transfusion of blood components.    Electronically signed by DELORES Harman CNP on 4/3/25 at 10:22 AM EDT

## 2025-04-03 NOTE — CARE COORDINATION
Case Management Assessment  Initial Evaluation    Date/Time of Evaluation: 4/3/2025 11:42 AM  Assessment Completed by: MILLIE Dos Santos   for [facility] [place] ([facility])  [employer]: [phone]    If patient is discharged prior to next notation, then this note serves as note for discharge by case management.    Patient Name: Catalina Leyva                   YOB: 1950  Diagnosis: Neutropenic fever [D70.9, R50.81]                   Date / Time: 4/2/2025  2:32 PM    Patient Admission Status: Inpatient   Readmission Risk (Low < 19, Mod (19-27), High > 27): Readmission Risk Score: 15.7    Current PCP: Mayra Mayen MD  PCP verified by CM? Yes    Chart Reviewed: Yes      History Provided by: Patient  Patient Orientation: Alert and Oriented    Patient Cognition: Alert    Hospitalization in the last 30 days (Readmission):  Yes    If yes, Readmission Assessment in CM Navigator will be completed.    Advance Directives:      Code Status: Full Code   Patient's Primary Decision Maker is: Named in Scanned ACP Document    Primary Decision Maker: Dr chase pena - Other Relative - [phone]    Secondary Decision Maker: CHASE KISER - Brother/Sister - [phone]    Discharge Planning:    Patient lives with: Spouse/Significant Other Type of Home: House  Primary Care Giver: Self  Patient Support Systems include: Family Members   Current Financial resources: Medicare, Other (Comment) ([facility])  Current community resources: None  Current services prior to admission: Durable Medical Equipment, Other (Comment) ([facility])            Current DME: [employer] (She doesn't use the DME)            Type of Home Care services:  None    ADLS  Prior functional level: Independent in ADLs/IADLs  Current functional level: Independent in ADLs/IADLs    PT AM-PAC:   /24  OT AM-PAC:   /24    Family can provide assistance at DC: Yes  Would you like Case Management to

## 2025-04-04 PROBLEM — R50.81 NEUTROPENIC FEVER: Status: RESOLVED | Noted: 2025-04-02 | Resolved: 2025-04-04

## 2025-04-04 PROBLEM — D70.9 NEUTROPENIC FEVER: Status: RESOLVED | Noted: 2025-04-02 | Resolved: 2025-04-04

## 2025-04-04 LAB
ALBUMIN SERPL-MCNC: 3.6 G/DL (ref 3.4–5)
ALP SERPL-CCNC: 58 U/L (ref 40–129)
ALT SERPL-CCNC: 10 U/L (ref 10–40)
ANION GAP SERPL CALCULATED.3IONS-SCNC: 7 MMOL/L (ref 3–16)
ANISOCYTOSIS BLD QL SMEAR: ABNORMAL
APTT BLD: 41.4 SEC (ref 22.1–36.4)
AST SERPL-CCNC: 15 U/L (ref 15–37)
BASOPHILS # BLD: 0 K/UL (ref 0–0.2)
BASOPHILS NFR BLD: 1 %
BILIRUB DIRECT SERPL-MCNC: 0.2 MG/DL (ref 0–0.3)
BILIRUB INDIRECT SERPL-MCNC: 0.1 MG/DL (ref 0–1)
BILIRUB SERPL-MCNC: 0.3 MG/DL (ref 0–1)
BUN SERPL-MCNC: 11 MG/DL (ref 7–20)
CALCIUM SERPL-MCNC: 8.7 MG/DL (ref 8.3–10.6)
CHLORIDE SERPL-SCNC: 107 MMOL/L (ref 99–110)
CO2 SERPL-SCNC: 24 MMOL/L (ref 21–32)
CREAT SERPL-MCNC: 0.6 MG/DL (ref 0.6–1.2)
CRP SERPL-MCNC: 98.9 MG/L (ref 0–5.1)
D-DIMER QUANTITATIVE: 1.04 UG/ML FEU (ref 0–0.6)
DACRYOCYTES BLD QL SMEAR: ABNORMAL
DEPRECATED RDW RBC AUTO: 18.4 % (ref 12.4–15.4)
EOSINOPHIL # BLD: 0 K/UL (ref 0–0.6)
EOSINOPHIL NFR BLD: 1 %
FERRITIN SERPL IA-MCNC: 105 NG/ML (ref 15–150)
FIBRINOGEN PPP-MCNC: 471 MG/DL (ref 227–534)
GFR SERPLBLD CREATININE-BSD FMLA CKD-EPI: >90 ML/MIN/{1.73_M2}
GLUCOSE SERPL-MCNC: 94 MG/DL (ref 70–99)
HCT VFR BLD AUTO: 34.3 % (ref 36–48)
HGB BLD-MCNC: 11.7 G/DL (ref 12–16)
INR PPP: 1.08 (ref 0.85–1.15)
LDH SERPL L TO P-CCNC: 155 U/L (ref 100–190)
LYMPHOCYTES # BLD: 0.5 K/UL (ref 1–5.1)
LYMPHOCYTES NFR BLD: 28 %
MACROCYTES BLD QL SMEAR: ABNORMAL
MAGNESIUM SERPL-MCNC: 1.86 MG/DL (ref 1.8–2.4)
MCH RBC QN AUTO: 29.1 PG (ref 26–34)
MCHC RBC AUTO-ENTMCNC: 34.2 G/DL (ref 31–36)
MCV RBC AUTO: 85.1 FL (ref 80–100)
MICROCYTES BLD QL SMEAR: ABNORMAL
MONOCYTES # BLD: 0.3 K/UL (ref 0–1.3)
MONOCYTES NFR BLD: 20 %
NEUTROPHILS # BLD: 0.7 K/UL (ref 1.7–7.7)
NEUTROPHILS NFR BLD: 44 %
NEUTS BAND NFR BLD MANUAL: 1 % (ref 0–7)
OVALOCYTES BLD QL SMEAR: ABNORMAL
PHOSPHATE SERPL-MCNC: 2.5 MG/DL (ref 2.5–4.9)
PLATELET # BLD AUTO: 111 K/UL (ref 135–450)
PLATELET BLD QL SMEAR: ABNORMAL
PMV BLD AUTO: 7.7 FL (ref 5–10.5)
POIKILOCYTOSIS BLD QL SMEAR: ABNORMAL
POLYCHROMASIA BLD QL SMEAR: ABNORMAL
POTASSIUM SERPL-SCNC: 3.9 MMOL/L (ref 3.5–5.1)
PROT SERPL-MCNC: 5.9 G/DL (ref 6.4–8.2)
PROTHROMBIN TIME: 14.2 SEC (ref 11.9–14.9)
RBC # BLD AUTO: 4.04 M/UL (ref 4–5.2)
SLIDE REVIEW: ABNORMAL
SODIUM SERPL-SCNC: 138 MMOL/L (ref 136–145)
URATE SERPL-MCNC: 2.1 MG/DL (ref 2.6–6)
VARIANT LYMPHS NFR BLD MANUAL: 5 % (ref 0–6)
WBC # BLD AUTO: 1.6 K/UL (ref 4–11)

## 2025-04-04 PROCEDURE — 83735 ASSAY OF MAGNESIUM: CPT

## 2025-04-04 PROCEDURE — 85384 FIBRINOGEN ACTIVITY: CPT

## 2025-04-04 PROCEDURE — 6370000000 HC RX 637 (ALT 250 FOR IP)

## 2025-04-04 PROCEDURE — 85610 PROTHROMBIN TIME: CPT

## 2025-04-04 PROCEDURE — 82728 ASSAY OF FERRITIN: CPT

## 2025-04-04 PROCEDURE — 85379 FIBRIN DEGRADATION QUANT: CPT

## 2025-04-04 PROCEDURE — 36591 DRAW BLOOD OFF VENOUS DEVICE: CPT

## 2025-04-04 PROCEDURE — 85025 COMPLETE CBC W/AUTO DIFF WBC: CPT

## 2025-04-04 PROCEDURE — 86140 C-REACTIVE PROTEIN: CPT

## 2025-04-04 PROCEDURE — 2500000003 HC RX 250 WO HCPCS: Performed by: NURSE PRACTITIONER

## 2025-04-04 PROCEDURE — 84550 ASSAY OF BLOOD/URIC ACID: CPT

## 2025-04-04 PROCEDURE — 6370000000 HC RX 637 (ALT 250 FOR IP): Performed by: NURSE PRACTITIONER

## 2025-04-04 PROCEDURE — 83615 LACTATE (LD) (LDH) ENZYME: CPT

## 2025-04-04 PROCEDURE — 84100 ASSAY OF PHOSPHORUS: CPT

## 2025-04-04 PROCEDURE — 80076 HEPATIC FUNCTION PANEL: CPT

## 2025-04-04 PROCEDURE — 6360000002 HC RX W HCPCS: Performed by: NURSE PRACTITIONER

## 2025-04-04 PROCEDURE — 85730 THROMBOPLASTIN TIME PARTIAL: CPT

## 2025-04-04 PROCEDURE — 2060000000 HC ICU INTERMEDIATE R&B

## 2025-04-04 PROCEDURE — 2580000003 HC RX 258: Performed by: NURSE PRACTITIONER

## 2025-04-04 PROCEDURE — 80048 BASIC METABOLIC PNL TOTAL CA: CPT

## 2025-04-04 RX ORDER — LEVOFLOXACIN 500 MG/1
500 TABLET, FILM COATED ORAL EVERY 24 HOURS
Status: DISCONTINUED | OUTPATIENT
Start: 2025-04-04 | End: 2025-04-05 | Stop reason: HOSPADM

## 2025-04-04 RX ADMIN — ACYCLOVIR 400 MG: 400 TABLET ORAL at 20:33

## 2025-04-04 RX ADMIN — LEVETIRACETAM 500 MG: 500 TABLET, FILM COATED ORAL at 20:33

## 2025-04-04 RX ADMIN — HYDROXYZINE HYDROCHLORIDE 25 MG: 10 TABLET, FILM COATED ORAL at 20:34

## 2025-04-04 RX ADMIN — FLUCONAZOLE 200 MG: 200 TABLET ORAL at 07:54

## 2025-04-04 RX ADMIN — LORAZEPAM 1 MG: 1 TABLET ORAL at 07:53

## 2025-04-04 RX ADMIN — LEVOFLOXACIN 500 MG: 500 TABLET, FILM COATED ORAL at 20:33

## 2025-04-04 RX ADMIN — SODIUM CHLORIDE, PRESERVATIVE FREE 10 ML: 5 INJECTION INTRAVENOUS at 20:35

## 2025-04-04 RX ADMIN — SODIUM CHLORIDE 15 ML: 0.9 IRRIGANT IRRIGATION at 20:36

## 2025-04-04 RX ADMIN — CITALOPRAM HYDROBROMIDE 10 MG: 20 TABLET ORAL at 07:54

## 2025-04-04 RX ADMIN — CEFEPIME 2000 MG: 2 INJECTION, POWDER, FOR SOLUTION INTRAVENOUS at 06:24

## 2025-04-04 RX ADMIN — OXYBUTYNIN CHLORIDE 5 MG: 5 TABLET, EXTENDED RELEASE ORAL at 07:54

## 2025-04-04 RX ADMIN — SODIUM CHLORIDE: 0.9 INJECTION, SOLUTION INTRAVENOUS at 10:20

## 2025-04-04 RX ADMIN — AMLODIPINE BESYLATE 5 MG: 5 TABLET ORAL at 07:54

## 2025-04-04 RX ADMIN — SODIUM CHLORIDE, PRESERVATIVE FREE 10 ML: 5 INJECTION INTRAVENOUS at 09:26

## 2025-04-04 RX ADMIN — LORAZEPAM 1 MG: 1 TABLET ORAL at 17:47

## 2025-04-04 RX ADMIN — ACYCLOVIR 400 MG: 400 TABLET ORAL at 07:54

## 2025-04-04 RX ADMIN — PANTOPRAZOLE SODIUM 40 MG: 40 TABLET, DELAYED RELEASE ORAL at 07:11

## 2025-04-04 RX ADMIN — LEVETIRACETAM 500 MG: 500 TABLET, FILM COATED ORAL at 07:54

## 2025-04-04 ASSESSMENT — PAIN SCALES - GENERAL
PAINLEVEL_OUTOF10: 0
PAINLEVEL_OUTOF10: 0

## 2025-04-04 NOTE — DISCHARGE SUMMARY
Psychiatric Discharge Summary             Attending Physician: Maribel Garrido DO    Referring MD: Maribel Garrido DO  [address]  [address]  [place] [address]    Name: Catalina Leyva :  1950  MRN:  [id number]    Admission: 2025   Discharge:   25    Date: 2025    Diagnosis on admit: Fever/CRS        Medications:      Medication List        START taking these medications      pantoprazole 40 MG tablet  Commonly known as: PROTONIX  Take 1 tablet by mouth every morning (before breakfast)            CONTINUE taking these medications      acyclovir 400 MG tablet  Commonly known as: ZOVIRAX     amLODIPine 5 MG tablet  Commonly known as: NORVASC  Take 1 tablet by mouth daily     citalopram 10 MG tablet  Commonly known as: CELEXA  Take 1 tablet by mouth daily     fluconazole 200 MG tablet  Commonly known as: DIFLUCAN  Take 1 tablet by mouth daily Do not start until instructed to by provider.     hydrOXYzine HCl 25 MG tablet  Commonly known as: ATARAX     levETIRAcetam 500 MG tablet  Commonly known as: Keppra  Take 1 tablet by mouth 2 times daily Will start 3/24/25, prior to CAR-T Infusion.     levoFLOXacin 500 MG tablet  Commonly known as: LEVAQUIN  Take 1 tablet by mouth daily Do not start until instructed to by provider.     LORazepam 1 MG tablet  Commonly known as: ATIVAN  Take 1 tablet by mouth in the morning and 1 tablet in the evening. Do all this for 30 days. Max Daily Amount: 2 mg.     oxyBUTYnin 5 MG extended release tablet  Commonly known as: Ditropan XL  Take 1 tablet by mouth daily     PreserVision AREDS Caps     prochlorperazine 10 MG tablet  Commonly known as: COMPAZINE     vitamin D 50 MCG ( UT) Caps capsule  Commonly known as: CHOLECALCIFEROL               Where to Get Your Medications        These medications were sent to [facility] [place] [address] [address] - P [phone] - F [phone]  [address] [place] [address] [address] [place] [phone]  9/24/24  C6D1: 10/22/24  6. [place]/cyclophosphamide/Dex               C1D1: 11/11/24              C2D1: 12/10/25              C3D1: 1/7/25              C4D1: 2/4/25  7.    Lymphodepleting Chemotherapy:  Fludarabine and cyclophosphamide   Disease Status at time of Infusion:  VGPR  CAR-T Infusion Date: 3/24/25  CAR-T Product:  Carvykti   Batch ID Number: [id number]      ASSESSMENT AND PLAN:           1. IgA Kappa Multiple myeloma ISS 3, 1q321:  Currently in VGPR   - Diagnosed in 12/2020    - PET 1/2/25: no evidence of active disease, stable lytic lesions within the calvarium with no significant uptake   - MMP 11/11/24:  M-spike 0.1.  , LLC 1.6, K/L ratio 67.8.  IgA WNL  - MMP 2/4/2025:  No M-spike.  KLC 31.7, LLC <1.5, K/L ratio >21.13.  IgA  WNL  - MMP 3/4/24:  No M-spike.  KLC 25, LLC <1.5, K/L ratio >17.  IgA WNL     PLAN: Carvykti   - Continue to monitor Absolute Lymphocyte Count.  If ALC is >3 on Day 7-14 s/p Carvykti, give Dexamethasone 10 mg PO daily x 3 days      Day + 12     2. CRS / Neuro: At risk post-infusion  CRS Grade: Grade 1 (Fever Only)   - Monitor CRP and Ferrtin closely      Lab Results   Component Value Date    CRP 55.9 (H) 04/05/2025    CRP 98.9 (H) 04/04/2025    CRP 66.9 (H) 04/03/2025     Lab Results   Component Value Date    FERRITIN 90.1 04/05/2025    FERRITIN 105.0 04/04/2025    FERRITIN 89.6 04/03/2025          ICANS Grade: None  ICE Score:   10/10  - Neuro checks w/ CARTOX 10-point assessment Q4hrs  - Cont Keppra (started 3/24/2025)        3.  ID:    NF 4/2/25, T-max 101, from possible Infectious process vs CRS  - CXR / UA 3/31/25: Negative   History:   Clostridium Difficile   - Cdiff (1/1/25): positive s/p Dificid   History Hepatitis C s/p Harvoni 2016  - USN negative 10/2023  Hypogammaglobulinemia  - IgG (2/4/25): 339 - IVIG on 2/18/25       NF 4/2/25:   - CXR: Negative for acute disease  - Pan Cx: NG  - Lactic Acid: 1.1     PLAN:  - Cont Valtrex, Levaquin, & Diflucan ppx   - S/P

## 2025-04-04 NOTE — PLAN OF CARE
Problem: Safety - Adult  Goal: Free from fall injury  Outcome: Progressing  Flowsheets (Taken 4/4/2025 0524)  Free From Fall Injury:   Instruct family/caregiver on patient safety   Based on caregiver fall risk screen, instruct family/caregiver to ask for assistance with transferring infant if caregiver noted to have fall risk factors  Note: Orthostatic vital signs obtained at start of shift - see flowsheet for details.  Pt does not meet criteria for orthostasis.  Pt is a Med fall risk. See Bermudez Fall Score and ABCDS Injury Risk assessments.   - Screening for Orthostasis AND not a [place] Risk per BERMUDEZ/ABCDS: Pt bed is in low position, side rails up, call light and belongings are in reach.  Fall risk light is on outside pts room.  Pt encouraged to call for assistance as needed. Will continue with hourly rounds for PO intake, pain needs, toileting and repositioning as needed.      Problem: ABCDS Injury Assessment  Goal: Absence of physical injury  Outcome: Progressing  Flowsheets (Taken 4/4/2025 0524)  Absence of Physical Injury: Implement safety measures based on patient assessment     Problem: Infection - Adult  Goal: Absence of infection during hospitalization  Outcome: Progressing  Flowsheets (Taken 4/4/2025 0524)  Absence of infection during hospitalization:   Assess and monitor for signs and symptoms of infection   Monitor lab/diagnostic results   Monitor all insertion sites i.e., indwelling lines, tubes and drains   Administer medications as ordered   Instruct and encourage patient and family to use good hand hygiene technique  Note: Patient remained afebrile this shift. Pt continues IV antibiotic treatment.     Problem: Hematologic - Adult  Goal: Maintains hematologic stability  Outcome: Progressing  Flowsheets (Taken 4/4/2025 0526)  Maintains hematologic stability:   Assess for signs and symptoms of bleeding or hemorrhage   Monitor labs for bleeding or clotting disorders   Administer blood

## 2025-04-04 NOTE — PLAN OF CARE
Problem: Chronic Conditions and Co-morbidities  Goal: Patient's chronic conditions and co-morbidity symptoms are monitored and maintained or improved  Outcome: Progressing     Problem: Safety - Adult  Goal: Free from fall injury  4/4/2025 1456 by Chacha Velasquez RN  Outcome: Progressing    Problem: ABCDS Injury Assessment  Goal: Absence of physical injury  4/4/2025 1456 by Chacha Velasquez, RN  Outcome: Progressing  Absence of Physical Injury: Implement safety measures based on patient assessment    Problem: Infection - Adult  Goal: Absence of infection during hospitalization  4/4/2025 1456 by Chacha Velasquez RN  Outcome: Progressing   Assess and monitor for signs and symptoms of infection   Monitor lab/diagnostic results   Monitor all insertion sites i.e., indwelling lines, tubes and drains   Administer medications as ordered   Instruct and encourage patient and family to use good hand hygiene technique

## 2025-04-04 NOTE — PROGRESS NOTES
BCC Progress Note    2025     Catalina Leyva    MRN: [id number]    : 1950    Referring MD: Maribel Garrido DO  [address]  [address]   [id number]      Interval History:  - Afebrile overnight  - no s/sx ICANS  - CRP and ferritin continue to trend up    ECOG PS:  (1) Restricted in physically strenuous activity, ambulatory and able to do work of light nature    KPS: 90% Able to carry on normal activity; minor signs or symptoms of disease    Isolation: None    Medications    Scheduled Meds:   sodium chloride flush  10 mL IntraVENous 2 times per day    Saline Mouthwash  15 mL Swish & Spit 4x Daily AC & HS    cefepime  2,000 mg IntraVENous Q8H    acyclovir  400 mg Oral BID    amLODIPine  5 mg Oral Daily    citalopram  10 mg Oral Daily    fluconazole  200 mg Oral Daily    hydrOXYzine HCl  25 mg Oral Nightly    levETIRAcetam  500 mg Oral BID    LORazepam  1 mg Oral BID    oxyBUTYnin  5 mg Oral Daily    enoxaparin  40 mg SubCUTAneous QPM    pantoprazole  40 mg Oral QAM AC     Continuous Infusions:   sodium chloride      sodium chloride 75 mL/hr at 25     PRN Meds:.sodium chloride flush, sodium chloride, potassium chloride, magnesium sulfate, magnesium hydroxide, ALTEplase (CATHFLO) 2 mg in sterile water 2 mL injection, acetaminophen, prochlorperazine **OR** prochlorperazine, calcium carbonate    ROS:  As noted above, otherwise remainder of 10-point ROS negative    Physical Exam:     I&O:    Intake/Output Summary (Last 24 hours) at 2025 0821  Last data filed at 2025 0627  Gross per 24 hour   Intake 2294 ml   Output 4650 ml   Net -2356 ml       Vital Signs:  /86   Pulse 89   Temp 97.9 °F (36.6 °C) (Oral)   Resp 20   Ht 1.622 m (5' 3.86\") Comment: actual, measured  Wt 74.3 kg (163 lb 12.8 oz)   SpO2 99%   BMI 28.24 kg/m²     Weight:    Wt Readings from Last 3 Encounters:   25 74.3 kg (163 lb 12.8 oz)   25 74.3 kg (163 lb 12.8 oz)   25 74.5 kg (164  recovered. Re-vaccinate vs booster based on titer (assure titers are drawn prior to administering immunoglobulins)  - Start PCP ppx on day + 30 - stop when CD4 > 250  - Cont Valtrex for 1 year post CAR-T     4. Heme:   Pancytopenia   - Transfuse for Hgb < 7 and Platelets < 10K  - No transfusion today     5. Metabolic:    Electrolytes are WNL and renal fxn stable.    Risk for TLS:  No evidence  - S/p allopurinol daily: D/C'ed 4/1/25 w/ uric acid 2.2  - Start IVFs: NS @ 75 mL/hr   - Replace K+ & Mg per PRN orders     6. GI / Nutrition:    Nutrition:  Appetite and oral intake is adequate  - low microbial diet   - Follow closely with dietician     7.  Social:  - Patient has h/o ETOH abuse (quit 2010) and tobacco use (quit 1980)  - Patient cares for  with Geovani Body Dementia.  States this is a stressor.  Social work for palliative care consulted  - Patient has h/o challenges with mood and anxiety.  Takes Celexa 10 mg, Lorazepam 1 mg BID, and Hydroxizine 25 mg Q HS  - Primary Caregiver:   Rohit Haskins (sister)  - Back-up Caregiver:  Radha Rossi (friend)  - Lodging:  Lives [place], [place]    - DVT Prophylaxis: Platelets <50,000 cells/dL - prophylactic lovenox on hold and mechanical prophylaxis with bilateral SCDs while in bed in place.  Contraindications to pharmacologic prophylaxis: None  Contraindications to mechanical prophylaxis: None    - Disposition: When afebrile/CRS resolved. Hopefully DC on Saturday    The patient was seen and examined by Dr. Jenna Rodriguez, APRN - CNP    Adrian West MD  [facility]  [phone]

## 2025-04-05 VITALS
TEMPERATURE: 97.9 F | HEART RATE: 92 BPM | HEIGHT: 64 IN | RESPIRATION RATE: 18 BRPM | OXYGEN SATURATION: 98 % | DIASTOLIC BLOOD PRESSURE: 80 MMHG | BODY MASS INDEX: 28.07 KG/M2 | WEIGHT: 164.4 LBS | SYSTOLIC BLOOD PRESSURE: 117 MMHG

## 2025-04-05 LAB
ALBUMIN SERPL-MCNC: 3.5 G/DL (ref 3.4–5)
ALP SERPL-CCNC: 55 U/L (ref 40–129)
ALT SERPL-CCNC: 10 U/L (ref 10–40)
ANION GAP SERPL CALCULATED.3IONS-SCNC: 9 MMOL/L (ref 3–16)
APTT BLD: 45.3 SEC (ref 22.1–36.4)
AST SERPL-CCNC: 16 U/L (ref 15–37)
BACTERIA THROAT AEROBE CULT: NORMAL
BASOPHILS # BLD: 0 K/UL (ref 0–0.2)
BASOPHILS NFR BLD: 0 %
BILIRUB DIRECT SERPL-MCNC: <0.1 MG/DL (ref 0–0.3)
BILIRUB INDIRECT SERPL-MCNC: ABNORMAL MG/DL (ref 0–1)
BILIRUB SERPL-MCNC: <0.2 MG/DL (ref 0–1)
BUN SERPL-MCNC: 8 MG/DL (ref 7–20)
CALCIUM SERPL-MCNC: 8.7 MG/DL (ref 8.3–10.6)
CHLORIDE SERPL-SCNC: 108 MMOL/L (ref 99–110)
CO2 SERPL-SCNC: 24 MMOL/L (ref 21–32)
CREAT SERPL-MCNC: 0.5 MG/DL (ref 0.6–1.2)
CRP SERPL-MCNC: 55.9 MG/L (ref 0–5.1)
D-DIMER QUANTITATIVE: 0.79 UG/ML FEU (ref 0–0.6)
DEPRECATED RDW RBC AUTO: 18.7 % (ref 12.4–15.4)
EOSINOPHIL # BLD: 0 K/UL (ref 0–0.6)
EOSINOPHIL NFR BLD: 1 %
FERRITIN SERPL IA-MCNC: 90.1 NG/ML (ref 15–150)
FIBRINOGEN PPP-MCNC: 443 MG/DL (ref 227–534)
FINAL REPORT: NORMAL
GFR SERPLBLD CREATININE-BSD FMLA CKD-EPI: >90 ML/MIN/{1.73_M2}
GLUCOSE SERPL-MCNC: 91 MG/DL (ref 70–99)
HCT VFR BLD AUTO: 34.2 % (ref 36–48)
HGB BLD-MCNC: 11.6 G/DL (ref 12–16)
INR PPP: 1.11 (ref 0.85–1.15)
LDH SERPL L TO P-CCNC: 159 U/L (ref 100–190)
LYMPHOCYTES # BLD: 1.3 K/UL (ref 1–5.1)
LYMPHOCYTES NFR BLD: 55 %
MAGNESIUM SERPL-MCNC: 1.84 MG/DL (ref 1.8–2.4)
MCH RBC QN AUTO: 28.8 PG (ref 26–34)
MCHC RBC AUTO-ENTMCNC: 33.8 G/DL (ref 31–36)
MCV RBC AUTO: 85.2 FL (ref 80–100)
MONOCYTES # BLD: 0.2 K/UL (ref 0–1.3)
MONOCYTES NFR BLD: 9 %
NEUTROPHILS # BLD: 0.8 K/UL (ref 1.7–7.7)
NEUTROPHILS NFR BLD: 35 %
OVALOCYTES BLD QL SMEAR: ABNORMAL
PHOSPHATE SERPL-MCNC: 2.5 MG/DL (ref 2.5–4.9)
PLATELET # BLD AUTO: 114 K/UL (ref 135–450)
PLATELET BLD QL SMEAR: ABNORMAL
PMV BLD AUTO: 7.6 FL (ref 5–10.5)
POTASSIUM SERPL-SCNC: 3.9 MMOL/L (ref 3.5–5.1)
PRELIMINARY: NORMAL
PROT SERPL-MCNC: 5.6 G/DL (ref 6.4–8.2)
PROTHROMBIN TIME: 14.5 SEC (ref 11.9–14.9)
RBC # BLD AUTO: 4.01 M/UL (ref 4–5.2)
SCHISTOCYTES BLD QL SMEAR: ABNORMAL
SLIDE REVIEW: ABNORMAL
SODIUM SERPL-SCNC: 141 MMOL/L (ref 136–145)
URATE SERPL-MCNC: 2.3 MG/DL (ref 2.6–6)
WBC # BLD AUTO: 2.4 K/UL (ref 4–11)

## 2025-04-05 PROCEDURE — 85025 COMPLETE CBC W/AUTO DIFF WBC: CPT

## 2025-04-05 PROCEDURE — 85384 FIBRINOGEN ACTIVITY: CPT

## 2025-04-05 PROCEDURE — 80048 BASIC METABOLIC PNL TOTAL CA: CPT

## 2025-04-05 PROCEDURE — 85730 THROMBOPLASTIN TIME PARTIAL: CPT

## 2025-04-05 PROCEDURE — 83735 ASSAY OF MAGNESIUM: CPT

## 2025-04-05 PROCEDURE — 83615 LACTATE (LD) (LDH) ENZYME: CPT

## 2025-04-05 PROCEDURE — 85379 FIBRIN DEGRADATION QUANT: CPT

## 2025-04-05 PROCEDURE — 85610 PROTHROMBIN TIME: CPT

## 2025-04-05 PROCEDURE — 2580000003 HC RX 258: Performed by: NURSE PRACTITIONER

## 2025-04-05 PROCEDURE — 86140 C-REACTIVE PROTEIN: CPT

## 2025-04-05 PROCEDURE — 82728 ASSAY OF FERRITIN: CPT

## 2025-04-05 PROCEDURE — 84550 ASSAY OF BLOOD/URIC ACID: CPT

## 2025-04-05 PROCEDURE — 6370000000 HC RX 637 (ALT 250 FOR IP): Performed by: NURSE PRACTITIONER

## 2025-04-05 PROCEDURE — 84100 ASSAY OF PHOSPHORUS: CPT

## 2025-04-05 PROCEDURE — 36591 DRAW BLOOD OFF VENOUS DEVICE: CPT

## 2025-04-05 PROCEDURE — 2500000003 HC RX 250 WO HCPCS: Performed by: NURSE PRACTITIONER

## 2025-04-05 PROCEDURE — 80076 HEPATIC FUNCTION PANEL: CPT

## 2025-04-05 RX ORDER — PANTOPRAZOLE SODIUM 40 MG/1
40 TABLET, DELAYED RELEASE ORAL
Qty: 30 TABLET | Refills: 3 | Status: SHIPPED | OUTPATIENT
Start: 2025-04-05

## 2025-04-05 RX ADMIN — PANTOPRAZOLE SODIUM 40 MG: 40 TABLET, DELAYED RELEASE ORAL at 07:12

## 2025-04-05 RX ADMIN — CITALOPRAM HYDROBROMIDE 10 MG: 20 TABLET ORAL at 09:28

## 2025-04-05 RX ADMIN — SODIUM CHLORIDE, PRESERVATIVE FREE 10 ML: 5 INJECTION INTRAVENOUS at 09:29

## 2025-04-05 RX ADMIN — LEVETIRACETAM 500 MG: 500 TABLET, FILM COATED ORAL at 09:28

## 2025-04-05 RX ADMIN — ACYCLOVIR 400 MG: 400 TABLET ORAL at 09:28

## 2025-04-05 RX ADMIN — SODIUM CHLORIDE: 0.9 INJECTION, SOLUTION INTRAVENOUS at 00:22

## 2025-04-05 RX ADMIN — FLUCONAZOLE 200 MG: 200 TABLET ORAL at 09:29

## 2025-04-05 RX ADMIN — OXYBUTYNIN CHLORIDE 5 MG: 5 TABLET, EXTENDED RELEASE ORAL at 09:28

## 2025-04-05 RX ADMIN — AMLODIPINE BESYLATE 5 MG: 5 TABLET ORAL at 09:28

## 2025-04-05 RX ADMIN — LORAZEPAM 1 MG: 1 TABLET ORAL at 09:28

## 2025-04-05 NOTE — CARE COORDINATION
Case Management Assessment            Discharge Note                    Date / Time of Note: 4/5/2025 10:15 AM                  Discharge Note Completed by: JOSE GARCIA    Patient Name: Catalina Leyva   YOB: 1950  Diagnosis: Neutropenic fever [D70.9, R50.81]   Date / Time: 4/2/2025  2:32 PM    Current PCP: Mayra Mayen MD  Clinic patient: No    Hospitalization in the last 30 days: Yes  Readmission Assessment  Number of Days since last admission?: 1-7 days  Previous Disposition: Home with Family  Who is being Interviewed: Patient  What was the patient's/caregiver's perception as to why they think they needed to return back to the hospital?: Other (Comment) (fever)  Did you visit your Primary Care Physician after you left the hospital, before you returned this time?: No  Why weren't you able to visit your PCP?: Did not have an appointment  Did you see a specialist, such as Cardiac, Pulmonary, Orthopedic Physician, etc. after you left the hospital?: Yes  Who advised the patient to return to the hospital?: Physician, Self-referral, Caregiver  Does the patient report anything that got in the way of taking their medications?: No  In our efforts to provide the best possible care to you and others like you, can you think of anything that we could have done to help you after you left the hospital the first time, so that you might not have needed to return so soon?: Other (Comment) (nothing additional per pt)    Advance Directives:  Code Status: Full Code  [place] DNR form completed and on chart: Not Indicated    Financial:  Payor: MEDICARE / Plan: MEDICARE PART A AND B / Product Type: *No Product type* /      Pharmacy:    [facility] [place] [address] [address] - P [phone] - F [phone]  [address] [facility] [address]  [facility] [address]  Phone: [phone] Fax: [phone]    [id number]  [facility] -  [phone]  the paper chart on the unit.  Electronic documentation has been entered into epic for IMM letter #2 and original paper copy has been added to the paper chart at the nurses station.    IMM Letter given to Patient/Family/Significant other/Guardian/POA/by:: JOBY Rabago  IMM Letter date given:: 04/05/25  IMM Letter time given:: 1009    Transportation:  Transportation PLAN for discharge: family   Mode of Transport: Private Car  Reason for medical transport: Not Applicable  Name of Transport Company: Not Applicable  Time of Transport: n/a    Transport form completed: Not Indicated    Home Care:  Home Care ordered at discharge: Not Indicated  Home Care Agency: Not Applicable  Orders faxed: No    Durable Medical Equipment:  DME Provider: n/a  Equipment obtained during hospitalization: n/a    Home Oxygen and Respiratory Equipment:  Oxygen needed at discharge?: Not Indicated  Home Oxygen Company: Not Applicable  Portable tank available for discharge?: Not Indicated    Dialysis:  Dialysis patient: No    Dialysis Center:  Not Applicable    Hospice Services:  Location: Not Applicable  Agency: Not Applicable    Consents signed: Not Indicated    Referrals made at DISCHARGE for outpatient continued care:  Not Applicable    Additional CM Notes: Discharge order noted. I met with patient at bedside who denies CM needs. She has family to transport her home.  CM team available if discharge needs arise.    COVID Result:    Lab Results   Component Value Date/Time    COVID19 Not Detected 08/31/2022 11:28 AM       The Plan for Transition of Care is related to the following treatment goals of Neutropenic fever [D70.9, R50.81]    The Patient and/or patient representative Catalina and her family were provided with a choice of provider and agrees with the discharge plan Yes    Freedom of choice list was provided with basic dialogue that supports the patient's individualized plan of care/goals and shares the quality data associated with

## 2025-04-05 NOTE — PROGRESS NOTES
Pt discharged to home with all personal belongings. Port deaccessed, pt tolerated well. AVS reviewed and given to pt at bedside. All questions and concerns addressed.

## 2025-04-05 NOTE — PLAN OF CARE
Problem: Chronic Conditions and Co-morbidities  Goal: Patient's chronic conditions and co-morbidity symptoms are monitored and maintained or improved  Outcome: Progressing     Problem: Safety - Adult  Goal: Free from fall injury  4/5/2025 1004 by Courtney Jackson RN  Outcome: Progressing     Problem: Infection - Adult  Goal: Absence of infection at discharge  Outcome: Progressing

## 2025-04-05 NOTE — PLAN OF CARE
Problem: Safety - Adult  Goal: Free from fall injury  Outcome: Progressing  Note: Patient remained free of falls during shift. Patient remains a Lozano Fall Risk: Medium (25-44). Patient makes no attempt to get out of bed without help from hospital staff. Will continue to encourage call light usage prior to ambulating. Call light within reach and hourly rounding in place.      Problem: Infection - Adult  Goal: Absence of infection during hospitalization  Outcome: Progressing  Note: Patient is showing no signs or symptoms of nosocomial infections. Patient's temp during shift are as follows: Temp (8hrs), Av °F (36.7 °C), Min:97.9 °F (36.6 °C), Max:98.1 °F (36.7 °C)  . Patient placed in private room and instructed on importance of handwashing and when to wear a mask when ambulating in hallway. Will continue to assess for s/s of infection and implement POC. Call light within reach and hourly rounding in place.      Problem: Hematologic - Adult  Goal: Maintains hematologic stability  Outcome: Progressing  Note: Patient's hemoglobin this AM:   Recent Labs     25  0350   HGB 11.6*     Patient's platelet count this AM:   Recent Labs     25  0350   *    Thrombocytopenia Precautions in place.  Patient showing no signs or symptoms of active bleeding.  Transfusion not indicated at this time.  Patient verbalizes understanding of all instructions. Will continue to assess and implement POC. Call light within reach and hourly rounding in place.

## 2025-04-06 ENCOUNTER — HOSPITAL ENCOUNTER (OUTPATIENT)
Dept: ONCOLOGY | Age: 75
Setting detail: INFUSION SERIES
Discharge: HOME | End: 2025-04-06
Payer: MEDICARE

## 2025-04-06 VITALS
RESPIRATION RATE: 16 BRPM | SYSTOLIC BLOOD PRESSURE: 109 MMHG | BODY MASS INDEX: 28.13 KG/M2 | DIASTOLIC BLOOD PRESSURE: 65 MMHG | WEIGHT: 163.14 LBS | OXYGEN SATURATION: 97 % | TEMPERATURE: 97.6 F | HEART RATE: 93 BPM

## 2025-04-06 DIAGNOSIS — C90.02 MULTIPLE MYELOMA IN RELAPSE (HCC): Primary | ICD-10-CM

## 2025-04-06 LAB
ANION GAP SERPL CALCULATED.3IONS-SCNC: 11 MMOL/L (ref 3–16)
BACTERIA BLD CULT ORG #2: NORMAL
BACTERIA BLD CULT: NORMAL
BASOPHILS # BLD: 0 K/UL (ref 0–0.2)
BASOPHILS NFR BLD: 0 %
BUN SERPL-MCNC: 12 MG/DL (ref 7–20)
CALCIUM SERPL-MCNC: 9.8 MG/DL (ref 8.3–10.6)
CHLORIDE SERPL-SCNC: 103 MMOL/L (ref 99–110)
CO2 SERPL-SCNC: 27 MMOL/L (ref 21–32)
CREAT SERPL-MCNC: 0.7 MG/DL (ref 0.6–1.2)
CRP SERPL-MCNC: 39.1 MG/L (ref 0–5.1)
DEPRECATED RDW RBC AUTO: 18.7 % (ref 12.4–15.4)
EOSINOPHIL # BLD: 0 K/UL (ref 0–0.6)
EOSINOPHIL NFR BLD: 0 %
FERRITIN SERPL IA-MCNC: 95.3 NG/ML (ref 15–150)
FIBRINOGEN PPP-MCNC: 462 MG/DL (ref 227–534)
GFR SERPLBLD CREATININE-BSD FMLA CKD-EPI: >90 ML/MIN/{1.73_M2}
GLUCOSE SERPL-MCNC: 80 MG/DL (ref 70–99)
HCT VFR BLD AUTO: 36.8 % (ref 36–48)
HGB BLD-MCNC: 12.3 G/DL (ref 12–16)
LYMPHOCYTES # BLD: 2.8 K/UL (ref 1–5.1)
LYMPHOCYTES NFR BLD: 54 %
MCH RBC QN AUTO: 28.6 PG (ref 26–34)
MCHC RBC AUTO-ENTMCNC: 33.5 G/DL (ref 31–36)
MCV RBC AUTO: 85.5 FL (ref 80–100)
MONOCYTES # BLD: 0.5 K/UL (ref 0–1.3)
MONOCYTES NFR BLD: 11 %
NEUTROPHILS # BLD: 0.9 K/UL (ref 1.7–7.7)
NEUTROPHILS NFR BLD: 21 %
NEUTS BAND NFR BLD MANUAL: 1 % (ref 0–7)
PATH INTERP BLD-IMP: NO
PHOSPHATE SERPL-MCNC: 4.7 MG/DL (ref 2.5–4.9)
PLATELET # BLD AUTO: 124 K/UL (ref 135–450)
PMV BLD AUTO: 6.9 FL (ref 5–10.5)
POTASSIUM SERPL-SCNC: 4.7 MMOL/L (ref 3.5–5.1)
RBC # BLD AUTO: 4.31 M/UL (ref 4–5.2)
SODIUM SERPL-SCNC: 141 MMOL/L (ref 136–145)
VARIANT LYMPHS NFR BLD MANUAL: 13 % (ref 0–6)
WBC # BLD AUTO: 4.2 K/UL (ref 4–11)

## 2025-04-06 PROCEDURE — 85384 FIBRINOGEN ACTIVITY: CPT

## 2025-04-06 PROCEDURE — 80048 BASIC METABOLIC PNL TOTAL CA: CPT

## 2025-04-06 PROCEDURE — 85025 COMPLETE CBC W/AUTO DIFF WBC: CPT

## 2025-04-06 PROCEDURE — 36415 COLL VENOUS BLD VENIPUNCTURE: CPT

## 2025-04-06 PROCEDURE — 84100 ASSAY OF PHOSPHORUS: CPT

## 2025-04-06 PROCEDURE — 82728 ASSAY OF FERRITIN: CPT

## 2025-04-06 PROCEDURE — 86140 C-REACTIVE PROTEIN: CPT

## 2025-04-06 RX ORDER — OXYCODONE HYDROCHLORIDE 5 MG/1
5 TABLET ORAL EVERY 4 HOURS PRN
Status: CANCELLED | OUTPATIENT
Start: 2025-04-07

## 2025-04-06 RX ORDER — PROCHLORPERAZINE EDISYLATE 5 MG/ML
10 INJECTION INTRAMUSCULAR; INTRAVENOUS EVERY 6 HOURS PRN
Status: CANCELLED | OUTPATIENT
Start: 2025-04-07

## 2025-04-06 RX ORDER — MAGNESIUM SULFATE IN WATER 40 MG/ML
4000 INJECTION, SOLUTION INTRAVENOUS PRN
Status: CANCELLED | OUTPATIENT
Start: 2025-04-07

## 2025-04-06 RX ORDER — SODIUM CHLORIDE 9 MG/ML
INJECTION, SOLUTION INTRAVENOUS CONTINUOUS PRN
Status: CANCELLED | OUTPATIENT
Start: 2025-04-07

## 2025-04-06 RX ORDER — ONDANSETRON 2 MG/ML
8 INJECTION INTRAMUSCULAR; INTRAVENOUS EVERY 8 HOURS PRN
Status: CANCELLED | OUTPATIENT
Start: 2025-04-07

## 2025-04-06 RX ORDER — ONDANSETRON 4 MG/1
8 TABLET, FILM COATED ORAL EVERY 8 HOURS PRN
Status: CANCELLED | OUTPATIENT
Start: 2025-04-07

## 2025-04-06 RX ORDER — HEPARIN 100 UNIT/ML
500 SYRINGE INTRAVENOUS PRN
Status: CANCELLED | OUTPATIENT
Start: 2025-04-07

## 2025-04-06 RX ORDER — PROCHLORPERAZINE MALEATE 10 MG
10 TABLET ORAL EVERY 6 HOURS PRN
Status: CANCELLED | OUTPATIENT
Start: 2025-04-07

## 2025-04-06 RX ORDER — POTASSIUM CHLORIDE 1500 MG/1
40 TABLET, EXTENDED RELEASE ORAL PRN
Status: CANCELLED | OUTPATIENT
Start: 2025-04-07

## 2025-04-06 RX ORDER — OXYCODONE HYDROCHLORIDE 5 MG/1
10 TABLET ORAL EVERY 4 HOURS PRN
Status: CANCELLED | OUTPATIENT
Start: 2025-04-07

## 2025-04-06 RX ORDER — POTASSIUM CHLORIDE 29.8 MG/ML
80 INJECTION INTRAVENOUS PRN
Status: CANCELLED | OUTPATIENT
Start: 2025-04-07

## 2025-04-06 NOTE — PROGRESS NOTES
Short Stay Communication Note  Catalina Leyva  Diagnosis: Multiple Myeloma  Primary MD: Maribel Garrido MD  Treatment: Fludarabine/Cytoxan  CAR-T Administration Date: 3/24/25  Day +9 of Carvykti   Pt seen in outpatient infusion today. Labs drawn and reviewed.   CBC:   Recent Labs     04/04/25 0345 04/05/25  0350 04/06/25  0829   WBC 1.6* 2.4* 4.2   HGB 11.7* 11.6* 12.3   HCT 34.3* 34.2* 36.8   MCV 85.1 85.2 85.5   * 114* 124*     BMP/Mag:  Recent Labs     04/04/25 0345 04/05/25  0350 04/06/25  0829    141 141   K 3.9 3.9 4.7    108 103   CO2 24 24 27   PHOS 2.5 2.5 4.7   BUN 11 8 12   CREATININE 0.6 0.5* 0.7   MG 1.86 1.84  --      Standing parameters for replacement for this patient:   1 unit of pack red blood cells for a hemoglobin < or equal to 7  1 pack of platelets for a platelet count less than or equal to 10  40 MeQ of Potassium administered for a potassium level less than or equal to 3.4  4g of Magnesium Sulfate for a magnesum level less than or equal to 1.4  No transfusions required for the above lab values.    Urinalysis last done: 3/31/25 Urinalysis next due: 4/7/25    Chest X-Ray last done: 3/31/25 Chest X-Ray next due: 4/7/25    Symptoms addressed and reported to care team this date: none    Treatments this date: none      Reviewed medication schedule with pt and caregiver. Both able to verbalize all medications and schedule. Pt to be seen again tomorrow. Patient and caregiver verbalized understanding of discharge instructions including when and how to call the doctor and when to report  to the ER. Discharged ambulatory to home.   
  BILIDIR 0.2 <0.1   BILITOT 0.3 <0.2   ALKPHOS 58 55     Coags:   Recent Labs     04/04/25  0345 04/05/25  0350   PROTIME 14.2 14.5   INR 1.08 1.11   APTT 41.4* 45.3*     Uric Acid No results for input(s): \"LABURIC\" in the last 72 hours.  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 She was treated w/ Harvoni)  Hypogammaglobulinemia   Mitral valve prolapse   History of C.Diff   Neutropenic Fever  CRS      TREATMENT:            RVD-Lite x 8 cycles (4/30/21)  Melphalan 200 mg / Auto SCT (7/1/21)  Rev maintenance  Eev/pom/dex x 6 cycles  C1D1: 10/31/23  C2D1: 12/20/23  C3D1: 1/24/24  C4D1: 2/28/24  C5D1: 4/10/24  C6D1: 5/8/24  5.   Penelope Kyprolis and Dex x 6 cycles  C1D1: 6/4/24  C2D1: 7/2/24  C3D1: 8/6/24  C4D1: 8/27/24  C5D1: 9/24/24  C6D1: 10/22/24  6. Ninlaro/cyclophosphamide/Dex               C1D1: 11/11/24              C2D1: 12/10/25              C3D1: 1/7/25              C4D1: 2/4/25  7.    Lymphodepleting Chemotherapy:  Fludarabine and cyclophosphamide   Disease Status at time of Infusion:  VGPR  CAR-T Infusion Date: 3/24/25  CAR-T Product:  Carvykti   Batch ID Number: [id number]      ASSESSMENT AND PLAN:           1. IgA Kappa Multiple myeloma ISS 3, 1q321:  Currently in VGPR   - Diagnosed in 12/2020    - PET 1/2/25: no evidence of active disease, stable lytic lesions within the calvarium with no significant uptake   - MMP 11/11/24:  M-spike 0.1.  , LLC 1.6, K/L ratio 67.8.  IgA WNL  - MMP 2/4/2025:  No M-spike.  KLC 31.7, LLC <1.5, K/L ratio >21.13.  IgA  WNL  - MMP 3/4/24:  No M-spike.  KLC 25, LLC <1.5, K/L ratio >17.  IgA WNL     PLAN: Carvykti   - Continue to monitor Absolute

## 2025-04-07 ENCOUNTER — HOSPITAL ENCOUNTER (OUTPATIENT)
Dept: GENERAL RADIOLOGY | Age: 75
Discharge: HOME | End: 2025-04-07
Payer: MEDICARE

## 2025-04-07 ENCOUNTER — HOSPITAL ENCOUNTER (OUTPATIENT)
Dept: ONCOLOGY | Age: 75
Setting detail: INFUSION SERIES
Discharge: HOME | End: 2025-04-07
Payer: MEDICARE

## 2025-04-07 ENCOUNTER — CARE COORDINATION (OUTPATIENT)
Dept: CASE MANAGEMENT | Age: 75
End: 2025-04-07

## 2025-04-07 VITALS
SYSTOLIC BLOOD PRESSURE: 112 MMHG | HEART RATE: 83 BPM | BODY MASS INDEX: 28.13 KG/M2 | RESPIRATION RATE: 16 BRPM | TEMPERATURE: 97.7 F | OXYGEN SATURATION: 99 % | DIASTOLIC BLOOD PRESSURE: 72 MMHG | WEIGHT: 163.14 LBS

## 2025-04-07 DIAGNOSIS — C90.02 MULTIPLE MYELOMA IN RELAPSE (HCC): Primary | ICD-10-CM

## 2025-04-07 LAB
ALBUMIN SERPL-MCNC: 4 G/DL (ref 3.4–5)
ALP SERPL-CCNC: 68 U/L (ref 40–129)
ALT SERPL-CCNC: 13 U/L (ref 10–40)
ANION GAP SERPL CALCULATED.3IONS-SCNC: 9 MMOL/L (ref 3–16)
ANISOCYTOSIS BLD QL SMEAR: ABNORMAL
AST SERPL-CCNC: 19 U/L (ref 15–37)
BASOPHILS # BLD: 0 K/UL (ref 0–0.2)
BASOPHILS NFR BLD: 0 %
BILIRUB DIRECT SERPL-MCNC: <0.1 MG/DL (ref 0–0.3)
BILIRUB INDIRECT SERPL-MCNC: 0.2 MG/DL (ref 0–1)
BILIRUB SERPL-MCNC: 0.3 MG/DL (ref 0–1)
BUN SERPL-MCNC: 17 MG/DL (ref 7–20)
CALCIUM SERPL-MCNC: 9.5 MG/DL (ref 8.3–10.6)
CHLORIDE SERPL-SCNC: 103 MMOL/L (ref 99–110)
CO2 SERPL-SCNC: 27 MMOL/L (ref 21–32)
CREAT SERPL-MCNC: 0.8 MG/DL (ref 0.6–1.2)
CRP SERPL-MCNC: 26.4 MG/L (ref 0–5.1)
DEPRECATED RDW RBC AUTO: 18.3 % (ref 12.4–15.4)
EOSINOPHIL # BLD: 0 K/UL (ref 0–0.6)
EOSINOPHIL NFR BLD: 0 %
FERRITIN SERPL IA-MCNC: 79.8 NG/ML (ref 15–150)
FIBRINOGEN PPP-MCNC: 427 MG/DL (ref 227–534)
GFR SERPLBLD CREATININE-BSD FMLA CKD-EPI: 78 ML/MIN/{1.73_M2}
GLUCOSE SERPL-MCNC: 83 MG/DL (ref 70–99)
HCT VFR BLD AUTO: 36.3 % (ref 36–48)
HGB BLD-MCNC: 12.2 G/DL (ref 12–16)
INR PPP: 1.05 (ref 0.85–1.15)
LDH SERPL L TO P-CCNC: 185 U/L (ref 100–190)
LYMPHOCYTES # BLD: 2.1 K/UL (ref 1–5.1)
LYMPHOCYTES NFR BLD: 70 %
MAGNESIUM SERPL-MCNC: 2.1 MG/DL (ref 1.8–2.4)
MCH RBC QN AUTO: 28.6 PG (ref 26–34)
MCHC RBC AUTO-ENTMCNC: 33.6 G/DL (ref 31–36)
MCV RBC AUTO: 85.1 FL (ref 80–100)
MONOCYTES # BLD: 0.1 K/UL (ref 0–1.3)
MONOCYTES NFR BLD: 4 %
NEUTROPHILS # BLD: 0.8 K/UL (ref 1.7–7.7)
NEUTROPHILS NFR BLD: 26 %
OVALOCYTES BLD QL SMEAR: ABNORMAL
PHOSPHATE SERPL-MCNC: 4.2 MG/DL (ref 2.5–4.9)
PLATELET # BLD AUTO: 121 K/UL (ref 135–450)
PMV BLD AUTO: 7.2 FL (ref 5–10.5)
POIKILOCYTOSIS BLD QL SMEAR: ABNORMAL
POTASSIUM SERPL-SCNC: 4.7 MMOL/L (ref 3.5–5.1)
PROT SERPL-MCNC: 6.3 G/DL (ref 6.4–8.2)
PROTHROMBIN TIME: 13.9 SEC (ref 11.9–14.9)
RBC # BLD AUTO: 4.26 M/UL (ref 4–5.2)
SODIUM SERPL-SCNC: 139 MMOL/L (ref 136–145)
URATE SERPL-MCNC: 4.3 MG/DL (ref 2.6–6)
WBC # BLD AUTO: 3 K/UL (ref 4–11)

## 2025-04-07 PROCEDURE — 85025 COMPLETE CBC W/AUTO DIFF WBC: CPT

## 2025-04-07 PROCEDURE — 84550 ASSAY OF BLOOD/URIC ACID: CPT

## 2025-04-07 PROCEDURE — 85610 PROTHROMBIN TIME: CPT

## 2025-04-07 PROCEDURE — 36591 DRAW BLOOD OFF VENOUS DEVICE: CPT

## 2025-04-07 PROCEDURE — 85384 FIBRINOGEN ACTIVITY: CPT

## 2025-04-07 PROCEDURE — 83735 ASSAY OF MAGNESIUM: CPT

## 2025-04-07 PROCEDURE — 80048 BASIC METABOLIC PNL TOTAL CA: CPT

## 2025-04-07 PROCEDURE — 86140 C-REACTIVE PROTEIN: CPT

## 2025-04-07 PROCEDURE — 82728 ASSAY OF FERRITIN: CPT

## 2025-04-07 PROCEDURE — 80076 HEPATIC FUNCTION PANEL: CPT

## 2025-04-07 PROCEDURE — 84100 ASSAY OF PHOSPHORUS: CPT

## 2025-04-07 PROCEDURE — 83615 LACTATE (LD) (LDH) ENZYME: CPT

## 2025-04-07 PROCEDURE — 71045 X-RAY EXAM CHEST 1 VIEW: CPT

## 2025-04-07 RX ORDER — SODIUM CHLORIDE 9 MG/ML
INJECTION, SOLUTION INTRAVENOUS CONTINUOUS PRN
Status: CANCELLED | OUTPATIENT
Start: 2025-04-08

## 2025-04-07 RX ORDER — HEPARIN 100 UNIT/ML
500 SYRINGE INTRAVENOUS PRN
Status: CANCELLED | OUTPATIENT
Start: 2025-04-08

## 2025-04-07 RX ORDER — PROCHLORPERAZINE EDISYLATE 5 MG/ML
10 INJECTION INTRAMUSCULAR; INTRAVENOUS EVERY 6 HOURS PRN
Status: CANCELLED | OUTPATIENT
Start: 2025-04-08

## 2025-04-07 RX ORDER — DEXAMETHASONE 4 MG/1
10 TABLET ORAL ONCE
Status: DISCONTINUED | OUTPATIENT
Start: 2025-04-07 | End: 2025-04-07

## 2025-04-07 RX ORDER — MAGNESIUM SULFATE IN WATER 40 MG/ML
4000 INJECTION, SOLUTION INTRAVENOUS PRN
Status: CANCELLED | OUTPATIENT
Start: 2025-04-08

## 2025-04-07 RX ORDER — OXYCODONE HYDROCHLORIDE 5 MG/1
10 TABLET ORAL EVERY 4 HOURS PRN
Status: CANCELLED | OUTPATIENT
Start: 2025-04-08

## 2025-04-07 RX ORDER — PROCHLORPERAZINE MALEATE 10 MG
10 TABLET ORAL EVERY 6 HOURS PRN
Status: CANCELLED | OUTPATIENT
Start: 2025-04-08

## 2025-04-07 RX ORDER — ONDANSETRON 4 MG/1
8 TABLET, FILM COATED ORAL EVERY 8 HOURS PRN
Status: CANCELLED | OUTPATIENT
Start: 2025-04-08

## 2025-04-07 RX ORDER — OXYCODONE HYDROCHLORIDE 5 MG/1
5 TABLET ORAL EVERY 4 HOURS PRN
Status: CANCELLED | OUTPATIENT
Start: 2025-04-08

## 2025-04-07 RX ORDER — POTASSIUM CHLORIDE 1500 MG/1
40 TABLET, EXTENDED RELEASE ORAL PRN
Status: CANCELLED | OUTPATIENT
Start: 2025-04-08

## 2025-04-07 RX ORDER — ONDANSETRON 2 MG/ML
8 INJECTION INTRAMUSCULAR; INTRAVENOUS EVERY 8 HOURS PRN
Status: CANCELLED | OUTPATIENT
Start: 2025-04-08

## 2025-04-07 RX ORDER — POTASSIUM CHLORIDE 29.8 MG/ML
80 INJECTION INTRAVENOUS PRN
Status: CANCELLED | OUTPATIENT
Start: 2025-04-08

## 2025-04-07 NOTE — CARE COORDINATION
Care Transitions Note    Initial Call - Call within 2 business days of discharge: Yes    Attempted to reach patient for transitions of care follow up. Unable to reach patient.    Outreach Attempts:   CTN spoke with patient briefly who reported she was getting ready to leave radiology and requested a call back tomorrow.     Patient: Catalina Leyva    Patient : 1950   MRN: [id number]    Reason for Admission: relapse multiple myeloma  Discharge Date: 25  RURS: Readmission Risk Score: 17.9    Last Discharge Facility       Date Complaint Diagnosis Description Type Department Provider    25   Admission (Discharged) Maribel Baxter, DO            Was this an external facility discharge? No    Follow Up Appointment:   Patient does not have a follow up appointment scheduled at time of call.  Unable to reach  Future Appointments         Provider Specialty Dept Phone    2025 8:00 AM [facility] [phone]    2025 8:00 AM [facility] [phone]    4/10/2025 8:00 AM [facility] [phone]    2025 8:00 AM [facility] [phone]    2025 12:30 PM [facility] [phone]    2025 8:00 AM Mayra Mayen MD Family Medicine [phone]            Plan for follow-up on next business day.      Jessica Yost, MSN, RN, [facility]  Care Transition Nurse  [phone] mobile

## 2025-04-07 NOTE — PROGRESS NOTES
Short Stay Communication Note  Catalina Leyva  Diagnosis: Multiple Myeloma  Primary MD: Maribel Garrido MD  Treatment: Fludarabine/Cytoxan  CAR-T Administration Date: 3/24/25  Day +14 of Carvykti   Pt seen in outpatient infusion today. Labs drawn and reviewed.   CBC:   Recent Labs     04/05/25  0350 04/06/25  0829 04/07/25  0854   WBC 2.4* 4.2 3.0*   HGB 11.6* 12.3 12.2   HCT 34.2* 36.8 36.3   MCV 85.2 85.5 85.1   * 124* 121*     BMP/Mag:  Recent Labs     04/05/25  0350 04/06/25  0829 04/07/25  0853    141 139   K 3.9 4.7 4.7    103 103   CO2 24 27 27   PHOS 2.5 4.7 4.2   BUN 8 12 17   CREATININE 0.5* 0.7 0.8   MG 1.84  --  2.10     Standing parameters for replacement for this patient:   1 unit of pack red blood cells for a hemoglobin < or equal to 7  1 pack of platelets for a platelet count less than or equal to 10  40 MeQ of Potassium administered for a potassium level less than or equal to 3.4  4g of Magnesium Sulfate for a magnesum level less than or equal to 1.4  No transfusions required for the above lab values.    Urinalysis last done: 4/7/25 Urinalysis next due: 4/14/25    Chest X-Ray last done: 4/7/25 Chest X-Ray next due: 4/14/25    Symptoms addressed and reported to care team this date: none    Treatments this date: none      Reviewed medication schedule with pt and caregiver. Both able to verbalize all medications and schedule. Pt to be seen again tomorrow. Patient and caregiver verbalized understanding of discharge instructions including when and how to call the doctor and when to report  to the ER. Discharged ambulatory to home.

## 2025-04-07 NOTE — PROGRESS NOTES
BCC Progress Note      2025    Catalina Leyva    :  1950    MRN:  [id number]    Referring MD: Maribel Garrido,   [address]  [address]  [place] [address]      Interval History:  - CRP and ferritin trending down   - Watch ALC closely, if rises above 3 start dex     ECOG PS:  (1) Restricted in physically strenuous activity, ambulatory and able to do work of light nature    KPS: 90% Able to carry on normal activity; minor signs or symptoms of disease    Isolation:  None     Medications    Scheduled Meds:  Continuous Infusions:  PRN Meds:.      ROS:  As noted above, otherwise remainder of 10-point ROS negative      Physical Exam:     Vital Signs:  /72   Pulse 83   Temp 97.7 °F (36.5 °C) (Oral)   Resp 16   Wt 74 kg (163 lb 2.3 oz)   SpO2 99%   BMI 28.13 kg/m²     Weight:    Wt Readings from Last 3 Encounters:   25 74 kg (163 lb 2.3 oz)   25 74 kg (163 lb 2.3 oz)   25 74.6 kg (164 lb 6.4 oz)     General: Awake, alert and oriented.  HEENT: normocephalic, alopecia, PERRL, no scleral erythema or icterus, Oral mucosa moist and intact, throat clear  NECK: supple   BACK: Straight   SKIN: warm dry and intact without lesions rashes or masses  CHEST: CTA bilaterally without use of accessory muscles  CV: Normal S1 S2, RRR, no MRG  ABD: NT ND normoactive BS  EXTREMITIES: without edema, denies calf tenderness  NEURO: grossly intact  CATHETER: PAC-CDI     Laboratory Data:  CBC:   Recent Labs     25  0350 25  0829 25  0854   WBC 2.4* 4.2 3.0*   HGB 11.6* 12.3 12.2   HCT 34.2* 36.8 36.3   MCV 85.2 85.5 85.1   * 124* 121*     BMP/Mag:  Recent Labs     25  0350 25  0829 25  0853    141 139   K 3.9 4.7 4.7    103 103   CO2 24 27 27   PHOS 2.5 4.7 4.2   BUN 8 12 17   CREATININE 0.5* 0.7 0.8   MG 1.84  --  2.10     LIVP:   Recent Labs     25  0350 25  0853   AST 16 19   ALT 10 13   BILIDIR <0.1 <0.1   BILITOT <0.2 0.3

## 2025-04-07 NOTE — PROGRESS NOTES
Physician Progress Note      PATIENT:               JUANITA BLANCO  CSN #:                  [id number]  :                       1950  ADMIT DATE:       2025 2:32 PM  DISCH DATE:        2025 10:48 AM  RESPONDING  PROVIDER #:        HOLGER ADAMS          QUERY TEXT:    Admitted with neutropenic fever and noted receiving IV Cefepime for possible   infectious process vs CRS on attending progress note   Please clarify any   associated diagnoses:    The clinical indicators include:  -Per oncology note -\"NF 25, T-max 101, from possible Infectious process   vs CRS...Start Cefepime Day + 3 (25)\"  - WBC 1.4, CRP 15, Temp 100.8 adjusted to 101.3, HR   -Pan cultures, CBC, CMP, IV Cefepime, IVF's, neutropenic precautions  - Discharged on Levaquin po  Options provided:  -- Sepsis due to unknown bacterial infection present on arrival  -- Unknown bacterial infection confirmed, no sepsis  -- Other - I will add my own diagnosis  -- Disagree - Not applicable / Not valid  -- Disagree - Clinically unable to determine / Unknown  -- Refer to Clinical Documentation Reviewer    PROVIDER RESPONSE TEXT:    Fever from CRS    Query created by: Leyla Camargo on 2025 3:19 PM      Electronically signed by:  HOLGER ADAMS 2025 10:47 AM           no

## 2025-04-08 ENCOUNTER — CARE COORDINATION (OUTPATIENT)
Dept: CASE MANAGEMENT | Age: 75
End: 2025-04-08

## 2025-04-08 ENCOUNTER — HOSPITAL ENCOUNTER (OUTPATIENT)
Dept: ONCOLOGY | Age: 75
Setting detail: INFUSION SERIES
Discharge: HOME | End: 2025-04-08
Payer: MEDICARE

## 2025-04-08 VITALS
HEART RATE: 84 BPM | DIASTOLIC BLOOD PRESSURE: 75 MMHG | RESPIRATION RATE: 16 BRPM | SYSTOLIC BLOOD PRESSURE: 120 MMHG | TEMPERATURE: 97.8 F | BODY MASS INDEX: 28.05 KG/M2 | OXYGEN SATURATION: 99 % | WEIGHT: 162.7 LBS

## 2025-04-08 DIAGNOSIS — C90.02 MULTIPLE MYELOMA IN RELAPSE (HCC): Primary | ICD-10-CM

## 2025-04-08 LAB
ANION GAP SERPL CALCULATED.3IONS-SCNC: 8 MMOL/L (ref 3–16)
BASOPHILS # BLD: 0 K/UL (ref 0–0.2)
BASOPHILS NFR BLD: 0 %
BILIRUB UR QL STRIP.AUTO: NEGATIVE
BUN SERPL-MCNC: 16 MG/DL (ref 7–20)
CALCIUM SERPL-MCNC: 9.2 MG/DL (ref 8.3–10.6)
CHLORIDE SERPL-SCNC: 103 MMOL/L (ref 99–110)
CLARITY UR: CLEAR
CO2 SERPL-SCNC: 27 MMOL/L (ref 21–32)
COLOR UR: YELLOW
CREAT SERPL-MCNC: 0.7 MG/DL (ref 0.6–1.2)
DEPRECATED RDW RBC AUTO: 18.4 % (ref 12.4–15.4)
EOSINOPHIL # BLD: 0 K/UL (ref 0–0.6)
EOSINOPHIL NFR BLD: 1 %
FERRITIN SERPL IA-MCNC: 78.4 NG/ML (ref 15–150)
FIBRINOGEN PPP-MCNC: 435 MG/DL (ref 227–534)
GFR SERPLBLD CREATININE-BSD FMLA CKD-EPI: >90 ML/MIN/{1.73_M2}
GLUCOSE SERPL-MCNC: 109 MG/DL (ref 70–99)
GLUCOSE UR STRIP.AUTO-MCNC: NEGATIVE MG/DL
HCT VFR BLD AUTO: 35.7 % (ref 36–48)
HGB BLD-MCNC: 12 G/DL (ref 12–16)
HGB UR QL STRIP.AUTO: NEGATIVE
KETONES UR STRIP.AUTO-MCNC: NEGATIVE MG/DL
LEUKOCYTE ESTERASE UR QL STRIP.AUTO: NEGATIVE
LYMPHOCYTES # BLD: 0.9 K/UL (ref 1–5.1)
LYMPHOCYTES NFR BLD: 46 %
MCH RBC QN AUTO: 28.8 PG (ref 26–34)
MCHC RBC AUTO-ENTMCNC: 33.8 G/DL (ref 31–36)
MCV RBC AUTO: 85.4 FL (ref 80–100)
MICROCYTES BLD QL SMEAR: ABNORMAL
MONOCYTES # BLD: 0.4 K/UL (ref 0–1.3)
MONOCYTES NFR BLD: 22 %
NEUTROPHILS # BLD: 0.6 K/UL (ref 1.7–7.7)
NEUTROPHILS NFR BLD: 30 %
NEUTS BAND NFR BLD MANUAL: 1 % (ref 0–7)
NITRITE UR QL STRIP.AUTO: NEGATIVE
NRBC BLD-RTO: 1 /100 WBC
OVALOCYTES BLD QL SMEAR: ABNORMAL
PH UR STRIP.AUTO: 6 [PH] (ref 5–8)
PHOSPHATE SERPL-MCNC: 3.4 MG/DL (ref 2.5–4.9)
PLATELET # BLD AUTO: 127 K/UL (ref 135–450)
PMV BLD AUTO: 7.1 FL (ref 5–10.5)
POIKILOCYTOSIS BLD QL SMEAR: ABNORMAL
POTASSIUM SERPL-SCNC: 4.5 MMOL/L (ref 3.5–5.1)
PROT UR STRIP.AUTO-MCNC: NEGATIVE MG/DL
RBC # BLD AUTO: 4.18 M/UL (ref 4–5.2)
RBC MORPH BLD: NORMAL
SODIUM SERPL-SCNC: 138 MMOL/L (ref 136–145)
SP GR UR STRIP.AUTO: 1.01 (ref 1–1.03)
STOMATOCYTES BLD QL SMEAR: ABNORMAL
UA DIPSTICK W REFLEX MICRO PNL UR: NORMAL
URN SPEC COLLECT METH UR: NORMAL
UROBILINOGEN UR STRIP-ACNC: 0.2 E.U./DL
WBC # BLD AUTO: 2 K/UL (ref 4–11)

## 2025-04-08 PROCEDURE — 80048 BASIC METABOLIC PNL TOTAL CA: CPT

## 2025-04-08 PROCEDURE — 82728 ASSAY OF FERRITIN: CPT

## 2025-04-08 PROCEDURE — 84100 ASSAY OF PHOSPHORUS: CPT

## 2025-04-08 PROCEDURE — 85025 COMPLETE CBC W/AUTO DIFF WBC: CPT

## 2025-04-08 PROCEDURE — 36591 DRAW BLOOD OFF VENOUS DEVICE: CPT

## 2025-04-08 PROCEDURE — 85384 FIBRINOGEN ACTIVITY: CPT

## 2025-04-08 PROCEDURE — 81003 URINALYSIS AUTO W/O SCOPE: CPT

## 2025-04-08 RX ORDER — ONDANSETRON 4 MG/1
8 TABLET, FILM COATED ORAL EVERY 8 HOURS PRN
Status: CANCELLED | OUTPATIENT
Start: 2025-04-09

## 2025-04-08 RX ORDER — PROCHLORPERAZINE MALEATE 10 MG
10 TABLET ORAL EVERY 6 HOURS PRN
Status: CANCELLED | OUTPATIENT
Start: 2025-04-09

## 2025-04-08 RX ORDER — POTASSIUM CHLORIDE 29.8 MG/ML
80 INJECTION INTRAVENOUS PRN
Status: DISCONTINUED | OUTPATIENT
Start: 2025-04-08 | End: 2025-04-09 | Stop reason: HOSPADM

## 2025-04-08 RX ORDER — ONDANSETRON 2 MG/ML
8 INJECTION INTRAMUSCULAR; INTRAVENOUS EVERY 8 HOURS PRN
Status: CANCELLED | OUTPATIENT
Start: 2025-04-09

## 2025-04-08 RX ORDER — SODIUM CHLORIDE 9 MG/ML
INJECTION, SOLUTION INTRAVENOUS CONTINUOUS PRN
Status: CANCELLED | OUTPATIENT
Start: 2025-04-09

## 2025-04-08 RX ORDER — SODIUM CHLORIDE 9 MG/ML
INJECTION, SOLUTION INTRAVENOUS CONTINUOUS PRN
Status: DISCONTINUED | OUTPATIENT
Start: 2025-04-08 | End: 2025-04-09 | Stop reason: HOSPADM

## 2025-04-08 RX ORDER — MAGNESIUM SULFATE IN WATER 40 MG/ML
4000 INJECTION, SOLUTION INTRAVENOUS PRN
Status: CANCELLED | OUTPATIENT
Start: 2025-04-09

## 2025-04-08 RX ORDER — POTASSIUM CHLORIDE 1500 MG/1
40 TABLET, EXTENDED RELEASE ORAL PRN
Status: CANCELLED | OUTPATIENT
Start: 2025-04-09

## 2025-04-08 RX ORDER — HEPARIN 100 UNIT/ML
500 SYRINGE INTRAVENOUS PRN
Status: CANCELLED | OUTPATIENT
Start: 2025-04-09

## 2025-04-08 RX ORDER — POTASSIUM CHLORIDE 1500 MG/1
40 TABLET, EXTENDED RELEASE ORAL PRN
Status: DISCONTINUED | OUTPATIENT
Start: 2025-04-08 | End: 2025-04-09 | Stop reason: HOSPADM

## 2025-04-08 RX ORDER — MAGNESIUM SULFATE IN WATER 40 MG/ML
4000 INJECTION, SOLUTION INTRAVENOUS PRN
Status: DISCONTINUED | OUTPATIENT
Start: 2025-04-08 | End: 2025-04-09 | Stop reason: HOSPADM

## 2025-04-08 RX ORDER — OXYCODONE HYDROCHLORIDE 5 MG/1
10 TABLET ORAL EVERY 4 HOURS PRN
Status: CANCELLED | OUTPATIENT
Start: 2025-04-09

## 2025-04-08 RX ORDER — PROCHLORPERAZINE EDISYLATE 5 MG/ML
10 INJECTION INTRAMUSCULAR; INTRAVENOUS EVERY 6 HOURS PRN
Status: CANCELLED | OUTPATIENT
Start: 2025-04-09

## 2025-04-08 RX ORDER — OXYCODONE HYDROCHLORIDE 5 MG/1
5 TABLET ORAL EVERY 4 HOURS PRN
Status: CANCELLED | OUTPATIENT
Start: 2025-04-09

## 2025-04-08 RX ORDER — POTASSIUM CHLORIDE 29.8 MG/ML
80 INJECTION INTRAVENOUS PRN
Status: CANCELLED | OUTPATIENT
Start: 2025-04-09

## 2025-04-08 NOTE — PROGRESS NOTES
Short Stay Communication Note  Catalina Leyva  Diagnosis: Multiple Myeloma  Primary MD: Maribel Garrido MD  Treatment: Fludarabine/Cytoxan  CAR-T Administration Date: 3/24/25  Day +15 of Carvykti   Pt seen in outpatient infusion today. Labs drawn and reviewed.   CBC:   Recent Labs     04/06/25  0829 04/07/25  0854 04/08/25  0837   WBC 4.2 3.0* 2.0*   HGB 12.3 12.2 12.0   HCT 36.8 36.3 35.7*   MCV 85.5 85.1 85.4   * 121* 127*     BMP/Mag:  Recent Labs     04/06/25  0829 04/07/25  0853 04/08/25  0837    139 138   K 4.7 4.7 4.5    103 103   CO2 27 27 27   PHOS 4.7 4.2 3.4   BUN 12 17 16   CREATININE 0.7 0.8 0.7   MG  --  2.10  --      Standing parameters for replacement for this patient:   1 unit of pack red blood cells for a hemoglobin < or equal to 7  1 pack of platelets for a platelet count less than or equal to 10  40 MeQ of Potassium administered for a potassium level less than or equal to 3.4  4g of Magnesium Sulfate for a magnesum level less than or equal to 1.4  No transfusions required for the above lab values.    Urinalysis last done: 4/7/25 Urinalysis next due: 4/14/25    Chest X-Ray last done: 4/7/25 Chest X-Ray next due: 4/14/25    Symptoms addressed and reported to care team this date: none    Treatments this date: none      Reviewed medication schedule with pt and caregiver. Both able to verbalize all medications and schedule. Pt to be seen again tomorrow. Patient and caregiver verbalized understanding of discharge instructions including when and how to call the doctor and when to report  to the ER. Discharged ambulatory to home.

## 2025-04-08 NOTE — PROGRESS NOTES
Short Stay Communication Note  Catalina Leyva  Diagnosis: Multiple Myeloma  Primary MD: Maribel Garrido MD  Treatment: Fludarabine/Cytoxan  CAR-T Administration Date: 3/24/25  Day +14 of Carvykti   Pt seen in outpatient infusion today. Labs drawn and reviewed.   CBC:   Recent Labs     04/06/25  0829 04/07/25  0854 04/08/25  0837   WBC 4.2 3.0* 2.0*   HGB 12.3 12.2 12.0   HCT 36.8 36.3 35.7*   MCV 85.5 85.1 85.4   * 121* 127*     BMP/Mag:  Recent Labs     04/06/25  0829 04/07/25  0853 04/08/25  0837    139 138   K 4.7 4.7 4.5    103 103   CO2 27 27 27   PHOS 4.7 4.2 3.4   BUN 12 17 16   CREATININE 0.7 0.8 0.7   MG  --  2.10  --      Standing parameters for replacement for this patient:   1 unit of pack red blood cells for a hemoglobin < or equal to 7  1 pack of platelets for a platelet count less than or equal to 10  40 MeQ of Potassium administered for a potassium level less than or equal to 3.4  4g of Magnesium Sulfate for a magnesum level less than or equal to 1.4  No transfusions required for the above lab values.    Urinalysis last done: 4/7/25 Urinalysis next due: 4/14/25    Chest X-Ray last done: 4/7/25 Chest X-Ray next due: 4/14/25    Symptoms addressed and reported to care team this date: none    Treatments this date: none      Reviewed medication schedule with pt and caregiver. Both able to verbalize all medications and schedule. Pt to be seen again tomorrow. Patient and caregiver verbalized understanding of discharge instructions including when and how to call the doctor and when to report  to the ER. Discharged ambulatory to home.

## 2025-04-08 NOTE — PROGRESS NOTES
BCC Progress Note      2025    Catalina Leyva    :  1950    MRN:  [id number]    Referring MD: Maribel Garrido DO  [address]  [address]  [place] [address]      Interval History:   - CRP and ferritin trending down   - Watch ALC closely, if rises above 3 start dex     ECOG PS:  (1) Restricted in physically strenuous activity, ambulatory and able to do work of light nature    KPS: 90% Able to carry on normal activity; minor signs or symptoms of disease    Isolation:  None     Medications    Scheduled Meds:   ALTEplase (CATHFLO) 2 mg in sterile water 2 mL injection  2 mg IntraCATHeter Once     Continuous Infusions:   sodium chloride       PRN Meds:.sodium chloride, potassium chloride, potassium chloride, magnesium sulfate, magnesium hydroxide      ROS:  As noted above, otherwise remainder of 10-point ROS negative      Physical Exam:     Vital Signs:  /75   Pulse 84   Temp 97.8 °F (36.6 °C) (Oral)   Resp 16   Wt 73.8 kg (162 lb 11.2 oz)   SpO2 99%   BMI 28.05 kg/m²     Weight:    Wt Readings from Last 3 Encounters:   25 73.8 kg (162 lb 11.2 oz)   25 74 kg (163 lb 2.3 oz)   25 74 kg (163 lb 2.3 oz)     General: Awake, alert and oriented.  HEENT: normocephalic, alopecia, PERRL, no scleral erythema or icterus, Oral mucosa moist and intact, throat clear  NECK: supple   BACK: Straight   SKIN: warm dry and intact without lesions rashes or masses  CHEST: CTA bilaterally without use of accessory muscles  CV: Normal S1 S2, RRR, no MRG  ABD: NT ND normoactive BS  EXTREMITIES: without edema, denies calf tenderness  NEURO: grossly intact  CATHETER: PAC-CDI     Laboratory Data:  CBC:   Recent Labs     25  0829 25  0854 25  0837   WBC 4.2 3.0* 2.0*   HGB 12.3 12.2 12.0   HCT 36.8 36.3 35.7*   MCV 85.5 85.1 85.4   * 121* 127*     BMP/Mag:  Recent Labs     25  0829 25  0853 25  0837    139 138   K 4.7 4.7 4.5    103 103   CO2

## 2025-04-08 NOTE — CARE COORDINATION
Care Transitions Note    Initial Call - Call within 2 business days of discharge: Yes    Attempted to reach patient for transitions of care follow up. Unable to reach patient.    Outreach Attempts:   Multiple attempts to contact patient at phone numbers on file.     Patient: Catalina Leyva    Patient : 1950   MRN: [id number]    Reason for Admission: relapse multiple myeloma   Discharge Date: 25  RURS: Readmission Risk Score: 17.9    Last Discharge Facility       Date Complaint Diagnosis Description Type Department Provider    25   Admission (Discharged) ALEXIA Maribel Woodward, DO            Was this an external facility discharge? No    Follow Up Appointment:   Patient does not have a follow up appointment scheduled at time of call.  CTN unable to reach patient post discharge. CTN will route message to PCP office.   Future Appointments         Provider Specialty Dept Phone    2025 8:00 AM [facility] [phone]    4/10/2025 8:00 AM [facility] [phone]    2025 8:00 AM [facility] [phone]    2025 12:30 PM [facility] [phone]    2025 8:00 AM Mayra Mayen MD Family Medicine [phone]            No further follow-up call indicated     Jessica Yost, MSN, RN, [facility]  Care Transition Nurse  [phone] mobile

## 2025-04-09 ENCOUNTER — HOSPITAL ENCOUNTER (OUTPATIENT)
Dept: ONCOLOGY | Age: 75
Setting detail: INFUSION SERIES
Discharge: HOME | End: 2025-04-09
Payer: MEDICARE

## 2025-04-09 VITALS
SYSTOLIC BLOOD PRESSURE: 109 MMHG | BODY MASS INDEX: 27.98 KG/M2 | RESPIRATION RATE: 16 BRPM | OXYGEN SATURATION: 98 % | TEMPERATURE: 97.2 F | WEIGHT: 162.26 LBS | DIASTOLIC BLOOD PRESSURE: 74 MMHG | HEART RATE: 74 BPM

## 2025-04-09 DIAGNOSIS — C90.02 MULTIPLE MYELOMA IN RELAPSE (HCC): Primary | ICD-10-CM

## 2025-04-09 LAB
ALBUMIN SERPL-MCNC: 4 G/DL (ref 3.4–5)
ALP SERPL-CCNC: 72 U/L (ref 40–129)
ALT SERPL-CCNC: 14 U/L (ref 10–40)
ANION GAP SERPL CALCULATED.3IONS-SCNC: 8 MMOL/L (ref 3–16)
ANISOCYTOSIS BLD QL SMEAR: ABNORMAL
AST SERPL-CCNC: 21 U/L (ref 15–37)
BASOPHILS # BLD: 0 K/UL (ref 0–0.2)
BASOPHILS NFR BLD: 2 %
BILIRUB DIRECT SERPL-MCNC: 0.2 MG/DL (ref 0–0.3)
BILIRUB INDIRECT SERPL-MCNC: 0.1 MG/DL (ref 0–1)
BILIRUB SERPL-MCNC: 0.3 MG/DL (ref 0–1)
BUN SERPL-MCNC: 15 MG/DL (ref 7–20)
CALCIUM SERPL-MCNC: 9.3 MG/DL (ref 8.3–10.6)
CHLORIDE SERPL-SCNC: 105 MMOL/L (ref 99–110)
CO2 SERPL-SCNC: 26 MMOL/L (ref 21–32)
CREAT SERPL-MCNC: 0.7 MG/DL (ref 0.6–1.2)
DACRYOCYTES BLD QL SMEAR: ABNORMAL
DEPRECATED RDW RBC AUTO: 18.5 % (ref 12.4–15.4)
EOSINOPHIL # BLD: 0.1 K/UL (ref 0–0.6)
EOSINOPHIL NFR BLD: 6 %
FERRITIN SERPL IA-MCNC: 72.7 NG/ML (ref 15–150)
FIBRINOGEN PPP-MCNC: 409 MG/DL (ref 227–534)
GFR SERPLBLD CREATININE-BSD FMLA CKD-EPI: >90 ML/MIN/{1.73_M2}
GLUCOSE SERPL-MCNC: 108 MG/DL (ref 70–99)
HCT VFR BLD AUTO: 35 % (ref 36–48)
HGB BLD-MCNC: 11.9 G/DL (ref 12–16)
LDH SERPL L TO P-CCNC: 192 U/L (ref 100–190)
LYMPHOCYTES # BLD: 0.5 K/UL (ref 1–5.1)
LYMPHOCYTES NFR BLD: 23 %
MAGNESIUM SERPL-MCNC: 2.01 MG/DL (ref 1.8–2.4)
MCH RBC QN AUTO: 28.4 PG (ref 26–34)
MCHC RBC AUTO-ENTMCNC: 34.1 G/DL (ref 31–36)
MCV RBC AUTO: 83.3 FL (ref 80–100)
MICROCYTES BLD QL SMEAR: ABNORMAL
MONOCYTES # BLD: 0.4 K/UL (ref 0–1.3)
MONOCYTES NFR BLD: 20 %
NEUTROPHILS # BLD: 0.9 K/UL (ref 1.7–7.7)
NEUTROPHILS NFR BLD: 47 %
OVALOCYTES BLD QL SMEAR: ABNORMAL
PHOSPHATE SERPL-MCNC: 3.2 MG/DL (ref 2.5–4.9)
PLATELET # BLD AUTO: 130 K/UL (ref 135–450)
PMV BLD AUTO: 6.9 FL (ref 5–10.5)
POTASSIUM SERPL-SCNC: 4.6 MMOL/L (ref 3.5–5.1)
PROT SERPL-MCNC: 6.2 G/DL (ref 6.4–8.2)
RBC # BLD AUTO: 4.2 M/UL (ref 4–5.2)
SCHISTOCYTES BLD QL SMEAR: ABNORMAL
SODIUM SERPL-SCNC: 139 MMOL/L (ref 136–145)
URATE SERPL-MCNC: 3.9 MG/DL (ref 2.6–6)
VARIANT LYMPHS NFR BLD MANUAL: 2 % (ref 0–6)
WBC # BLD AUTO: 1.9 K/UL (ref 4–11)

## 2025-04-09 PROCEDURE — 80076 HEPATIC FUNCTION PANEL: CPT

## 2025-04-09 PROCEDURE — 84550 ASSAY OF BLOOD/URIC ACID: CPT

## 2025-04-09 PROCEDURE — 82728 ASSAY OF FERRITIN: CPT

## 2025-04-09 PROCEDURE — 85025 COMPLETE CBC W/AUTO DIFF WBC: CPT

## 2025-04-09 PROCEDURE — 85384 FIBRINOGEN ACTIVITY: CPT

## 2025-04-09 PROCEDURE — 83615 LACTATE (LD) (LDH) ENZYME: CPT

## 2025-04-09 PROCEDURE — 84100 ASSAY OF PHOSPHORUS: CPT

## 2025-04-09 PROCEDURE — 83735 ASSAY OF MAGNESIUM: CPT

## 2025-04-09 PROCEDURE — 80048 BASIC METABOLIC PNL TOTAL CA: CPT

## 2025-04-09 PROCEDURE — 36591 DRAW BLOOD OFF VENOUS DEVICE: CPT

## 2025-04-09 RX ORDER — PROCHLORPERAZINE EDISYLATE 5 MG/ML
10 INJECTION INTRAMUSCULAR; INTRAVENOUS EVERY 6 HOURS PRN
Status: CANCELLED | OUTPATIENT
Start: 2025-04-10

## 2025-04-09 RX ORDER — POTASSIUM CHLORIDE 29.8 MG/ML
80 INJECTION INTRAVENOUS PRN
Status: CANCELLED | OUTPATIENT
Start: 2025-04-10

## 2025-04-09 RX ORDER — MAGNESIUM SULFATE IN WATER 40 MG/ML
4000 INJECTION, SOLUTION INTRAVENOUS PRN
Status: CANCELLED | OUTPATIENT
Start: 2025-04-10

## 2025-04-09 RX ORDER — PROCHLORPERAZINE MALEATE 10 MG
10 TABLET ORAL EVERY 6 HOURS PRN
Status: CANCELLED | OUTPATIENT
Start: 2025-04-10

## 2025-04-09 RX ORDER — OXYCODONE HYDROCHLORIDE 5 MG/1
5 TABLET ORAL EVERY 4 HOURS PRN
Status: CANCELLED | OUTPATIENT
Start: 2025-04-10

## 2025-04-09 RX ORDER — HEPARIN 100 UNIT/ML
500 SYRINGE INTRAVENOUS PRN
Status: CANCELLED | OUTPATIENT
Start: 2025-04-10

## 2025-04-09 RX ORDER — OXYCODONE HYDROCHLORIDE 5 MG/1
10 TABLET ORAL EVERY 4 HOURS PRN
Status: CANCELLED | OUTPATIENT
Start: 2025-04-10

## 2025-04-09 RX ORDER — ONDANSETRON 2 MG/ML
8 INJECTION INTRAMUSCULAR; INTRAVENOUS EVERY 8 HOURS PRN
Status: CANCELLED | OUTPATIENT
Start: 2025-04-10

## 2025-04-09 RX ORDER — POTASSIUM CHLORIDE 1500 MG/1
40 TABLET, EXTENDED RELEASE ORAL PRN
Status: CANCELLED | OUTPATIENT
Start: 2025-04-10

## 2025-04-09 RX ORDER — SODIUM CHLORIDE 9 MG/ML
INJECTION, SOLUTION INTRAVENOUS CONTINUOUS PRN
Status: CANCELLED | OUTPATIENT
Start: 2025-04-10

## 2025-04-09 RX ORDER — ONDANSETRON 4 MG/1
8 TABLET, FILM COATED ORAL EVERY 8 HOURS PRN
Status: CANCELLED | OUTPATIENT
Start: 2025-04-10

## 2025-04-09 NOTE — PROGRESS NOTES
palliative care consulted  - Patient has h/o challenges with mood and anxiety.  Takes Celexa 10 mg, Lorazepam 1 mg BID, and Hydroxizine 25 mg Q HS  - Primary Caregiver: Rohit Haskins (sister)  - Back-up Caregiver:  Radha Rossi (friend)  - Lodging:  [place], [place]    - Disposition: Patient will return to OP Infusion daily for 30 days post CAR-T Infusion for CAR-T assessment including S/S of toxicity (CRS / ICANS) for labs (CBC w/ diff, CMP, Mag & Phos) and provider visit.    The patient was seen and examined by Dr. Garrido.      Radha Reza, DELORES - CNP

## 2025-04-10 ENCOUNTER — HOSPITAL ENCOUNTER (OUTPATIENT)
Dept: ONCOLOGY | Age: 75
Setting detail: INFUSION SERIES
Discharge: HOME | End: 2025-04-10
Payer: MEDICARE

## 2025-04-10 VITALS
OXYGEN SATURATION: 99 % | BODY MASS INDEX: 27.71 KG/M2 | DIASTOLIC BLOOD PRESSURE: 87 MMHG | RESPIRATION RATE: 16 BRPM | TEMPERATURE: 97.8 F | WEIGHT: 160.72 LBS | HEART RATE: 86 BPM | SYSTOLIC BLOOD PRESSURE: 127 MMHG

## 2025-04-10 DIAGNOSIS — C90.02 MULTIPLE MYELOMA IN RELAPSE (HCC): Primary | ICD-10-CM

## 2025-04-10 LAB
ANION GAP SERPL CALCULATED.3IONS-SCNC: 8 MMOL/L (ref 3–16)
ANISOCYTOSIS BLD QL SMEAR: ABNORMAL
BASOPHILS # BLD: 0 K/UL (ref 0–0.2)
BASOPHILS NFR BLD: 0 %
BUN SERPL-MCNC: 13 MG/DL (ref 7–20)
CALCIUM SERPL-MCNC: 9.3 MG/DL (ref 8.3–10.6)
CHLORIDE SERPL-SCNC: 106 MMOL/L (ref 99–110)
CO2 SERPL-SCNC: 26 MMOL/L (ref 21–32)
CREAT SERPL-MCNC: 0.7 MG/DL (ref 0.6–1.2)
CRP SERPL-MCNC: 8.7 MG/L (ref 0–5.1)
DEPRECATED RDW RBC AUTO: 18.7 % (ref 12.4–15.4)
EOSINOPHIL # BLD: 0.1 K/UL (ref 0–0.6)
EOSINOPHIL NFR BLD: 3 %
FERRITIN SERPL IA-MCNC: 67.9 NG/ML (ref 15–150)
FIBRINOGEN PPP-MCNC: 383 MG/DL (ref 227–534)
GFR SERPLBLD CREATININE-BSD FMLA CKD-EPI: >90 ML/MIN/{1.73_M2}
GLUCOSE SERPL-MCNC: 95 MG/DL (ref 70–99)
HCT VFR BLD AUTO: 36.7 % (ref 36–48)
HGB BLD-MCNC: 12.3 G/DL (ref 12–16)
INR PPP: 1.03 (ref 0.85–1.15)
LYMPHOCYTES # BLD: 0.5 K/UL (ref 1–5.1)
LYMPHOCYTES NFR BLD: 21 %
MCH RBC QN AUTO: 28.9 PG (ref 26–34)
MCHC RBC AUTO-ENTMCNC: 33.7 G/DL (ref 31–36)
MCV RBC AUTO: 85.9 FL (ref 80–100)
MONOCYTES # BLD: 0.4 K/UL (ref 0–1.3)
MONOCYTES NFR BLD: 17 %
NEUTROPHILS # BLD: 1.2 K/UL (ref 1.7–7.7)
NEUTROPHILS NFR BLD: 53 %
NEUTS BAND NFR BLD MANUAL: 3 % (ref 0–7)
PHOSPHATE SERPL-MCNC: 3 MG/DL (ref 2.5–4.9)
PLATELET # BLD AUTO: 144 K/UL (ref 135–450)
PMV BLD AUTO: 7.3 FL (ref 5–10.5)
POTASSIUM SERPL-SCNC: 4.5 MMOL/L (ref 3.5–5.1)
PROTHROMBIN TIME: 13.8 SEC (ref 11.9–14.9)
RBC # BLD AUTO: 4.26 M/UL (ref 4–5.2)
SODIUM SERPL-SCNC: 140 MMOL/L (ref 136–145)
VARIANT LYMPHS NFR BLD MANUAL: 3 % (ref 0–6)
WBC # BLD AUTO: 2.1 K/UL (ref 4–11)

## 2025-04-10 PROCEDURE — 36591 DRAW BLOOD OFF VENOUS DEVICE: CPT

## 2025-04-10 PROCEDURE — 86140 C-REACTIVE PROTEIN: CPT

## 2025-04-10 PROCEDURE — 2580000003 HC RX 258: Performed by: STUDENT IN AN ORGANIZED HEALTH CARE EDUCATION/TRAINING PROGRAM

## 2025-04-10 PROCEDURE — 85025 COMPLETE CBC W/AUTO DIFF WBC: CPT

## 2025-04-10 PROCEDURE — 82728 ASSAY OF FERRITIN: CPT

## 2025-04-10 PROCEDURE — 80048 BASIC METABOLIC PNL TOTAL CA: CPT

## 2025-04-10 PROCEDURE — 96360 HYDRATION IV INFUSION INIT: CPT

## 2025-04-10 PROCEDURE — 85610 PROTHROMBIN TIME: CPT

## 2025-04-10 PROCEDURE — 85384 FIBRINOGEN ACTIVITY: CPT

## 2025-04-10 PROCEDURE — 84100 ASSAY OF PHOSPHORUS: CPT

## 2025-04-10 RX ORDER — HEPARIN 100 UNIT/ML
500 SYRINGE INTRAVENOUS PRN
Status: DISCONTINUED | OUTPATIENT
Start: 2025-04-10 | End: 2025-04-11 | Stop reason: HOSPADM

## 2025-04-10 RX ORDER — SODIUM CHLORIDE 9 MG/ML
INJECTION, SOLUTION INTRAVENOUS CONTINUOUS PRN
Status: CANCELLED | OUTPATIENT
Start: 2025-04-11

## 2025-04-10 RX ORDER — HEPARIN 100 UNIT/ML
500 SYRINGE INTRAVENOUS PRN
Status: CANCELLED | OUTPATIENT
Start: 2025-04-11

## 2025-04-10 RX ORDER — MAGNESIUM SULFATE IN WATER 40 MG/ML
4000 INJECTION, SOLUTION INTRAVENOUS PRN
Status: CANCELLED | OUTPATIENT
Start: 2025-04-11

## 2025-04-10 RX ORDER — ONDANSETRON 4 MG/1
8 TABLET, FILM COATED ORAL EVERY 8 HOURS PRN
Status: CANCELLED | OUTPATIENT
Start: 2025-04-11

## 2025-04-10 RX ORDER — POTASSIUM CHLORIDE 29.8 MG/ML
80 INJECTION INTRAVENOUS PRN
Status: CANCELLED | OUTPATIENT
Start: 2025-04-11

## 2025-04-10 RX ORDER — PROCHLORPERAZINE EDISYLATE 5 MG/ML
10 INJECTION INTRAMUSCULAR; INTRAVENOUS EVERY 6 HOURS PRN
Status: CANCELLED | OUTPATIENT
Start: 2025-04-11

## 2025-04-10 RX ORDER — ONDANSETRON 2 MG/ML
8 INJECTION INTRAMUSCULAR; INTRAVENOUS EVERY 8 HOURS PRN
Status: CANCELLED | OUTPATIENT
Start: 2025-04-11

## 2025-04-10 RX ORDER — POTASSIUM CHLORIDE 1500 MG/1
40 TABLET, EXTENDED RELEASE ORAL PRN
Status: CANCELLED | OUTPATIENT
Start: 2025-04-11

## 2025-04-10 RX ORDER — PROCHLORPERAZINE MALEATE 10 MG
10 TABLET ORAL EVERY 6 HOURS PRN
Status: CANCELLED | OUTPATIENT
Start: 2025-04-11

## 2025-04-10 RX ORDER — OXYCODONE HYDROCHLORIDE 5 MG/1
10 TABLET ORAL EVERY 4 HOURS PRN
Status: CANCELLED | OUTPATIENT
Start: 2025-04-11

## 2025-04-10 RX ORDER — 0.9 % SODIUM CHLORIDE 0.9 %
500 INTRAVENOUS SOLUTION INTRAVENOUS ONCE
Status: COMPLETED | OUTPATIENT
Start: 2025-04-10 | End: 2025-04-10

## 2025-04-10 RX ORDER — OXYCODONE HYDROCHLORIDE 5 MG/1
5 TABLET ORAL EVERY 4 HOURS PRN
Status: CANCELLED | OUTPATIENT
Start: 2025-04-11

## 2025-04-10 RX ADMIN — SODIUM CHLORIDE 500 ML: 9 INJECTION, SOLUTION INTRAVENOUS at 11:20

## 2025-04-10 NOTE — PROGRESS NOTES
Patient stated today when she gets up to go to the bathroom, she feels dizzy and lightheadedness. Dr. Garrido ordered orthostatic test and if patient is positive, give 500 ml NS bolus. NS saline 500 ml given.  
consulted  - Patient has h/o challenges with mood and anxiety.  Takes Celexa 10 mg, Lorazepam 1 mg BID, and Hydroxizine 25 mg Q HS  - Primary Caregiver: Rohit Haskins (sister)  - Back-up Caregiver:  Radha Rossi (friend)  - Lodging:  [place], [place]    - Disposition: Patient will return to OP Infusion daily for 30 days post CAR-T Infusion for CAR-T assessment including S/S of toxicity (CRS / ICANS) for labs (CBC w/ diff, CMP, Mag & Phos) and provider visit.    The patient was seen and examined by Dr. Garrido.      DELORES Harrington - NP

## 2025-04-11 ENCOUNTER — HOSPITAL ENCOUNTER (OUTPATIENT)
Dept: ONCOLOGY | Age: 75
Setting detail: INFUSION SERIES
Discharge: HOME | End: 2025-04-11
Payer: MEDICARE

## 2025-04-11 VITALS
SYSTOLIC BLOOD PRESSURE: 129 MMHG | OXYGEN SATURATION: 100 % | DIASTOLIC BLOOD PRESSURE: 86 MMHG | TEMPERATURE: 97.5 F | RESPIRATION RATE: 16 BRPM | HEART RATE: 74 BPM

## 2025-04-11 DIAGNOSIS — C90.02 MULTIPLE MYELOMA IN RELAPSE (HCC): Primary | ICD-10-CM

## 2025-04-11 LAB
ALBUMIN SERPL-MCNC: 4 G/DL (ref 3.4–5)
ALP SERPL-CCNC: 72 U/L (ref 40–129)
ALT SERPL-CCNC: 13 U/L (ref 10–40)
ANION GAP SERPL CALCULATED.3IONS-SCNC: 8 MMOL/L (ref 3–16)
ANISOCYTOSIS BLD QL SMEAR: ABNORMAL
AST SERPL-CCNC: 21 U/L (ref 15–37)
BASOPHILS # BLD: 0 K/UL (ref 0–0.2)
BASOPHILS NFR BLD: 2 %
BILIRUB DIRECT SERPL-MCNC: 0.2 MG/DL (ref 0–0.3)
BILIRUB INDIRECT SERPL-MCNC: 0.1 MG/DL (ref 0–1)
BILIRUB SERPL-MCNC: 0.3 MG/DL (ref 0–1)
BUN SERPL-MCNC: 11 MG/DL (ref 7–20)
CALCIUM SERPL-MCNC: 8.9 MG/DL (ref 8.3–10.6)
CHLORIDE SERPL-SCNC: 103 MMOL/L (ref 99–110)
CO2 SERPL-SCNC: 25 MMOL/L (ref 21–32)
CREAT SERPL-MCNC: 0.6 MG/DL (ref 0.6–1.2)
CRP SERPL-MCNC: 6.7 MG/L (ref 0–5.1)
DACRYOCYTES BLD QL SMEAR: ABNORMAL
DEPRECATED RDW RBC AUTO: 18.1 % (ref 12.4–15.4)
EOSINOPHIL # BLD: 0 K/UL (ref 0–0.6)
EOSINOPHIL NFR BLD: 1 %
FERRITIN SERPL IA-MCNC: 64.5 NG/ML (ref 15–150)
FIBRINOGEN PPP-MCNC: 341 MG/DL (ref 227–534)
GFR SERPLBLD CREATININE-BSD FMLA CKD-EPI: >90 ML/MIN/{1.73_M2}
GLUCOSE SERPL-MCNC: 87 MG/DL (ref 70–99)
HCT VFR BLD AUTO: 34.6 % (ref 36–48)
HGB BLD-MCNC: 11.8 G/DL (ref 12–16)
LDH SERPL L TO P-CCNC: 179 U/L (ref 100–190)
LYMPHOCYTES # BLD: 0.6 K/UL (ref 1–5.1)
LYMPHOCYTES NFR BLD: 23 %
MAGNESIUM SERPL-MCNC: 1.93 MG/DL (ref 1.8–2.4)
MCH RBC QN AUTO: 28.8 PG (ref 26–34)
MCHC RBC AUTO-ENTMCNC: 34.2 G/DL (ref 31–36)
MCV RBC AUTO: 84.2 FL (ref 80–100)
MICROCYTES BLD QL SMEAR: ABNORMAL
MONOCYTES # BLD: 0.5 K/UL (ref 0–1.3)
MONOCYTES NFR BLD: 23 %
NEUTROPHILS # BLD: 1.1 K/UL (ref 1.7–7.7)
NEUTROPHILS NFR BLD: 49 %
OVALOCYTES BLD QL SMEAR: ABNORMAL
PHOSPHATE SERPL-MCNC: 3 MG/DL (ref 2.5–4.9)
PLATELET # BLD AUTO: 135 K/UL (ref 135–450)
PMV BLD AUTO: 7 FL (ref 5–10.5)
POTASSIUM SERPL-SCNC: 4.2 MMOL/L (ref 3.5–5.1)
PROT SERPL-MCNC: 6 G/DL (ref 6.4–8.2)
RBC # BLD AUTO: 4.1 M/UL (ref 4–5.2)
SCHISTOCYTES BLD QL SMEAR: ABNORMAL
SODIUM SERPL-SCNC: 136 MMOL/L (ref 136–145)
URATE SERPL-MCNC: 3.6 MG/DL (ref 2.6–6)
VARIANT LYMPHS NFR BLD MANUAL: 2 % (ref 0–6)
WBC # BLD AUTO: 2.2 K/UL (ref 4–11)

## 2025-04-11 PROCEDURE — 80076 HEPATIC FUNCTION PANEL: CPT

## 2025-04-11 PROCEDURE — 82728 ASSAY OF FERRITIN: CPT

## 2025-04-11 PROCEDURE — 85025 COMPLETE CBC W/AUTO DIFF WBC: CPT

## 2025-04-11 PROCEDURE — 84550 ASSAY OF BLOOD/URIC ACID: CPT

## 2025-04-11 PROCEDURE — 86140 C-REACTIVE PROTEIN: CPT

## 2025-04-11 PROCEDURE — 36591 DRAW BLOOD OFF VENOUS DEVICE: CPT

## 2025-04-11 PROCEDURE — 84100 ASSAY OF PHOSPHORUS: CPT

## 2025-04-11 PROCEDURE — 83615 LACTATE (LD) (LDH) ENZYME: CPT

## 2025-04-11 PROCEDURE — 83735 ASSAY OF MAGNESIUM: CPT

## 2025-04-11 PROCEDURE — 85384 FIBRINOGEN ACTIVITY: CPT

## 2025-04-11 PROCEDURE — 80048 BASIC METABOLIC PNL TOTAL CA: CPT

## 2025-04-11 RX ORDER — HEPARIN 100 UNIT/ML
500 SYRINGE INTRAVENOUS PRN
OUTPATIENT
Start: 2025-04-12

## 2025-04-11 RX ORDER — POTASSIUM CHLORIDE 29.8 MG/ML
80 INJECTION INTRAVENOUS PRN
OUTPATIENT
Start: 2025-04-12

## 2025-04-11 RX ORDER — OXYCODONE HYDROCHLORIDE 5 MG/1
10 TABLET ORAL EVERY 4 HOURS PRN
OUTPATIENT
Start: 2025-04-12

## 2025-04-11 RX ORDER — PROCHLORPERAZINE EDISYLATE 5 MG/ML
10 INJECTION INTRAMUSCULAR; INTRAVENOUS EVERY 6 HOURS PRN
OUTPATIENT
Start: 2025-04-12

## 2025-04-11 RX ORDER — ONDANSETRON 2 MG/ML
8 INJECTION INTRAMUSCULAR; INTRAVENOUS EVERY 8 HOURS PRN
OUTPATIENT
Start: 2025-04-12

## 2025-04-11 RX ORDER — FLUCONAZOLE 200 MG/1
200 TABLET ORAL DAILY
Qty: 30 TABLET | Refills: 2
Start: 2025-04-11

## 2025-04-11 RX ORDER — PROCHLORPERAZINE MALEATE 10 MG
10 TABLET ORAL EVERY 6 HOURS PRN
OUTPATIENT
Start: 2025-04-12

## 2025-04-11 RX ORDER — OXYCODONE HYDROCHLORIDE 5 MG/1
5 TABLET ORAL EVERY 4 HOURS PRN
OUTPATIENT
Start: 2025-04-12

## 2025-04-11 RX ORDER — POTASSIUM CHLORIDE 1500 MG/1
40 TABLET, EXTENDED RELEASE ORAL PRN
OUTPATIENT
Start: 2025-04-12

## 2025-04-11 RX ORDER — SODIUM CHLORIDE 9 MG/ML
INJECTION, SOLUTION INTRAVENOUS CONTINUOUS PRN
OUTPATIENT
Start: 2025-04-12

## 2025-04-11 RX ORDER — MAGNESIUM SULFATE IN WATER 40 MG/ML
4000 INJECTION, SOLUTION INTRAVENOUS PRN
OUTPATIENT
Start: 2025-04-12

## 2025-04-11 RX ORDER — ONDANSETRON 4 MG/1
8 TABLET, FILM COATED ORAL EVERY 8 HOURS PRN
OUTPATIENT
Start: 2025-04-12

## 2025-04-11 RX ORDER — LEVOFLOXACIN 500 MG/1
500 TABLET, FILM COATED ORAL DAILY
Qty: 30 TABLET | Refills: 2
Start: 2025-04-11

## 2025-04-11 NOTE — PROGRESS NOTES
Lorazepam 1 mg BID, and Hydroxizine 25 mg Q HS  - Primary Caregiver: Rohit Haskins (sister)  - Back-up Caregiver:  Radha Rossi (friend)  - Lodging:  [place], [place]    - Disposition: Patient will return to OP Infusion daily for 30 days post CAR-T Infusion for CAR-T assessment including S/S of toxicity (CRS / ICANS) for labs (CBC w/ diff, CMP, Mag & Phos) and provider visit.    The patient was seen and examined by Dr. Garrido.      DELORES Harman - CNP

## 2025-04-12 ENCOUNTER — APPOINTMENT (OUTPATIENT)
Dept: ONCOLOGY | Age: 75
End: 2025-04-12
Payer: MEDICARE

## 2025-04-13 ENCOUNTER — HOSPITAL ENCOUNTER (OUTPATIENT)
Dept: ONCOLOGY | Age: 75
Setting detail: INFUSION SERIES
Discharge: HOME | End: 2025-04-13
Payer: MEDICARE

## 2025-04-13 VITALS
HEART RATE: 78 BPM | SYSTOLIC BLOOD PRESSURE: 130 MMHG | RESPIRATION RATE: 16 BRPM | DIASTOLIC BLOOD PRESSURE: 76 MMHG | OXYGEN SATURATION: 99 % | BODY MASS INDEX: 27.86 KG/M2 | WEIGHT: 161.6 LBS | TEMPERATURE: 97.2 F

## 2025-04-13 DIAGNOSIS — C90.02 MULTIPLE MYELOMA IN RELAPSE (HCC): Primary | ICD-10-CM

## 2025-04-13 LAB
ANION GAP SERPL CALCULATED.3IONS-SCNC: 9 MMOL/L (ref 3–16)
BASOPHILS # BLD: 0 K/UL (ref 0–0.2)
BASOPHILS NFR BLD: 1.4 %
BUN SERPL-MCNC: 13 MG/DL (ref 7–20)
CALCIUM SERPL-MCNC: 9.3 MG/DL (ref 8.3–10.6)
CHLORIDE SERPL-SCNC: 106 MMOL/L (ref 99–110)
CO2 SERPL-SCNC: 25 MMOL/L (ref 21–32)
CREAT SERPL-MCNC: 0.7 MG/DL (ref 0.6–1.2)
DEPRECATED RDW RBC AUTO: 18.9 % (ref 12.4–15.4)
EOSINOPHIL # BLD: 0 K/UL (ref 0–0.6)
EOSINOPHIL NFR BLD: 2 %
FERRITIN SERPL IA-MCNC: 54.2 NG/ML (ref 15–150)
FIBRINOGEN PPP-MCNC: 315 MG/DL (ref 227–534)
GFR SERPLBLD CREATININE-BSD FMLA CKD-EPI: >90 ML/MIN/{1.73_M2}
GLUCOSE SERPL-MCNC: 105 MG/DL (ref 70–99)
HCT VFR BLD AUTO: 35.5 % (ref 36–48)
HGB BLD-MCNC: 12.1 G/DL (ref 12–16)
LYMPHOCYTES # BLD: 0.4 K/UL (ref 1–5.1)
LYMPHOCYTES NFR BLD: 15.9 %
MCH RBC QN AUTO: 28.9 PG (ref 26–34)
MCHC RBC AUTO-ENTMCNC: 34 G/DL (ref 31–36)
MCV RBC AUTO: 85 FL (ref 80–100)
MONOCYTES # BLD: 0.8 K/UL (ref 0–1.3)
MONOCYTES NFR BLD: 31.9 %
NEUTROPHILS # BLD: 1.2 K/UL (ref 1.7–7.7)
NEUTROPHILS NFR BLD: 48.8 %
PHOSPHATE SERPL-MCNC: 3.9 MG/DL (ref 2.5–4.9)
PLATELET # BLD AUTO: 125 K/UL (ref 135–450)
PMV BLD AUTO: 7 FL (ref 5–10.5)
POTASSIUM SERPL-SCNC: 4.6 MMOL/L (ref 3.5–5.1)
RBC # BLD AUTO: 4.18 M/UL (ref 4–5.2)
SODIUM SERPL-SCNC: 140 MMOL/L (ref 136–145)
WBC # BLD AUTO: 2.4 K/UL (ref 4–11)

## 2025-04-13 PROCEDURE — 85025 COMPLETE CBC W/AUTO DIFF WBC: CPT

## 2025-04-13 PROCEDURE — 84100 ASSAY OF PHOSPHORUS: CPT

## 2025-04-13 PROCEDURE — 36591 DRAW BLOOD OFF VENOUS DEVICE: CPT

## 2025-04-13 PROCEDURE — 80048 BASIC METABOLIC PNL TOTAL CA: CPT

## 2025-04-13 PROCEDURE — 85384 FIBRINOGEN ACTIVITY: CPT

## 2025-04-13 PROCEDURE — 82728 ASSAY OF FERRITIN: CPT

## 2025-04-13 RX ORDER — PROCHLORPERAZINE MALEATE 10 MG
10 TABLET ORAL EVERY 6 HOURS PRN
Status: CANCELLED | OUTPATIENT
Start: 2025-04-14

## 2025-04-13 RX ORDER — OXYCODONE HYDROCHLORIDE 5 MG/1
10 TABLET ORAL EVERY 4 HOURS PRN
Status: CANCELLED | OUTPATIENT
Start: 2025-04-14

## 2025-04-13 RX ORDER — POTASSIUM CHLORIDE 1500 MG/1
40 TABLET, EXTENDED RELEASE ORAL PRN
Status: CANCELLED | OUTPATIENT
Start: 2025-04-14

## 2025-04-13 RX ORDER — POTASSIUM CHLORIDE 29.8 MG/ML
80 INJECTION INTRAVENOUS PRN
Status: CANCELLED | OUTPATIENT
Start: 2025-04-14

## 2025-04-13 RX ORDER — OXYCODONE HYDROCHLORIDE 5 MG/1
5 TABLET ORAL EVERY 4 HOURS PRN
Status: CANCELLED | OUTPATIENT
Start: 2025-04-14

## 2025-04-13 RX ORDER — ONDANSETRON 2 MG/ML
8 INJECTION INTRAMUSCULAR; INTRAVENOUS EVERY 8 HOURS PRN
Status: CANCELLED | OUTPATIENT
Start: 2025-04-14

## 2025-04-13 RX ORDER — ONDANSETRON 4 MG/1
8 TABLET, FILM COATED ORAL EVERY 8 HOURS PRN
Status: CANCELLED | OUTPATIENT
Start: 2025-04-14

## 2025-04-13 RX ORDER — HEPARIN 100 UNIT/ML
500 SYRINGE INTRAVENOUS PRN
Status: CANCELLED | OUTPATIENT
Start: 2025-04-14

## 2025-04-13 RX ORDER — MAGNESIUM SULFATE IN WATER 40 MG/ML
4000 INJECTION, SOLUTION INTRAVENOUS PRN
Status: CANCELLED | OUTPATIENT
Start: 2025-04-14

## 2025-04-13 RX ORDER — PROCHLORPERAZINE EDISYLATE 5 MG/ML
10 INJECTION INTRAMUSCULAR; INTRAVENOUS EVERY 6 HOURS PRN
Status: CANCELLED | OUTPATIENT
Start: 2025-04-14

## 2025-04-13 RX ORDER — SODIUM CHLORIDE 9 MG/ML
INJECTION, SOLUTION INTRAVENOUS CONTINUOUS PRN
Status: CANCELLED | OUTPATIENT
Start: 2025-04-14

## 2025-04-13 NOTE — PROGRESS NOTES
Short Stay Communication Note  Catalina Leyva  Diagnosis: Multiple Myeloma  Primary MD: Maribel Garrido MD  Treatment: Fludarabine/Cytoxan  CAR-T Administration Date: 3/24/25  Day +20 of Carvykti   Pt seen in outpatient infusion today. Labs drawn and reviewed.   CBC:   Recent Labs     04/11/25  0832 04/13/25  0837   WBC 2.2* 2.4*   HGB 11.8* 12.1   HCT 34.6* 35.5*   MCV 84.2 85.0    125*     BMP/Mag:  Recent Labs     04/11/25  0832 04/13/25  0837    140   K 4.2 4.6    106   CO2 25 25   PHOS 3.0 3.9   BUN 11 13   CREATININE 0.6 0.7   MG 1.93  --      Standing parameters for replacement for this patient:   1 unit of pack red blood cells for a hemoglobin < or equal to 7  1 pack of platelets for a platelet count less than or equal to 10  40 MeQ of Potassium administered for a potassium level less than or equal to 3.4  4g of Magnesium Sulfate for a magnesum level less than or equal to 1.4  No transfusions required for the above lab values.    Urinalysis last done: 4/7/25 Urinalysis next due: 4/14/25    Chest X-Ray last done: 4/7/25 Chest X-Ray next due: 4/14/25    Symptoms addressed and reported to care team this date: N/A, patient denies dizziness/lightheadedness which occurred on previous visit (4/11/2025).     Treatments this date: labs    Potassium lab resulted with a note saying there was a possibility of hemolyzation. Tracy Schoenhoft, NP notified. No new orders.    Reviewed medication schedule with pt and caregiver. Both able to verbalize all medications and schedule. Pt to be seen again tomorrow. Patient and caregiver verbalized understanding of discharge instructions including when and how to call the doctor and when to report to the ER. Discharged ambulatory to home.       Mirian Tate

## 2025-04-13 NOTE — PROGRESS NOTES
BCC Progress Note      2025    Catalina Leyva    :  1950    MRN:  [id number]    Referring MD: Maribel Garrido,   [address]  [address]  [place] [address]      Interval History:  Doing well. Eating and drinking. No s/s CRS or neurotox     ECOG PS:  (1) Restricted in physically strenuous activity, ambulatory and able to do work of light nature    KPS: 90% Able to carry on normal activity; minor signs or symptoms of disease    Isolation:  None     Medications    Scheduled Meds:      Continuous Infusions:      PRN Meds:.      ROS:  As noted above, otherwise remainder of 10-point ROS negative      Physical Exam:     Vital Signs:  /76   Pulse 78   Temp 97.2 °F (36.2 °C) (Oral)   Resp 16   Wt 73.3 kg (161 lb 9.6 oz)   SpO2 99%   BMI 27.86 kg/m²     Weight:    Wt Readings from Last 3 Encounters:   25 73.3 kg (161 lb 9.6 oz)   04/10/25 72.9 kg (160 lb 11.5 oz)   25 73.6 kg (162 lb 4.1 oz)     General: Awake, alert and oriented.  HEENT: normocephalic,  no scleral erythema or icterus, Oral mucosa moist and intact, throat clear  NECK: supple   BACK: Straight   SKIN: warm dry and intact without lesions rashes or masses  CHEST: CTA bilaterally without use of accessory muscles  CV: Normal S1 S2, RRR, no MRG  ABD: NT ND normoactive BS  EXTREMITIES: without edema, denies calf tenderness  NEURO: grossly intact  CATHETER: PAC-CDI     Laboratory Data:  CBC:   Recent Labs     25  0832 25  0837   WBC 2.2* 2.4*   HGB 11.8* 12.1   HCT 34.6* 35.5*   MCV 84.2 85.0    125*     BMP/Mag:  Recent Labs     25  0832 25  0837    140   K 4.2 4.6    106   CO2  25   PHOS 3.0 3.9   BUN 11 13   CREATININE 0.6 0.7   MG 1.93  --      LIVP:   Recent Labs     25  0832   AST 21   ALT 13   BILIDIR 0.2   BILITOT 0.3   ALKPHOS 72     Coags:   No results for input(s): \"PROTIME\", \"INR\", \"APTT\" in the last 72 hours.    Uric Acid No results for input(s):

## 2025-04-14 ENCOUNTER — HOSPITAL ENCOUNTER (OUTPATIENT)
Dept: GENERAL RADIOLOGY | Age: 75
Discharge: HOME | End: 2025-04-14
Payer: MEDICARE

## 2025-04-14 ENCOUNTER — HOSPITAL ENCOUNTER (OUTPATIENT)
Dept: ONCOLOGY | Age: 75
Setting detail: INFUSION SERIES
Discharge: HOME | End: 2025-04-14
Payer: MEDICARE

## 2025-04-14 VITALS
BODY MASS INDEX: 28.05 KG/M2 | RESPIRATION RATE: 16 BRPM | HEART RATE: 84 BPM | DIASTOLIC BLOOD PRESSURE: 79 MMHG | TEMPERATURE: 97.3 F | SYSTOLIC BLOOD PRESSURE: 117 MMHG | OXYGEN SATURATION: 98 % | WEIGHT: 162.7 LBS

## 2025-04-14 DIAGNOSIS — C90.02 MULTIPLE MYELOMA IN RELAPSE (HCC): Primary | ICD-10-CM

## 2025-04-14 LAB
ALBUMIN SERPL-MCNC: 3.9 G/DL (ref 3.4–5)
ALBUMIN SERPL-MCNC: 4.3 G/DL (ref 3.4–5)
ALBUMIN/GLOB SERPL: 2 {RATIO} (ref 1.1–2.2)
ALP SERPL-CCNC: 72 U/L (ref 40–129)
ALP SERPL-CCNC: 81 U/L (ref 40–129)
ALT SERPL-CCNC: 17 U/L (ref 10–40)
ALT SERPL-CCNC: 19 U/L (ref 10–40)
ANION GAP SERPL CALCULATED.3IONS-SCNC: 12 MMOL/L (ref 3–16)
ANION GAP SERPL CALCULATED.3IONS-SCNC: 6 MMOL/L (ref 3–16)
AST SERPL-CCNC: 24 U/L (ref 15–37)
AST SERPL-CCNC: 34 U/L (ref 15–37)
BASOPHILS # BLD: 0 K/UL (ref 0–0.2)
BASOPHILS NFR BLD: 0.8 %
BILIRUB DIRECT SERPL-MCNC: <0.1 MG/DL (ref 0–0.3)
BILIRUB INDIRECT SERPL-MCNC: 0.2 MG/DL (ref 0–1)
BILIRUB SERPL-MCNC: 0.3 MG/DL (ref 0–1)
BILIRUB SERPL-MCNC: 0.4 MG/DL (ref 0–1)
BILIRUB UR QL STRIP.AUTO: NEGATIVE
BUN SERPL-MCNC: 12 MG/DL (ref 7–20)
BUN SERPL-MCNC: 13 MG/DL (ref 7–20)
CALCIUM SERPL-MCNC: 9.3 MG/DL (ref 8.3–10.6)
CALCIUM SERPL-MCNC: 9.6 MG/DL (ref 8.3–10.6)
CHLORIDE SERPL-SCNC: 104 MMOL/L (ref 99–110)
CHLORIDE SERPL-SCNC: 107 MMOL/L (ref 99–110)
CLARITY UR: CLEAR
CO2 SERPL-SCNC: 23 MMOL/L (ref 21–32)
CO2 SERPL-SCNC: 26 MMOL/L (ref 21–32)
COLOR UR: YELLOW
CREAT SERPL-MCNC: 0.7 MG/DL (ref 0.6–1.2)
CREAT SERPL-MCNC: 0.7 MG/DL (ref 0.6–1.2)
DEPRECATED RDW RBC AUTO: 18.7 % (ref 12.4–15.4)
EOSINOPHIL # BLD: 0 K/UL (ref 0–0.6)
EOSINOPHIL NFR BLD: 2.1 %
FERRITIN SERPL IA-MCNC: 50.2 NG/ML (ref 15–150)
FIBRINOGEN PPP-MCNC: 338 MG/DL (ref 227–534)
FUNGUS BLD CULT: NORMAL
FUNGUS BLD CULT: NORMAL
FUNGUS SPEC CULT: NORMAL
FUNGUS SPEC CULT: NORMAL
GFR SERPLBLD CREATININE-BSD FMLA CKD-EPI: >90 ML/MIN/{1.73_M2}
GFR SERPLBLD CREATININE-BSD FMLA CKD-EPI: >90 ML/MIN/{1.73_M2}
GLUCOSE SERPL-MCNC: 126 MG/DL (ref 70–99)
GLUCOSE SERPL-MCNC: 83 MG/DL (ref 70–99)
GLUCOSE UR STRIP.AUTO-MCNC: NEGATIVE MG/DL
HCT VFR BLD AUTO: 35.3 % (ref 36–48)
HGB BLD-MCNC: 12.2 G/DL (ref 12–16)
HGB UR QL STRIP.AUTO: NEGATIVE
INR PPP: 1.04 (ref 0.85–1.15)
KETONES UR STRIP.AUTO-MCNC: NEGATIVE MG/DL
LDH SERPL L TO P-CCNC: 306 U/L (ref 100–190)
LEUKOCYTE ESTERASE UR QL STRIP.AUTO: NEGATIVE
LOEFFLER MB STN SPEC: NORMAL
LOEFFLER MB STN SPEC: NORMAL
LYMPHOCYTES # BLD: 0.4 K/UL (ref 1–5.1)
LYMPHOCYTES NFR BLD: 16.5 %
MAGNESIUM SERPL-MCNC: 2.02 MG/DL (ref 1.8–2.4)
MCH RBC QN AUTO: 28.7 PG (ref 26–34)
MCHC RBC AUTO-ENTMCNC: 34.6 G/DL (ref 31–36)
MCV RBC AUTO: 82.9 FL (ref 80–100)
MONOCYTES # BLD: 0.6 K/UL (ref 0–1.3)
MONOCYTES NFR BLD: 27.4 %
NEUTROPHILS # BLD: 1.2 K/UL (ref 1.7–7.7)
NEUTROPHILS NFR BLD: 53.2 %
NITRITE UR QL STRIP.AUTO: NEGATIVE
PH UR STRIP.AUTO: 6 [PH] (ref 5–8)
PHOSPHATE SERPL-MCNC: 3.8 MG/DL (ref 2.5–4.9)
PLATELET # BLD AUTO: 138 K/UL (ref 135–450)
PMV BLD AUTO: 7.2 FL (ref 5–10.5)
POTASSIUM SERPL-SCNC: 4.6 MMOL/L (ref 3.5–5.1)
POTASSIUM SERPL-SCNC: 5.4 MMOL/L (ref 3.5–5.1)
PROT SERPL-MCNC: 6.1 G/DL (ref 6.4–8.2)
PROT SERPL-MCNC: 6.5 G/DL (ref 6.4–8.2)
PROT UR STRIP.AUTO-MCNC: NEGATIVE MG/DL
PROTHROMBIN TIME: 13.8 SEC (ref 11.9–14.9)
RBC # BLD AUTO: 4.26 M/UL (ref 4–5.2)
SODIUM SERPL-SCNC: 139 MMOL/L (ref 136–145)
SODIUM SERPL-SCNC: 139 MMOL/L (ref 136–145)
SP GR UR STRIP.AUTO: <=1.005 (ref 1–1.03)
UA DIPSTICK W REFLEX MICRO PNL UR: NORMAL
URATE SERPL-MCNC: 3.4 MG/DL (ref 2.6–6)
URN SPEC COLLECT METH UR: NORMAL
UROBILINOGEN UR STRIP-ACNC: 0.2 E.U./DL
WBC # BLD AUTO: 2.3 K/UL (ref 4–11)

## 2025-04-14 PROCEDURE — 71045 X-RAY EXAM CHEST 1 VIEW: CPT

## 2025-04-14 PROCEDURE — 80053 COMPREHEN METABOLIC PANEL: CPT

## 2025-04-14 PROCEDURE — 84100 ASSAY OF PHOSPHORUS: CPT

## 2025-04-14 PROCEDURE — 83615 LACTATE (LD) (LDH) ENZYME: CPT

## 2025-04-14 PROCEDURE — 85610 PROTHROMBIN TIME: CPT

## 2025-04-14 PROCEDURE — 82728 ASSAY OF FERRITIN: CPT

## 2025-04-14 PROCEDURE — 85025 COMPLETE CBC W/AUTO DIFF WBC: CPT

## 2025-04-14 PROCEDURE — 36591 DRAW BLOOD OFF VENOUS DEVICE: CPT

## 2025-04-14 PROCEDURE — 81003 URINALYSIS AUTO W/O SCOPE: CPT

## 2025-04-14 PROCEDURE — 85384 FIBRINOGEN ACTIVITY: CPT

## 2025-04-14 PROCEDURE — 83735 ASSAY OF MAGNESIUM: CPT

## 2025-04-14 PROCEDURE — 84550 ASSAY OF BLOOD/URIC ACID: CPT

## 2025-04-14 RX ORDER — ONDANSETRON 2 MG/ML
8 INJECTION INTRAMUSCULAR; INTRAVENOUS EVERY 8 HOURS PRN
Status: CANCELLED | OUTPATIENT
Start: 2025-04-15

## 2025-04-14 RX ORDER — MAGNESIUM SULFATE IN WATER 40 MG/ML
4000 INJECTION, SOLUTION INTRAVENOUS PRN
Status: CANCELLED | OUTPATIENT
Start: 2025-04-15

## 2025-04-14 RX ORDER — POTASSIUM CHLORIDE 1500 MG/1
40 TABLET, EXTENDED RELEASE ORAL PRN
Status: CANCELLED | OUTPATIENT
Start: 2025-04-15

## 2025-04-14 RX ORDER — HEPARIN 100 UNIT/ML
500 SYRINGE INTRAVENOUS PRN
Status: CANCELLED | OUTPATIENT
Start: 2025-04-15

## 2025-04-14 RX ORDER — PROCHLORPERAZINE MALEATE 10 MG
10 TABLET ORAL EVERY 6 HOURS PRN
Status: CANCELLED | OUTPATIENT
Start: 2025-04-15

## 2025-04-14 RX ORDER — ONDANSETRON 4 MG/1
8 TABLET, FILM COATED ORAL EVERY 8 HOURS PRN
Status: CANCELLED | OUTPATIENT
Start: 2025-04-15

## 2025-04-14 RX ORDER — PROCHLORPERAZINE EDISYLATE 5 MG/ML
10 INJECTION INTRAMUSCULAR; INTRAVENOUS EVERY 6 HOURS PRN
Status: CANCELLED | OUTPATIENT
Start: 2025-04-15

## 2025-04-14 RX ORDER — POTASSIUM CHLORIDE 29.8 MG/ML
80 INJECTION INTRAVENOUS PRN
Status: CANCELLED | OUTPATIENT
Start: 2025-04-15

## 2025-04-14 RX ORDER — OXYCODONE HYDROCHLORIDE 5 MG/1
10 TABLET ORAL EVERY 4 HOURS PRN
Status: CANCELLED | OUTPATIENT
Start: 2025-04-15

## 2025-04-14 RX ORDER — SODIUM CHLORIDE 9 MG/ML
INJECTION, SOLUTION INTRAVENOUS CONTINUOUS PRN
Status: CANCELLED | OUTPATIENT
Start: 2025-04-15

## 2025-04-14 RX ORDER — OXYCODONE HYDROCHLORIDE 5 MG/1
5 TABLET ORAL EVERY 4 HOURS PRN
Status: CANCELLED | OUTPATIENT
Start: 2025-04-15

## 2025-04-14 NOTE — PROGRESS NOTES
Short Stay Communication Note  Catalina Leyva  Diagnosis: Multiple Myeloma  Primary MD: Maribel Garrido MD  Treatment: Fludarabine/Cytoxan  CAR-T Administration Date: 3/24/25  Day +21 of Carvykti   Pt seen in outpatient infusion today. Labs drawn and reviewed.   CBC:   Recent Labs     04/13/25  0837 04/14/25  0825   WBC 2.4* 2.3*   HGB 12.1 12.2   HCT 35.5* 35.3*   MCV 85.0 82.9   * 138     BMP/Mag:  Recent Labs     04/13/25  0837 04/14/25  0825 04/14/25  0925    139 139   K 4.6 5.4* 4.6    107 104   CO2 25 26 23   PHOS 3.9 3.8  --    BUN 13 12 13   CREATININE 0.7 0.7 0.7   MG  --  2.02  --      Standing parameters for replacement for this patient:   1 unit of pack red blood cells for a hemoglobin < or equal to 7  1 pack of platelets for a platelet count less than or equal to 10  40 MeQ of Potassium administered for a potassium level less than or equal to 3.4  4g of Magnesium Sulfate for a magnesum level less than or equal to 1.4  No transfusions required for the above lab values.    Urinalysis last done: 4/14/25 Urinalysis next due: 4/21/25    Chest X-Ray last done: 4/14/25 Chest X-Ray next due: 4/21/25    Symptoms addressed and reported to care team this date: increased fatigue    Treatments this date: labs    Potassium lab resulted with a note saying there was a possibility of hemolyzation. CMP re-ordered per verbal order NP. See 0925 labs for new results.    Reviewed medication schedule with pt and caregiver. Both able to verbalize all medications and schedule. Pt to be seen again ***. Patient and caregiver verbalized understanding of discharge instructions including when and how to call the doctor and when to report to the ER. Discharged ambulatory to home.       
challenges with mood and anxiety.  Takes Celexa 10 mg, Lorazepam 1 mg BID, and Hydroxizine 25 mg Q HS  - Primary Caregiver: Rohit Haskins (sister)  - Back-up Caregiver:  Radha Rossi (friend)  - Lodging:  [place], [place]    - Disposition: Patient will return to OP Infusion daily for 30 days post CAR-T Infusion for CAR-T assessment including S/S of toxicity (CRS / ICANS) for labs (CBC w/ diff, CMP, Mag & Phos) and provider visit.    The patient was seen and examined by DELORES Martinez - NP

## 2025-04-16 ENCOUNTER — HOSPITAL ENCOUNTER (OUTPATIENT)
Dept: ONCOLOGY | Age: 75
Setting detail: INFUSION SERIES
Discharge: HOME | End: 2025-04-16
Payer: MEDICARE

## 2025-04-16 VITALS
DIASTOLIC BLOOD PRESSURE: 68 MMHG | TEMPERATURE: 97.4 F | BODY MASS INDEX: 27.82 KG/M2 | HEART RATE: 80 BPM | SYSTOLIC BLOOD PRESSURE: 113 MMHG | RESPIRATION RATE: 16 BRPM | OXYGEN SATURATION: 99 % | WEIGHT: 161.38 LBS

## 2025-04-16 DIAGNOSIS — C90.02 MULTIPLE MYELOMA IN RELAPSE (HCC): Primary | ICD-10-CM

## 2025-04-16 LAB
ALBUMIN SERPL-MCNC: 3.9 G/DL (ref 3.4–5)
ALP SERPL-CCNC: 74 U/L (ref 40–129)
ALT SERPL-CCNC: 15 U/L (ref 10–40)
ANION GAP SERPL CALCULATED.3IONS-SCNC: 7 MMOL/L (ref 3–16)
AST SERPL-CCNC: 23 U/L (ref 15–37)
BASOPHILS # BLD: 0 K/UL (ref 0–0.2)
BASOPHILS NFR BLD: 0.7 %
BILIRUB DIRECT SERPL-MCNC: 0.2 MG/DL (ref 0–0.3)
BILIRUB INDIRECT SERPL-MCNC: 0.1 MG/DL (ref 0–1)
BILIRUB SERPL-MCNC: 0.3 MG/DL (ref 0–1)
BUN SERPL-MCNC: 15 MG/DL (ref 7–20)
CALCIUM SERPL-MCNC: 9.1 MG/DL (ref 8.3–10.6)
CHLORIDE SERPL-SCNC: 104 MMOL/L (ref 99–110)
CO2 SERPL-SCNC: 26 MMOL/L (ref 21–32)
CREAT SERPL-MCNC: 0.6 MG/DL (ref 0.6–1.2)
DEPRECATED RDW RBC AUTO: 18.9 % (ref 12.4–15.4)
EOSINOPHIL # BLD: 0 K/UL (ref 0–0.6)
EOSINOPHIL NFR BLD: 1.7 %
FERRITIN SERPL IA-MCNC: 45.8 NG/ML (ref 15–150)
FIBRINOGEN PPP-MCNC: 317 MG/DL (ref 227–534)
GFR SERPLBLD CREATININE-BSD FMLA CKD-EPI: >90 ML/MIN/{1.73_M2}
GLUCOSE SERPL-MCNC: 97 MG/DL (ref 70–99)
HCT VFR BLD AUTO: 34.9 % (ref 36–48)
HGB BLD-MCNC: 12 G/DL (ref 12–16)
LDH SERPL L TO P-CCNC: 182 U/L (ref 100–190)
LYMPHOCYTES # BLD: 0.4 K/UL (ref 1–5.1)
LYMPHOCYTES NFR BLD: 14.7 %
MAGNESIUM SERPL-MCNC: 1.91 MG/DL (ref 1.8–2.4)
MCH RBC QN AUTO: 28.6 PG (ref 26–34)
MCHC RBC AUTO-ENTMCNC: 34.2 G/DL (ref 31–36)
MCV RBC AUTO: 83.5 FL (ref 80–100)
MONOCYTES # BLD: 0.6 K/UL (ref 0–1.3)
MONOCYTES NFR BLD: 24.1 %
NEUTROPHILS # BLD: 1.5 K/UL (ref 1.7–7.7)
NEUTROPHILS NFR BLD: 58.8 %
PHOSPHATE SERPL-MCNC: 3.8 MG/DL (ref 2.5–4.9)
PLATELET # BLD AUTO: 138 K/UL (ref 135–450)
PMV BLD AUTO: 6.7 FL (ref 5–10.5)
POTASSIUM SERPL-SCNC: 4.3 MMOL/L (ref 3.5–5.1)
PROT SERPL-MCNC: 5.9 G/DL (ref 6.4–8.2)
RBC # BLD AUTO: 4.18 M/UL (ref 4–5.2)
SODIUM SERPL-SCNC: 137 MMOL/L (ref 136–145)
URATE SERPL-MCNC: 3.8 MG/DL (ref 2.6–6)
WBC # BLD AUTO: 2.6 K/UL (ref 4–11)

## 2025-04-16 PROCEDURE — 80076 HEPATIC FUNCTION PANEL: CPT

## 2025-04-16 PROCEDURE — 84100 ASSAY OF PHOSPHORUS: CPT

## 2025-04-16 PROCEDURE — 83615 LACTATE (LD) (LDH) ENZYME: CPT

## 2025-04-16 PROCEDURE — 36591 DRAW BLOOD OFF VENOUS DEVICE: CPT

## 2025-04-16 PROCEDURE — 83735 ASSAY OF MAGNESIUM: CPT

## 2025-04-16 PROCEDURE — 80048 BASIC METABOLIC PNL TOTAL CA: CPT

## 2025-04-16 PROCEDURE — 85025 COMPLETE CBC W/AUTO DIFF WBC: CPT

## 2025-04-16 PROCEDURE — 84550 ASSAY OF BLOOD/URIC ACID: CPT

## 2025-04-16 PROCEDURE — 85384 FIBRINOGEN ACTIVITY: CPT

## 2025-04-16 PROCEDURE — 82728 ASSAY OF FERRITIN: CPT

## 2025-04-16 RX ORDER — PROCHLORPERAZINE MALEATE 10 MG
10 TABLET ORAL EVERY 6 HOURS PRN
OUTPATIENT
Start: 2025-04-17

## 2025-04-16 RX ORDER — ONDANSETRON 2 MG/ML
8 INJECTION INTRAMUSCULAR; INTRAVENOUS EVERY 8 HOURS PRN
OUTPATIENT
Start: 2025-04-17

## 2025-04-16 RX ORDER — SODIUM CHLORIDE 9 MG/ML
INJECTION, SOLUTION INTRAVENOUS CONTINUOUS PRN
Status: CANCELLED | OUTPATIENT
Start: 2025-04-17

## 2025-04-16 RX ORDER — HEPARIN 100 UNIT/ML
500 SYRINGE INTRAVENOUS PRN
Status: DISCONTINUED | OUTPATIENT
Start: 2025-04-16 | End: 2025-04-17 | Stop reason: HOSPADM

## 2025-04-16 RX ORDER — MAGNESIUM SULFATE IN WATER 40 MG/ML
4000 INJECTION, SOLUTION INTRAVENOUS PRN
Status: CANCELLED | OUTPATIENT
Start: 2025-04-17

## 2025-04-16 RX ORDER — OXYCODONE HYDROCHLORIDE 5 MG/1
5 TABLET ORAL EVERY 4 HOURS PRN
OUTPATIENT
Start: 2025-04-17

## 2025-04-16 RX ORDER — OXYCODONE HYDROCHLORIDE 5 MG/1
10 TABLET ORAL EVERY 4 HOURS PRN
OUTPATIENT
Start: 2025-04-17

## 2025-04-16 RX ORDER — ONDANSETRON 4 MG/1
8 TABLET, FILM COATED ORAL EVERY 8 HOURS PRN
OUTPATIENT
Start: 2025-04-17

## 2025-04-16 RX ORDER — POTASSIUM CHLORIDE 1500 MG/1
40 TABLET, EXTENDED RELEASE ORAL PRN
Status: CANCELLED | OUTPATIENT
Start: 2025-04-17

## 2025-04-16 RX ORDER — POTASSIUM CHLORIDE 29.8 MG/ML
80 INJECTION INTRAVENOUS PRN
Status: CANCELLED | OUTPATIENT
Start: 2025-04-17

## 2025-04-16 RX ORDER — HEPARIN 100 UNIT/ML
500 SYRINGE INTRAVENOUS PRN
OUTPATIENT
Start: 2025-04-17

## 2025-04-16 RX ORDER — PROCHLORPERAZINE EDISYLATE 5 MG/ML
10 INJECTION INTRAMUSCULAR; INTRAVENOUS EVERY 6 HOURS PRN
OUTPATIENT
Start: 2025-04-17

## 2025-04-16 ASSESSMENT — PAIN SCALES - GENERAL: PAINLEVEL_OUTOF10: 0

## 2025-04-16 NOTE — PROGRESS NOTES
BCC Progress Note      2025    Catalina Leyva    :  1950    MRN:  [id number]    Referring MD: Maribel Garrido,   [address]  [address]  [place] [address]      Interval History: Doing well. Eating and drinking. No s/s CRS or neurotox     ECOG PS:  (1) Restricted in physically strenuous activity, ambulatory and able to do work of light nature    KPS: 90% Able to carry on normal activity; minor signs or symptoms of disease    Isolation:  None     Medications    Scheduled Meds:      Continuous Infusions:      PRN Meds:.heparin flush      ROS:  As noted above, otherwise remainder of 10-point ROS negative      Physical Exam:     Vital Signs:  /68   Pulse 80   Temp 97.4 °F (36.3 °C) (Oral)   Resp 16   Wt 73.2 kg (161 lb 6 oz)   SpO2 99%   BMI 27.82 kg/m²     Weight:    Wt Readings from Last 3 Encounters:   25 73.2 kg (161 lb 6 oz)   25 73.8 kg (162 lb 11.2 oz)   25 73.3 kg (161 lb 9.6 oz)     General: Awake, alert and oriented.  HEENT: normocephalic,  no scleral erythema or icterus, Oral mucosa moist and intact, throat clear  NECK: supple   BACK: Straight   SKIN: warm dry and intact without lesions rashes or masses  CHEST: CTA bilaterally without use of accessory muscles  CV: Normal S1 S2, RRR, no MRG  ABD: NT ND normoactive BS  EXTREMITIES: without edema, denies calf tenderness  NEURO: grossly intact  CATHETER: PAC-CDI     Laboratory Data:  CBC:   Recent Labs     25  0825 25  0820   WBC 2.3* 2.6*   HGB 12.2 12.0   HCT 35.3* 34.9*   MCV 82.9 83.5    138     BMP/Mag:  Recent Labs     25  0825 25  0925 25  0820    139 137   K 5.4* 4.6 4.3    104 104   CO2 26 23 26   PHOS 3.8  --  3.8   BUN 12 13 15   CREATININE 0.7 0.7 0.6   MG 2.02  --  1.91     LIVP:   Recent Labs     2525 25  0925 25  0820   AST 34 24 23   ALT 19 17 15   BILIDIR <0.1  --  0.2   BILITOT 0.3 0.4 0.3   ALKPHOS 72 81 74     Coags:

## 2025-04-16 NOTE — PROGRESS NOTES
Short Stay Communication Note  Catalina Leyva  Diagnosis: Multiple Myeloma  Primary MD: Maribel Garrido MD  Treatment: Fludarabine/Cytoxan  CAR-T Administration Date: 3/24/25  Day +23 of Carvykti   Pt seen in outpatient infusion today. Labs drawn and reviewed.   CBC:   Recent Labs     04/14/25  0825 04/16/25  0820   WBC 2.3* 2.6*   HGB 12.2 12.0   HCT 35.3* 34.9*   MCV 82.9 83.5    138     BMP/Mag:  Recent Labs     04/14/25  0825 04/14/25  0925 04/16/25  0820    139 137   K 5.4* 4.6 4.3    104 104   CO2 26 23 26   PHOS 3.8  --  3.8   BUN 12 13 15   CREATININE 0.7 0.7 0.6   MG 2.02  --  1.91     Standing parameters for replacement for this patient:   1 unit of pack red blood cells for a hemoglobin < or equal to 7  1 pack of platelets for a platelet count less than or equal to 10  40 MeQ of Potassium administered for a potassium level less than or equal to 3.4  4g of Magnesium Sulfate for a magnesum level less than or equal to 1.4  No transfusions required for the above lab values.    Urinalysis last done: 4/14/25 Urinalysis next due: 4/21/25    Chest X-Ray last done: 4/14/25 Chest X-Ray next due: 4/21/25    Symptoms addressed and reported to care team this date: none    Treatments this date: labs    Discontinued Prophylactic antibiotics on this date 4/16/25    Reviewed medication schedule with pt and caregiver. Both able to verbalize all medications and schedule. Pt to be seen again tomorrow. Patient and caregiver verbalized understanding of discharge instructions including when and how to call the doctor and when to report to the ER. Discharged ambulatory to home.

## 2025-04-18 ENCOUNTER — HOSPITAL ENCOUNTER (OUTPATIENT)
Dept: ONCOLOGY | Age: 75
Setting detail: INFUSION SERIES
Discharge: HOME | End: 2025-04-18
Payer: MEDICARE

## 2025-04-18 VITALS
WEIGHT: 161.6 LBS | DIASTOLIC BLOOD PRESSURE: 76 MMHG | HEART RATE: 79 BPM | OXYGEN SATURATION: 98 % | SYSTOLIC BLOOD PRESSURE: 114 MMHG | TEMPERATURE: 97.8 F | BODY MASS INDEX: 27.86 KG/M2 | RESPIRATION RATE: 16 BRPM

## 2025-04-18 DIAGNOSIS — C90.02 MULTIPLE MYELOMA IN RELAPSE (HCC): Primary | ICD-10-CM

## 2025-04-18 LAB
ALBUMIN SERPL-MCNC: 4 G/DL (ref 3.4–5)
ALP SERPL-CCNC: 76 U/L (ref 40–129)
ALT SERPL-CCNC: 17 U/L (ref 10–40)
ANION GAP SERPL CALCULATED.3IONS-SCNC: 6 MMOL/L (ref 3–16)
AST SERPL-CCNC: 24 U/L (ref 15–37)
BASOPHILS # BLD: 0 K/UL (ref 0–0.2)
BASOPHILS NFR BLD: 1 %
BILIRUB DIRECT SERPL-MCNC: 0.2 MG/DL (ref 0–0.3)
BILIRUB INDIRECT SERPL-MCNC: 0.2 MG/DL (ref 0–1)
BILIRUB SERPL-MCNC: 0.4 MG/DL (ref 0–1)
BUN SERPL-MCNC: 13 MG/DL (ref 7–20)
CALCIUM SERPL-MCNC: 9.3 MG/DL (ref 8.3–10.6)
CHLORIDE SERPL-SCNC: 106 MMOL/L (ref 99–110)
CO2 SERPL-SCNC: 27 MMOL/L (ref 21–32)
CREAT SERPL-MCNC: 0.6 MG/DL (ref 0.6–1.2)
CRP SERPL-MCNC: 4 MG/L (ref 0–5.1)
DEPRECATED RDW RBC AUTO: 19.2 % (ref 12.4–15.4)
EOSINOPHIL # BLD: 0.1 K/UL (ref 0–0.6)
EOSINOPHIL NFR BLD: 3.1 %
FERRITIN SERPL IA-MCNC: 44.6 NG/ML (ref 15–150)
FIBRINOGEN PPP-MCNC: 323 MG/DL (ref 227–534)
GFR SERPLBLD CREATININE-BSD FMLA CKD-EPI: >90 ML/MIN/{1.73_M2}
GLUCOSE SERPL-MCNC: 83 MG/DL (ref 70–99)
HCT VFR BLD AUTO: 35.6 % (ref 36–48)
HGB BLD-MCNC: 12.1 G/DL (ref 12–16)
INR PPP: 0.99 (ref 0.85–1.15)
LDH SERPL L TO P-CCNC: 194 U/L (ref 100–190)
LYMPHOCYTES # BLD: 0.4 K/UL (ref 1–5.1)
LYMPHOCYTES NFR BLD: 17.6 %
MAGNESIUM SERPL-MCNC: 2.01 MG/DL (ref 1.8–2.4)
MCH RBC QN AUTO: 28.7 PG (ref 26–34)
MCHC RBC AUTO-ENTMCNC: 34.1 G/DL (ref 31–36)
MCV RBC AUTO: 84 FL (ref 80–100)
MONOCYTES # BLD: 0.6 K/UL (ref 0–1.3)
MONOCYTES NFR BLD: 26.8 %
NEUTROPHILS # BLD: 1.2 K/UL (ref 1.7–7.7)
NEUTROPHILS NFR BLD: 51.5 %
PHOSPHATE SERPL-MCNC: 3.4 MG/DL (ref 2.5–4.9)
PLATELET # BLD AUTO: 136 K/UL (ref 135–450)
PMV BLD AUTO: 6.8 FL (ref 5–10.5)
POTASSIUM SERPL-SCNC: 4.3 MMOL/L (ref 3.5–5.1)
PROT SERPL-MCNC: 6 G/DL (ref 6.4–8.2)
PROTHROMBIN TIME: 13.3 SEC (ref 11.9–14.9)
RBC # BLD AUTO: 4.23 M/UL (ref 4–5.2)
SODIUM SERPL-SCNC: 139 MMOL/L (ref 136–145)
URATE SERPL-MCNC: 3.7 MG/DL (ref 2.6–6)
WBC # BLD AUTO: 2.3 K/UL (ref 4–11)

## 2025-04-18 PROCEDURE — 86140 C-REACTIVE PROTEIN: CPT

## 2025-04-18 PROCEDURE — 85025 COMPLETE CBC W/AUTO DIFF WBC: CPT

## 2025-04-18 PROCEDURE — 82728 ASSAY OF FERRITIN: CPT

## 2025-04-18 PROCEDURE — 83615 LACTATE (LD) (LDH) ENZYME: CPT

## 2025-04-18 PROCEDURE — 84550 ASSAY OF BLOOD/URIC ACID: CPT

## 2025-04-18 PROCEDURE — 80076 HEPATIC FUNCTION PANEL: CPT

## 2025-04-18 PROCEDURE — 80048 BASIC METABOLIC PNL TOTAL CA: CPT

## 2025-04-18 PROCEDURE — 83735 ASSAY OF MAGNESIUM: CPT

## 2025-04-18 PROCEDURE — 85384 FIBRINOGEN ACTIVITY: CPT

## 2025-04-18 PROCEDURE — 36591 DRAW BLOOD OFF VENOUS DEVICE: CPT

## 2025-04-18 PROCEDURE — 84100 ASSAY OF PHOSPHORUS: CPT

## 2025-04-18 PROCEDURE — 85610 PROTHROMBIN TIME: CPT

## 2025-04-18 RX ORDER — PROCHLORPERAZINE EDISYLATE 5 MG/ML
10 INJECTION INTRAMUSCULAR; INTRAVENOUS EVERY 6 HOURS PRN
OUTPATIENT
Start: 2025-04-19

## 2025-04-18 RX ORDER — ONDANSETRON 2 MG/ML
8 INJECTION INTRAMUSCULAR; INTRAVENOUS EVERY 8 HOURS PRN
OUTPATIENT
Start: 2025-04-19

## 2025-04-18 RX ORDER — OXYCODONE HYDROCHLORIDE 5 MG/1
10 TABLET ORAL EVERY 4 HOURS PRN
OUTPATIENT
Start: 2025-04-19

## 2025-04-18 RX ORDER — SODIUM CHLORIDE 9 MG/ML
INJECTION, SOLUTION INTRAVENOUS CONTINUOUS PRN
Status: DISCONTINUED | OUTPATIENT
Start: 2025-04-18 | End: 2025-04-19 | Stop reason: HOSPADM

## 2025-04-18 RX ORDER — POTASSIUM CHLORIDE 1500 MG/1
40 TABLET, EXTENDED RELEASE ORAL PRN
OUTPATIENT
Start: 2025-04-19

## 2025-04-18 RX ORDER — MAGNESIUM SULFATE IN WATER 40 MG/ML
4000 INJECTION, SOLUTION INTRAVENOUS PRN
OUTPATIENT
Start: 2025-04-19

## 2025-04-18 RX ORDER — PROCHLORPERAZINE MALEATE 10 MG
10 TABLET ORAL EVERY 6 HOURS PRN
OUTPATIENT
Start: 2025-04-19

## 2025-04-18 RX ORDER — HEPARIN 100 UNIT/ML
500 SYRINGE INTRAVENOUS PRN
OUTPATIENT
Start: 2025-04-19

## 2025-04-18 RX ORDER — POTASSIUM CHLORIDE 29.8 MG/ML
80 INJECTION INTRAVENOUS PRN
Status: DISCONTINUED | OUTPATIENT
Start: 2025-04-18 | End: 2025-04-19 | Stop reason: HOSPADM

## 2025-04-18 RX ORDER — ONDANSETRON 4 MG/1
8 TABLET, FILM COATED ORAL EVERY 8 HOURS PRN
OUTPATIENT
Start: 2025-04-19

## 2025-04-18 RX ORDER — MAGNESIUM SULFATE IN WATER 40 MG/ML
4000 INJECTION, SOLUTION INTRAVENOUS PRN
Status: DISCONTINUED | OUTPATIENT
Start: 2025-04-18 | End: 2025-04-19 | Stop reason: HOSPADM

## 2025-04-18 RX ORDER — SODIUM CHLORIDE 9 MG/ML
INJECTION, SOLUTION INTRAVENOUS CONTINUOUS PRN
OUTPATIENT
Start: 2025-04-19

## 2025-04-18 RX ORDER — POTASSIUM CHLORIDE 29.8 MG/ML
80 INJECTION INTRAVENOUS PRN
OUTPATIENT
Start: 2025-04-19

## 2025-04-18 RX ORDER — POTASSIUM CHLORIDE 1500 MG/1
40 TABLET, EXTENDED RELEASE ORAL PRN
Status: DISCONTINUED | OUTPATIENT
Start: 2025-04-18 | End: 2025-04-19 | Stop reason: HOSPADM

## 2025-04-18 RX ORDER — OXYCODONE HYDROCHLORIDE 5 MG/1
5 TABLET ORAL EVERY 4 HOURS PRN
OUTPATIENT
Start: 2025-04-19

## 2025-04-18 NOTE — PROGRESS NOTES
BCC Progress Note      2025    Catalina Leyva    :  1950    MRN:  [id number]    Referring MD: Maribel Garrido,   [address]  [address]  [place] [address]      Interval History: Doing well. Eating and drinking. No s/s CRS or neurotox  - GCSF today     ECOG PS:  (1) Restricted in physically strenuous activity, ambulatory and able to do work of light nature    KPS: 90% Able to carry on normal activity; minor signs or symptoms of disease    Isolation:  None     Medications    Scheduled Meds:   ALTEplase (CATHFLO) 2 mg in sterile water 2 mL injection  2 mg IntraCATHeter Once       Continuous Infusions:   sodium chloride         PRN Meds:.sodium chloride, potassium chloride, potassium chloride, magnesium sulfate, magnesium hydroxide      ROS:  As noted above, otherwise remainder of 10-point ROS negative      Physical Exam:     Vital Signs:  /76   Pulse 79   Temp 97.8 °F (36.6 °C) (Oral)   Resp 16   Wt 73.3 kg (161 lb 9.6 oz)   SpO2 98%   BMI 27.86 kg/m²     Weight:    Wt Readings from Last 3 Encounters:   25 73.3 kg (161 lb 9.6 oz)   25 73.2 kg (161 lb 6 oz)   25 73.8 kg (162 lb 11.2 oz)     General: Awake, alert and oriented.  HEENT: normocephalic,  no scleral erythema or icterus, Oral mucosa moist and intact, throat clear  NECK: supple   BACK: Straight   SKIN: warm dry and intact without lesions rashes or masses  CHEST: CTA bilaterally without use of accessory muscles  CV: Normal S1 S2, RRR, no MRG  ABD: NT ND normoactive BS  EXTREMITIES: without edema, denies calf tenderness  NEURO: grossly intact  CATHETER: PAC-CDI     Laboratory Data:  CBC:   Recent Labs     25  0820 25  0819   WBC 2.6* 2.3*   HGB 12.0 12.1   HCT 34.9* 35.6*   MCV 83.5 84.0    136     BMP/Mag:  Recent Labs     25  0820 25  0819    139   K 4.3 4.3    106   CO2 26 27   PHOS 3.8 3.4   BUN 15 13   CREATININE 0.6 0.6   MG 1.91 2.01     LIVP:   Recent

## 2025-04-21 ENCOUNTER — HOSPITAL ENCOUNTER (OUTPATIENT)
Dept: ONCOLOGY | Age: 75
Setting detail: INFUSION SERIES
Discharge: HOME | End: 2025-04-21
Payer: MEDICARE

## 2025-04-21 ENCOUNTER — HOSPITAL ENCOUNTER (OUTPATIENT)
Dept: GENERAL RADIOLOGY | Age: 75
Discharge: HOME | End: 2025-04-21
Payer: MEDICARE

## 2025-04-21 VITALS
RESPIRATION RATE: 18 BRPM | BODY MASS INDEX: 27.98 KG/M2 | WEIGHT: 162.26 LBS | OXYGEN SATURATION: 98 % | SYSTOLIC BLOOD PRESSURE: 112 MMHG | TEMPERATURE: 98 F | DIASTOLIC BLOOD PRESSURE: 71 MMHG | HEART RATE: 78 BPM

## 2025-04-21 DIAGNOSIS — C90.02 MULTIPLE MYELOMA IN RELAPSE (HCC): Primary | ICD-10-CM

## 2025-04-21 LAB
ALBUMIN SERPL-MCNC: 4 G/DL (ref 3.4–5)
ALP SERPL-CCNC: 77 U/L (ref 40–129)
ALT SERPL-CCNC: 17 U/L (ref 10–40)
ANION GAP SERPL CALCULATED.3IONS-SCNC: 9 MMOL/L (ref 3–16)
AST SERPL-CCNC: 24 U/L (ref 15–37)
BASOPHILS # BLD: 0 K/UL (ref 0–0.2)
BASOPHILS NFR BLD: 0.8 %
BILIRUB DIRECT SERPL-MCNC: 0.2 MG/DL (ref 0–0.3)
BILIRUB INDIRECT SERPL-MCNC: 0.2 MG/DL (ref 0–1)
BILIRUB SERPL-MCNC: 0.4 MG/DL (ref 0–1)
BILIRUB UR QL STRIP.AUTO: NEGATIVE
BUN SERPL-MCNC: 13 MG/DL (ref 7–20)
CALCIUM SERPL-MCNC: 9.3 MG/DL (ref 8.3–10.6)
CHLORIDE SERPL-SCNC: 105 MMOL/L (ref 99–110)
CLARITY UR: CLEAR
CO2 SERPL-SCNC: 27 MMOL/L (ref 21–32)
COLOR UR: YELLOW
CREAT SERPL-MCNC: 0.7 MG/DL (ref 0.6–1.2)
CRP SERPL-MCNC: 4.1 MG/L (ref 0–5.1)
DEPRECATED RDW RBC AUTO: 18.9 % (ref 12.4–15.4)
EOSINOPHIL # BLD: 0.1 K/UL (ref 0–0.6)
EOSINOPHIL NFR BLD: 4.2 %
FERRITIN SERPL IA-MCNC: 38.9 NG/ML (ref 15–150)
FIBRINOGEN PPP-MCNC: 333 MG/DL (ref 227–534)
FUNGUS BLD CULT: NORMAL
FUNGUS BLD CULT: NORMAL
FUNGUS SPEC CULT: NORMAL
FUNGUS SPEC CULT: NORMAL
GFR SERPLBLD CREATININE-BSD FMLA CKD-EPI: >90 ML/MIN/{1.73_M2}
GLUCOSE SERPL-MCNC: 94 MG/DL (ref 70–99)
GLUCOSE UR STRIP.AUTO-MCNC: NEGATIVE MG/DL
HCT VFR BLD AUTO: 35.8 % (ref 36–48)
HGB BLD-MCNC: 12.3 G/DL (ref 12–16)
HGB UR QL STRIP.AUTO: NEGATIVE
INR PPP: 1.06 (ref 0.85–1.15)
KETONES UR STRIP.AUTO-MCNC: NEGATIVE MG/DL
LDH SERPL L TO P-CCNC: 222 U/L (ref 100–190)
LEUKOCYTE ESTERASE UR QL STRIP.AUTO: NEGATIVE
LOEFFLER MB STN SPEC: NORMAL
LOEFFLER MB STN SPEC: NORMAL
LYMPHOCYTES # BLD: 0.8 K/UL (ref 1–5.1)
LYMPHOCYTES NFR BLD: 23 %
MAGNESIUM SERPL-MCNC: 2.02 MG/DL (ref 1.8–2.4)
MCH RBC QN AUTO: 28.7 PG (ref 26–34)
MCHC RBC AUTO-ENTMCNC: 34.3 G/DL (ref 31–36)
MCV RBC AUTO: 83.5 FL (ref 80–100)
MONOCYTES # BLD: 0.8 K/UL (ref 0–1.3)
MONOCYTES NFR BLD: 25.8 %
NEUTROPHILS # BLD: 1.5 K/UL (ref 1.7–7.7)
NEUTROPHILS NFR BLD: 46.2 %
NITRITE UR QL STRIP.AUTO: NEGATIVE
PH UR STRIP.AUTO: 6 [PH] (ref 5–8)
PHOSPHATE SERPL-MCNC: 3.6 MG/DL (ref 2.5–4.9)
PLATELET # BLD AUTO: 133 K/UL (ref 135–450)
PMV BLD AUTO: 6.9 FL (ref 5–10.5)
POTASSIUM SERPL-SCNC: 4.4 MMOL/L (ref 3.5–5.1)
PROT SERPL-MCNC: 6 G/DL (ref 6.4–8.2)
PROT UR STRIP.AUTO-MCNC: NEGATIVE MG/DL
PROTHROMBIN TIME: 14 SEC (ref 11.9–14.9)
RBC # BLD AUTO: 4.29 M/UL (ref 4–5.2)
SODIUM SERPL-SCNC: 141 MMOL/L (ref 136–145)
SP GR UR STRIP.AUTO: <=1.005 (ref 1–1.03)
UA DIPSTICK W REFLEX MICRO PNL UR: NORMAL
URATE SERPL-MCNC: 3.7 MG/DL (ref 2.6–6)
URN SPEC COLLECT METH UR: NORMAL
UROBILINOGEN UR STRIP-ACNC: 0.2 E.U./DL
WBC # BLD AUTO: 3.3 K/UL (ref 4–11)

## 2025-04-21 PROCEDURE — 71045 X-RAY EXAM CHEST 1 VIEW: CPT

## 2025-04-21 PROCEDURE — 83735 ASSAY OF MAGNESIUM: CPT

## 2025-04-21 PROCEDURE — 85384 FIBRINOGEN ACTIVITY: CPT

## 2025-04-21 PROCEDURE — 84550 ASSAY OF BLOOD/URIC ACID: CPT

## 2025-04-21 PROCEDURE — 85025 COMPLETE CBC W/AUTO DIFF WBC: CPT

## 2025-04-21 PROCEDURE — 36591 DRAW BLOOD OFF VENOUS DEVICE: CPT

## 2025-04-21 PROCEDURE — 80048 BASIC METABOLIC PNL TOTAL CA: CPT

## 2025-04-21 PROCEDURE — 86140 C-REACTIVE PROTEIN: CPT

## 2025-04-21 PROCEDURE — 81003 URINALYSIS AUTO W/O SCOPE: CPT

## 2025-04-21 PROCEDURE — 83615 LACTATE (LD) (LDH) ENZYME: CPT

## 2025-04-21 PROCEDURE — 82728 ASSAY OF FERRITIN: CPT

## 2025-04-21 PROCEDURE — 84100 ASSAY OF PHOSPHORUS: CPT

## 2025-04-21 PROCEDURE — 80076 HEPATIC FUNCTION PANEL: CPT

## 2025-04-21 PROCEDURE — 85610 PROTHROMBIN TIME: CPT

## 2025-04-21 RX ORDER — SODIUM CHLORIDE 9 MG/ML
INJECTION, SOLUTION INTRAVENOUS CONTINUOUS PRN
OUTPATIENT
Start: 2025-04-22

## 2025-04-21 RX ORDER — MAGNESIUM SULFATE IN WATER 40 MG/ML
4000 INJECTION, SOLUTION INTRAVENOUS PRN
OUTPATIENT
Start: 2025-04-22

## 2025-04-21 RX ORDER — HEPARIN 100 UNIT/ML
500 SYRINGE INTRAVENOUS PRN
OUTPATIENT
Start: 2025-04-22

## 2025-04-21 RX ORDER — OXYCODONE HYDROCHLORIDE 5 MG/1
10 TABLET ORAL EVERY 4 HOURS PRN
OUTPATIENT
Start: 2025-04-22

## 2025-04-21 RX ORDER — OXYCODONE HYDROCHLORIDE 5 MG/1
5 TABLET ORAL EVERY 4 HOURS PRN
OUTPATIENT
Start: 2025-04-22

## 2025-04-21 RX ORDER — ONDANSETRON 2 MG/ML
8 INJECTION INTRAMUSCULAR; INTRAVENOUS EVERY 8 HOURS PRN
OUTPATIENT
Start: 2025-04-22

## 2025-04-21 RX ORDER — PROCHLORPERAZINE MALEATE 10 MG
10 TABLET ORAL EVERY 6 HOURS PRN
OUTPATIENT
Start: 2025-04-22

## 2025-04-21 RX ORDER — POTASSIUM CHLORIDE 29.8 MG/ML
80 INJECTION INTRAVENOUS PRN
OUTPATIENT
Start: 2025-04-22

## 2025-04-21 RX ORDER — ONDANSETRON 4 MG/1
8 TABLET, FILM COATED ORAL EVERY 8 HOURS PRN
OUTPATIENT
Start: 2025-04-22

## 2025-04-21 RX ORDER — PROCHLORPERAZINE EDISYLATE 5 MG/ML
10 INJECTION INTRAMUSCULAR; INTRAVENOUS EVERY 6 HOURS PRN
OUTPATIENT
Start: 2025-04-22

## 2025-04-21 RX ORDER — POTASSIUM CHLORIDE 1500 MG/1
40 TABLET, EXTENDED RELEASE ORAL PRN
OUTPATIENT
Start: 2025-04-22

## 2025-04-21 NOTE — PROGRESS NOTES
Short Stay Communication Note  Catalina Leyva  Diagnosis: Multiple Myeloma  Primary MD: Maribel Garrido MD  Treatment: Fludarabine/Cytoxan  CAR-T Administration Date: 3/24/25  Day +28 of Carvykti   Pt seen in outpatient infusion today. Labs drawn and reviewed.   CBC:   Recent Labs     04/21/25  0814   WBC 3.3*   HGB 12.3   HCT 35.8*   MCV 83.5   *     BMP/Mag:  Recent Labs     04/21/25  0814      K 4.4      CO2 27   PHOS 3.6   BUN 13   CREATININE 0.7   MG 2.02     Standing parameters for replacement for this patient:   1 unit of pack red blood cells for a hemoglobin < or equal to 7  1 pack of platelets for a platelet count less than or equal to 10  40 MeQ of Potassium administered for a potassium level less than or equal to 3.4  4g of Magnesium Sulfate for a magnesum level less than or equal to 1.4  No transfusions required for the above lab values.    Urinalysis last done: 4/21/25 Urinalysis next due: 4/28/25    Chest X-Ray last done: 4/21/25 Chest X-Ray next due: 4/28/25    Symptoms addressed and reported to care team this date: none    Treatments this date: labs    Discontinued Prophylactic antibiotics on this date 4/16/25    Reviewed medication schedule with pt and caregiver. Both able to verbalize all medications and schedule. Pt to be seen again Wednesday. Patient and caregiver verbalized understanding of discharge instructions including when and how to call the doctor and when to report to the ER. Discharged ambulatory to home.

## 2025-04-21 NOTE — PROGRESS NOTES
[facility] Progress Note      2025    Catalina Leyva    :  1950    MRN:  [id number]    Referring MD: Maribel Garrido DO  [address]  [address]  [place] [address]      Interval History: Doing well. Eating and drinking. No s/s CRS or neurotox       ECOG PS:  (1) Restricted in physically strenuous activity, ambulatory and able to do work of light nature    KPS: 90% Able to carry on normal activity; minor signs or symptoms of disease    Isolation:  None     Medications    Scheduled Meds:        Continuous Infusions:        PRN Meds:.      ROS:  As noted above, otherwise remainder of 10-point ROS negative      Physical Exam:     Vital Signs:  /71   Pulse 78   Temp 98 °F (36.7 °C) (Oral)   Resp 18   Wt 73.6 kg (162 lb 4.1 oz)   SpO2 98%   BMI 27.98 kg/m²     Weight:    Wt Readings from Last 3 Encounters:   25 73.6 kg (162 lb 4.1 oz)   25 73.3 kg (161 lb 9.6 oz)   25 73.2 kg (161 lb 6 oz)     General: Awake, alert and oriented.  HEENT: normocephalic,  no scleral erythema or icterus, Oral mucosa moist and intact, throat clear  NECK: supple   BACK: Straight   SKIN: warm dry and intact without lesions rashes or masses  CHEST: CTA bilaterally without use of accessory muscles  CV: Normal S1 S2, RRR, no MRG  ABD: NT ND normoactive BS  EXTREMITIES: without edema, denies calf tenderness  NEURO: grossly intact  CATHETER: PAC-CDI     Laboratory Data:  CBC:   No results for input(s): \"WBC\", \"HGB\", \"HCT\", \"MCV\", \"PLT\" in the last 72 hours.    BMP/Mag:  No results for input(s): \"NA\", \"K\", \"CL\", \"CO2\", \"PHOS\", \"BUN\", \"CREATININE\", \"MG\" in the last 72 hours.    Invalid input(s): \"CA\"    LIVP:   No results for input(s): \"AST\", \"ALT\", \"LIPASE\", \"AMYLASE\", \"BILIDIR\", \"BILITOT\", \"ALKPHOS\" in the last 72 hours.    Invalid input(s): \"ALB\"    Coags:   No results for input(s): \"PROTIME\", \"INR\", \"APTT\" in the last 72 hours.        Uric Acid No results for input(s): \"LABURIC\" in the last 72

## 2025-04-23 ENCOUNTER — HOSPITAL ENCOUNTER (OUTPATIENT)
Dept: ONCOLOGY | Age: 75
Setting detail: INFUSION SERIES
Discharge: HOME | End: 2025-04-23
Payer: MEDICARE

## 2025-04-23 VITALS
RESPIRATION RATE: 16 BRPM | BODY MASS INDEX: 28.32 KG/M2 | SYSTOLIC BLOOD PRESSURE: 131 MMHG | DIASTOLIC BLOOD PRESSURE: 79 MMHG | WEIGHT: 164.24 LBS | TEMPERATURE: 98 F | OXYGEN SATURATION: 98 % | HEART RATE: 75 BPM

## 2025-04-23 DIAGNOSIS — C90.02 MULTIPLE MYELOMA IN RELAPSE (HCC): Primary | ICD-10-CM

## 2025-04-23 LAB
ALBUMIN SERPL-MCNC: 3.9 G/DL (ref 3.4–5)
ALP SERPL-CCNC: 79 U/L (ref 40–129)
ALT SERPL-CCNC: 16 U/L (ref 10–40)
ANION GAP SERPL CALCULATED.3IONS-SCNC: 8 MMOL/L (ref 3–16)
AST SERPL-CCNC: 24 U/L (ref 15–37)
BASOPHILS # BLD: 0 K/UL (ref 0–0.2)
BASOPHILS NFR BLD: 0.8 %
BILIRUB DIRECT SERPL-MCNC: 0.2 MG/DL (ref 0–0.3)
BILIRUB INDIRECT SERPL-MCNC: 0.2 MG/DL (ref 0–1)
BILIRUB SERPL-MCNC: 0.4 MG/DL (ref 0–1)
BUN SERPL-MCNC: 12 MG/DL (ref 7–20)
CALCIUM SERPL-MCNC: 9.2 MG/DL (ref 8.3–10.6)
CHLORIDE SERPL-SCNC: 106 MMOL/L (ref 99–110)
CO2 SERPL-SCNC: 26 MMOL/L (ref 21–32)
CREAT SERPL-MCNC: 0.6 MG/DL (ref 0.6–1.2)
CRP SERPL-MCNC: 3.4 MG/L (ref 0–5.1)
DEPRECATED RDW RBC AUTO: 19 % (ref 12.4–15.4)
EOSINOPHIL # BLD: 0.2 K/UL (ref 0–0.6)
EOSINOPHIL NFR BLD: 5.5 %
FERRITIN SERPL IA-MCNC: 35.7 NG/ML (ref 15–150)
FIBRINOGEN PPP-MCNC: 284 MG/DL (ref 227–534)
GFR SERPLBLD CREATININE-BSD FMLA CKD-EPI: >90 ML/MIN/{1.73_M2}
GLUCOSE SERPL-MCNC: 80 MG/DL (ref 70–99)
HCT VFR BLD AUTO: 35.6 % (ref 36–48)
HGB BLD-MCNC: 12.1 G/DL (ref 12–16)
IGG SERPL-MCNC: 320 MG/DL (ref 700–1600)
LDH SERPL L TO P-CCNC: 214 U/L (ref 100–190)
LYMPHOCYTES # BLD: 0.8 K/UL (ref 1–5.1)
LYMPHOCYTES NFR BLD: 27.7 %
MAGNESIUM SERPL-MCNC: 1.89 MG/DL (ref 1.8–2.4)
MCH RBC QN AUTO: 29.2 PG (ref 26–34)
MCHC RBC AUTO-ENTMCNC: 33.8 G/DL (ref 31–36)
MCV RBC AUTO: 86.3 FL (ref 80–100)
MONOCYTES # BLD: 0.8 K/UL (ref 0–1.3)
MONOCYTES NFR BLD: 25.9 %
NEUTROPHILS # BLD: 1.2 K/UL (ref 1.7–7.7)
NEUTROPHILS NFR BLD: 40.1 %
PHOSPHATE SERPL-MCNC: 3.6 MG/DL (ref 2.5–4.9)
PLATELET # BLD AUTO: 121 K/UL (ref 135–450)
PMV BLD AUTO: 6.8 FL (ref 5–10.5)
POTASSIUM SERPL-SCNC: 4.3 MMOL/L (ref 3.5–5.1)
PROT SERPL-MCNC: 5.8 G/DL (ref 6.4–8.2)
RBC # BLD AUTO: 4.13 M/UL (ref 4–5.2)
SODIUM SERPL-SCNC: 140 MMOL/L (ref 136–145)
URATE SERPL-MCNC: 3.2 MG/DL (ref 2.6–6)
WBC # BLD AUTO: 2.9 K/UL (ref 4–11)

## 2025-04-23 PROCEDURE — 84100 ASSAY OF PHOSPHORUS: CPT

## 2025-04-23 PROCEDURE — 86360 T CELL ABSOLUTE COUNT/RATIO: CPT

## 2025-04-23 PROCEDURE — 83735 ASSAY OF MAGNESIUM: CPT

## 2025-04-23 PROCEDURE — 84550 ASSAY OF BLOOD/URIC ACID: CPT

## 2025-04-23 PROCEDURE — 85025 COMPLETE CBC W/AUTO DIFF WBC: CPT

## 2025-04-23 PROCEDURE — 80048 BASIC METABOLIC PNL TOTAL CA: CPT

## 2025-04-23 PROCEDURE — 82728 ASSAY OF FERRITIN: CPT

## 2025-04-23 PROCEDURE — 86140 C-REACTIVE PROTEIN: CPT

## 2025-04-23 PROCEDURE — 83615 LACTATE (LD) (LDH) ENZYME: CPT

## 2025-04-23 PROCEDURE — 85384 FIBRINOGEN ACTIVITY: CPT

## 2025-04-23 PROCEDURE — 82784 ASSAY IGA/IGD/IGG/IGM EACH: CPT

## 2025-04-23 PROCEDURE — 36591 DRAW BLOOD OFF VENOUS DEVICE: CPT

## 2025-04-23 PROCEDURE — 80076 HEPATIC FUNCTION PANEL: CPT

## 2025-04-23 RX ORDER — OXYCODONE HYDROCHLORIDE 5 MG/1
10 TABLET ORAL EVERY 4 HOURS PRN
OUTPATIENT
Start: 2025-04-24

## 2025-04-23 RX ORDER — OXYCODONE HYDROCHLORIDE 5 MG/1
5 TABLET ORAL EVERY 4 HOURS PRN
OUTPATIENT
Start: 2025-04-24

## 2025-04-23 RX ORDER — ONDANSETRON 2 MG/ML
8 INJECTION INTRAMUSCULAR; INTRAVENOUS EVERY 8 HOURS PRN
OUTPATIENT
Start: 2025-04-24

## 2025-04-23 RX ORDER — PROCHLORPERAZINE MALEATE 10 MG
10 TABLET ORAL EVERY 6 HOURS PRN
OUTPATIENT
Start: 2025-04-24

## 2025-04-23 RX ORDER — MAGNESIUM SULFATE IN WATER 40 MG/ML
4000 INJECTION, SOLUTION INTRAVENOUS PRN
OUTPATIENT
Start: 2025-04-24

## 2025-04-23 RX ORDER — LEVOFLOXACIN 500 MG/1
500 TABLET, FILM COATED ORAL DAILY
Qty: 30 TABLET | Refills: 3
Start: 2025-04-23

## 2025-04-23 RX ORDER — HEPARIN 100 UNIT/ML
500 SYRINGE INTRAVENOUS PRN
OUTPATIENT
Start: 2025-04-24

## 2025-04-23 RX ORDER — PROCHLORPERAZINE EDISYLATE 5 MG/ML
10 INJECTION INTRAMUSCULAR; INTRAVENOUS EVERY 6 HOURS PRN
OUTPATIENT
Start: 2025-04-24

## 2025-04-23 RX ORDER — SODIUM CHLORIDE 9 MG/ML
INJECTION, SOLUTION INTRAVENOUS CONTINUOUS PRN
OUTPATIENT
Start: 2025-04-24

## 2025-04-23 RX ORDER — POTASSIUM CHLORIDE 29.8 MG/ML
80 INJECTION INTRAVENOUS PRN
OUTPATIENT
Start: 2025-04-24

## 2025-04-23 RX ORDER — FLUCONAZOLE 200 MG/1
200 TABLET ORAL DAILY
Qty: 30 TABLET | Refills: 3
Start: 2025-04-23

## 2025-04-23 RX ORDER — POTASSIUM CHLORIDE 1500 MG/1
40 TABLET, EXTENDED RELEASE ORAL PRN
OUTPATIENT
Start: 2025-04-24

## 2025-04-23 RX ORDER — ONDANSETRON 4 MG/1
8 TABLET, FILM COATED ORAL EVERY 8 HOURS PRN
OUTPATIENT
Start: 2025-04-24

## 2025-04-23 NOTE — PROGRESS NOTES
Short Stay Communication Note  Catalina Leyva  Diagnosis: Multiple Myeloma  Primary MD: Maribel Garrido MD  Treatment: Fludarabine/Cytoxan  CAR-T Administration Date: 3/24/25  Day +30 of Carvykti   Pt seen in outpatient infusion today. Labs drawn and reviewed.   CBC:   Recent Labs     04/21/25  0814 04/23/25  0821   WBC 3.3* 2.9*   HGB 12.3 12.1   HCT 35.8* 35.6*   MCV 83.5 86.3   * 121*     BMP/Mag:  Recent Labs     04/21/25  0814 04/23/25  0821    140   K 4.4 4.3    106   CO2 27 26   PHOS 3.6 3.6   BUN 13 12   CREATININE 0.7 0.6   MG 2.02 1.89     Standing parameters for replacement for this patient:   1 unit of pack red blood cells for a hemoglobin < or equal to 7  1 pack of platelets for a platelet count less than or equal to 10  40 MeQ of Potassium administered for a potassium level less than or equal to 3.4  4g of Magnesium Sulfate for a magnesum level less than or equal to 1.4  No transfusions required for the above lab values.    Urinalysis last done: 4/21/25 Urinalysis next due: 4/28/25    Chest X-Ray last done: 4/21/25 Chest X-Ray next due: 4/28/25    Symptoms addressed and reported to care team this date: none    Treatments this date: none      Reviewed medication schedule with pt and caregiver. Both able to verbalize all medications and schedule. Pt to be seen again at [facility] on Friday. Patient and caregiver verbalized understanding of discharge instructions including when and how to call the doctor and when to report  to the ER. Discharged ambulatory to home.   
  History Hepatitis C s/p Harvoni 2016  - USN negative 10/2023  Hypogammaglobulinemia  - IgG (2/4/25): 339 - IVIG on 2/18/25         PLAN:  - Cont Valtrex, Levaquin and Diflucan ppx.   - S/P Cefepime 4/2/25-4/4/25      Post CAR- T monitoring / maintenance:  - IgG & CD4 level monthly starting on day + 30 (4/23/25): Pending   - Check disease titers on day + 30 or when WBC recovered. Re-vaccinate vs booster based on titer (assure titers are drawn prior to administering immunoglobulins)  - Start PCP ppx on day + 30 - stop when CD4 > 250: Start @ [facility] pending CD4 count   - Cont Valtrex for 1 year post CAR-T     4. Heme:   Pancytopenia r/t recent chemo  - Transfuse for Hgb < 7 and Platelets < 10K  - No transfusion today     5. Metabolic:    Electrolytes are WNL and renal fxn stable.    - No Fluids  - Replace K+ & Mg per PRN orders     6. GI / Nutrition:    Nutrition:  Appetite and oral intake is adequate  - low microbial diet   - Follow closely with dietician     7.  Social:  - Patient has h/o ETOH abuse (quit 2010) and tobacco use (quit 1980)  - Patient cares for  with Geovani Body Dementia.  States this is a stressor.  Social work for palliative care consulted  - Patient has h/o challenges with mood and anxiety.  Takes Celexa 10 mg, Lorazepam 1 mg BID, and Hydroxizine 25 mg Q HS  - Primary Caregiver: Rohit Haskins (sister)  - Back-up Caregiver:  Radha Rossi (friend)  - Lodging:  Lives [place], [place]    - Disposition: Will Return to [facility] on Friday, 4/25/25, for labs (CBC w/ diff, CMP, Mag & Phos, Ferritin & CRP) and provider visit.      The patient was seen and examined by Dr. Ortega.       DELORES Harrington - NP      Jonny Ortega MD  Hematology, Bone marrow transplant and Cellular therapy  [facility] - [facility]

## 2025-04-28 LAB
FUNGUS BLD CULT: NORMAL
FUNGUS BLD CULT: NORMAL
FUNGUS SPEC CULT: NORMAL
FUNGUS SPEC CULT: NORMAL
LOEFFLER MB STN SPEC: NORMAL
LOEFFLER MB STN SPEC: NORMAL

## 2025-05-05 NOTE — PROGRESS NOTES
Comprehensive Cancer Clinic Note  [facility]   [address]  [place], [place] [address] [phone]    Patient Name: Catalina Leyva        MRN: [id number]    : 1950  (74 y.o.)  Gender: female   Date of encounter: 2025    Procedure: CAR-T  Diagnosis: IgA Kappa Multiple myeloma ISS 3, 1q321: Currently in [facility]   Routine follow-up for: 30 Day Clinic Visit  __________________________________________________________________________________________________    Pt did not show for her clinic appt today.    Essie VELOZ, PRABHJOT-S   for [facility] ([facility])  [place] Mobile: [phone]

## 2025-05-07 ENCOUNTER — HOSPITAL ENCOUNTER (OUTPATIENT)
Dept: ONCOLOGY | Age: 75
Discharge: HOME | End: 2025-05-07

## 2025-05-13 RX ORDER — HYDROXYZINE HYDROCHLORIDE 25 MG/1
25 TABLET, FILM COATED ORAL NIGHTLY
Qty: 30 TABLET | Refills: 5 | Status: SHIPPED | OUTPATIENT
Start: 2025-05-13

## 2025-06-13 DIAGNOSIS — F41.9 ANXIETY DISORDER, UNSPECIFIED: ICD-10-CM

## 2025-06-13 RX ORDER — LORAZEPAM 1 MG/1
TABLET ORAL
Qty: 60 TABLET | Refills: 0 | Status: SHIPPED | OUTPATIENT
Start: 2025-06-13 | End: 2025-07-13

## 2025-06-13 NOTE — TELEPHONE ENCOUNTER
Lov 3/11/2025    Future Appointments   Date Time Provider Department Center   6/17/2025  8:00 AM Mayra Mayen MD [facility] DEP   6/24/2025  8:00 AM [facility] PROCED[facility] 1 [facility] None

## 2025-06-17 ENCOUNTER — OFFICE VISIT (OUTPATIENT)
Dept: FAMILY MEDICINE CLINIC | Age: 75
End: 2025-06-17

## 2025-06-17 VITALS
WEIGHT: 158.8 LBS | DIASTOLIC BLOOD PRESSURE: 64 MMHG | SYSTOLIC BLOOD PRESSURE: 99 MMHG | HEART RATE: 63 BPM | HEIGHT: 64 IN | OXYGEN SATURATION: 98 % | BODY MASS INDEX: 27.11 KG/M2

## 2025-06-17 DIAGNOSIS — F39 MOOD DISORDER: Primary | ICD-10-CM

## 2025-06-17 RX ORDER — SULFAMETHOXAZOLE AND TRIMETHOPRIM 400; 80 MG/1; MG/1
TABLET ORAL
COMMUNITY
Start: 2025-04-25

## 2025-06-17 RX ORDER — LORAZEPAM 1 MG/1
1 TABLET ORAL 2 TIMES DAILY
Qty: 60 TABLET | Refills: 2 | Status: SHIPPED | OUTPATIENT
Start: 2025-06-17 | End: 2025-07-17

## 2025-06-17 ASSESSMENT — ENCOUNTER SYMPTOMS
DIARRHEA: 0
COUGH: 0

## 2025-06-17 NOTE — PROGRESS NOTES
25    Catalina Leyva (: 1950) is a 74 y.o. female, here for evaluation of the following medical concerns:    HPI;  Mood Disorder:  Patient presents for follow-up of depression. Current complaints include: fatigue.  She denies any other symptoms. Symptoms/signs of john: none.  External stressors:: personal illness as well as 's illness  Current treatment includes: Celexa- for depression and lorazepam for anxiety.  Medication side effects: none.    Multiplr myeloma- just finished her chemotherapy    Review of Systems   Constitutional:  Positive for fatigue. Negative for activity change, appetite change, chills, fever and unexpected weight change.   HENT:  Negative for congestion.    Respiratory:  Negative for cough.    Cardiovascular:  Negative for chest pain.   Gastrointestinal:  Negative for diarrhea.   Genitourinary: Negative.    Musculoskeletal:  Positive for arthralgias.            Neurological:  Negative for dizziness. Seizures: .  Hematological:  Negative for adenopathy.   Psychiatric/Behavioral:  Positive for sleep disturbance. The patient is nervous/anxious.      Current Outpatient Medications   Medication Sig Dispense Refill    BACTRIM 400-80 MG per tablet Take by mouth      LORazepam (ATIVAN) 1 MG tablet Take 1 tablet by mouth in the morning and 1 tablet in the evening. Do all this for 30 days. Max Daily Amount: 2 mg. 60 tablet 2    hydrOXYzine HCl (ATARAX) 25 MG tablet TAKE 1 TABLET BY MOUTH nightly 30 tablet 5    Multiple Vitamins-Minerals (PRESERVISION AREDS) CAPS Take by mouth in the morning and at bedtime      acyclovir (ZOVIRAX) 400 MG tablet       citalopram (CELEXA) 10 MG tablet Take 1 tablet by mouth daily 90 tablet 3    oxyBUTYnin (DITROPAN XL) 5 MG extended release tablet Take 1 tablet by mouth daily 30 tablet 5    prochlorperazine (COMPAZINE) 10 MG tablet take 1 tablet BY MOUTH every 6 hours AS NEEDED FOR nausea AND vomiting (Patient taking differently: Takes as

## 2025-06-24 ENCOUNTER — HOSPITAL ENCOUNTER (OUTPATIENT)
Age: 75
Setting detail: SPECIMEN
Discharge: HOME | End: 2025-06-24
Payer: MEDICARE

## 2025-06-24 ENCOUNTER — HOSPITAL ENCOUNTER (OUTPATIENT)
Dept: ONCOLOGY | Age: 75
Setting detail: INFUSION SERIES
Discharge: HOME | End: 2025-06-24
Payer: MEDICARE

## 2025-06-24 VITALS
TEMPERATURE: 97.8 F | SYSTOLIC BLOOD PRESSURE: 146 MMHG | HEART RATE: 69 BPM | OXYGEN SATURATION: 100 % | DIASTOLIC BLOOD PRESSURE: 77 MMHG | RESPIRATION RATE: 20 BRPM

## 2025-06-24 LAB
BASOPHILS # BLD: 0 K/UL (ref 0–0.2)
BASOPHILS NFR BLD: 0.4 %
DEPRECATED RDW RBC AUTO: 15.7 % (ref 12.4–15.4)
EOSINOPHIL # BLD: 0.1 K/UL (ref 0–0.6)
EOSINOPHIL NFR BLD: 2.2 %
HCT VFR BLD AUTO: 39.9 % (ref 36–48)
HGB BLD-MCNC: 13.5 G/DL (ref 12–16)
LYMPHOCYTES # BLD: 0.6 K/UL (ref 1–5.1)
LYMPHOCYTES NFR BLD: 17.6 %
MCH RBC QN AUTO: 29 PG (ref 26–34)
MCHC RBC AUTO-ENTMCNC: 33.9 G/DL (ref 31–36)
MCV RBC AUTO: 85.6 FL (ref 80–100)
MONOCYTES # BLD: 0.4 K/UL (ref 0–1.3)
MONOCYTES NFR BLD: 9.8 %
NEUTROPHILS # BLD: 2.6 K/UL (ref 1.7–7.7)
NEUTROPHILS NFR BLD: 70 %
PLATELET # BLD AUTO: 161 K/UL (ref 135–450)
PMV BLD AUTO: 7.5 FL (ref 5–10.5)
RBC # BLD AUTO: 4.66 M/UL (ref 4–5.2)
WBC # BLD AUTO: 3.7 K/UL (ref 4–11)

## 2025-06-24 PROCEDURE — 6360000002 HC RX W HCPCS: Performed by: NURSE PRACTITIONER

## 2025-06-24 PROCEDURE — 88341 IMHCHEM/IMCYTCHM EA ADD ANTB: CPT

## 2025-06-24 PROCEDURE — 36591 DRAW BLOOD OFF VENOUS DEVICE: CPT

## 2025-06-24 PROCEDURE — 88313 SPECIAL STAINS GROUP 2: CPT

## 2025-06-24 PROCEDURE — 88184 FLOWCYTOMETRY/ TC 1 MARKER: CPT

## 2025-06-24 PROCEDURE — 96375 TX/PRO/DX INJ NEW DRUG ADDON: CPT

## 2025-06-24 PROCEDURE — 88305 TISSUE EXAM BY PATHOLOGIST: CPT

## 2025-06-24 PROCEDURE — 96376 TX/PRO/DX INJ SAME DRUG ADON: CPT

## 2025-06-24 PROCEDURE — 85025 COMPLETE CBC W/AUTO DIFF WBC: CPT

## 2025-06-24 PROCEDURE — 99152 MOD SED SAME PHYS/QHP 5/>YRS: CPT

## 2025-06-24 PROCEDURE — 88342 IMHCHEM/IMCYTCHM 1ST ANTB: CPT

## 2025-06-24 PROCEDURE — 38221 DX BONE MARROW BIOPSIES: CPT

## 2025-06-24 PROCEDURE — 88185 FLOWCYTOMETRY/TC ADD-ON: CPT

## 2025-06-24 PROCEDURE — 96374 THER/PROPH/DIAG INJ IV PUSH: CPT

## 2025-06-24 PROCEDURE — 88311 DECALCIFY TISSUE: CPT

## 2025-06-24 RX ORDER — FENTANYL CITRATE 50 UG/ML
25 INJECTION, SOLUTION INTRAMUSCULAR; INTRAVENOUS PRN
Status: DISCONTINUED | OUTPATIENT
Start: 2025-06-24 | End: 2025-06-25 | Stop reason: HOSPADM

## 2025-06-24 RX ORDER — FLUMAZENIL 0.1 MG/ML
0.5 INJECTION INTRAVENOUS PRN
Status: DISCONTINUED | OUTPATIENT
Start: 2025-06-24 | End: 2025-06-25 | Stop reason: HOSPADM

## 2025-06-24 RX ORDER — NALOXONE HYDROCHLORIDE 0.4 MG/ML
0.4 INJECTION, SOLUTION INTRAMUSCULAR; INTRAVENOUS; SUBCUTANEOUS PRN
Status: DISCONTINUED | OUTPATIENT
Start: 2025-06-24 | End: 2025-06-25 | Stop reason: HOSPADM

## 2025-06-24 RX ORDER — MIDAZOLAM HYDROCHLORIDE 1 MG/ML
1 INJECTION, SOLUTION INTRAMUSCULAR; INTRAVENOUS PRN
Status: DISCONTINUED | OUTPATIENT
Start: 2025-06-24 | End: 2025-06-25 | Stop reason: HOSPADM

## 2025-06-24 RX ORDER — LIDOCAINE HYDROCHLORIDE 10 MG/ML
30 INJECTION, SOLUTION INFILTRATION; PERINEURAL
Status: DISCONTINUED | OUTPATIENT
Start: 2025-06-24 | End: 2025-06-25 | Stop reason: HOSPADM

## 2025-06-24 RX ADMIN — FENTANYL CITRATE 50 MCG: 50 INJECTION, SOLUTION INTRAMUSCULAR; INTRAVENOUS at 09:15

## 2025-06-24 RX ADMIN — MIDAZOLAM HYDROCHLORIDE 4 MG: 1 INJECTION, SOLUTION INTRAMUSCULAR; INTRAVENOUS at 09:16

## 2025-06-24 RX ADMIN — FENTANYL CITRATE 25 MCG: 50 INJECTION, SOLUTION INTRAMUSCULAR; INTRAVENOUS at 09:27

## 2025-06-24 NOTE — PROCEDURES
Pt scheduled for bone marrow biopsy with conscious sedation for diagnosis of Multiple Myeloma.  Pt's history, allergies, and medication list reviewed by nursing and physician prior to start of procedure.  Pt assessed and deemed fit for procedure by physician.    Height: 5'5\"  Weight: 156 LBS    Pre-Procedural Assessment:  Signed, Dated, and timed Informed Consent on the chart  VS's obtained and documented  Pt has a patent Port site (see flowsheets)  Pt has been NPO since 06/23/2025 at 2100  Current Medications, Allergies, and recent H&P reviewed and on chart  Emergency medications and equipment available (crash cart, narcan, flumazenil, O2, suction, etc)  Time out performed prior to procedure (see sedation documentation)  Pre-Procedure Alvino Score: 10    Procedure:  Nursing present throughout procedure to assess, assist, and monitor.  Vital Signs monitored throughout - see sedation documentation. Patient to be discharged from OPO once Alvino Score of 8 or better is achieved or once pre-procedural Alvino Score is obtained.  Medications given: fentanyl 75mcg  Versed 4mg    Post-Procedure:  Pt vital signs stable.  Discharge teaching provided (see AVS).  Post procedure Alvino Score 10.  Pt and family verbalized understanding of all instructions.  Pt discharged from Outpt Oncology with neighbor Refugio who will drive them home.

## 2025-07-18 ENCOUNTER — TELEPHONE (OUTPATIENT)
Dept: FAMILY MEDICINE CLINIC | Age: 75
End: 2025-07-18

## 2025-07-18 DIAGNOSIS — F41.9 ANXIETY DISORDER, UNSPECIFIED: ICD-10-CM

## 2025-07-18 RX ORDER — LORAZEPAM 1 MG/1
TABLET ORAL
Qty: 60 TABLET | Refills: 0 | OUTPATIENT
Start: 2025-07-18 | End: 2025-08-17

## 2025-07-18 NOTE — TELEPHONE ENCOUNTER
Future Appointments   Date Time Provider Department Center   9/3/2025 10:00 AM aMyra Mayen MD [facility] DEP     LOV 6/17/2025

## 2025-07-23 LAB
Lab: NORMAL
THIS TEST SENT TO: NORMAL

## 2025-07-29 RX ORDER — OXYBUTYNIN CHLORIDE 5 MG/1
5 TABLET, EXTENDED RELEASE ORAL DAILY
Qty: 30 TABLET | Refills: 5 | Status: SHIPPED | OUTPATIENT
Start: 2025-07-29

## 2025-07-29 NOTE — TELEPHONE ENCOUNTER
PCP is out of office, Please advise  Lov 6/17/2025  Future Appointments   Date Time Provider Department Center   9/3/2025 10:00 AM Mayra Mayen MD [facility] DEP        - - -

## 2025-09-02 ENCOUNTER — TELEPHONE (OUTPATIENT)
Dept: FAMILY MEDICINE CLINIC | Age: 75
End: 2025-09-02

## 2025-09-03 ENCOUNTER — OFFICE VISIT (OUTPATIENT)
Dept: FAMILY MEDICINE CLINIC | Age: 75
End: 2025-09-03

## 2025-09-03 VITALS
TEMPERATURE: 97.1 F | SYSTOLIC BLOOD PRESSURE: 130 MMHG | WEIGHT: 157 LBS | BODY MASS INDEX: 26.16 KG/M2 | RESPIRATION RATE: 14 BRPM | DIASTOLIC BLOOD PRESSURE: 70 MMHG | OXYGEN SATURATION: 99 % | HEART RATE: 75 BPM | HEIGHT: 65 IN

## 2025-09-03 DIAGNOSIS — Z86.19 HISTORY OF HEPATITIS C: ICD-10-CM

## 2025-09-03 DIAGNOSIS — D64.9 NORMOCYTIC ANEMIA: ICD-10-CM

## 2025-09-03 DIAGNOSIS — C90.00 MULTIPLE MYELOMA, REMISSION STATUS UNSPECIFIED (HCC): ICD-10-CM

## 2025-09-03 DIAGNOSIS — R21 FACIAL RASH: ICD-10-CM

## 2025-09-03 DIAGNOSIS — Z00.00 MEDICARE ANNUAL WELLNESS VISIT, SUBSEQUENT: Primary | ICD-10-CM

## 2025-09-03 DIAGNOSIS — F39 MOOD DISORDER: ICD-10-CM

## 2025-09-03 DIAGNOSIS — F43.9 STRESS AT HOME: ICD-10-CM

## 2025-09-03 DIAGNOSIS — R32 URINARY INCONTINENCE, UNSPECIFIED TYPE: ICD-10-CM

## 2025-09-03 DIAGNOSIS — I10 PRIMARY HYPERTENSION: ICD-10-CM

## 2025-09-03 RX ORDER — TRIAMCINOLONE ACETONIDE 0.25 MG/G
CREAM TOPICAL
Qty: 15 G | Refills: 0 | Status: SHIPPED | OUTPATIENT
Start: 2025-09-03

## 2025-09-03 RX ORDER — LORAZEPAM 1 MG/1
1 TABLET ORAL 2 TIMES DAILY
Qty: 180 TABLET | Refills: 0 | Status: SHIPPED | OUTPATIENT
Start: 2025-09-03 | End: 2025-12-02

## 2025-09-05 ENCOUNTER — TELEPHONE (OUTPATIENT)
Dept: FAMILY MEDICINE CLINIC | Age: 75
End: 2025-09-05

## 2025-09-05 RX ORDER — HYDROXYZINE HYDROCHLORIDE 25 MG/1
25 TABLET, FILM COATED ORAL NIGHTLY
Qty: 90 TABLET | Refills: 3 | Status: SHIPPED | OUTPATIENT
Start: 2025-09-05

## 2025-09-05 RX ORDER — CITALOPRAM HYDROBROMIDE 10 MG/1
10 TABLET ORAL DAILY
Qty: 90 TABLET | Refills: 3 | Status: SHIPPED | OUTPATIENT
Start: 2025-09-05

## (undated) DEVICE — APPLICATOR PREP 26ML 0.7% IOD POVACRYLEX 74% ISO ALC ST

## (undated) DEVICE — Z INACTIVE USE 2855096 SPONGE GZ W4XL4IN 8 PLY 100% COT

## (undated) DEVICE — GARMENT,MEDLINE,DVT,INT,CALF,MED, GEN2: Brand: MEDLINE

## (undated) DEVICE — TOWEL,STOP FLAG GOLD N-W: Brand: MEDLINE

## (undated) DEVICE — BOWL MED L 32OZ PLAS W/ MOLD GRAD EZ OPN PEEL PCH

## (undated) DEVICE — SUTURE PROL SZ 2-0 L36IN NONABSORBABLE BLU SH L26MM 1/2 CIR 8523H

## (undated) DEVICE — C-ARM: Brand: UNBRANDED

## (undated) DEVICE — SUTURE VCRL SZ 3-0 L18IN ABSRB UD L26MM SH 1/2 CIR J864D

## (undated) DEVICE — 3M™ STERI-STRIP™ REINFORCED ADHESIVE SKIN CLOSURES, R1540, 1/8 IN X 3 IN (3 MM X 75 MM), 5 STRIPS/ENVELOPE: Brand: 3M™ STERI-STRIP™

## (undated) DEVICE — SURGICAL SET UP - SURE SET: Brand: MEDLINE INDUSTRIES, INC.

## (undated) DEVICE — APPLICATOR MEDICATED 26 CC SOLUTION HI LT ORNG CHLORAPREP

## (undated) DEVICE — JEWISH HOSPITAL TURNOVER KIT: Brand: MEDLINE INDUSTRIES, INC.

## (undated) DEVICE — STANDARD HYPODERMIC NEEDLE,POLYPROPYLENE HUB: Brand: MONOJECT

## (undated) DEVICE — SYRINGE MED 10ML LUERLOCK TIP W/O SFTY DISP

## (undated) DEVICE — HICKMAN TRIFUSION TRIPLE-LUMEN LONG-TERM CENTRAL VENOUS CATHETER 12F INTERMEDIATE TRAY (23CM): Brand: HICKMAN TRIFUSION

## (undated) DEVICE — 3M™ TEGADERM™ TRANSPARENT FILM DRESSING FRAME STYLE, 1626W, 4 IN X 4-3/4 IN (10 CM X 12 CM), 50/CT 4CT/CASE: Brand: 3M™ TEGADERM™

## (undated) DEVICE — DRAPE,T,LAPARO,TRANS,STERILE: Brand: MEDLINE

## (undated) DEVICE — GLOVE SURG SZ 7 L12IN FNGR THK75MIL WHT LTX POLYMER BEAD

## (undated) DEVICE — SPONGE,LAP,18"X18",DLX,XR,ST,5/PK,40/PK: Brand: MEDLINE

## (undated) DEVICE — CONTAINER,SPECIMEN,PNEU TUBE,3OZ,OR STRL: Brand: MEDLINE

## (undated) DEVICE — SUTURE VCRL SZ 3-0 L27IN ABSRB UD L26MM SH 1/2 CIR J416H

## (undated) DEVICE — GLOVE ORANGE PI 7 1/2   MSG9075

## (undated) DEVICE — SHEET,T,THYROID,STERILE: Brand: MEDLINE

## (undated) DEVICE — CO2 CANNULA,SSOFT,ADLT,7O2,7CO2,M,BLK: Brand: MEDLINE

## (undated) DEVICE — ADAPTER MON OD15X22MM ID15MM STD LUER FIT W/ LIFESAVER

## (undated) DEVICE — ADHESIVE SKIN CLSR 0.7ML TOP DERMBND ADV

## (undated) DEVICE — CONNECTOR IV TB L28MM CLR VLV ACCS NDLLSS DISP MAXPLUS

## (undated) DEVICE — SYRINGE MED 10ML SLIP TIP BLNT FILL AND LUERLOCK DISP

## (undated) DEVICE — SYRINGE 20ML LL S/C 50

## (undated) DEVICE — ELECTROSURGICAL PENCIL ROCKER SWITCH NON COATED BLADE ELECTRODE 10 FT (3 M) CORD HOLSTER: Brand: MEGADYNE

## (undated) DEVICE — SOLUTION IV IRRIG 500ML 0.9% SODIUM CHL 2F7123

## (undated) DEVICE — MAJOR SET UP PK

## (undated) DEVICE — COVER LT HNDL BLU PLAS

## (undated) DEVICE — MEDICINE CUP, GRADUATED, STER: Brand: MEDLINE

## (undated) DEVICE — GOWN SIRUS NONREIN LG W/TWL: Brand: MEDLINE INDUSTRIES, INC.

## (undated) DEVICE — RADIFOCUS GLIDEWIRE: Brand: GLIDEWIRE

## (undated) DEVICE — PROVE COVER: Brand: UNBRANDED

## (undated) DEVICE — 3M™ STERI-STRIP™ COMPOUND BENZOIN TINCTURE 40 BAGS/CARTON 4 CARTONS/CASE C1544: Brand: 3M™ STERI-STRIP™

## (undated) DEVICE — PLATE ES AD W 9FT CRD 2

## (undated) DEVICE — PACK,UNIVERSAL,SPLIT,II,AURORA: Brand: MEDLINE

## (undated) DEVICE — DRAPE C ARM UNIV W41XL74IN CLR PLAS XR VELC CLSR POLY STRP

## (undated) DEVICE — ELECTRODE PT RET AD L9FT HI MOIST COND ADH HYDRGEL CORDED

## (undated) DEVICE — SUTURE VCRL SZ 4-0 L18IN ABSRB UD L19MM PS-2 3/8 CIR PRIM J496H